# Patient Record
Sex: FEMALE | Race: ASIAN | NOT HISPANIC OR LATINO | Employment: UNEMPLOYED | URBAN - METROPOLITAN AREA
[De-identification: names, ages, dates, MRNs, and addresses within clinical notes are randomized per-mention and may not be internally consistent; named-entity substitution may affect disease eponyms.]

---

## 2019-12-19 ENCOUNTER — HOSPITAL ENCOUNTER (OUTPATIENT)
Facility: CLINIC | Age: 29
Discharge: HOME | End: 2019-12-19

## 2023-08-05 ENCOUNTER — APPOINTMENT (OUTPATIENT)
Dept: CT IMAGING | Facility: CLINIC | Age: 33
End: 2023-08-05
Attending: PSYCHIATRY & NEUROLOGY
Payer: COMMERCIAL

## 2023-08-05 ENCOUNTER — APPOINTMENT (OUTPATIENT)
Dept: GENERAL RADIOLOGY | Facility: CLINIC | Age: 33
End: 2023-08-05
Attending: STUDENT IN AN ORGANIZED HEALTH CARE EDUCATION/TRAINING PROGRAM
Payer: COMMERCIAL

## 2023-08-05 ENCOUNTER — HOSPITAL ENCOUNTER (EMERGENCY)
Facility: CLINIC | Age: 33
Discharge: HOME | End: 2023-08-05
Attending: EMERGENCY MEDICINE | Admitting: INTERNAL MEDICINE
Payer: COMMERCIAL

## 2023-08-05 ENCOUNTER — APPOINTMENT (OUTPATIENT)
Dept: CT IMAGING | Facility: CLINIC | Age: 33
End: 2023-08-05
Attending: STUDENT IN AN ORGANIZED HEALTH CARE EDUCATION/TRAINING PROGRAM
Payer: COMMERCIAL

## 2023-08-05 ENCOUNTER — HOSPITAL ENCOUNTER (INPATIENT)
Facility: CLINIC | Age: 33
LOS: 11 days | Discharge: HOME | End: 2023-08-16
Admitting: INTERNAL MEDICINE
Payer: COMMERCIAL

## 2023-08-05 VITALS
SYSTOLIC BLOOD PRESSURE: 87 MMHG | RESPIRATION RATE: 20 BRPM | DIASTOLIC BLOOD PRESSURE: 72 MMHG | OXYGEN SATURATION: 100 % | HEART RATE: 91 BPM | WEIGHT: 123 LBS

## 2023-08-05 DIAGNOSIS — I46.9 CARDIAC ARREST (H): ICD-10-CM

## 2023-08-05 LAB
ABO/RH(D): NORMAL
ALBUMIN SERPL BCG-MCNC: 3.6 G/DL (ref 3.5–5.2)
ALLEN'S TEST: ABNORMAL
ALP SERPL-CCNC: 49 U/L (ref 35–129)
ALT SERPL W P-5'-P-CCNC: 51 U/L (ref 0–70)
AMPHETAMINES UR QL SCN: ABNORMAL
ANION GAP SERPL CALCULATED.3IONS-SCNC: 14 MMOL/L (ref 7–15)
ANTIBODY SCREEN: NEGATIVE
AST SERPL W P-5'-P-CCNC: 80 U/L (ref 0–45)
ATRIAL RATE - MUSE: 90 BPM
BARBITURATES UR QL SCN: ABNORMAL
BASE EXCESS BLDA CALC-SCNC: -8.3 MMOL/L (ref -9.6–2)
BASE EXCESS BLDA CALC-SCNC: -9.2 MMOL/L (ref -9–1.8)
BENZODIAZ UR QL SCN: ABNORMAL
BILIRUB SERPL-MCNC: 0.6 MG/DL
BUN SERPL-MCNC: 17.4 MG/DL (ref 8–23)
BZE UR QL SCN: ABNORMAL
CA-I BLD-MCNC: 4.3 MG/DL (ref 4.4–5.2)
CA-I BLD-MCNC: 4.4 MG/DL (ref 4.4–5.2)
CA-I BLD-MCNC: 4.9 MG/DL (ref 4.4–5.2)
CALCIUM SERPL-MCNC: 7.5 MG/DL (ref 8.2–9.6)
CANNABINOIDS UR QL SCN: ABNORMAL
CHLORIDE SERPL-SCNC: 111 MMOL/L (ref 98–107)
CHOLEST SERPL-MCNC: 115 MG/DL
CPB POCT: NO
CREAT BLD-MCNC: 1 MG/DL (ref 0.5–1)
CREAT SERPL-MCNC: 0.87 MG/DL (ref 0.51–1.17)
DEPRECATED HCO3 PLAS-SCNC: 17 MMOL/L (ref 22–29)
DIASTOLIC BLOOD PRESSURE - MUSE: NORMAL MMHG
ERYTHROCYTE [DISTWIDTH] IN BLOOD BY AUTOMATED COUNT: 12.7 % (ref 10–15)
ETHANOL UR QL SCN: ABNORMAL
FIBRINOGEN PPP-MCNC: 173 MG/DL (ref 170–490)
GFR SERPL CREATININE-BSD FRML MDRD: 43 ML/MIN/1.73M2
GFR SERPL CREATININE-BSD FRML MDRD: ABNORMAL ML/MIN/{1.73_M2}
GLUCOSE BLD-MCNC: 150 MG/DL (ref 70–99)
GLUCOSE BLD-MCNC: 165 MG/DL (ref 70–99)
GLUCOSE BLDC GLUCOMTR-MCNC: 124 MG/DL (ref 70–99)
GLUCOSE BLDC GLUCOMTR-MCNC: 146 MG/DL (ref 70–99)
GLUCOSE BLDC GLUCOMTR-MCNC: 149 MG/DL (ref 70–99)
GLUCOSE SERPL-MCNC: 170 MG/DL (ref 70–99)
HCO3 BLD-SCNC: 19 MMOL/L (ref 21–28)
HCO3 BLDA-SCNC: 17 MMOL/L (ref 21–28)
HCO3 BLDV-SCNC: 19 MMOL/L (ref 21–28)
HCO3 BLDV-SCNC: 20 MMOL/L (ref 21–28)
HCT VFR BLD AUTO: 38.7 % (ref 35–53)
HCT VFR BLD CALC: 41 % (ref 35–47)
HDLC SERPL-MCNC: 61 MG/DL
HGB BLD-MCNC: 12 G/DL (ref 11.7–17.7)
HGB BLD-MCNC: 12.3 G/DL (ref 11.7–15.7)
HGB BLD-MCNC: 13.9 G/DL (ref 11.7–15.7)
HOLD SPECIMEN: NORMAL
INR PPP: 1.22 (ref 0.85–1.15)
INTERPRETATION ECG - MUSE: NORMAL
LACTATE BLD-SCNC: 4.2 MMOL/L
LACTATE BLD-SCNC: 6.6 MMOL/L
LACTATE SERPL-SCNC: 4.6 MMOL/L (ref 0.7–2)
LDLC SERPL CALC-MCNC: 40 MG/DL
MAGNESIUM SERPL-MCNC: 1.7 MG/DL (ref 1.7–2.3)
MCH RBC QN AUTO: 27.6 PG (ref 26.5–33)
MCHC RBC AUTO-ENTMCNC: 31 G/DL (ref 31.5–36.5)
MCV RBC AUTO: 89 FL (ref 78–100)
NONHDLC SERPL-MCNC: 54 MG/DL
O2/TOTAL GAS SETTING VFR VENT: 100 %
O2/TOTAL GAS SETTING VFR VENT: 50 %
OPIATES UR QL SCN: ABNORMAL
OXYHGB MFR BLD: 99 % (ref 92–100)
OXYHGB MFR BLDA: 100 % (ref 92–100)
P AXIS - MUSE: 92 DEGREES
PCO2 BLD: 46 MM HG (ref 35–45)
PCO2 BLDA: 34 MM HG (ref 35–45)
PCO2 BLDV: 48 MM HG (ref 40–50)
PCO2 BLDV: 51 MM HG (ref 40–50)
PCP QUAL URINE (ROCHE): ABNORMAL
PH BLD: 7.21 [PH] (ref 7.35–7.45)
PH BLDA: 7.31 [PH] (ref 7.35–7.45)
PH BLDV: 7.2 [PH] (ref 7.32–7.43)
PH BLDV: 7.2 [PH] (ref 7.32–7.43)
PHOSPHATE SERPL-MCNC: 4.3 MG/DL (ref 2.5–4.5)
PLATELET # BLD AUTO: 263 10E3/UL (ref 150–450)
PO2 BLD: 190 MM HG (ref 80–105)
PO2 BLDA: 497 MM HG (ref 80–105)
PO2 BLDV: 34 MM HG (ref 25–47)
PO2 BLDV: 36 MM HG (ref 25–47)
POTASSIUM BLD-SCNC: 3.4 MMOL/L (ref 3.5–5)
POTASSIUM BLD-SCNC: 3.6 MMOL/L (ref 3.4–5.3)
POTASSIUM SERPL-SCNC: 3.7 MMOL/L (ref 3.4–5.3)
PR INTERVAL - MUSE: 140 MS
PROT SERPL-MCNC: 5.7 G/DL (ref 6.4–8.3)
QRS DURATION - MUSE: 80 MS
QT - MUSE: 380 MS
QTC - MUSE: 464 MS
R AXIS - MUSE: 79 DEGREES
RADIOLOGIST FLAGS: ABNORMAL
RBC # BLD AUTO: 4.34 10E6/UL (ref 3.8–5.9)
SAO2 % BLDV: 53 % (ref 94–100)
SAO2 % BLDV: 55 % (ref 94–100)
SODIUM BLD-SCNC: 143 MMOL/L (ref 133–144)
SODIUM BLD-SCNC: 143 MMOL/L (ref 133–144)
SODIUM SERPL-SCNC: 142 MMOL/L (ref 136–145)
SPECIMEN EXPIRATION DATE: NORMAL
SYSTOLIC BLOOD PRESSURE - MUSE: NORMAL MMHG
T AXIS - MUSE: 77 DEGREES
TRIGL SERPL-MCNC: 71 MG/DL
TROPONIN T BLD-MCNC: 0.12 UG/L
TROPONIN T SERPL HS-MCNC: 144 NG/L
TSH SERPL DL<=0.005 MIU/L-ACNC: 3.24 UIU/ML (ref 0.3–4.2)
VENTRICULAR RATE- MUSE: 90 BPM
WBC # BLD AUTO: 24.2 10E3/UL (ref 4–11)

## 2023-08-05 PROCEDURE — 80307 DRUG TEST PRSMV CHEM ANLYZR: CPT | Performed by: STUDENT IN AN ORGANIZED HEALTH CARE EDUCATION/TRAINING PROGRAM

## 2023-08-05 PROCEDURE — 250N000011 HC RX IP 250 OP 636

## 2023-08-05 PROCEDURE — 82565 ASSAY OF CREATININE: CPT

## 2023-08-05 PROCEDURE — 87077 CULTURE AEROBIC IDENTIFY: CPT | Performed by: STUDENT IN AN ORGANIZED HEALTH CARE EDUCATION/TRAINING PROGRAM

## 2023-08-05 PROCEDURE — 87147 CULTURE TYPE IMMUNOLOGIC: CPT | Performed by: STUDENT IN AN ORGANIZED HEALTH CARE EDUCATION/TRAINING PROGRAM

## 2023-08-05 PROCEDURE — 84484 ASSAY OF TROPONIN QUANT: CPT

## 2023-08-05 PROCEDURE — 70498 CT ANGIOGRAPHY NECK: CPT

## 2023-08-05 PROCEDURE — 99152 MOD SED SAME PHYS/QHP 5/>YRS: CPT | Mod: GC | Performed by: INTERNAL MEDICINE

## 2023-08-05 PROCEDURE — 87205 SMEAR GRAM STAIN: CPT | Performed by: STUDENT IN AN ORGANIZED HEALTH CARE EDUCATION/TRAINING PROGRAM

## 2023-08-05 PROCEDURE — 84443 ASSAY THYROID STIM HORMONE: CPT | Performed by: STUDENT IN AN ORGANIZED HEALTH CARE EDUCATION/TRAINING PROGRAM

## 2023-08-05 PROCEDURE — 200N000002 HC R&B ICU UMMC

## 2023-08-05 PROCEDURE — 70496 CT ANGIOGRAPHY HEAD: CPT | Mod: 26 | Performed by: RADIOLOGY

## 2023-08-05 PROCEDURE — 86850 RBC ANTIBODY SCREEN: CPT | Performed by: STUDENT IN AN ORGANIZED HEALTH CARE EDUCATION/TRAINING PROGRAM

## 2023-08-05 PROCEDURE — 87635 SARS-COV-2 COVID-19 AMP PRB: CPT | Performed by: STUDENT IN AN ORGANIZED HEALTH CARE EDUCATION/TRAINING PROGRAM

## 2023-08-05 PROCEDURE — 36556 INSERT NON-TUNNEL CV CATH: CPT

## 2023-08-05 PROCEDURE — 74018 RADEX ABDOMEN 1 VIEW: CPT | Mod: 26 | Performed by: RADIOLOGY

## 2023-08-05 PROCEDURE — 85027 COMPLETE CBC AUTOMATED: CPT | Performed by: STUDENT IN AN ORGANIZED HEALTH CARE EDUCATION/TRAINING PROGRAM

## 2023-08-05 PROCEDURE — 83735 ASSAY OF MAGNESIUM: CPT | Performed by: STUDENT IN AN ORGANIZED HEALTH CARE EDUCATION/TRAINING PROGRAM

## 2023-08-05 PROCEDURE — 93308 TTE F-UP OR LMTD: CPT | Mod: 26 | Performed by: EMERGENCY MEDICINE

## 2023-08-05 PROCEDURE — 82330 ASSAY OF CALCIUM: CPT | Performed by: STUDENT IN AN ORGANIZED HEALTH CARE EDUCATION/TRAINING PROGRAM

## 2023-08-05 PROCEDURE — 82947 ASSAY GLUCOSE BLOOD QUANT: CPT

## 2023-08-05 PROCEDURE — 80061 LIPID PANEL: CPT | Performed by: STUDENT IN AN ORGANIZED HEALTH CARE EDUCATION/TRAINING PROGRAM

## 2023-08-05 PROCEDURE — 84484 ASSAY OF TROPONIN QUANT: CPT | Performed by: STUDENT IN AN ORGANIZED HEALTH CARE EDUCATION/TRAINING PROGRAM

## 2023-08-05 PROCEDURE — 31500 INSERT EMERGENCY AIRWAY: CPT | Performed by: EMERGENCY MEDICINE

## 2023-08-05 PROCEDURE — 86316 IMMUNOASSAY TUMOR OTHER: CPT

## 2023-08-05 PROCEDURE — 999N000185 HC STATISTIC TRANSPORT TIME EA 15 MIN

## 2023-08-05 PROCEDURE — 250N000009 HC RX 250: Performed by: EMERGENCY MEDICINE

## 2023-08-05 PROCEDURE — 5A1945Z RESPIRATORY VENTILATION, 24-96 CONSECUTIVE HOURS: ICD-10-PCS | Performed by: INTERNAL MEDICINE

## 2023-08-05 PROCEDURE — 85384 FIBRINOGEN ACTIVITY: CPT | Performed by: STUDENT IN AN ORGANIZED HEALTH CARE EDUCATION/TRAINING PROGRAM

## 2023-08-05 PROCEDURE — 84132 ASSAY OF SERUM POTASSIUM: CPT

## 2023-08-05 PROCEDURE — 82803 BLOOD GASES ANY COMBINATION: CPT | Mod: 91

## 2023-08-05 PROCEDURE — 94002 VENT MGMT INPAT INIT DAY: CPT

## 2023-08-05 PROCEDURE — 93454 CORONARY ARTERY ANGIO S&I: CPT | Performed by: INTERNAL MEDICINE

## 2023-08-05 PROCEDURE — 250N000013 HC RX MED GY IP 250 OP 250 PS 637: Performed by: STUDENT IN AN ORGANIZED HEALTH CARE EDUCATION/TRAINING PROGRAM

## 2023-08-05 PROCEDURE — 272N000001 HC OR GENERAL SUPPLY STERILE: Performed by: INTERNAL MEDICINE

## 2023-08-05 PROCEDURE — 999N000157 HC STATISTIC RCP TIME EA 10 MIN

## 2023-08-05 PROCEDURE — 74176 CT ABD & PELVIS W/O CONTRAST: CPT | Mod: 26 | Performed by: RADIOLOGY

## 2023-08-05 PROCEDURE — 82805 BLOOD GASES W/O2 SATURATION: CPT | Performed by: STUDENT IN AN ORGANIZED HEALTH CARE EDUCATION/TRAINING PROGRAM

## 2023-08-05 PROCEDURE — 83605 ASSAY OF LACTIC ACID: CPT

## 2023-08-05 PROCEDURE — 84450 TRANSFERASE (AST) (SGOT): CPT | Performed by: STUDENT IN AN ORGANIZED HEALTH CARE EDUCATION/TRAINING PROGRAM

## 2023-08-05 PROCEDURE — 70498 CT ANGIOGRAPHY NECK: CPT | Mod: 26 | Performed by: RADIOLOGY

## 2023-08-05 PROCEDURE — 999N000065 XR CHEST PORT 1 VIEW

## 2023-08-05 PROCEDURE — 250N000011 HC RX IP 250 OP 636: Performed by: STUDENT IN AN ORGANIZED HEALTH CARE EDUCATION/TRAINING PROGRAM

## 2023-08-05 PROCEDURE — 82810 BLOOD GASES O2 SAT ONLY: CPT

## 2023-08-05 PROCEDURE — 83605 ASSAY OF LACTIC ACID: CPT | Performed by: STUDENT IN AN ORGANIZED HEALTH CARE EDUCATION/TRAINING PROGRAM

## 2023-08-05 PROCEDURE — 70496 CT ANGIOGRAPHY HEAD: CPT

## 2023-08-05 PROCEDURE — 84155 ASSAY OF PROTEIN SERUM: CPT | Performed by: STUDENT IN AN ORGANIZED HEALTH CARE EDUCATION/TRAINING PROGRAM

## 2023-08-05 PROCEDURE — 71250 CT THORAX DX C-: CPT | Mod: 26 | Performed by: RADIOLOGY

## 2023-08-05 PROCEDURE — 250N000011 HC RX IP 250 OP 636: Performed by: INTERNAL MEDICINE

## 2023-08-05 PROCEDURE — C1894 INTRO/SHEATH, NON-LASER: HCPCS | Performed by: INTERNAL MEDICINE

## 2023-08-05 PROCEDURE — 70450 CT HEAD/BRAIN W/O DYE: CPT | Mod: 77

## 2023-08-05 PROCEDURE — 84100 ASSAY OF PHOSPHORUS: CPT | Performed by: STUDENT IN AN ORGANIZED HEALTH CARE EDUCATION/TRAINING PROGRAM

## 2023-08-05 PROCEDURE — B2111ZZ FLUOROSCOPY OF MULTIPLE CORONARY ARTERIES USING LOW OSMOLAR CONTRAST: ICD-10-PCS | Performed by: INTERNAL MEDICINE

## 2023-08-05 PROCEDURE — 71045 X-RAY EXAM CHEST 1 VIEW: CPT | Mod: 26 | Performed by: RADIOLOGY

## 2023-08-05 PROCEDURE — 93010 ELECTROCARDIOGRAM REPORT: CPT | Mod: 59 | Performed by: EMERGENCY MEDICINE

## 2023-08-05 PROCEDURE — 82803 BLOOD GASES ANY COMBINATION: CPT

## 2023-08-05 PROCEDURE — 36556 INSERT NON-TUNNEL CV CATH: CPT | Mod: 59 | Performed by: INTERNAL MEDICINE

## 2023-08-05 PROCEDURE — C1751 CATH, INF, PER/CENT/MIDLINE: HCPCS | Performed by: INTERNAL MEDICINE

## 2023-08-05 PROCEDURE — 93454 CORONARY ARTERY ANGIO S&I: CPT | Mod: 26 | Performed by: INTERNAL MEDICINE

## 2023-08-05 PROCEDURE — 83036 HEMOGLOBIN GLYCOSYLATED A1C: CPT | Performed by: STUDENT IN AN ORGANIZED HEALTH CARE EDUCATION/TRAINING PROGRAM

## 2023-08-05 PROCEDURE — 258N000003 HC RX IP 258 OP 636

## 2023-08-05 PROCEDURE — 410N000003 HC PER-PERFUSION 1ST 30 MIN: Performed by: INTERNAL MEDICINE

## 2023-08-05 PROCEDURE — C1769 GUIDE WIRE: HCPCS | Performed by: INTERNAL MEDICINE

## 2023-08-05 PROCEDURE — 74018 RADEX ABDOMEN 1 VIEW: CPT | Mod: 26 | Performed by: STUDENT IN AN ORGANIZED HEALTH CARE EDUCATION/TRAINING PROGRAM

## 2023-08-05 PROCEDURE — 250N000011 HC RX IP 250 OP 636: Mod: JZ | Performed by: INTERNAL MEDICINE

## 2023-08-05 PROCEDURE — 70450 CT HEAD/BRAIN W/O DYE: CPT

## 2023-08-05 PROCEDURE — 999N000065 XR ABDOMEN PORT 1 VIEW

## 2023-08-05 PROCEDURE — 71250 CT THORAX DX C-: CPT

## 2023-08-05 PROCEDURE — 258N000003 HC RX IP 258 OP 636: Performed by: INTERNAL MEDICINE

## 2023-08-05 PROCEDURE — 82330 ASSAY OF CALCIUM: CPT

## 2023-08-05 PROCEDURE — 99291 CRITICAL CARE FIRST HOUR: CPT | Mod: 25

## 2023-08-05 PROCEDURE — 36415 COLL VENOUS BLD VENIPUNCTURE: CPT | Performed by: STUDENT IN AN ORGANIZED HEALTH CARE EDUCATION/TRAINING PROGRAM

## 2023-08-05 PROCEDURE — 99291 CRITICAL CARE FIRST HOUR: CPT | Mod: 25 | Performed by: EMERGENCY MEDICINE

## 2023-08-05 PROCEDURE — 93005 ELECTROCARDIOGRAM TRACING: CPT | Mod: 59

## 2023-08-05 PROCEDURE — 99152 MOD SED SAME PHYS/QHP 5/>YRS: CPT | Performed by: INTERNAL MEDICINE

## 2023-08-05 PROCEDURE — 71045 X-RAY EXAM CHEST 1 VIEW: CPT | Mod: 26 | Performed by: STUDENT IN AN ORGANIZED HEALTH CARE EDUCATION/TRAINING PROGRAM

## 2023-08-05 PROCEDURE — 93005 ELECTROCARDIOGRAM TRACING: CPT

## 2023-08-05 PROCEDURE — 93308 TTE F-UP OR LMTD: CPT

## 2023-08-05 PROCEDURE — 3E043XZ INTRODUCTION OF VASOPRESSOR INTO CENTRAL VEIN, PERCUTANEOUS APPROACH: ICD-10-PCS | Performed by: INTERNAL MEDICINE

## 2023-08-05 PROCEDURE — 85610 PROTHROMBIN TIME: CPT | Performed by: STUDENT IN AN ORGANIZED HEALTH CARE EDUCATION/TRAINING PROGRAM

## 2023-08-05 PROCEDURE — 250N000011 HC RX IP 250 OP 636: Performed by: EMERGENCY MEDICINE

## 2023-08-05 PROCEDURE — 86901 BLOOD TYPING SEROLOGIC RH(D): CPT | Performed by: STUDENT IN AN ORGANIZED HEALTH CARE EDUCATION/TRAINING PROGRAM

## 2023-08-05 DEVICE — CATH CENTRAL LINE MULTI LUMEN 7FRX20CM CDC-45703-XP1A: Type: IMPLANTABLE DEVICE | Status: FUNCTIONAL

## 2023-08-05 RX ORDER — FENTANYL CITRATE 50 UG/ML
INJECTION, SOLUTION INTRAMUSCULAR; INTRAVENOUS
Status: DISCONTINUED
Start: 2023-08-05 | End: 2023-08-05 | Stop reason: WASHOUT

## 2023-08-05 RX ORDER — CEFTRIAXONE 1 G/1
1 INJECTION, POWDER, FOR SOLUTION INTRAMUSCULAR; INTRAVENOUS EVERY 24 HOURS
Status: DISCONTINUED | OUTPATIENT
Start: 2023-08-05 | End: 2023-08-06

## 2023-08-05 RX ORDER — FENTANYL CITRATE 50 UG/ML
INJECTION, SOLUTION INTRAMUSCULAR; INTRAVENOUS
Status: DISCONTINUED | OUTPATIENT
Start: 2023-08-05 | End: 2023-08-05 | Stop reason: HOSPADM

## 2023-08-05 RX ORDER — IOPAMIDOL 755 MG/ML
75 INJECTION, SOLUTION INTRAVASCULAR ONCE
Status: COMPLETED | OUTPATIENT
Start: 2023-08-05 | End: 2023-08-05

## 2023-08-05 RX ORDER — NALOXONE HYDROCHLORIDE 0.4 MG/ML
0.4 INJECTION, SOLUTION INTRAMUSCULAR; INTRAVENOUS; SUBCUTANEOUS
Status: DISCONTINUED | OUTPATIENT
Start: 2023-08-05 | End: 2023-08-16 | Stop reason: HOSPADM

## 2023-08-05 RX ORDER — NICOTINE POLACRILEX 4 MG
15-30 LOZENGE BUCCAL
Status: DISCONTINUED | OUTPATIENT
Start: 2023-08-05 | End: 2023-08-05

## 2023-08-05 RX ORDER — MAGNESIUM SULFATE HEPTAHYDRATE 40 MG/ML
2 INJECTION, SOLUTION INTRAVENOUS ONCE
Status: COMPLETED | OUTPATIENT
Start: 2023-08-05 | End: 2023-08-05

## 2023-08-05 RX ORDER — DEXMEDETOMIDINE HYDROCHLORIDE 4 UG/ML
.1-1.2 INJECTION, SOLUTION INTRAVENOUS CONTINUOUS
Status: DISCONTINUED | OUTPATIENT
Start: 2023-08-05 | End: 2023-08-05

## 2023-08-05 RX ORDER — DEXTROSE MONOHYDRATE 100 MG/ML
INJECTION, SOLUTION INTRAVENOUS CONTINUOUS PRN
Status: DISCONTINUED | OUTPATIENT
Start: 2023-08-05 | End: 2023-08-07

## 2023-08-05 RX ORDER — ETOMIDATE 2 MG/ML
20 INJECTION INTRAVENOUS ONCE
Status: COMPLETED | OUTPATIENT
Start: 2023-08-05 | End: 2023-08-05

## 2023-08-05 RX ORDER — NICOTINE POLACRILEX 4 MG
15-30 LOZENGE BUCCAL
Status: DISCONTINUED | OUTPATIENT
Start: 2023-08-05 | End: 2023-08-16 | Stop reason: HOSPADM

## 2023-08-05 RX ORDER — PROPOFOL 10 MG/ML
5-75 INJECTION, EMULSION INTRAVENOUS CONTINUOUS
Status: DISCONTINUED | OUTPATIENT
Start: 2023-08-05 | End: 2023-08-07

## 2023-08-05 RX ORDER — ONDANSETRON 4 MG/1
4 TABLET, ORALLY DISINTEGRATING ORAL EVERY 6 HOURS PRN
Status: DISCONTINUED | OUTPATIENT
Start: 2023-08-05 | End: 2023-08-16 | Stop reason: HOSPADM

## 2023-08-05 RX ORDER — BUSPIRONE HYDROCHLORIDE 5 MG/1
10 TABLET ORAL 3 TIMES DAILY
Status: DISCONTINUED | OUTPATIENT
Start: 2023-08-05 | End: 2023-08-07

## 2023-08-05 RX ORDER — DEXTROSE MONOHYDRATE 25 G/50ML
25-50 INJECTION, SOLUTION INTRAVENOUS
Status: DISCONTINUED | OUTPATIENT
Start: 2023-08-05 | End: 2023-08-05

## 2023-08-05 RX ORDER — PROPOFOL 10 MG/ML
INJECTION, EMULSION INTRAVENOUS
Status: COMPLETED
Start: 2023-08-05 | End: 2023-08-05

## 2023-08-05 RX ORDER — POTASSIUM CHLORIDE 7.45 MG/ML
10 INJECTION INTRAVENOUS ONCE
Status: COMPLETED | OUTPATIENT
Start: 2023-08-05 | End: 2023-08-06

## 2023-08-05 RX ORDER — DEXTROSE MONOHYDRATE 25 G/50ML
25-50 INJECTION, SOLUTION INTRAVENOUS
Status: DISCONTINUED | OUTPATIENT
Start: 2023-08-05 | End: 2023-08-16 | Stop reason: HOSPADM

## 2023-08-05 RX ORDER — NALOXONE HYDROCHLORIDE 0.4 MG/ML
0.2 INJECTION, SOLUTION INTRAMUSCULAR; INTRAVENOUS; SUBCUTANEOUS
Status: DISCONTINUED | OUTPATIENT
Start: 2023-08-05 | End: 2023-08-16 | Stop reason: HOSPADM

## 2023-08-05 RX ORDER — ONDANSETRON 2 MG/ML
4 INJECTION INTRAMUSCULAR; INTRAVENOUS EVERY 6 HOURS PRN
Status: DISCONTINUED | OUTPATIENT
Start: 2023-08-05 | End: 2023-08-16 | Stop reason: HOSPADM

## 2023-08-05 RX ORDER — FENTANYL CITRATE 50 UG/ML
25-50 INJECTION, SOLUTION INTRAMUSCULAR; INTRAVENOUS
Status: DISCONTINUED | OUTPATIENT
Start: 2023-08-05 | End: 2023-08-07

## 2023-08-05 RX ORDER — CALCIUM GLUCONATE 20 MG/ML
1 INJECTION, SOLUTION INTRAVENOUS ONCE
Status: COMPLETED | OUTPATIENT
Start: 2023-08-05 | End: 2023-08-05

## 2023-08-05 RX ORDER — CHLORHEXIDINE GLUCONATE ORAL RINSE 1.2 MG/ML
15 SOLUTION DENTAL EVERY 12 HOURS
Status: DISCONTINUED | OUTPATIENT
Start: 2023-08-05 | End: 2023-08-07

## 2023-08-05 RX ORDER — IOPAMIDOL 755 MG/ML
INJECTION, SOLUTION INTRAVASCULAR
Status: DISCONTINUED | OUTPATIENT
Start: 2023-08-05 | End: 2023-08-05 | Stop reason: HOSPADM

## 2023-08-05 RX ADMIN — CEFTRIAXONE SODIUM 1 G: 1 INJECTION, POWDER, FOR SOLUTION INTRAMUSCULAR; INTRAVENOUS at 23:28

## 2023-08-05 RX ADMIN — Medication 50 MCG/HR: at 21:19

## 2023-08-05 RX ADMIN — PROPOFOL 30 MCG/KG/MIN: 10 INJECTION, EMULSION INTRAVENOUS at 23:14

## 2023-08-05 RX ADMIN — CALCIUM GLUCONATE 1 G: 20 INJECTION, SOLUTION INTRAVENOUS at 22:46

## 2023-08-05 RX ADMIN — ETOMIDATE INJECTION 20 MG: 2 SOLUTION INTRAVENOUS at 19:56

## 2023-08-05 RX ADMIN — SUCCINYLCHOLINE CHLORIDE 100 MG: 20 INJECTION, SOLUTION INTRAMUSCULAR; INTRAVENOUS; PARENTERAL at 19:59

## 2023-08-05 RX ADMIN — MAGNESIUM SULFATE IN WATER 2 G: 40 INJECTION, SOLUTION INTRAVENOUS at 21:41

## 2023-08-05 RX ADMIN — CHLORHEXIDINE GLUCONATE 15 ML: 1.2 SOLUTION ORAL at 22:45

## 2023-08-05 RX ADMIN — EPINEPHRINE 0.02 MCG/KG/MIN: 1 INJECTION INTRAMUSCULAR; INTRAVENOUS; SUBCUTANEOUS at 23:13

## 2023-08-05 RX ADMIN — BUSPIRONE HYDROCHLORIDE 10 MG: 10 TABLET ORAL at 23:13

## 2023-08-05 RX ADMIN — KETAMINE HYDROCHLORIDE 50 MG: 10 INJECTION INTRAMUSCULAR; INTRAVENOUS at 19:57

## 2023-08-05 RX ADMIN — IOPAMIDOL 75 ML: 755 INJECTION, SOLUTION INTRAVENOUS at 22:56

## 2023-08-05 ASSESSMENT — ACTIVITIES OF DAILY LIVING (ADL)
ADLS_ACUITY_SCORE: 35
ADLS_ACUITY_SCORE: 35
ADLS_ACUITY_SCORE: 39
ADLS_ACUITY_SCORE: 35

## 2023-08-05 NOTE — LETTER
[facility] 6D INTERMEDIATE CARE  [address] [facility] [address]  [phone]      2023    Estela Fry  [address] [place] [address]  [phone] (home)     : 1990      To Whom it may concern:    Estela Fry was admitted to the hospital from 23 - 23.     Sincerely,    Delia Beal PA-C

## 2023-08-05 NOTE — Clinical Note
Total contrast given (ml) 20 Detail Level: Detailed Quality 130: Documentation Of Current Medications In The Medical Record: Current Medications Documented Quality 131: Pain Assessment And Follow-Up: Pain assessment using a standardized tool is documented as negative, no follow-up plan required Quality 110: Preventive Care And Screening: Influenza Immunization: Influenza immunization was not ordered or administered, reason not given

## 2023-08-05 NOTE — Clinical Note
Case will be done under monitored anesthesia care; CRNA and/or MDA present throughout case. Please see anesthesia record for full documentation of medications, hemodynamics, respiratory status, and level of consciousness.

## 2023-08-05 NOTE — Clinical Note
dry, intact, no bleeding and no hematoma. All sites are closed and covered with steri strips and sterile semi-porous dressing.

## 2023-08-05 NOTE — LETTER
Transition Communication Hand-off for Care Transitions to Next Level of Care Provider    Name: Estela Fry  : 1990  MRN #: [id number]  Primary Care Provider: [facility]     Primary Clinic: [facility]  [facility] [address]     Reason for Hospitalization:  Cardiac arrest (H) [I46.9]  Admit Date/Time: 2023  8:13 PM  Discharge Date: 23  Payor Source: Payor: [facility] / Plan: [facility] PMAP / Product Type: HMO /            Reason for Communication Hand-off Referral: Other continuity of care    Discharge Plan: See AVS    Discharge Needs Assessment:  Needs      Flowsheet Row Most Recent Value   Equipment Currently Used at Home none          Follow-up plan:    Future Appointments   Date Time Provider Department Center   2023  8:30 AM Ana Bales MD [facility]   10/18/2023 11:30 AM Marcia Wilhelm MD [facility]       Key Recommendations:  See AVS    Lety Sena RN    AVS/Discharge Summary is the source of truth; this is a helpful guide for improved communication of patient story

## 2023-08-05 NOTE — Clinical Note
Monitored Anesthesia Care is planned for this procedure.    Provider immediate assessment completed.

## 2023-08-06 ENCOUNTER — APPOINTMENT (OUTPATIENT)
Dept: CT IMAGING | Facility: CLINIC | Age: 33
End: 2023-08-06
Attending: STUDENT IN AN ORGANIZED HEALTH CARE EDUCATION/TRAINING PROGRAM
Payer: COMMERCIAL

## 2023-08-06 ENCOUNTER — APPOINTMENT (OUTPATIENT)
Dept: ULTRASOUND IMAGING | Facility: CLINIC | Age: 33
End: 2023-08-06
Attending: STUDENT IN AN ORGANIZED HEALTH CARE EDUCATION/TRAINING PROGRAM
Payer: COMMERCIAL

## 2023-08-06 ENCOUNTER — ANCILLARY PROCEDURE (OUTPATIENT)
Dept: ULTRASOUND IMAGING | Facility: CLINIC | Age: 33
End: 2023-08-06
Attending: EMERGENCY MEDICINE
Payer: COMMERCIAL

## 2023-08-06 ENCOUNTER — APPOINTMENT (OUTPATIENT)
Dept: CARDIOLOGY | Facility: CLINIC | Age: 33
End: 2023-08-06
Attending: STUDENT IN AN ORGANIZED HEALTH CARE EDUCATION/TRAINING PROGRAM
Payer: COMMERCIAL

## 2023-08-06 LAB
ALBUMIN SERPL BCG-MCNC: 2.8 G/DL (ref 3.5–5.2)
ALBUMIN SERPL BCG-MCNC: 2.9 G/DL (ref 3.5–5.2)
ALBUMIN SERPL BCG-MCNC: 3.2 G/DL (ref 3.5–5.2)
ALBUMIN SERPL BCG-MCNC: 3.5 G/DL (ref 3.5–5.2)
ALBUMIN SERPL BCG-MCNC: 3.6 G/DL (ref 3.5–5.2)
ALLEN'S TEST: ABNORMAL
ALP SERPL-CCNC: 36 U/L (ref 35–129)
ALP SERPL-CCNC: 36 U/L (ref 35–129)
ALP SERPL-CCNC: 40 U/L (ref 35–129)
ALP SERPL-CCNC: 42 U/L (ref 35–129)
ALP SERPL-CCNC: 43 U/L (ref 35–129)
ALT SERPL W P-5'-P-CCNC: 33 U/L (ref 0–70)
ALT SERPL W P-5'-P-CCNC: 35 U/L (ref 0–70)
ALT SERPL W P-5'-P-CCNC: 36 U/L (ref 0–70)
ALT SERPL W P-5'-P-CCNC: 47 U/L (ref 0–70)
ALT SERPL W P-5'-P-CCNC: 49 U/L (ref 0–70)
ANION GAP SERPL CALCULATED.3IONS-SCNC: 12 MMOL/L (ref 7–15)
ANION GAP SERPL CALCULATED.3IONS-SCNC: 8 MMOL/L (ref 7–15)
ANION GAP SERPL CALCULATED.3IONS-SCNC: 9 MMOL/L (ref 7–15)
AST SERPL W P-5'-P-CCNC: 34 U/L (ref 0–45)
AST SERPL W P-5'-P-CCNC: 40 U/L (ref 0–45)
AST SERPL W P-5'-P-CCNC: 40 U/L (ref 0–45)
AST SERPL W P-5'-P-CCNC: 59 U/L (ref 0–45)
AST SERPL W P-5'-P-CCNC: 72 U/L (ref 0–45)
BASE EXCESS BLDA CALC-SCNC: -1.5 MMOL/L (ref -9–1.8)
BASE EXCESS BLDA CALC-SCNC: -2.3 MMOL/L (ref -9–1.8)
BASE EXCESS BLDA CALC-SCNC: -2.4 MMOL/L (ref -9–1.8)
BASE EXCESS BLDA CALC-SCNC: -5.1 MMOL/L (ref -9–1.8)
BASE EXCESS BLDV CALC-SCNC: -0.9 MMOL/L (ref -7.7–1.9)
BILIRUB SERPL-MCNC: 0.5 MG/DL
BILIRUB SERPL-MCNC: 0.6 MG/DL
BILIRUB SERPL-MCNC: 0.8 MG/DL
BILIRUB SERPL-MCNC: 0.9 MG/DL
BILIRUB SERPL-MCNC: 0.9 MG/DL
BUN SERPL-MCNC: 10 MG/DL (ref 8–23)
BUN SERPL-MCNC: 10.5 MG/DL (ref 8–23)
BUN SERPL-MCNC: 13.6 MG/DL (ref 8–23)
BUN SERPL-MCNC: 16.5 MG/DL (ref 8–23)
BUN SERPL-MCNC: 8.9 MG/DL (ref 8–23)
C PNEUM DNA SPEC QL NAA+PROBE: NOT DETECTED
CA-I BLD-MCNC: 4.4 MG/DL (ref 4.4–5.2)
CA-I BLD-MCNC: 4.4 MG/DL (ref 4.4–5.2)
CA-I BLD-MCNC: 4.5 MG/DL (ref 4.4–5.2)
CA-I BLD-MCNC: 4.6 MG/DL (ref 4.4–5.2)
CA-I BLD-MCNC: 5.5 MG/DL (ref 4.4–5.2)
CALCIUM SERPL-MCNC: 7.5 MG/DL (ref 8.2–9.6)
CALCIUM SERPL-MCNC: 7.6 MG/DL (ref 8.2–9.6)
CALCIUM SERPL-MCNC: 7.8 MG/DL (ref 8.2–9.6)
CALCIUM SERPL-MCNC: 8 MG/DL (ref 8.2–9.6)
CALCIUM SERPL-MCNC: 9.5 MG/DL (ref 8.2–9.6)
CHLORIDE SERPL-SCNC: 108 MMOL/L (ref 98–107)
CHLORIDE SERPL-SCNC: 110 MMOL/L (ref 98–107)
CREAT SERPL-MCNC: 0.59 MG/DL (ref 0.51–1.17)
CREAT SERPL-MCNC: 0.6 MG/DL (ref 0.51–1.17)
CREAT SERPL-MCNC: 0.61 MG/DL (ref 0.51–1.17)
CREAT SERPL-MCNC: 0.67 MG/DL (ref 0.51–1.17)
CREAT SERPL-MCNC: 0.77 MG/DL (ref 0.51–1.17)
DEPRECATED HCO3 PLAS-SCNC: 18 MMOL/L (ref 22–29)
DEPRECATED HCO3 PLAS-SCNC: 20 MMOL/L (ref 22–29)
DEPRECATED HCO3 PLAS-SCNC: 20 MMOL/L (ref 22–29)
DEPRECATED HCO3 PLAS-SCNC: 21 MMOL/L (ref 22–29)
DEPRECATED HCO3 PLAS-SCNC: 22 MMOL/L (ref 22–29)
ERYTHROCYTE [DISTWIDTH] IN BLOOD BY AUTOMATED COUNT: 12.8 % (ref 10–15)
ERYTHROCYTE [DISTWIDTH] IN BLOOD BY AUTOMATED COUNT: 12.9 % (ref 10–15)
ERYTHROCYTE [DISTWIDTH] IN BLOOD BY AUTOMATED COUNT: 13 % (ref 10–15)
FLUAV H1 2009 PAND RNA SPEC QL NAA+PROBE: NOT DETECTED
FLUAV H1 RNA SPEC QL NAA+PROBE: NOT DETECTED
FLUAV H3 RNA SPEC QL NAA+PROBE: NOT DETECTED
FLUAV RNA SPEC QL NAA+PROBE: NOT DETECTED
FLUBV RNA SPEC QL NAA+PROBE: NOT DETECTED
GFR SERPL CREATININE-BSD FRML MDRD: ABNORMAL ML/MIN/{1.73_M2}
GLUCOSE BLDC GLUCOMTR-MCNC: 103 MG/DL (ref 70–99)
GLUCOSE BLDC GLUCOMTR-MCNC: 111 MG/DL (ref 70–99)
GLUCOSE BLDC GLUCOMTR-MCNC: 116 MG/DL (ref 70–99)
GLUCOSE BLDC GLUCOMTR-MCNC: 116 MG/DL (ref 70–99)
GLUCOSE BLDC GLUCOMTR-MCNC: 121 MG/DL (ref 70–99)
GLUCOSE BLDC GLUCOMTR-MCNC: 132 MG/DL (ref 70–99)
GLUCOSE BLDC GLUCOMTR-MCNC: 132 MG/DL (ref 70–99)
GLUCOSE BLDC GLUCOMTR-MCNC: 137 MG/DL (ref 70–99)
GLUCOSE BLDC GLUCOMTR-MCNC: 142 MG/DL (ref 70–99)
GLUCOSE BLDC GLUCOMTR-MCNC: 98 MG/DL (ref 70–99)
GLUCOSE BLDC GLUCOMTR-MCNC: 98 MG/DL (ref 70–99)
GLUCOSE SERPL-MCNC: 107 MG/DL (ref 70–99)
GLUCOSE SERPL-MCNC: 121 MG/DL (ref 70–99)
GLUCOSE SERPL-MCNC: 126 MG/DL (ref 70–99)
GLUCOSE SERPL-MCNC: 140 MG/DL (ref 70–99)
GLUCOSE SERPL-MCNC: 145 MG/DL (ref 70–99)
HADV DNA SPEC QL NAA+PROBE: NOT DETECTED
HBA1C MFR BLD: 5.4 %
HCO3 BLD-SCNC: 20 MMOL/L (ref 21–28)
HCO3 BLD-SCNC: 23 MMOL/L (ref 21–28)
HCO3 BLD-SCNC: 23 MMOL/L (ref 21–28)
HCO3 BLD-SCNC: 24 MMOL/L (ref 21–28)
HCO3 BLDV-SCNC: 25 MMOL/L (ref 21–28)
HCOV PNL SPEC NAA+PROBE: NOT DETECTED
HCT VFR BLD AUTO: 34.6 % (ref 35–53)
HCT VFR BLD AUTO: 36 % (ref 35–53)
HCT VFR BLD AUTO: 40.3 % (ref 35–53)
HGB BLD-MCNC: 11.3 G/DL (ref 11.7–17.7)
HGB BLD-MCNC: 11.5 G/DL (ref 11.7–17.7)
HGB BLD-MCNC: 12.8 G/DL (ref 11.7–17.7)
HMPV RNA SPEC QL NAA+PROBE: NOT DETECTED
HPIV1 RNA SPEC QL NAA+PROBE: NOT DETECTED
HPIV2 RNA SPEC QL NAA+PROBE: NOT DETECTED
HPIV3 RNA SPEC QL NAA+PROBE: NOT DETECTED
HPIV4 RNA SPEC QL NAA+PROBE: NOT DETECTED
LACTATE SERPL-SCNC: 1 MMOL/L (ref 0.7–2)
LACTATE SERPL-SCNC: 1.1 MMOL/L (ref 0.7–2)
LACTATE SERPL-SCNC: 1.1 MMOL/L (ref 0.7–2)
LACTATE SERPL-SCNC: 1.3 MMOL/L (ref 0.7–2)
LACTATE SERPL-SCNC: 1.3 MMOL/L (ref 0.7–2)
LACTATE SERPL-SCNC: 1.5 MMOL/L (ref 0.7–2)
LACTATE SERPL-SCNC: 2.2 MMOL/L (ref 0.7–2)
LACTATE SERPL-SCNC: 2.7 MMOL/L (ref 0.7–2)
M PNEUMO DNA SPEC QL NAA+PROBE: NOT DETECTED
MAGNESIUM SERPL-MCNC: 1.7 MG/DL (ref 1.7–2.3)
MAGNESIUM SERPL-MCNC: 2.5 MG/DL (ref 1.7–2.3)
MCH RBC QN AUTO: 27.9 PG (ref 26.5–33)
MCH RBC QN AUTO: 28 PG (ref 26.5–33)
MCH RBC QN AUTO: 28.6 PG (ref 26.5–33)
MCHC RBC AUTO-ENTMCNC: 31.8 G/DL (ref 31.5–36.5)
MCHC RBC AUTO-ENTMCNC: 31.9 G/DL (ref 31.5–36.5)
MCHC RBC AUTO-ENTMCNC: 32.7 G/DL (ref 31.5–36.5)
MCV RBC AUTO: 88 FL (ref 78–100)
MRSA DNA SPEC QL NAA+PROBE: POSITIVE
O2/TOTAL GAS SETTING VFR VENT: 35 %
O2/TOTAL GAS SETTING VFR VENT: 40 %
OXYHGB MFR BLD: 98 % (ref 92–100)
OXYHGB MFR BLD: 98 % (ref 92–100)
OXYHGB MFR BLD: 99 % (ref 92–100)
OXYHGB MFR BLD: 99 % (ref 92–100)
OXYHGB MFR BLDV: 73 % (ref 70–75)
PCO2 BLD: 36 MM HG (ref 35–45)
PCO2 BLD: 42 MM HG (ref 35–45)
PCO2 BLD: 43 MM HG (ref 35–45)
PCO2 BLD: 43 MM HG (ref 35–45)
PCO2 BLDV: 47 MM HG (ref 40–50)
PH BLD: 7.35 [PH] (ref 7.35–7.45)
PH BLD: 7.36 [PH] (ref 7.35–7.45)
PH BLDV: 7.34 [PH] (ref 7.32–7.43)
PHOSPHATE SERPL-MCNC: 3.4 MG/DL (ref 2.5–4.5)
PLAT MORPH BLD: NORMAL
PLATELET # BLD AUTO: 153 10E3/UL (ref 150–450)
PLATELET # BLD AUTO: 161 10E3/UL (ref 150–450)
PLATELET # BLD AUTO: 199 10E3/UL (ref 150–450)
PO2 BLD: 128 MM HG (ref 80–105)
PO2 BLD: 155 MM HG (ref 80–105)
PO2 BLD: 160 MM HG (ref 80–105)
PO2 BLD: 180 MM HG (ref 80–105)
PO2 BLDV: 43 MM HG (ref 25–47)
POTASSIUM SERPL-SCNC: 3.3 MMOL/L (ref 3.4–5.3)
POTASSIUM SERPL-SCNC: 4 MMOL/L (ref 3.4–5.3)
POTASSIUM SERPL-SCNC: 4 MMOL/L (ref 3.4–5.3)
POTASSIUM SERPL-SCNC: 4.1 MMOL/L (ref 3.4–5.3)
POTASSIUM SERPL-SCNC: 4.2 MMOL/L (ref 3.4–5.3)
POTASSIUM SERPL-SCNC: 5.1 MMOL/L (ref 3.4–5.3)
PROT SERPL-MCNC: 4.7 G/DL (ref 6.4–8.3)
PROT SERPL-MCNC: 4.8 G/DL (ref 6.4–8.3)
PROT SERPL-MCNC: 5.2 G/DL (ref 6.4–8.3)
PROT SERPL-MCNC: 5.5 G/DL (ref 6.4–8.3)
PROT SERPL-MCNC: 5.5 G/DL (ref 6.4–8.3)
RADIOLOGIST FLAGS: ABNORMAL
RBC # BLD AUTO: 3.95 10E6/UL (ref 3.8–5.9)
RBC # BLD AUTO: 4.1 10E6/UL (ref 3.8–5.9)
RBC # BLD AUTO: 4.59 10E6/UL (ref 3.8–5.9)
RBC MORPH BLD: NORMAL
RSV RNA SPEC QL NAA+PROBE: NOT DETECTED
RSV RNA SPEC QL NAA+PROBE: NOT DETECTED
RV+EV RNA SPEC QL NAA+PROBE: NOT DETECTED
SA TARGET DNA: POSITIVE
SARS-COV-2 RNA RESP QL NAA+PROBE: NEGATIVE
SODIUM SERPL-SCNC: 136 MMOL/L (ref 136–145)
SODIUM SERPL-SCNC: 138 MMOL/L (ref 136–145)
TROPONIN T SERPL HS-MCNC: 173 NG/L
TROPONIN T SERPL HS-MCNC: 44 NG/L
TROPONIN T SERPL HS-MCNC: 47 NG/L
TROPONIN T SERPL HS-MCNC: 54 NG/L
UFH PPP CHRO-ACNC: >1.1 IU/ML
WBC # BLD AUTO: 17.7 10E3/UL (ref 4–11)
WBC # BLD AUTO: 18.1 10E3/UL (ref 4–11)
WBC # BLD AUTO: 20.4 10E3/UL (ref 4–11)

## 2023-08-06 PROCEDURE — 85027 COMPLETE CBC AUTOMATED: CPT | Performed by: STUDENT IN AN ORGANIZED HEALTH CARE EDUCATION/TRAINING PROGRAM

## 2023-08-06 PROCEDURE — 250N000011 HC RX IP 250 OP 636

## 2023-08-06 PROCEDURE — 82330 ASSAY OF CALCIUM: CPT | Performed by: STUDENT IN AN ORGANIZED HEALTH CARE EDUCATION/TRAINING PROGRAM

## 2023-08-06 PROCEDURE — 200N000002 HC R&B ICU UMMC

## 2023-08-06 PROCEDURE — 250N000011 HC RX IP 250 OP 636: Mod: JZ | Performed by: STUDENT IN AN ORGANIZED HEALTH CARE EDUCATION/TRAINING PROGRAM

## 2023-08-06 PROCEDURE — 250N000009 HC RX 250

## 2023-08-06 PROCEDURE — 83605 ASSAY OF LACTIC ACID: CPT | Performed by: STUDENT IN AN ORGANIZED HEALTH CARE EDUCATION/TRAINING PROGRAM

## 2023-08-06 PROCEDURE — 83735 ASSAY OF MAGNESIUM: CPT | Performed by: STUDENT IN AN ORGANIZED HEALTH CARE EDUCATION/TRAINING PROGRAM

## 2023-08-06 PROCEDURE — 82805 BLOOD GASES W/O2 SATURATION: CPT | Performed by: STUDENT IN AN ORGANIZED HEALTH CARE EDUCATION/TRAINING PROGRAM

## 2023-08-06 PROCEDURE — 93970 EXTREMITY STUDY: CPT | Mod: 26 | Performed by: RADIOLOGY

## 2023-08-06 PROCEDURE — 84484 ASSAY OF TROPONIN QUANT: CPT | Performed by: STUDENT IN AN ORGANIZED HEALTH CARE EDUCATION/TRAINING PROGRAM

## 2023-08-06 PROCEDURE — 87040 BLOOD CULTURE FOR BACTERIA: CPT | Performed by: STUDENT IN AN ORGANIZED HEALTH CARE EDUCATION/TRAINING PROGRAM

## 2023-08-06 PROCEDURE — 258N000003 HC RX IP 258 OP 636: Performed by: STUDENT IN AN ORGANIZED HEALTH CARE EDUCATION/TRAINING PROGRAM

## 2023-08-06 PROCEDURE — 94002 VENT MGMT INPAT INIT DAY: CPT

## 2023-08-06 PROCEDURE — 87641 MR-STAPH DNA AMP PROBE: CPT | Performed by: STUDENT IN AN ORGANIZED HEALTH CARE EDUCATION/TRAINING PROGRAM

## 2023-08-06 PROCEDURE — 80051 ELECTROLYTE PANEL: CPT | Performed by: STUDENT IN AN ORGANIZED HEALTH CARE EDUCATION/TRAINING PROGRAM

## 2023-08-06 PROCEDURE — 87077 CULTURE AEROBIC IDENTIFY: CPT | Performed by: STUDENT IN AN ORGANIZED HEALTH CARE EDUCATION/TRAINING PROGRAM

## 2023-08-06 PROCEDURE — C9113 INJ PANTOPRAZOLE SODIUM, VIA: HCPCS | Mod: JZ | Performed by: STUDENT IN AN ORGANIZED HEALTH CARE EDUCATION/TRAINING PROGRAM

## 2023-08-06 PROCEDURE — 84132 ASSAY OF SERUM POTASSIUM: CPT | Performed by: STUDENT IN AN ORGANIZED HEALTH CARE EDUCATION/TRAINING PROGRAM

## 2023-08-06 PROCEDURE — 94003 VENT MGMT INPAT SUBQ DAY: CPT

## 2023-08-06 PROCEDURE — 87633 RESP VIRUS 12-25 TARGETS: CPT | Performed by: STUDENT IN AN ORGANIZED HEALTH CARE EDUCATION/TRAINING PROGRAM

## 2023-08-06 PROCEDURE — 84100 ASSAY OF PHOSPHORUS: CPT | Performed by: STUDENT IN AN ORGANIZED HEALTH CARE EDUCATION/TRAINING PROGRAM

## 2023-08-06 PROCEDURE — 250N000013 HC RX MED GY IP 250 OP 250 PS 637: Performed by: STUDENT IN AN ORGANIZED HEALTH CARE EDUCATION/TRAINING PROGRAM

## 2023-08-06 PROCEDURE — 93010 ELECTROCARDIOGRAM REPORT: CPT | Performed by: INTERNAL MEDICINE

## 2023-08-06 PROCEDURE — 99291 CRITICAL CARE FIRST HOUR: CPT | Mod: GC | Performed by: INTERNAL MEDICINE

## 2023-08-06 PROCEDURE — 250N000011 HC RX IP 250 OP 636: Performed by: STUDENT IN AN ORGANIZED HEALTH CARE EDUCATION/TRAINING PROGRAM

## 2023-08-06 PROCEDURE — 36415 COLL VENOUS BLD VENIPUNCTURE: CPT | Performed by: STUDENT IN AN ORGANIZED HEALTH CARE EDUCATION/TRAINING PROGRAM

## 2023-08-06 PROCEDURE — 87486 CHLMYD PNEUM DNA AMP PROBE: CPT | Performed by: STUDENT IN AN ORGANIZED HEALTH CARE EDUCATION/TRAINING PROGRAM

## 2023-08-06 PROCEDURE — 250N000009 HC RX 250: Performed by: STUDENT IN AN ORGANIZED HEALTH CARE EDUCATION/TRAINING PROGRAM

## 2023-08-06 PROCEDURE — 70450 CT HEAD/BRAIN W/O DYE: CPT | Mod: 26 | Performed by: RADIOLOGY

## 2023-08-06 PROCEDURE — 71275 CT ANGIOGRAPHY CHEST: CPT

## 2023-08-06 PROCEDURE — 71275 CT ANGIOGRAPHY CHEST: CPT | Mod: 26 | Performed by: STUDENT IN AN ORGANIZED HEALTH CARE EDUCATION/TRAINING PROGRAM

## 2023-08-06 PROCEDURE — 76775 US EXAM ABDO BACK WALL LIM: CPT | Mod: 26 | Performed by: RADIOLOGY

## 2023-08-06 PROCEDURE — 999N000185 HC STATISTIC TRANSPORT TIME EA 15 MIN

## 2023-08-06 PROCEDURE — 84450 TRANSFERASE (AST) (SGOT): CPT | Performed by: STUDENT IN AN ORGANIZED HEALTH CARE EDUCATION/TRAINING PROGRAM

## 2023-08-06 PROCEDURE — 258N000003 HC RX IP 258 OP 636

## 2023-08-06 PROCEDURE — 93005 ELECTROCARDIOGRAM TRACING: CPT

## 2023-08-06 PROCEDURE — 99254 IP/OBS CNSLTJ NEW/EST MOD 60: CPT | Mod: GC | Performed by: PSYCHIATRY & NEUROLOGY

## 2023-08-06 PROCEDURE — 93306 TTE W/DOPPLER COMPLETE: CPT

## 2023-08-06 PROCEDURE — 76775 US EXAM ABDO BACK WALL LIM: CPT

## 2023-08-06 PROCEDURE — 70450 CT HEAD/BRAIN W/O DYE: CPT

## 2023-08-06 PROCEDURE — 85520 HEPARIN ASSAY: CPT | Performed by: STUDENT IN AN ORGANIZED HEALTH CARE EDUCATION/TRAINING PROGRAM

## 2023-08-06 PROCEDURE — 93970 EXTREMITY STUDY: CPT

## 2023-08-06 PROCEDURE — 999N000157 HC STATISTIC RCP TIME EA 10 MIN

## 2023-08-06 RX ORDER — CALCIUM GLUCONATE 20 MG/ML
1 INJECTION, SOLUTION INTRAVENOUS
Status: DISCONTINUED | OUTPATIENT
Start: 2023-08-06 | End: 2023-08-06

## 2023-08-06 RX ORDER — NOREPINEPHRINE BITARTRATE 0.06 MG/ML
INJECTION, SOLUTION INTRAVENOUS
Status: COMPLETED
Start: 2023-08-06 | End: 2023-08-06

## 2023-08-06 RX ORDER — ADENOSINE 3 MG/ML
INJECTION, SOLUTION INTRAVENOUS
Status: COMPLETED
Start: 2023-08-06 | End: 2023-08-06

## 2023-08-06 RX ORDER — DEXMEDETOMIDINE HYDROCHLORIDE 4 UG/ML
.1-1.2 INJECTION, SOLUTION INTRAVENOUS CONTINUOUS
Status: DISCONTINUED | OUTPATIENT
Start: 2023-08-06 | End: 2023-08-07

## 2023-08-06 RX ORDER — MAGNESIUM SULFATE HEPTAHYDRATE 40 MG/ML
2 INJECTION, SOLUTION INTRAVENOUS ONCE
Status: COMPLETED | OUTPATIENT
Start: 2023-08-07 | End: 2023-08-07

## 2023-08-06 RX ORDER — NOREPINEPHRINE BITARTRATE 0.06 MG/ML
.01-.6 INJECTION, SOLUTION INTRAVENOUS CONTINUOUS
Status: DISCONTINUED | OUTPATIENT
Start: 2023-08-06 | End: 2023-08-07

## 2023-08-06 RX ORDER — HEPARIN SODIUM 10000 [USP'U]/100ML
0-5000 INJECTION, SOLUTION INTRAVENOUS CONTINUOUS
Status: DISPENSED | OUTPATIENT
Start: 2023-08-06 | End: 2023-08-10

## 2023-08-06 RX ORDER — HEPARIN SODIUM 5000 [USP'U]/.5ML
5000 INJECTION, SOLUTION INTRAVENOUS; SUBCUTANEOUS EVERY 12 HOURS
Status: DISCONTINUED | OUTPATIENT
Start: 2023-08-06 | End: 2023-08-06

## 2023-08-06 RX ORDER — CEFTRIAXONE 2 G/1
2 INJECTION, POWDER, FOR SOLUTION INTRAMUSCULAR; INTRAVENOUS EVERY 24 HOURS
Status: COMPLETED | OUTPATIENT
Start: 2023-08-06 | End: 2023-08-09

## 2023-08-06 RX ORDER — NOREPINEPHRINE BITARTRATE 0.06 MG/ML
.01-.6 INJECTION, SOLUTION INTRAVENOUS CONTINUOUS
Status: CANCELLED | OUTPATIENT
Start: 2023-08-06

## 2023-08-06 RX ORDER — IOPAMIDOL 755 MG/ML
52 INJECTION, SOLUTION INTRAVASCULAR ONCE
Status: COMPLETED | OUTPATIENT
Start: 2023-08-06 | End: 2023-08-06

## 2023-08-06 RX ORDER — POTASSIUM CHLORIDE 29.8 MG/ML
20 INJECTION INTRAVENOUS ONCE
Status: COMPLETED | OUTPATIENT
Start: 2023-08-06 | End: 2023-08-06

## 2023-08-06 RX ADMIN — POTASSIUM CHLORIDE 10 MEQ: 7.45 INJECTION INTRAVENOUS at 00:16

## 2023-08-06 RX ADMIN — ONDANSETRON 4 MG: 2 INJECTION INTRAMUSCULAR; INTRAVENOUS at 21:43

## 2023-08-06 RX ADMIN — Medication 0.03 MCG/KG/MIN: at 07:28

## 2023-08-06 RX ADMIN — PANTOPRAZOLE SODIUM 40 MG: 40 INJECTION, POWDER, FOR SOLUTION INTRAVENOUS at 08:12

## 2023-08-06 RX ADMIN — DEXMEDETOMIDINE HYDROCHLORIDE 0.3 MCG/KG/HR: 400 INJECTION INTRAVENOUS at 17:08

## 2023-08-06 RX ADMIN — CALCIUM CHLORIDE 1 G: 100 INJECTION, SOLUTION INTRAVENOUS at 21:06

## 2023-08-06 RX ADMIN — HEPARIN SODIUM 1000 UNITS/HR: 10000 INJECTION, SOLUTION INTRAVENOUS at 14:33

## 2023-08-06 RX ADMIN — Medication 50 MCG/HR: at 08:24

## 2023-08-06 RX ADMIN — DEXMEDETOMIDINE HYDROCHLORIDE 0.2 MCG/KG/HR: 400 INJECTION INTRAVENOUS at 06:40

## 2023-08-06 RX ADMIN — BUSPIRONE HYDROCHLORIDE 10 MG: 10 TABLET ORAL at 19:41

## 2023-08-06 RX ADMIN — SODIUM CHLORIDE, POTASSIUM CHLORIDE, SODIUM LACTATE AND CALCIUM CHLORIDE 1000 ML: 600; 310; 30; 20 INJECTION, SOLUTION INTRAVENOUS at 11:07

## 2023-08-06 RX ADMIN — NOREPINEPHRINE BITARTRATE 0.03 MCG/KG/MIN: 0.06 INJECTION, SOLUTION INTRAVENOUS at 07:28

## 2023-08-06 RX ADMIN — SODIUM CHLORIDE, POTASSIUM CHLORIDE, SODIUM LACTATE AND CALCIUM CHLORIDE 500 ML: 600; 310; 30; 20 INJECTION, SOLUTION INTRAVENOUS at 05:09

## 2023-08-06 RX ADMIN — BUSPIRONE HYDROCHLORIDE 10 MG: 10 TABLET ORAL at 08:37

## 2023-08-06 RX ADMIN — Medication 2 UNITS/HR: at 07:52

## 2023-08-06 RX ADMIN — CHLORHEXIDINE GLUCONATE 15 ML: 1.2 SOLUTION ORAL at 08:52

## 2023-08-06 RX ADMIN — CEFTRIAXONE SODIUM 2 G: 2 INJECTION, POWDER, FOR SOLUTION INTRAMUSCULAR; INTRAVENOUS at 22:10

## 2023-08-06 RX ADMIN — BUSPIRONE HYDROCHLORIDE 10 MG: 10 TABLET ORAL at 14:36

## 2023-08-06 RX ADMIN — IOPAMIDOL 52 ML: 755 INJECTION, SOLUTION INTRAVENOUS at 15:53

## 2023-08-06 RX ADMIN — ADENOSINE 6 MG: 3 INJECTION INTRAVENOUS at 03:15

## 2023-08-06 RX ADMIN — PROPOFOL 40 MCG/KG/MIN: 10 INJECTION, EMULSION INTRAVENOUS at 05:21

## 2023-08-06 RX ADMIN — SODIUM CHLORIDE, POTASSIUM CHLORIDE, SODIUM LACTATE AND CALCIUM CHLORIDE 500 ML: 600; 310; 30; 20 INJECTION, SOLUTION INTRAVENOUS at 06:11

## 2023-08-06 RX ADMIN — SODIUM CHLORIDE, POTASSIUM CHLORIDE, SODIUM LACTATE AND CALCIUM CHLORIDE 1000 ML: 600; 310; 30; 20 INJECTION, SOLUTION INTRAVENOUS at 07:48

## 2023-08-06 RX ADMIN — HEPARIN SODIUM 5000 UNITS: 5000 INJECTION, SOLUTION INTRAVENOUS; SUBCUTANEOUS at 10:38

## 2023-08-06 RX ADMIN — FENTANYL CITRATE 25 MCG: 50 INJECTION, SOLUTION INTRAMUSCULAR; INTRAVENOUS at 10:31

## 2023-08-06 RX ADMIN — POTASSIUM CHLORIDE 20 MEQ: 29.8 INJECTION, SOLUTION INTRAVENOUS at 01:25

## 2023-08-06 RX ADMIN — CHLORHEXIDINE GLUCONATE 15 ML: 1.2 SOLUTION ORAL at 19:40

## 2023-08-06 RX ADMIN — EPINEPHRINE 0.03 MCG/KG/MIN: 1 INJECTION INTRAMUSCULAR; INTRAVENOUS; SUBCUTANEOUS at 08:24

## 2023-08-06 ASSESSMENT — ACTIVITIES OF DAILY LIVING (ADL)
ADLS_ACUITY_SCORE: 46
ADLS_ACUITY_SCORE: 35
ADLS_ACUITY_SCORE: 46
ADLS_ACUITY_SCORE: 35
ADLS_ACUITY_SCORE: 35
ADLS_ACUITY_SCORE: 46
ADLS_ACUITY_SCORE: 35
ADLS_ACUITY_SCORE: 43
ADLS_ACUITY_SCORE: 35
ADLS_ACUITY_SCORE: 35
ADLS_ACUITY_SCORE: 46
ADLS_ACUITY_SCORE: 35
ADLS_ACUITY_SCORE: 39
ADLS_ACUITY_SCORE: 35
ADLS_ACUITY_SCORE: 35
ADLS_ACUITY_SCORE: 39
ADLS_ACUITY_SCORE: 46
ADLS_ACUITY_SCORE: 50
ADLS_ACUITY_SCORE: 46
ADLS_ACUITY_SCORE: 35

## 2023-08-06 NOTE — ED NOTES
Second provider critical care    Critical care was performed.   Critical Care Addendum  My initial assessment, based on my review of prehospital provider report, review of vital signs, focused history, physical exam, review of cardiac rhythm monitor, 12 lead ECG analysis, and discussion with cardiology , established a high suspicion that Latia An has cardiac arrest, which requires immediate intervention, and therefore she is critically ill.     After the initial assessment, the care team initiated multiple lab tests, initiated medication therapy with epi drip, RSI medications, and consulted with cardiology  to provide stabilization care. Due to the critical nature of this patient, I reassessed vital signs, physical exam, mental status, neurologic status, and respiratory status multiple times prior to her disposition.     Time also spent performing reviewing test results, discussion with consultants, and coordination of care.     Critical care time (excluding teaching time and procedures): 35 minutes.     I provided additional critical care in the emergency department for this unidentified patient.  Approximately 31-year-old female that was found to be in cardiac arrest by prehospital providers.  Rhythm interpreted to be V-fib and ROSC after 1 shock.  Patient arrives with i-gel in place.  Initially ultrasounded heart with adequate cardiac activity, no RV dilation.  Brief FAST exam completed with no free fluid in the abdomen.  Uterus appears to be empty.  No significant B-lines.  I-gel was swapped out for ET tube with RSI medications.  Blood pressures were soft in the 80s, epi drip initiated.  Her glucose was normal.  Initial i-STAT labs with mild lactate elevation to 6, otherwise electrolytes largely within normal limits.  Significant other is present and reporting no significant comorbidities.  EKG postarrest is nonischemic.  Chest x-ray confirming ET tube placement.    Concern for possible  intracranial bleeding, electrolyte abnormality, arrhythmia, respiratory arrest as etiologies for cardiac arrest.  The Cath Lab was activated prehospital for this V-fib arrest.  Spoke with Dr. Liz from the cardiology team who insisted on immediate Cath Lab intervention.  Patient was hemodynamically stable with a normal EKG at this time.  Expressed my concern for possible intracranial bleeding and advocated for head CT prior to Cath Lab.  Cardiology did not agree and patient was brought to the Cath Lab by ECMO staff with plan for remainder of their postarrest work-up including head CT deferred until after cath.      MD Lilly Miller Sarah, MD  08/05/23 2030       Abiola Carr MD  08/05/23 0878

## 2023-08-06 NOTE — ED PROVIDER NOTES
ED Provider Note  [facility]      History     Chief Complaint   Patient presents with    Cardiac Arrest     HPI  31 year old female who was BIBA under anonymous protocol with ROSC in field after unwitnessed arrest. EMS reports they have limited information as the patient's family did not speak English. They found her down in her home, unresponsive with vfib on the monitor. She was shocked x1 with ROSC in field. iGel was placed and patient was bagged during transport. She had sinus tachycardia and otherwise nl vitals during transport. No meds were given. Finger stick glucose was around 170. They were not aware of any trauma or suspected overdose. They do not know of any past medical problems. They are not sure how long patient was down but they do believe family members performed bystander CPR. On arrival, patient receiving bag mask ventilations with ambu bag. She opened her eyes shortly after being transferred to hospital stretcher and started gagging/tearing, with extensor posturing. EMS reports they activated ECMO from the field but they stopped in the ED because cath lab was not ready yet.    Patient's  arrived later in the course and reports he was out fishing with his son. The patient called him and mentioned she was taking some herbal remedy/tea to treat recent problem of uterine prolapse. His daughter later called him from home telling him that the patient had collapsed and was not responding. He was on his way driving home and was a few stop lights away. He found patient on the floor of the kitchen, blue and not breathing well when he arrived. He started CPR. He did not see any blood or evidence that their had been any trauma. He did not see any evidence that she had taken a bunch of pills. She does not have a history of self harm or SI. She has not heart or lung issues. No family h/o sudden cardiac death. The only thing he knows that she was taking was an herbal medication  to treat the prolapse. He thinks she may have choked on the medicine and passed out.         Past Medical History  No past medical history on file.  No past surgical history on file.  No current outpatient medications on file.    Not on File  Family History  No family history on file.  Social History          A medically appropriate review of systems was performed with pertinent positives and negatives noted in the HPI, and all other systems negative.    Physical Exam   BP: 103/73  Pulse: 99  Resp: 18  Weight: 55.8 kg (123 lb)  SpO2: 96 %  Physical Exam  Vitals and nursing note reviewed.   Constitutional:       General: She is in acute distress.      Appearance: She is normal weight. She is not diaphoretic.      Interventions: She is intubated. Backboard in place.      Comments: iGel in place   HENT:      Head: Normocephalic and atraumatic.      Nose: Nose normal.      Mouth/Throat:      Mouth: Mucous membranes are moist.   Eyes:      General: Gaze aligned appropriately. No scleral icterus.     Conjunctiva/sclera: Conjunctivae normal.      Right eye: Right conjunctiva is not injected.      Left eye: Left conjunctiva is not injected.      Pupils:      Right eye: Pupil is round.      Left eye: Pupil is not round.      Comments: Pupils 4 mm bilaterally   Cardiovascular:      Rate and Rhythm: Normal rate and regular rhythm.   Pulmonary:      Effort: She is intubated.      Breath sounds: Normal breath sounds.      Comments: BVM ventilations  Abdominal:      General: There is no distension.      Palpations: Abdomen is soft.   Musculoskeletal:         General: No swelling or signs of injury.      Right lower leg: No edema.      Left lower leg: No edema.   Skin:     Coloration: Skin is not cyanotic, jaundiced, mottled or pale.      Findings: No bruising or rash.   Neurological:      GCS: GCS eye subscore is 2. GCS motor subscore is 2.      Cranial Nerves: No facial asymmetry.      Motor: Abnormal muscle tone present.    Psychiatric:      Comments: Unable to assess           ED Course, Procedures, & Data      -Intubation    Date/Time: 8/5/2023 8:38 PM    Performed by: Marla Kang MD  Authorized by: Marla Kang MD    Emergent condition/consent implied    ED EVALUATION:      ASA Class: Class 5- Severe systemic disease with imminent risk of death    Mallampati: N/A- Alternate secured airway    NPO Status: not NPO, emergent situation    UNIVERSAL PROTOCOL   Site Marked: NA  Prior Images Obtained and Reviewed:  NA  Required items: Required blood products, implants, devices and special equipment available    Patient identity confirmed:  Anonymous protocol, patient vented/unresponsive  Patient was reevaluated immediately before administering moderate or deep sedation or anesthesia  Confirmation Checklist:  Correct equipment/implants were available  Universal protocol:     Required blood products, implants, devices, and special equipment available: yes      Patient identity confirmed:  Anonymous protocol, patient vented/unresponsive  Pre-procedure details:     Indications: airway protection, altered consciousness and cardio/pulmonary arrest      Patient status:  Altered mental status    Look externally: no concerns      Induction agents:  Etomidate    Paralytics:  Succinylcholine  Procedure details:     Preoxygenation:  Supraglottic device    CPR in progress: no      Number of attempts:  1  Successful intubation attempt details:     Intubation method:  Oral    Intubation technique: video assisted      Laryngoscope blade:  Mac 3    Bougie used: yes      Grade view: II      Tube size (mm):  7.5    Tube type:  Cuffed    Tube visualized through cords: yes    Placement assessment:     ETT at teeth/gumline (cm):  21    Tube secured with:  ETT navas    Breath sounds:  Equal    Placement verification: chest rise, colorimetric ETCO2, CXR verification, direct visualization and equal breath sounds      CXR findings:  Appropriate  position  Post-procedure details:     Procedure completion:  Patient tolerated the procedure well with no immediate complications    PROCEDURE    Patient Tolerance:  Patient tolerated the procedure well with no immediate complications    Results for orders placed during the hospital encounter of 08/05/23    POC US ECHO LIMITED    Impression  Limited Bedside Cardiac Ultrasound, performed and interpreted by me.  Indication: Pulseless on exam/Cardiac arrest and Hypotension/shock.  subcostal views were acquired.  Image quality was satisfactory.    Findings:  Global left ventricular function appears intact.  Chambers do not appear dilated.  There is no evidence of free fluid within the pericardium.    IMPRESSION: Grossly normal left ventricular function and chamber size.  No pericardial effusion..    ED Course Selections:        EKG Interpretation:      Interpreted by Marla Kang MD  Time reviewed: 1939  Symptoms at time of EKG: ROSC  Rhythm: normal sinus   Rate: normal  Axis: normal  Ectopy: none  Conduction: normal  ST Segments/ T Waves: No ST-T wave changes  Q Waves: none  Comparison to prior: No old EKG available    Clinical Impression: normal EKG          ED Course Selections: The Lactic acid level is elevated due to cardiac arrest, at this time there is no sign of severe sepsis or septic shock.       Results for orders placed or performed during the hospital encounter of 08/05/23   XR Abdomen Port 1 View     Status: None    Narrative    EXAM: XR ABDOMEN PORT 1 VIEW  8/5/2023 7:55 PM     HISTORY:  OG PLACEMENT       COMPARISON:  Same day chest x-ray    FINDINGS:   Portable supine frontal view of the abdomen. Gastric tube tip and  sidehole projected in the stomach.    Nonobstructive bowel gas pattern. No portal venous gas or pneumatosis.    The included osseous structures and soft tissues are within normal  limits.     The lung bases are clear.      Impression    IMPRESSION: Gastric tube tip and sidehole  projected in the stomach.  Nonobstructive bowel gas pattern.     I have personally reviewed the examination and initial interpretation  and I agree with the findings.    BENITEZ RYAN DO         SYSTEM ID:  [id number]   XR Chest Port 1 View     Status: None    Narrative    Exam: XR CHEST PORT 1 VIEW, 8/5/2023 7:56 PM    Indication: ETT    Comparison: None    Findings:   Endotracheal tube tip in the mid thoracic trachea. Gastric tube tip  out of field of view and sidehole projecting in the stomach. Cardiac  and mediastinal silhouettes within normal limits. No focal airspace  consolidation. No pleural effusion or appreciable pneumothorax. No  acute osseous abnormality.      Impression    Impression: Endotracheal tube tip in midthoracic trachea. No acute  airspace disease.    I have personally reviewed the examination and initial interpretation  and I agree with the findings.    BENITEZ RYAN DO         SYSTEM ID:  [id number]   POC US ECHO LIMITED     Status: None    Impression    Limited Bedside Cardiac Ultrasound, performed and interpreted by me.   Indication: Pulseless on exam/Cardiac arrest and Hypotension/shock.  subcostal views were acquired.   Image quality was satisfactory.    Findings:    Global left ventricular function appears intact.  Chambers do not appear dilated.  There is no evidence of free fluid within the pericardium.    IMPRESSION: Grossly normal left ventricular function and chamber size.  No pericardial effusion..       Loda Draw     Status: None (In process)    Narrative    The following orders were created for panel order Loda Draw.  Procedure                               Abnormality         Status                     ---------                               -----------         ------                     Extra Blue Top Tube[[id number]]                              Final result               Extra Red Top Tube[[id number]]                               Final result               Extra Green Top  (Lithium...[[id number]]                                                 Extra Purple Top Tube[[id number]]                            Final result                 Please view results for these tests on the individual orders.   iStat Gases Electrolytes ICA Glucose Venous, POCT     Status: Abnormal   Result Value Ref Range    CPB Applied No     Hematocrit POCT 41 35 - 47 %    Calcium, Ionized Whole Blood POCT 4.9 4.4 - 5.2 mg/dL    Glucose Whole Blood POCT 165 (H) 70 - 99 mg/dL    Bicarbonate Venous POCT 19 (L) 21 - 28 mmol/L    Hemoglobin POCT 13.9 11.7 - 15.7 g/dL    Potassium POCT 3.6 3.4 - 5.3 mmol/L    Sodium POCT 143 133 - 144 mmol/L    pCO2 Venous POCT 48 40 - 50 mm Hg    pO2 Venous POCT 34 25 - 47 mm Hg    pH Venous POCT 7.20 (L) 7.32 - 7.43    O2 Sat, Venous POCT 53 (L) 94 - 100 %   Extra Blue Top Tube     Status: None   Result Value Ref Range    Hold Specimen JIC    Extra Red Top Tube     Status: None   Result Value Ref Range    Hold Specimen JIC    Extra Purple Top Tube     Status: None   Result Value Ref Range    Hold Specimen JIC    Creatinine POCT     Status: Abnormal   Result Value Ref Range    Creatinine POCT 1.0 0.5 - 1.0 mg/dL    GFR, ESTIMATED POCT 43 (L) >60 mL/min/1.73m2   iStat Troponin, POCT     Status: Normal   Result Value Ref Range    TROPPC POCT 0.12 <=0.12 ug/L   Arterial Panel POCT     Status: Abnormal   Result Value Ref Range    pH Arterial POCT 7.31 (L) 7.35 - 7.45    pCO2 Arterial POCT 34 (L) 35 - 45 mm Hg    pO2 Arterial POCT 497 (H) 80 - 105 mm Hg    Bicarbonate Arterial POCT 17 (L) 21 - 28 mmol/L    Sodium POCT 143 133 - 144 mmol/L    Potassium POCT 3.4 (L) 3.5 - 5.0 mmol/L    Hemoglobin POCT 12.3 11.7 - 15.7 g/dL    Glucose Whole Blood POCT 150 (H) 70 - 99 mg/dL    Calcium, Ionized Whole Blood POCT 4.4 4.4 - 5.2 mg/dL    Base Excess/Deficit (+/-) POCT -8.3 -9.6 - 2.0 mmol/L    FIO2 POCT 100.0 %    Lactic Acid POCT 4.2 (HH) <=2.0 mmol/L   Oxyhemoglobin, arterial POCT     Status: Normal    Result Value Ref Range    Oxyhemoglobin POCT 100 92 - 100 %   iStat Gases (lactate) venous, POCT     Status: Abnormal   Result Value Ref Range    Lactic Acid POCT 6.6 (HH) <=2.0 mmol/L    Bicarbonate Venous POCT 20 (L) 21 - 28 mmol/L    O2 Sat, Venous POCT 55 (L) 94 - 100 %    pCO2 Venous POCT 51 (H) 40 - 50 mm Hg    pH Venous POCT 7.20 (L) 7.32 - 7.43    pO2 Venous POCT 36 25 - 47 mm Hg   EKG 12-lead, tracing only     Status: None   Result Value Ref Range    Systolic Blood Pressure  mmHg    Diastolic Blood Pressure  mmHg    Ventricular Rate 90 BPM    Atrial Rate 90 BPM    MN Interval 140 ms    QRS Duration 80 ms     ms    QTc 464 ms    P Axis 92 degrees    R AXIS 79 degrees    T Axis 77 degrees    Interpretation ECG       Sinus rhythm  Low voltage QRS  Borderline ECG  Unconfirmed report - interpretation of this ECG is computer generated - see medical record for final interpretation  Confirmed by - EMERGENCY ROOM, PHYSICIAN ([id number]),  RATNA POWERS ([id number]) on 8/5/2023 8:22:05 PM       Medications   0.9% sodium chloride BOLUS (has no administration in time range)   etomidate (AMIDATE) injection 20 mg (20 mg Intravenous $Given 8/5/23 1956)   ketamine (KETALAR) injection 50 mg (50 mg Intravenous $Given by Other 8/5/23 1957)   succinylcholine (ANECTINE) injection 100 mg (100 mg Intravenous $Given 8/5/23 1959)     Labs Ordered and Resulted from Time of ED Arrival to Time of ED Departure   ISTAT GASES ELECTROLYTES ICA GLUCOSE VENOUS POCT - Abnormal       Result Value    CPB Applied No      Hematocrit POCT 41      Calcium, Ionized Whole Blood POCT 4.9      Glucose Whole Blood POCT 165 (*)     Bicarbonate Venous POCT 19 (*)     Hemoglobin POCT 13.9      Potassium POCT 3.6      Sodium POCT 143      pCO2 Venous POCT 48      pO2 Venous POCT 34      pH Venous POCT 7.20 (*)     O2 Sat, Venous POCT 53 (*)    ISTAT CREATININE POCT - Abnormal    Creatinine POCT 1.0      GFR, ESTIMATED POCT 43 (*)    ARTERIAL PANEL  POCT - Abnormal    pH Arterial POCT 7.31 (*)     pCO2 Arterial POCT 34 (*)     pO2 Arterial POCT 497 (*)     Bicarbonate Arterial POCT 17 (*)     Sodium POCT 143      Potassium POCT 3.4 (*)     Hemoglobin POCT 12.3      Glucose Whole Blood POCT 150 (*)     Calcium, Ionized Whole Blood POCT 4.4      Base Excess/Deficit (+/-) POCT -8.3      FIO2 POCT 100.0      Lactic Acid POCT 4.2 (*)    ISTAT GASES LACTATE VENOUS POCT - Abnormal    Lactic Acid POCT 6.6 (*)     Bicarbonate Venous POCT 20 (*)     O2 Sat, Venous POCT 55 (*)     pCO2 Venous POCT 51 (*)     pH Venous POCT 7.20 (*)     pO2 Venous POCT 36     ISTAT TROPONIN POCT - Normal    TROPPC POCT 0.12     OXYHEMOGLOBIN, ARTERIAL POCT - Normal    Oxyhemoglobin POCT 100     LACTIC ACID WHOLE BLOOD   BLOOD GAS VENOUS   LACTIC ACID WHOLE BLOOD     POC US ECHO LIMITED   Final Result   Limited Bedside Cardiac Ultrasound, performed and interpreted by me.    Indication: Pulseless on exam/Cardiac arrest and Hypotension/shock.   subcostal views were acquired.    Image quality was satisfactory.      Findings:     Global left ventricular function appears intact.   Chambers do not appear dilated.   There is no evidence of free fluid within the pericardium.      IMPRESSION: Grossly normal left ventricular function and chamber size.  No pericardial effusion..            XR Chest Port 1 View   Final Result   Impression: Endotracheal tube tip in midthoracic trachea. No acute   airspace disease.      I have personally reviewed the examination and initial interpretation   and I agree with the findings.      BENITEZ RYAN DO            SYSTEM ID:  [id number]      XR Abdomen Port 1 View   Final Result   IMPRESSION: Gastric tube tip and sidehole projected in the stomach.   Nonobstructive bowel gas pattern.       I have personally reviewed the examination and initial interpretation   and I agree with the findings.      BENITEZ RYAN DO            SYSTEM ID:  [id number]      POC US  RETROPERITONEAL LIMITED (Aorta/renal)    (Results Pending)          Critical care was performed.   Critical Care Addendum  My initial assessment, based on my review of prehospital provider report, review of nursing observations, review of vital signs, focused history, physical exam, review of cardiac rhythm monitor, 12 lead ECG analysis, and discussion with  and EMS , established a high suspicion that Estela Fry has respiratory distress, severe traumatic injuries, ischemic or hemorrhagic stroke, a dangerous drug overdose, cardiac arrest, altered mental status, a critical arrhythmia, a high risk electrolyte abnormality, severe hemorrhage, and severe hypotension, which requires immediate intervention, and therefore she is critically ill.     After the initial assessment, the care team initiated multiple lab tests, initiated IV fluid administration, initiated medication therapy with RSI meds, ketamine, performed advanced airway management, and consulted with cardiology  to provide stabilization care. Due to the critical nature of this patient, I reassessed nursing observations, vital signs, physical exam, review of cardiac rhythm monitor, mental status, neurologic status, and respiratory status multiple times prior to her disposition.     Time also spent performing documentation, discussion with family to obtain medical information for decision making, reviewing test results, discussion with consultants, and coordination of care.     Critical care time (excluding teaching time and procedures): 48 minutes.     Assessment & Plan    31 year old female who was BIBA under anonymous protocol with ROSC in field after unwitnessed arrest.      Ddx: vfib arrest, seizure, PEA arrest, respiratory arrest, intracranial hemorrhage, accidental/intentional overdose, traumatic injury, long QT syndrome, Torsade de points, electrolyte abnormality    Young healthy female BIBA with ROSC in field after defibrillation x1 for reported  vfib with unknown down time. Very limited information about events surrounding presumed arrest. POCUS ECHO on arrival with organized cardiac activity. No pericardial effusion. No RVD. Patient receiving BVM ventilations through iGel placed in field. Shortly after transfer to ED stretcher, patient opened eyes, tearing, extensor posturing, gagging on tube. Initial BP in 80s. Repeat 103/73. HR 99 with NSR on the monitor. Sats 96% on iGel with BVMs. RSI meds given for endotracheal intubation. Sedation and analgesia with ketamine. POC labs notable for venous blood gas with ph 7.20, pCO2 51, lactate 6.6. Relatively normal for typical cardiac arrest with prolonged down time. EKG with NSR and no acute ischemic changes. Normal intervals. No WPW, no Brugada. POC Trop 0.12. Examination with extensor posturing and anisocoria of left pupil concerning for ICH or other neurologic pathology. Patient without risk factors for CAD. No rhythm strip showing presenting rhythm so question seizure activity vs vfib?     Spoke with cath lab staff, Dr. Liz, and discussed concern for intracranial hemorrhage or other etiology for arrest. It is unclear if presenting rhythm was truly vfib and patient is not refractory with ROSC achieved after one defibrillation. Patient remains hemodynamically stable so no traditional indication to initiate ECMO. Patient also very low risk for CAD and coronary lesion is not the most likely cause for arrest. Discussed obtaining head CT and treating potential ICH prior to completing diagnostic coronary angiogram. Discussed concern that delaying management of herniation or other more likely etiology for arrest may result in worse patient outcome. Dr. Liz verbalized understanding of concerns but wanted patient to go directly to cath lab without delay for head CT or further diagnostics. Patient transported to cath lab for further management per cardiology service.       I have reviewed the nursing notes. I have  reviewed the findings, diagnosis, plan and need for follow up with the patient.    New Prescriptions    No medications on file       Final diagnoses:   Cardiac arrest (H)       Marla Kang  [facility] EMERGENCY DEPARTMENT  8/5/2023     Marla Kang MD  08/06/23 [id number]       Marla Kang MD  08/06/23 [id number]

## 2023-08-06 NOTE — CONSULTS
Neurocritical Care Consultation    Reason for critical care admission: Cardiac Arrest  Admitting Team: CSI  Date of Service:  08/06/2023  Date of Admission:  8/5/2023  Hospital Day: 1    Assessment/Plan  Latia Richards is a 89 year old adult with unknown past medical history was admitted on 8/5/2023 for unwitnessed VT/VF cardiac arrest. Obtained 3 shocks before ROSC. Coronary angiography without evidence of coronary artery disease. Neurocritical care consulted for neuro-prognostication and concern for stroke given CT Head with R frontal hypodensity with loss of grey white differentiation. Will perform repeat CT Head to further characterize lesion.    Additional Information:   CTA unremarkable without large vessel occlusion. Repeat CT head no longer with loss of grey white, initial lesion on CT likely artifact, less concern for stroke at this point. Will have day team confirm my prelim read.    24 hour events: Per HPI    Neuro  #Concern for anoxic brain injury post arrest   Post arrest day: 0  Length of downtime: unknown  -Witnessed arrest: No  -ROSC: Yes, rhythm: V fib  -TTM initiated: No  -Current temp: 98.1     #R anterior frontal hypodensity (resolved?)  -CTA head and neck now  -CT head without contrast 8/6/23 at 0400 to further characterize hypodensity and assess for stability  -No need for antiplatelets at this time from neurology perspective   -Consider MRI brain in 3-5 days pending clinical course     Analgesics & sedation  Prior sedation: fentanyl 200 mcg x 1, versed 4 mg x 1   Current sedation: initially none, afterwards started on fentanyl 50 and propofol    Exam findings   -Cough: Yes  -Gag: Yes  -Pupils: PERRL, pupillometry NPI's R 3.6, 2.17 mm; L 3.4, 2.17 mm  -Corneals: Yes     Neuroimaging  -Day 1 CTH shows: Loss of gray-white differentiation in the anterior right  frontal lobe concerning for ischemic infarct.     Neurophysiology  -start/continue vEEG with [facility]  -vEEG shows:  pending    Biomarkers  -Neuron-specific Enolase (NSE) results: pending   -S 100B protein: pending     Recommendations  -Avoid hypotonic solutions as they may worsen cerebral edema   -Avoid nitroprusside or nitroglycerin as they may also worsen cerbral edema  -Advise slow correction of hypernatremia if needed  -When safe to do so, please limit use of CNS acting medications such as benzodiazepines, opiates, anticholinergics, which will permit a more accurate neurological assessment  -We will continue to follow, please call [phone] with any questions    Clinically Significant Risk Factors Present on Admission          # Hypocalcemia: Lowest iCa = 4.3 mg/dL in last 2 days, will monitor and replace as appropriate      # Coagulation Defect: INR = 1.22 (Ref range: 0.85 - 1.15) and/or PTT = N/A, will monitor for bleeding                    The patient was discussed with neurocritical care attending, Dr. Griffith over the phone - to be formally staffed in AM     Argelia Posada MD  PGY-3 Neurology Resident     History of Present Illness:  Latia Arroyout is a 89 year old adult with unknown past medical history was admitted on 8/5/2023 for VT/VF cardiac arrest. Unwitnessed event. 3 shocks before obtaining ROSC.     Patient was intubated, thus history obtained from  at bedside.    Per , she has been feeling unwell since yesterday.  Endorsed feeling very fatigued yesterday at a family reunion.  Has been stressed from work.  Today, patient was cooking in the kitchen while partner had been out fishing.  As he was out getting cookies for his daughter, he received a call from his daughter saying that his wife was passed out on the floor.  He hurried home and found the patient blue.  He initiated CPR and attempted to give her water, then called 911.  He was concerned she had something stuck in her throat.    Per H&P, patient was given 2 shocks by first responders and upon arrival by EMS was given an additional 1  shock with ROSC.  Coronary angiography performed on arrival without evidence of coronary artery disease.    I was paged by nursing with concerns that patient was having flexor posturing movements of her arms.  On my arrival, patient appeared to have random movements in all extremities.  She was intermittently kicking her legs and, although arms were held in a flexed posture initially, she soon started moving them randomly.  Appeared to have excellent strength in all extremities, requiring me and to other nursing staff to hold her down. Appeared to be reaching for her ET tube (had mitts on).  Notably, she was not on any sedation at this time.    Discussed with primary team we were okay with starting the patient on sedation with fentanyl and propofol to keep her comfortable.        Physician Attestation   I saw this patient with the resident and agree with the resident/fellow's findings and plan of care as documented in the note.      Key findings: Patient status post cardiac arrest.  On examination she is following all commands.  She is interactive and moving.  EEG deferred secondary to this fact.  Follow clinically.  We will sign off at this time.  Please call back should there be any neurologic findings following extubation.    Please see A&P for additional details of medical decision making.  77 MINUTES SPENT BY ME on the date of service doing chart review, history, exam, documentation & further activities per the note.        Josh Griffith MD  Date of Service (when I saw the patient): 08/06/23        Not on File    Current Medications:   busPIRone  10 mg Oral TID    calcium gluconate  1 g Intravenous Once    chlorhexidine  15 mL Mouth/Throat Q12H    magnesium sulfate  2 g Intravenous Once    [START ON 8/6/2023] pantoprazole  40 mg Per Feeding Tube QAM AC    Or    [START ON 8/6/2023] pantoprazole  40 mg Intravenous QAM AC       PRN Medications:  glucose **OR** dextrose **OR** glucagon, fentaNYL, naloxone  **OR** naloxone **OR** naloxone **OR** naloxone, ondansetron **OR** ondansetron    Infusions:   EPINEPHrine         Physical Examination:   Initially performed off all sedation. Later given fentanyl and propofol as patient appeared very agitated.    Vitals: Temp 98.1  F (36.7  C) (Esophageal)   Ht 1.524 m (5')   Wt 56 kg (123 lb 7.3 oz)   BMI 24.11 kg/m    General: Adult adult patient, lying in bed, critically-ill, agitated appeared to be trying to pull at tube with both arms, appears uncomfortable  HEENT: Normocephalic, atraumatic, no icterus  Cardiac: tachycardic per vitals  Pulm: unlabored, expansion symmetric, no retractions or use of accessory muscles  Abdomen: non-distended  Extremities: Warm, no edema, appears well perfused  Skin: No rash or lesion  Neuro:  Mental status: Awake with eyes open - not following any commands   Cranial nerves: PERRL, conjugate gaze: midline with spontaneous R horizontal movements but no spontaneous L horizontal movements, face grossly symmetric  Motor: Moving all extremities spontaneously antigravity, kicking legs and moving arms - appeared to be reaching to remove ET tube with both arms  Sensory: No movement to noxious stimuli after sedated on fentanyl and propofol  Coordination: Unable to assess   Gait: Unable to assess     Labs and Imaging:    Results for orders placed or performed during the hospital encounter of 08/05/23 (from the past 24 hour(s))   Ionized Calcium   Result Value Ref Range    Calcium Ionized 4.3 (L) 4.4 - 5.2 mg/dL   Blood gas arterial and oxyhgb   Result Value Ref Range    pH Arterial 7.21 (L) 7.35 - 7.45    pCO2 Arterial 46 (H) 35 - 45 mm Hg    pO2 Arterial 190 (H) 80 - 105 mm Hg    Bicarbonate Arterial 19 (L) 21 - 28 mmol/L    Oxyhemoglobin Arterial 99 92 - 100 %    Base Excess/Deficit (+/-) -9.2 (L) -9.0 - 1.8 mmol/L    FIO2 50     Roger's Test Artline    Lactic acid whole blood   Result Value Ref Range    Lactic Acid 4.6 (HH) 0.7 - 2.0 mmol/L   CT  Chest Abdomen Pelvis w/o Contrast    Impression    RESIDENT PRELIMINARY INTERPRETATION  IMPRESSION:   1. Findings concerning for aspiration/infection with endotracheal tube  tip in the lower thoracic trachea.  2. No definite acute findings in the abdomen or pelvis on this  noncontrast exam.      CT Head w/o Contrast   Result Value Ref Range    Radiologist flags Probable ischemic infarct (AA)     Narrative    EXAM: CT HEAD W/O CONTRAST  8/5/2023 9:07 PM     HISTORY:  post arrest       COMPARISON:  None    TECHNIQUE: Using multidetector thin collimation helical acquisition  technique, axial, coronal and sagittal CT images from the skull base  to the vertex were obtained without intravenous contrast.   (topogram) image(s) also obtained and reviewed.    FINDINGS:  No acute intracranial hemorrhage, mass effect, or midline shift. Loss  of gray-white matter differentiation in the anterior right frontal  lobe. Subtle loss of gray-white matter differentiation in the right  temporal lobe, commonly seen in this location, likely artifactual.  Ventricles are proportionate to the cerebral sulci. Clear basal  cisterns. Contrast was visualized within the sinuses secondary to same  day cardiac catheterization.    The bony calvaria and the bones of the skull base are normal. The  visualized portions of the paranasal sinuses and mastoid air cells are  clear. Grossly normal orbits.       Impression    IMPRESSION: Loss of gray-white differentiation in the anterior right  frontal lobe concerning for ischemic infarct.     [Critical Result: Probable ischemic infarct]    Finding was identified on 8/5/2023 9:08 PM.     Dr. Marie was contacted by Dr. Sloan on 8/5/2023 9:11 PM and  verbalized understanding of the critical result.     I have personally reviewed the examination and initial interpretation  and I agree with the findings.    KANDICE BLANCO MD         SYSTEM ID:  [id number]   Comprehensive metabolic panel   Result Value Ref  "Range    Sodium 142 136 - 145 mmol/L    Potassium 3.7 3.4 - 5.3 mmol/L    Chloride 111 (H) 98 - 107 mmol/L    Carbon Dioxide (CO2) 17 (L) 22 - 29 mmol/L    Anion Gap 14 7 - 15 mmol/L    Urea Nitrogen 17.4 8.0 - 23.0 mg/dL    Creatinine 0.87 0.51 - 1.17 mg/dL    Calcium 7.5 (L) 8.2 - 9.6 mg/dL    Glucose 170 (H) 70 - 99 mg/dL    Alkaline Phosphatase 49 35 - 129 U/L    AST 80 (H) 0 - 45 U/L    ALT 51 0 - 70 U/L    Protein Total 5.7 (L) 6.4 - 8.3 g/dL    Albumin 3.6 3.5 - 5.2 g/dL    Bilirubin Total 0.6 <=1.2 mg/dL    GFR Estimate      Narrative    The generation of reference intervals for this test is currently based on binary male or female sex. If the electronic health record information indicates another gender identity or if Legal Sex is recorded as \"Unknown\", both male and female reference intervals are provided where applicable, and should be considered according to the individual's appropriate clinical context.   Magnesium   Result Value Ref Range    Magnesium 1.7 1.7 - 2.3 mg/dL   Phosphorus   Result Value Ref Range    Phosphorus 4.3 2.5 - 4.5 mg/dL   CBC with platelets   Result Value Ref Range    WBC Count 24.2 (H) 4.0 - 11.0 10e3/uL    RBC Count 4.34 3.80 - 5.90 10e6/uL    Hemoglobin 12.0 11.7 - 17.7 g/dL    Hematocrit 38.7 35.0 - 53.0 %    MCV 89 78 - 100 fL    MCH 27.6 26.5 - 33.0 pg    MCHC 31.0 (L) 31.5 - 36.5 g/dL    RDW 12.7 10.0 - 15.0 %    Platelet Count 263 150 - 450 10e3/uL    Narrative    The generation of reference intervals for this test is currently based on binary male or female sex. If the electronic health record information indicates another gender identity or if Legal Sex is recorded as \"Unknown\", both male and female reference intervals are provided where applicable, and should be considered according to the individual's appropriate clinical context.   INR   Result Value Ref Range    INR 1.22 (H) 0.85 - 1.15   Fibrinogen activity   Result Value Ref Range    Fibrinogen Activity 173 170 - " 490 mg/dL   ABO/Rh type and screen    Narrative    The following orders were created for panel order ABO/Rh type and screen.  Procedure                               Abnormality         Status                     ---------                               -----------         ------                     Adult Type and Screen[[id number]]                            Final result                 Please view results for these tests on the individual orders.   TSH with free T4 reflex   Result Value Ref Range    TSH 3.24 0.30 - 4.20 uIU/mL   Adult Type and Screen   Result Value Ref Range    ABO/RH(D) O POS     Antibody Screen Negative Negative    SPECIMEN EXPIRATION DATE [id number]    Glucose by meter   Result Value Ref Range    GLUCOSE BY METER POCT 149 (H) 70 - 99 mg/dL   Urine Drugs of Abuse Screen with ETOH    Narrative    The following orders were created for panel order Urine Drugs of Abuse Screen with ETOH.  Procedure                               Abnormality         Status                     ---------                               -----------         ------                     Drug abuse screen 8 urin...[[id number]]  Abnormal            Final result                 Please view results for these tests on the individual orders.   Drug abuse screen 8 urine (UR)   Result Value Ref Range    Amphetamines Urine Screen Negative Screen Negative    Barbituates Urine Screen Negative Screen Negative    Benzodiazepine Urine Screen Positive (A) Screen Negative    Cannabinoids Urine Screen Negative Screen Negative    Cocaine Urine Screen Negative Screen Negative    Ethanol Urine Screen Negative Screen Negative    Opiates Urine Screen Negative Screen Negative    PCP Urine Screen Negative Screen Negative   Glucose by meter   Result Value Ref Range    GLUCOSE BY METER POCT 146 (H) 70 - 99 mg/dL   XR Chest Port 1 View    Impression    RESIDENT PRELIMINARY INTERPRETATION  IMPRESSION:  1.  Endotracheal tube terminates over the mid  thoracic trachea.  2.  Right greater than left mixed perihilar opacities suggestive of  infection/aspiration when comparing to the same day CT CAP.   EKG 12-lead, complete   Result Value Ref Range    Systolic Blood Pressure  mmHg    Diastolic Blood Pressure  mmHg    Ventricular Rate 96 BPM    Atrial Rate 96 BPM    SD Interval 134 ms    QRS Duration 88 ms     ms    QTc 459 ms    P Axis 84 degrees    R AXIS 63 degrees    T Axis 32 degrees    Interpretation ECG       Sinus rhythm  Low voltage QRS  Borderline ECG  No previous ECGs available         All relevant imaging and laboratory values personally reviewed.

## 2023-08-06 NOTE — PLAN OF CARE
Major Shift Events: Pt sedated with Precedex @ 0.2, Fent @ 25 for comfort. Pt follows commands and NULL. Levo, Vaso and Epi all started this morning to maintain MAPs>65. Weaned pressors as able throughout shift. Also given total 2L LR. Trending Lactics Q2H. Converted to SR/SB this morning. Vent settings unchanged. LE US, IVC US and chest CT completed.Given Heparin bolus and started gtt @ 1000units/hr. UOP minimal for part of shift, improving this afternoon, watching for now. Pt visited by spouse and son at bedside.  Plan: Continue to monitor pt. Plan for PST and potential extubation tomorrow. Orders for cardiac MRI.  For vital signs and complete assessments, please see documentation flowsheets.

## 2023-08-06 NOTE — ED TRIAGE NOTES
[facility] for an v-fib arrest. Pt was shocked once and received rosc   Sinus tach   Ecmo activation

## 2023-08-06 NOTE — ED PROVIDER NOTES
Second provider critical care    Critical care was performed.   Critical Care Addendum  My initial assessment, based on my review of prehospital provider report, review of vital signs, focused history, physical exam, review of cardiac rhythm monitor, 12 lead ECG analysis, and discussion with cardiology, established a high suspicion that Latia An has cardiac arrest, which requires immediate intervention, and therefore she is critically ill.     After the initial assessment, the care team initiated multiple lab tests, initiated medication therapy with epi drip, RSI medications, and consulted with cardiology to provide stabilization care. Due to the critical nature of this patient, I reassessed vital signs, physical exam, mental status, neurologic status, and respiratory status multiple times prior to her disposition.     Time also spent performing reviewing test results, discussion with consultants, and coordination of care.     Critical care time (excluding teaching time and procedures): 35 minutes.     I provided additional critical care in the emergency department for this unidentified patient.  Approximately 31-year-old female that was found to be in cardiac arrest by prehospital providers.  Rhythm interpreted to be V-fib and ROSC after 1 shock.  Patient arrives with i-gel in place.  Initially ultrasounded heart with adequate cardiac activity, no RV dilation.  Brief FAST exam completed with no free fluid in the abdomen.  Uterus appears to be empty.  No significant B-lines.  I-gel was swapped out for ET tube with RSI medications.  Blood pressures were soft in the 80s, epi drip initiated.  Her glucose was normal.  Initial i-STAT labs with mild lactate elevation to 6, otherwise electrolytes largely within normal limits.  Significant other is present and reporting no significant comorbidities.  EKG postarrest is nonischemic.  Chest x-ray confirming ET tube placement.    Concern for possible  intracranial bleeding, electrolyte abnormality, arrhythmia, respiratory arrest as etiologies for cardiac arrest.  The Cath Lab was activated prehospital for this V-fib arrest.  Spoke with Dr. Liz from the cardiology team who insisted on immediate Cath Lab intervention.  Patient was hemodynamically stable with a normal EKG at this time.  Expressed my concern for possible intracranial bleeding and advocated for head CT prior to Cath Lab.  Cardiology did not agree and patient was brought to the Cath Lab by ECMO staff with plan for remainder of their postarrest work-up including head CT deferred until after cath.      MD Lilly Miller Sarah, MD  08/05/23 2030       Abiola Carr MD  08/05/23 [id number]

## 2023-08-06 NOTE — PROGRESS NOTES
[facility]    Cardiology Progress Note- Cardiology        Date of Admission:  8/5/2023     Assessment & Plan: HVSL   Estela Fry ([facility]) is a 33 year old lady admitted on 8/5/2023 following VT/VF cardiac arrest at home with ROSC after 3 shocks.      NEURO/PSYCH  #Risk for anoxic brain injury  #C/f Frontal CVA  #Hypoxic encephalopathy 2/2 cardiac arrest  Patient with c/f frontal lobe CVA on initial CT head in setting of cardiac arrest with resultant edema. However, subsequent scans without evidence of CVA, suspect imaging artifact. Patient now following commands and moving all extremities.   -neurocrit consult, appreciate assistance  -propfol for sedation     CARDIOVASCULAR  #VT/VF cardiac arrest  #Shock, uncertain etiology   Unclear inciting etiology. Reportedly in VT/VF when first responders arrived on scene and underwent 3x DCCV shocks before achieving ROSC. Coronary angiogram clean and without anatomic variant. No lyte issues. Echocardiogram without significant LV dysfunction however with mild RV dysfunction and IVC thrombus. Consider PE and obstructive shock as etiology of arrest however would have expected a PEA physiology rather than VT/VF.   -wean vasopressors as able  -lower extremity ultrasounds, IVC ultrasound  -telemetry monitoring        PULMONARY  #Acute hypoxic respiratory failure  #Aspiration pneumonitis  In setting of arrest and possible PE as above. Currently with minimal vent settings.   -CTPE  -wean vent settings as able  -CTX x5 days given risk of aspiration  -peridex     GI  No known history     # Nutrition: start trickle TF this PM     RENAL  #Lactic acidosis  In setting of arrest. Improving with hemodynamic stability  -monitor I/O, lytes     HEME/ONC  #Leukocytosis  Favor stress response in setting of arrest. Trend CBC     ENDOCRINE  No known history  -insulin gtt prn given critical illness, currently not requiring       INFECTIOUS DISEASE  #Aspiration pneumonitis/aspiration PNA  -monitor respiratory status  -ABX as above  -Trend cultures     SKIN/MSK  No acute concerns     Prophylaxis:     - DVT: Heparin infusion    - GI: PPI   - Aspiration: peridex    Family: updated at bedside  Disposition: admit to CICU  Code Status: full     This patient's care was discussed with Dr Devin Valadez, attending cardiologist, who agrees with the assessment and plan unless otherwise indicated.    Gato Bacon MD [facility], PGY-5  Fellow, Cardiovascular Disease       Diet: NPO for Medical/Clinical Reasons Except for: Meds    DVT Prophylaxis: Heparin  Decker Catheter: PRESENT, indication: Strict 1-2 Hour I&O  Cardiac Monitoring: ACTIVE order. Indication: ICU  Code Status: Full Code          Clinically Significant Risk Factors Present on Admission        # Hypokalemia: Lowest K = 3.3 mmol/L in last 2 days, will replace as needed   # Hypocalcemia: Lowest Ca = 7.5 mg/dL in last 2 days, will monitor and replace as appropriate      # Coagulation Defect: INR = 1.22 (Ref range: 0.85 - 1.15) and/or PTT = N/A, will monitor for bleeding      # Circulatory Shock: currently requiring pressors for blood pressure support              Disposition Plan   Expected discharge: > 7 days, recommended to  be determined  once  stablized .    Entered: Gato Bacon MD 08/06/2023, 7:34 AM   The patient's care was discussed with the Attending Physician, Dr. Devin Valadez, Bedside Nurse, and Patient's Family.      Gato Bacon MD  [facility]  ______________________________________________________________________    Interval History   No events overnight. Increased vasopressor needs this AM, now stable. Minimal vent settings. Moving, opens eyes to command.     Physical Exam   Vital Signs: Temp: 99.1  F (37.3  C) Temp src: Esophageal BP: 95/73 Pulse: (!) 166   Resp: 16 SpO2: 100 % O2 Device: Mechanical Ventilator    Weight: 123 lbs  7.32 oz  Exam:  Constitutional: Intubated, moves spontaneously, opens eyes to command, no distress   Head: Normocephalic. No masses, lesions, or abnormalities  ENT: PERRL, no scleral icterus or injection, external nose and ears are normal  Cardiovascular: Tachycardic and regular, no murmur appreciated, no rub, radial pulses 2+ and equal bilaterally  Respiratory: Clear to auscultation bilaterally, mechanical breath sounds, no wheeze  Gastrointestinal: Abdomen soft, non-distended. No masses.   : Deferred  Musculoskeletal: extremities normal with no gross deformities noted, normal muscle tone  Skin: no suspicious lesions or rashes  Neurologic: Intubated, moves all extremities spontaneously, opens eyes to command  Psychiatric: Unable to assess       Medical Decision Making           Data   I personally reviewed all laboratory and imaging data from the last 24 hours in addition to all available cardiology data.

## 2023-08-06 NOTE — H&P
[facility]    Cardiology History and Physical -  CICU         Date of Admission:  8/5/2023    Assessment & Plan: HVSL    Latia Richards is a 89 year old adult admitted on 8/5/2023. Latia Richards who developed VT/VF cardiac arrest today with ROSC after 3 shocks.       NEURO/PSYCH  #C/f Frontal CVA  #Hypoxic encephalopathy 2/2 cardiac arrest  Patient with c/f frontal lobe CVA in setting of cardiac arrest with resultant edema. Unclear etiology for this in 31 year old given no reported medical history. Will discuss case with neurocritical care team for further eval  -neurocrit consult, appreciate assistance  -propfol for sedation    CARDIOVASCULAR  #VT/VF cardiac arrest  Unclear inciting etiology. Coronary angiogram clean and without anatomic variant. No lyte issues. Will obtain echo for assessment of underlying CM as well as EKG for underlying pre-excitation syndrome.   -TTE  -trend hemodynamics  -weaning off pressors  -lipids, A1C  -check EKG for pro-arrhythmogenic etiology      PULMONARY  #Acute hypoxic respiratory failure  #Aspiration pneumonitis  In setting of arrest  -wean vent settings as able  -CTX given aspiration  -peridex    GI  No known history    # Nutrition: hold on feeds for now    RENAL  #Lactic acidosis  In setting of arrest. Improving with hemodynamic stability  -monitor I/O, lytes    HEME/ONC  #Leukocytosis  Favor stress response in setting of arrest. Trend CBC    ENDOCRINE  No known history  -insulin gtt prn given critical illness    INFECTIOUS DISEASE  #Aspiration pneumonitis/aspiration PNA  -monitor respiratory status    # Antimicrobials  -ceftriaxone     # Cultures  -blood, sputum cultures, COVID check    SKIN/MSK  No acute concerns    Prophylaxis:     - DVT: SCDs for now      - GI: PPI   - Aspiration: peridex  Family: updated at bedside  Disposition: admit to CICU  Code Status: full    The patient was seen and  discussed with Dr. Valadez, who agrees with the above assessment and plan.      Wendy Marie MD  Cardiology Fellow PGY6  P: [phone]    Cardiac arrest  Shock: Shock, unspecified        Acidosis    Not present on admission    Not present on admission    Not present on admission    Encephalopathy: Toxic encephalopathy  Metabolic encephalopathy    Not present on admission      Not present on admission      Entered: Wendy Marie MD 08/05/2023, 10:26 PM       Wendy Marie MD  [facility]    ______________________________________________________________________    Chief Complaint   Cardiac arrest    History is obtained from the emergency department physician and EMS    History of Present Illness   Anonymous Asher Richards is a 89 year old female who suffered VT/VF cardiac arrest today. Unwitnessed event. By report given 2 shocks by first responders and on arrival by EMS was given 1 additional shock with ROSC. Transported to [facility] for ongoing care.     Coronary angiography performed on arrival and noted for no coronary artery disease. She is now transferred to the CICU for ongoing care. At time of this note, no additional information regarding PMH available aside from by report that patient is 31 years old.       Past Medical History    No past medical history on file.    Past Surgical History   No past surgical history on file.    Prior to Admission Medications   None           Physical Exam   Vital Signs: Temp: 98.1  F (36.7  C) Temp src: Esophageal                Weight: 123 lbs 7.32 oz    General Appearance: Intubated, in NAD  Respiratory: coarse bilaterally, ventilated breath sounds  Cardiovascular: tachy, regular, s1, s2  GI: soft, bs hypoactive  Skin: warm, well perfused, no rash on exposed skin  Neuro: intubated, sedated     Medical Decision Making       60 MINUTES SPENT BY ME on the date of service doing chart review, history, exam, documentation &  further activities per the note.      Data     I have personally reviewed the following data over the past 24 hrs:    24.2 (H)  \   12.0   / 263     142 111 (H) 17.4 /  146 (H)   3.7 17 (L) 0.87 \     ALT: 51 AST: 80 (H) AP: 49 TBILI: 0.6   ALB: 3.6 TOT PROTEIN: 5.7 (L) LIPASE: N/A     TSH: 3.24 T4: N/A A1C: N/A     Procal: N/A CRP: N/A Lactic Acid: 4.6 (HH)       INR:  1.22 (H) PTT:  N/A   D-dimer:  N/A Fibrinogen:  173     Imaging results reviewed over the past 24 hrs:   Recent Results (from the past 24 hour(s))   CT Chest Abdomen Pelvis w/o Contrast    Impression    RESIDENT PRELIMINARY INTERPRETATION  IMPRESSION:   1. Findings concerning for aspiration/infection with endotracheal tube  tip in the lower thoracic trachea.  2. No definite acute findings in the abdomen or pelvis on this  noncontrast exam.      CT Head w/o Contrast   Result Value    Radiologist flags Probable ischemic infarct (AA)    Narrative    EXAM: CT HEAD W/O CONTRAST  8/5/2023 9:07 PM     HISTORY:  post arrest       COMPARISON:  None    TECHNIQUE: Using multidetector thin collimation helical acquisition  technique, axial, coronal and sagittal CT images from the skull base  to the vertex were obtained without intravenous contrast.   (topogram) image(s) also obtained and reviewed.    FINDINGS:  No acute intracranial hemorrhage, mass effect, or midline shift. Loss  of gray-white matter differentiation in the anterior right frontal  lobe. Subtle loss of gray-white matter differentiation in the right  temporal lobe, commonly seen in this location, likely artifactual.  Ventricles are proportionate to the cerebral sulci. Clear basal  cisterns. Contrast was visualized within the sinuses secondary to same  day cardiac catheterization.    The bony calvaria and the bones of the skull base are normal. The  visualized portions of the paranasal sinuses and mastoid air cells are  clear. Grossly normal orbits.       Impression    IMPRESSION: Loss of  gray-white differentiation in the anterior right  frontal lobe concerning for ischemic infarct.     [Critical Result: Probable ischemic infarct]    Finding was identified on 8/5/2023 9:08 PM.     Dr. Marie was contacted by Dr. Sloan on 8/5/2023 9:11 PM and  verbalized understanding of the critical result.     I have personally reviewed the examination and initial interpretation  and I agree with the findings.    KANDICE BLANCO MD         SYSTEM ID:  [id number]

## 2023-08-06 NOTE — PLAN OF CARE
Major Shift Events:      Pt admited @ 2100 for VF/VT arrest with ROSC  Neuro/Pain: on admit pt posturing & no purposeful movments. Now pt moves purposefully. Does not follow commands. Pupils equal and reactive. Prop infusing per order.   Cardiac: on admission pt SR in the 90's. @ 0000 pt tachy cardic in 150s. EKG afib vs SVT. Adenosine given. MD states pts rhythm now looks more like ST. Rates currently 150-160s. Not responsive to fluid or sedation. Bedside echo completed x2 per MD. Pts ulse pressure 15 points. Epi infusing first part of night.   Respiratory: CMV/35%/350/14/5. Minimal blood secretions. Clear lung sounds.   GI: passing gas. No BM.   : UO /hr  Endocrine/Diet: Insulin gtt ordered. Has not met requirements yet.     Total Fluids/Replacements:  - 1L of LR  - 1g calcium gluc  - 30meq of potassium.     Plan: EP consult today. Discuss plan with team.   For vital signs and complete assessments, please see documentation flowsheets.

## 2023-08-07 ENCOUNTER — APPOINTMENT (OUTPATIENT)
Dept: OCCUPATIONAL THERAPY | Facility: CLINIC | Age: 33
End: 2023-08-07
Attending: STUDENT IN AN ORGANIZED HEALTH CARE EDUCATION/TRAINING PROGRAM
Payer: COMMERCIAL

## 2023-08-07 LAB
ALBUMIN SERPL BCG-MCNC: 3.2 G/DL (ref 3.5–5.2)
ALBUMIN SERPL BCG-MCNC: 3.3 G/DL (ref 3.5–5.2)
ALLEN'S TEST: ABNORMAL
ALLEN'S TEST: ABNORMAL
ALP SERPL-CCNC: 45 U/L (ref 35–129)
ALP SERPL-CCNC: 46 U/L (ref 35–129)
ALT SERPL W P-5'-P-CCNC: 35 U/L (ref 0–70)
ALT SERPL W P-5'-P-CCNC: 35 U/L (ref 0–70)
ANION GAP SERPL CALCULATED.3IONS-SCNC: 8 MMOL/L (ref 7–15)
ANION GAP SERPL CALCULATED.3IONS-SCNC: 9 MMOL/L (ref 7–15)
AST SERPL W P-5'-P-CCNC: 34 U/L (ref 0–45)
AST SERPL W P-5'-P-CCNC: 35 U/L (ref 0–45)
ATRIAL RATE - MUSE: 96 BPM
BASE EXCESS BLDA CALC-SCNC: 0.2 MMOL/L (ref -9–1.8)
BASE EXCESS BLDA CALC-SCNC: 1.5 MMOL/L (ref -9–1.8)
BILIRUB SERPL-MCNC: 0.6 MG/DL
BILIRUB SERPL-MCNC: 0.6 MG/DL
BUN SERPL-MCNC: 6.9 MG/DL (ref 6–20)
BUN SERPL-MCNC: 7.6 MG/DL (ref 8–23)
CA-I BLD-MCNC: 4.6 MG/DL (ref 4.4–5.2)
CA-I BLD-MCNC: 5 MG/DL (ref 4.4–5.2)
CALCIUM SERPL-MCNC: 8.3 MG/DL (ref 8.6–10)
CALCIUM SERPL-MCNC: 8.8 MG/DL (ref 8.2–9.6)
CHLORIDE SERPL-SCNC: 104 MMOL/L (ref 98–107)
CHLORIDE SERPL-SCNC: 106 MMOL/L (ref 98–107)
CREAT SERPL-MCNC: 0.63 MG/DL (ref 0.51–1.17)
CREAT SERPL-MCNC: 0.65 MG/DL (ref 0.51–1.17)
CRP SERPL-MCNC: 72.5 MG/L
DEPRECATED HCO3 PLAS-SCNC: 23 MMOL/L (ref 22–29)
DEPRECATED HCO3 PLAS-SCNC: 25 MMOL/L (ref 22–29)
DIASTOLIC BLOOD PRESSURE - MUSE: NORMAL MMHG
ERYTHROCYTE [DISTWIDTH] IN BLOOD BY AUTOMATED COUNT: 12.9 % (ref 10–15)
GFR SERPL CREATININE-BSD FRML MDRD: ABNORMAL ML/MIN/{1.73_M2}
GFR SERPL CREATININE-BSD FRML MDRD: ABNORMAL ML/MIN/{1.73_M2}
GLUCOSE BLDC GLUCOMTR-MCNC: 96 MG/DL (ref 70–99)
GLUCOSE SERPL-MCNC: 106 MG/DL (ref 70–99)
GLUCOSE SERPL-MCNC: 118 MG/DL (ref 70–99)
HCG INTACT+B SERPL-ACNC: <1 MIU/ML
HCO3 BLD-SCNC: 25 MMOL/L (ref 21–28)
HCO3 BLD-SCNC: 26 MMOL/L (ref 21–28)
HCT VFR BLD AUTO: 34.6 % (ref 35–53)
HGB BLD-MCNC: 11.1 G/DL (ref 11.7–17.7)
INTERPRETATION ECG - MUSE: NORMAL
LACTATE SERPL-SCNC: 0.6 MMOL/L (ref 0.7–2)
LACTATE SERPL-SCNC: 0.8 MMOL/L (ref 0.7–2)
LACTATE SERPL-SCNC: 0.9 MMOL/L (ref 0.7–2)
LACTATE SERPL-SCNC: 1 MMOL/L (ref 0.7–2)
LACTATE SERPL-SCNC: 1.1 MMOL/L (ref 0.7–2)
MAGNESIUM SERPL-MCNC: 1.6 MG/DL (ref 1.7–2.3)
MCH RBC QN AUTO: 28.4 PG (ref 26.5–33)
MCHC RBC AUTO-ENTMCNC: 32.1 G/DL (ref 31.5–36.5)
MCV RBC AUTO: 89 FL (ref 78–100)
NSE SERPL IA-MCNC: 14.5 NG/ML
O2/TOTAL GAS SETTING VFR VENT: 30 %
O2/TOTAL GAS SETTING VFR VENT: 35 %
OXYHGB MFR BLD: 97 % (ref 92–100)
OXYHGB MFR BLD: 99 % (ref 92–100)
P AXIS - MUSE: 84 DEGREES
PCO2 BLD: 39 MM HG (ref 35–45)
PCO2 BLD: 42 MM HG (ref 35–45)
PH BLD: 7.39 [PH] (ref 7.35–7.45)
PH BLD: 7.43 [PH] (ref 7.35–7.45)
PLATELET # BLD AUTO: 164 10E3/UL (ref 150–450)
PO2 BLD: 150 MM HG (ref 80–105)
PO2 BLD: 153 MM HG (ref 80–105)
POTASSIUM SERPL-SCNC: 3.6 MMOL/L (ref 3.4–5.3)
POTASSIUM SERPL-SCNC: 4 MMOL/L (ref 3.4–5.3)
PR INTERVAL - MUSE: 134 MS
PROT SERPL-MCNC: 5.3 G/DL (ref 6.4–8.3)
PROT SERPL-MCNC: 5.5 G/DL (ref 6.4–8.3)
QRS DURATION - MUSE: 88 MS
QT - MUSE: 364 MS
QTC - MUSE: 459 MS
R AXIS - MUSE: 63 DEGREES
RBC # BLD AUTO: 3.91 10E6/UL (ref 3.8–5.9)
SODIUM SERPL-SCNC: 137 MMOL/L (ref 136–145)
SODIUM SERPL-SCNC: 138 MMOL/L (ref 136–145)
SYSTOLIC BLOOD PRESSURE - MUSE: NORMAL MMHG
T AXIS - MUSE: 32 DEGREES
TROPONIN T SERPL HS-MCNC: 43 NG/L
TROPONIN T SERPL HS-MCNC: 45 NG/L
UFH PPP CHRO-ACNC: 0.4 IU/ML
UFH PPP CHRO-ACNC: 0.43 IU/ML
VENTRICULAR RATE- MUSE: 96 BPM
WBC # BLD AUTO: 16.6 10E3/UL (ref 4–11)

## 2023-08-07 PROCEDURE — 85520 HEPARIN ASSAY: CPT

## 2023-08-07 PROCEDURE — 84484 ASSAY OF TROPONIN QUANT: CPT | Performed by: STUDENT IN AN ORGANIZED HEALTH CARE EDUCATION/TRAINING PROGRAM

## 2023-08-07 PROCEDURE — 83735 ASSAY OF MAGNESIUM: CPT | Performed by: STUDENT IN AN ORGANIZED HEALTH CARE EDUCATION/TRAINING PROGRAM

## 2023-08-07 PROCEDURE — 93010 ELECTROCARDIOGRAM REPORT: CPT | Performed by: INTERNAL MEDICINE

## 2023-08-07 PROCEDURE — 36415 COLL VENOUS BLD VENIPUNCTURE: CPT | Performed by: INTERNAL MEDICINE

## 2023-08-07 PROCEDURE — 84155 ASSAY OF PROTEIN SERUM: CPT | Performed by: STUDENT IN AN ORGANIZED HEALTH CARE EDUCATION/TRAINING PROGRAM

## 2023-08-07 PROCEDURE — 97165 OT EVAL LOW COMPLEX 30 MIN: CPT | Mod: GO

## 2023-08-07 PROCEDURE — 86147 CARDIOLIPIN ANTIBODY EA IG: CPT | Performed by: INTERNAL MEDICINE

## 2023-08-07 PROCEDURE — 82330 ASSAY OF CALCIUM: CPT | Performed by: STUDENT IN AN ORGANIZED HEALTH CARE EDUCATION/TRAINING PROGRAM

## 2023-08-07 PROCEDURE — 999N000157 HC STATISTIC RCP TIME EA 10 MIN

## 2023-08-07 PROCEDURE — 250N000011 HC RX IP 250 OP 636: Mod: JZ | Performed by: STUDENT IN AN ORGANIZED HEALTH CARE EDUCATION/TRAINING PROGRAM

## 2023-08-07 PROCEDURE — 999N000127 HC STATISTIC PERIPHERAL IV START W US GUIDANCE

## 2023-08-07 PROCEDURE — 94003 VENT MGMT INPAT SUBQ DAY: CPT

## 2023-08-07 PROCEDURE — 82805 BLOOD GASES W/O2 SATURATION: CPT | Performed by: NURSE PRACTITIONER

## 2023-08-07 PROCEDURE — 85520 HEPARIN ASSAY: CPT | Performed by: STUDENT IN AN ORGANIZED HEALTH CARE EDUCATION/TRAINING PROGRAM

## 2023-08-07 PROCEDURE — 83605 ASSAY OF LACTIC ACID: CPT | Performed by: STUDENT IN AN ORGANIZED HEALTH CARE EDUCATION/TRAINING PROGRAM

## 2023-08-07 PROCEDURE — 99223 1ST HOSP IP/OBS HIGH 75: CPT | Mod: GC | Performed by: INTERNAL MEDICINE

## 2023-08-07 PROCEDURE — 99254 IP/OBS CNSLTJ NEW/EST MOD 60: CPT | Mod: GC | Performed by: INTERNAL MEDICINE

## 2023-08-07 PROCEDURE — C9113 INJ PANTOPRAZOLE SODIUM, VIA: HCPCS | Mod: JZ | Performed by: STUDENT IN AN ORGANIZED HEALTH CARE EDUCATION/TRAINING PROGRAM

## 2023-08-07 PROCEDURE — 80051 ELECTROLYTE PANEL: CPT | Performed by: STUDENT IN AN ORGANIZED HEALTH CARE EDUCATION/TRAINING PROGRAM

## 2023-08-07 PROCEDURE — 99291 CRITICAL CARE FIRST HOUR: CPT | Mod: FS | Performed by: NURSE PRACTITIONER

## 2023-08-07 PROCEDURE — 93005 ELECTROCARDIOGRAM TRACING: CPT

## 2023-08-07 PROCEDURE — 82805 BLOOD GASES W/O2 SATURATION: CPT

## 2023-08-07 PROCEDURE — 97530 THERAPEUTIC ACTIVITIES: CPT | Mod: GO

## 2023-08-07 PROCEDURE — 80053 COMPREHEN METABOLIC PANEL: CPT | Performed by: STUDENT IN AN ORGANIZED HEALTH CARE EDUCATION/TRAINING PROGRAM

## 2023-08-07 PROCEDURE — 85027 COMPLETE CBC AUTOMATED: CPT | Performed by: STUDENT IN AN ORGANIZED HEALTH CARE EDUCATION/TRAINING PROGRAM

## 2023-08-07 PROCEDURE — 250N000011 HC RX IP 250 OP 636: Mod: JZ

## 2023-08-07 PROCEDURE — 84702 CHORIONIC GONADOTROPIN TEST: CPT | Performed by: NURSE PRACTITIONER

## 2023-08-07 PROCEDURE — 999N000253 HC STATISTIC WEANING TRIALS

## 2023-08-07 PROCEDURE — 86146 BETA-2 GLYCOPROTEIN ANTIBODY: CPT | Performed by: INTERNAL MEDICINE

## 2023-08-07 PROCEDURE — 200N000002 HC R&B ICU UMMC

## 2023-08-07 PROCEDURE — 86140 C-REACTIVE PROTEIN: CPT | Performed by: NURSE PRACTITIONER

## 2023-08-07 PROCEDURE — 97535 SELF CARE MNGMENT TRAINING: CPT | Mod: GO

## 2023-08-07 PROCEDURE — 250N000013 HC RX MED GY IP 250 OP 250 PS 637: Performed by: STUDENT IN AN ORGANIZED HEALTH CARE EDUCATION/TRAINING PROGRAM

## 2023-08-07 RX ORDER — MAGNESIUM SULFATE HEPTAHYDRATE 40 MG/ML
2 INJECTION, SOLUTION INTRAVENOUS ONCE
Status: COMPLETED | OUTPATIENT
Start: 2023-08-07 | End: 2023-08-07

## 2023-08-07 RX ORDER — ACETAMINOPHEN 325 MG/1
650 TABLET ORAL EVERY 4 HOURS PRN
Status: DISCONTINUED | OUTPATIENT
Start: 2023-08-07 | End: 2023-08-16 | Stop reason: HOSPADM

## 2023-08-07 RX ORDER — POTASSIUM CHLORIDE 29.8 MG/ML
20 INJECTION INTRAVENOUS ONCE
Status: COMPLETED | OUTPATIENT
Start: 2023-08-07 | End: 2023-08-07

## 2023-08-07 RX ADMIN — MAGNESIUM SULFATE HEPTAHYDRATE 2 G: 2 INJECTION, SOLUTION INTRAVENOUS at 00:05

## 2023-08-07 RX ADMIN — BUSPIRONE HYDROCHLORIDE 10 MG: 10 TABLET ORAL at 07:14

## 2023-08-07 RX ADMIN — ACETAMINOPHEN 650 MG: 325 TABLET, FILM COATED ORAL at 02:13

## 2023-08-07 RX ADMIN — CHLORHEXIDINE GLUCONATE 15 ML: 1.2 SOLUTION ORAL at 07:14

## 2023-08-07 RX ADMIN — PANTOPRAZOLE SODIUM 40 MG: 40 INJECTION, POWDER, FOR SOLUTION INTRAVENOUS at 07:14

## 2023-08-07 RX ADMIN — CEFTRIAXONE SODIUM 2 G: 2 INJECTION, POWDER, FOR SOLUTION INTRAMUSCULAR; INTRAVENOUS at 23:02

## 2023-08-07 RX ADMIN — HEPARIN SODIUM 700 UNITS/HR: 10000 INJECTION, SOLUTION INTRAVENOUS at 13:43

## 2023-08-07 RX ADMIN — POTASSIUM CHLORIDE 20 MEQ: 29.8 INJECTION, SOLUTION INTRAVENOUS at 12:00

## 2023-08-07 RX ADMIN — MAGNESIUM SULFATE HEPTAHYDRATE 2 G: 2 INJECTION, SOLUTION INTRAVENOUS at 05:03

## 2023-08-07 RX ADMIN — ACETAMINOPHEN 650 MG: 325 TABLET, FILM COATED ORAL at 21:28

## 2023-08-07 ASSESSMENT — ACTIVITIES OF DAILY LIVING (ADL)
ADLS_ACUITY_SCORE: 46
ADLS_ACUITY_SCORE: 35
ADLS_ACUITY_SCORE: 46
ADLS_ACUITY_SCORE: 35
ADLS_ACUITY_SCORE: 42
ADLS_ACUITY_SCORE: 46
ADLS_ACUITY_SCORE: 35
ADLS_ACUITY_SCORE: 35
ADLS_ACUITY_SCORE: 46
ADLS_ACUITY_SCORE: 35
ADLS_ACUITY_SCORE: 46
ADLS_ACUITY_SCORE: 35
ADLS_ACUITY_SCORE: 35
ADLS_ACUITY_SCORE: 46
ADLS_ACUITY_SCORE: 42

## 2023-08-07 NOTE — CONSULTS
Hematology Consult Note   Date of Service: 08/07/2023    Patient: Estela Fry  MRN: [id number]  Admission Date: 8/5/2023  Hospital Day # 2   Primary Outpatient Hematologist: None    Reason for Consult: hypercoagulable workup      History of Present Illness:    Estela Fry is a 33 year old female with unknown medical history who presents after unwitnessed VT/Vfib arrest at home on 8/5.     The patient was feeling fatigued for one day prior to arrest. Per her daughter, she passed out while cooking. Her , who was not initially there at the time that she passed out, performed CPR and called 911. EMS shocked her three times before achieving ROSC.     On arrival to the hospital, the patient was intubated and requiring pressor support. She underwent a LHC that showed no evidence of CAD. TTE showing mild RV dysfunction with normal LV function. She was found to have an IVC thrombus (40%) in the mid/distal IVC with a nonocclusive DVT in the right external iliac vein surrounding her catheter. CT PE and lower extremity ultrasound were both negative.     The patient denies any personal or family history of clotting disorders. She denies any other medical history. She reports that she uses an estrogen patch for birth control. Does not take any other medications apart from advil when she has headaches. No recent history of travel.     Denies chest pain, shortness of breath, abdominal pain, nausea, vomiting.     Hematologic History:None    Review of Systems: Pertinent positive and negative systems described in HPI; the remainder of the 14 systems are negative    Past Medical History:  No past medical history on file.    Past Surgical History:  Past Surgical History:   Procedure Laterality Date     CV CENTRAL VENOUS CATHETER PLACEMENT N/A 8/5/2023    Procedure: Central Venous Catheter Placement;  Surgeon: Sid Liz MD;  Location:  [facility]     CV CORONARY ANGIOGRAM N/A 8/5/2023    Procedure: Coronary  Angiogram;  Surgeon: Sid Liz MD;  Location:  [facility]       Social History:  Social History     Socioeconomic History     Marital status:         Family History  No family history on file.    Outpatient Medications:  No current facility-administered medications on file prior to encounter.  No current outpatient medications on file prior to encounter.       Physical Exam:    BP 99/54   Pulse 101   Temp 99.7  F (37.6  C) (Oral)   Resp 22   Ht 1.524 m (5')   Wt 56 kg (123 lb 7.3 oz)   SpO2 97%   BMI 24.11 kg/m    Gen: Well appearing, in NAD  HEENT: EOMI, PERRL  CV: Normal rate, regular rhythm. No m/r/g  Pulm: CTAB, no wheezing, normal work of breathing  Abd: Soft, nt/nd, no rebound/guarding  Ext: Warm and well perfused. No lower extremity edema, erythema, or tenderness. Line in place in right groin without surrounding erythema, swelling, or tenderness  Skin: No rash, cyanosis or petechial lesion  Neuro: Alert and answering questions appropriately. CNII-XII grossly intact. Moving all extremities without issue or focal neurologic deficits.     Labs & Studies: I personally reviewed the following studies:  ROUTINE LABS (Last four results):  CMP  Recent Labs   Lab 08/07/23  0953 08/07/23  0749 08/07/23  0327 08/06/23  2233 08/06/23  2229 08/06/23  1614 08/06/23  1610 08/06/23  0319 08/06/23  0221 08/05/23  2112 08/05/23  2108     --  138  --  138  --  136   < >  --    < > 142   POTASSIUM 3.6  --  4.0  --  4.1  --  4.2   < >  --    < > 3.7   CHLORIDE 104  --  106  --  108*  --  108*   < >  --    < > 111*   CO2 25  --  23  --  22  --  20*   < >  --    < > 17*   ANIONGAP 8  --  9  --  8  --  8   < >  --    < > 14   * 96 118* 116* 121*   < > 140*   < >  --    < > 170*   BUN 6.9  --  7.6*  --  8.9  --  10.5   < >  --    < > 17.4   CR 0.65  --  0.63  --  0.59  --  0.60   < >  --    < > 0.87   CUATE 8.3*  --  8.8  --  9.5  --  7.6*   < >  --    < > 7.5*   MAG  --   --  1.6*  --   1.7  --   --   --  2.5*  --  1.7   PHOS  --   --   --   --   --   --   --   --  3.4  --  4.3   PROTTOTAL 5.5*  --  5.3*  --  5.2*  --  4.7*   < >  --    < > 5.7*   ALBUMIN 3.3*  --  3.2*  --  3.2*  --  2.8*   < >  --    < > 3.6   BILITOTAL 0.6  --  0.6  --  0.9  --  0.9   < >  --    < > 0.6   ALKPHOS 45  --  46  --  42  --  36   < >  --    < > 49   AST 34  --  35  --  40  --  34   < >  --    < > 80*   ALT 35  --  35  --  36  --  33   < >  --    < > 51    < > = values in this interval not displayed.     CBC  Recent Labs   Lab 08/07/23  0327 08/06/23  1610 08/06/23  1400 08/06/23  0319   WBC 16.6* 17.7* 20.4* 18.1*   RBC 3.91 4.10 4.59 3.95   HGB 11.1* 11.5* 12.8 11.3*   HCT 34.6* 36.0 40.3 34.6*   MCV 89 88 88 88   MCH 28.4 28.0 27.9 28.6   MCHC 32.1 31.9 31.8 32.7   RDW 12.9 13.0 12.9 12.8    161 199 153     INR  Recent Labs   Lab 08/05/23  2108   INR 1.22*       Imaging:  Examination: Ultrasound inferior vena cava 8/6/2023     Indication: Right upper quadrant IVC evaluation for thrombus     Comparison: None     Findings:  There has partial luminal echogenic debris spanning approximately 2.4  cm in length within the mid/distal IVC, filling roughly 40% of the  luminal diameter. Normal IVC phasic waveforms throughout. Normal IVC  velocities.     Nonocclusive echogenic clot surrounding the right external iliac vein  line/catheter.     On the cine clips in the right upper quadrant there is an edematous  structure that is either edematous duodenum or edematous gallbladder.  Consider right upper quadrant ultrasound                                                                      Impression:  1. There is approximately 40% width luminal focal thrombus within the  mid/distal IVC, with normal phasic waveforms throughout the IVC.  2. Nonocclusive DVT involving the right external iliac vein  surrounding catheter.  3. Structure in the right upper quadrant noted on the cine images  probably edematous duodenum and/or  edematous gallbladder. Right upper  quadrant ultrasound would help delineate.     [Urgent Result: DVTs]     Finding was identified on 8/6/2023 1:26 PM.      MELODY Vega CNP was contacted by Dr. Richards at 8/6/2023  1:30 PM and verbalized understanding of the urgent finding.      I have personally reviewed the examination and initial interpretation  and I agree with the findings.     WILLIAM GRIFFIN MD            Assessment & Plan:   Estela Fry is a 33 year old female with no known medical history who presents after VF arrest at home due to unknown etiology. Heme/onc was consulted for hypercoagulable workup due to IVC and right external iliac vein clots.     Recommendations:   IVC and right external iliac vein clots  Mid/distal IVC thrombus with 40% width initially seen on TTE and then on dedicated IVC US. Nonocclusive DVT in right external iliac vein surrounding patient's catheter also visualized on ultrasound. Patient started on heparin gtt by primary team. Unclear etiology for clots - patient denies personal or family history of clotting disorders, any medical conditions including autoimmune conditions, recent travel, or recurrent pregnancy losses. She did have one miscarriage but has seven healthy children. Would recommend checking antiphospholipid antibody, anticardiolipin antibody, and beta-2 glycoprotein antibodies. Would not recommend further workup (protein C, protein S, antithrombin, prothrombin, Factor V Leiden) at this time as these will not  and there is low suspicion they will be positive.   - continue heparin gtt for now until workup completed  - antiphospholipid antibody (ordered by primary team)  - anticardiolipin antibody (ordered)  - beta-2 glycoprotein (ordered)    Patient was seen and plan of care was discussed with attending physician Dr. Sanders.    We will continue to follow this patient. Please don't hesitate to contact the Fellow On-Call with questions.    Marta  MD Camilla  Internal Medicine PGY2    Attending Note:  I have reviewed the patient chart, and interviewed and examined the patient.  I agree with the assessment and plan.Unusual picture- I am not sure how the IVC and iliac thrombus ties into her cardiac arrest, since there was no pulmonary embolism on chest CT angiogram. Will check for antiphospholipid syndrome, since that would have an impact on anticoagulation management.   Jane Sanders MD  Hematology

## 2023-08-07 NOTE — PROGRESS NOTES
Major Shift Events:  comfortably sedated on current medications. Able to follow command and nod appropriately. Moves all four extremeties. Sinus rhythm through night. Norepi is only pressor. Adeuate UoP. Heparin gtt therapeutic with AM labs second recheck at 10am. 2 gm mag given.     Plan: wean to extubate today.     For vital signs and complete assessments, please see documentation flowsheets.

## 2023-08-07 NOTE — PROGRESS NOTES
[facility]    Cardiology Progress Note- Cardiology        Date of Admission:  8/5/2023     Assessment & Plan: HVSL   Estela Fry (Anonymous [facility]) is a 33 year old female admitted on 8/5/2023 following VT/VF cardiac arrest at home with ROSC after 3 shocks. She was not found to have obstructive CAD.      Today's Plan:  PST  Wean sedation, add precedex if needed  Cardiac MRI  EP consult  Extubate  PT/OT  Swallow eval after extubation  FLoor orders    NEURO/PSYCH  # Risk for anoxic brain injury  # C/f Frontal CVA  # Hypoxic encephalopathy 2/2 cardiac arrest  Patient with c/f frontal lobe CVA on initial CT head in setting of cardiac arrest with resultant edema. However, subsequent scans without evidence of CVA, suspect imaging artifact. Patient now following commands and moving all extremities.   -neurocrit consult, appreciate assistance    Plan:  Wean sedation  PST  PT/OT     CARDIOVASCULAR  #VT/VF cardiac arrest  #Shock, uncertain etiology   # IVC thrombus  # Nonocclusive DVT involving the right external iliac vein  surrounding catheter.  Unclear inciting etiology. Reportedly in VT/VF when first responders arrived on scene and underwent 3x DCCV shocks before achieving ROSC. Coronary angiogram clean and without anatomic variant. No lyte issues. Echocardiogram without significant LV dysfunction however with mild RV dysfunction and IVC thrombus.   Lactic normal    Plan:  -wean vasopressors as able  -lower extremity ultrasounds, IVC ultrasound completed, non occlusive DVT right external iliac, IVC thrombus  -telemetry monitoring      PULMONARY  #Acute hypoxic respiratory failure  #Aspiration pneumonitis  In setting of arrest and possible PE as above. Currently with minimal vent settings.   CTPE negative for PE    Plan:  - wean vent settings as able  - CTX x5 days given risk of aspiration  - peridex     GI  No known history  LFT's normal    Plan:  Swallow eval  after extubation  Diet order thereafter     RENAL  #Lactic acidosis- resolved  # Hypoalbuminemia  In setting of arrest.   Kidney function WNL  Net + 2.3 L yest    Plan:  Diurese to keep net even  -monitor I/O, lytes     HEME/ONC  # IVC thrombus  Hgb stable 11.1 (11.5)    Plan:   Continue Heparin infusion    INFECTIOUS DISEASE  #Aspiration pneumonitis/aspiration PNA  #Leukocytosis  WBC 16.6 (17.7)  Temp (24hrs), Av.9  F (37.2  C), Min:97.9  F (36.6  C), Max:100  F (37.8  C)     Plan:  Continue Ceftriaxone  Culture if fevers     ENDOCRINE  No known history  A1c 5.4  -insulin gtt prn given critical illness, currently not requiring      SKIN/MSK  No acute concerns     Prophylaxis:     - DVT: Heparin infusion    - GI: PPI   - Aspiration: peridex    Family: updated at bedside  Disposition: admit to CICU  Code Status: full     This patient's care was discussed with staff attending cardiologist, who agrees with the assessment and plan unless otherwise indicated.    Shanda Byrne NP       Diet: NPO for Medical/Clinical Reasons Except for: Meds    DVT Prophylaxis: Heparin  Decker Catheter: PRESENT, indication: Strict 1-2 Hour I&O  Cardiac Monitoring: ACTIVE order. Indication: ICU  Code Status: Full Code        ______________________________________________________________________    Interval History   No events overnight.     Physical Exam   Vital Signs: Temp: 100  F (37.8  C) Temp src: Esophageal BP: (!) 84/66 Pulse: 63   Resp: 14 SpO2: 100 % O2 Device: Mechanical Ventilator    Weight: 123 lbs 7.32 oz  Exam:  Constitutional: Intubated, moves spontaneously, opens eyes to command, no distress   Head: Normocephalic. No masses, lesions, or abnormalities  ENT: PERRL, no scleral icterus or injection, external nose and ears are normal  Cardiovascular: Tachycardic and regular, no murmur appreciated, no rub, radial pulses 2+ and equal bilaterally  Respiratory: Clear to auscultation bilaterally, mechanical breath sounds, no  wheeze  Gastrointestinal: Abdomen soft, non-distended. No masses.   : Deferred  Musculoskeletal: extremities normal with no gross deformities noted, normal muscle tone  Skin: no suspicious lesions or rashes  Neurologic: Intubated, moves all extremities spontaneously, opens eyes to command  Psychiatric: Unable to assess

## 2023-08-07 NOTE — CONSULTS
Cardiology Consultation Note   Attending: Dr. Eula Johnson.   Reason for consultation: Vfib arrest.   Provider requesting consultation: Dr. Wendy Marie.  Date of Service: 8/7/2023      CC:   Vfib arrest    HPI:   Ms Estela Fry is a 32 yo f with currently unknown medical history (patient chart merge pending) who was admitted to the hospital yesterday after unwitnessed VT/Vfib CA.    Per chart review, her  had noted that she has been feeling unwell since the day prior to her admission. They had a family reunion and she was feeling very fatigued. She had been stressed from work. She  was cooking in the kitchen while her partner was out fishing and he received a call from his daughter that her mother had passed out on the floor. He went back home and found her cyanotic, thus started CPR, attempted to give her water and called 911. First responders and EMS found her to be in shockable rhythm, thus shocked her 3 times before achieving ROSC. Total down time is unknown    On arrival, patient apparently intubated and initially requiring norepi, epi and vaso (now epi and vaso have been weaned off). Otherwise on minimal vent settings. Labs WBC ot 16.6, K 4.0, mg1.6. Patient underwent LHC that noted no CAD. She was transferred to CCU for ongoing care. In the ICU, Neurolocy consulted for neuroprognostication and with concern for stroke given CT head with R frontal hypodensity and loss of grey-white differentiation. CTA w/o LVO. Repeat CT without loss of grey-white differentiation and with initial lesion on CT likely artifact. EEG pending. No TTM was initiated.     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 Adenosine was administered (6mg IV) at that time with termination of the AVNRT, but arrhythmia recurred short after. Currently she is back in SR.      Past Medical History:   No past medical history on file.  - Will ask  as soon as we have contact  number (currently unknown)  - Patient currently still unable to recall medical history accurately      Past Surgical History:   Past Surgical History:   Procedure Laterality Date    CV CENTRAL VENOUS CATHETER PLACEMENT N/A 8/5/2023    Procedure: Central Venous Catheter Placement;  Surgeon: Sid Liz MD;  Location: [facility] CARDIAC CATH LAB    CV CORONARY ANGIOGRAM N/A 8/5/2023    Procedure: Coronary Angiogram;  Surgeon: Sid Liz MD;  Location: [facility] CARDIAC CATH LAB     Allergies: Per MAR   Not on File  Medications:   Per MAR current outpatient cardiovascular medications include:   No medications prior to admission.     No current outpatient medications on file.     Current Facility-Administered Medications   Medication Dose Route Frequency    busPIRone  10 mg Oral or Feeding Tube TID    cefTRIAXone  2 g Intravenous Q24H    chlorhexidine  15 mL Mouth/Throat Q12H    pantoprazole  40 mg Per Feeding Tube QAM AC    Or    pantoprazole  40 mg Intravenous QAM AC     Family History:   No family history on file.  Social History:   Social History     Tobacco Use    Smoking status: Not on file    Smokeless tobacco: Not on file   Substance Use Topics    Alcohol use: Not on file       ROS:   A comprehensive 10 point ROS was negative other than as mentioned in HPI.    Physical Examination:     General: extubated, mildly confused, does not recall what happened.   Neck: JVP is 10 cm  CV: s1 s2 regular; no m/r/g; no pitting edema  Lungs: CTABL, no rhonchi or wheezing  GI: BS+X4; non tender, non distended  Skin: warm, no rashes  Neuro: mildly confused; not oriented to place; moves all extremities;  Psych: cooperative and pleasant    Labs:  [facility]  Recent Labs   Lab 08/07/23  0749 08/07/23  0327 08/06/23  2233 08/06/23  2229 08/06/23  1614 08/06/23  1610 08/06/23  1024 08/06/23  1021   NA  --  138  --  138  --  136  --  138   POTASSIUM  --  4.0  --  4.1  --  4.2  --  5.1   CHLORIDE  --  106  --  108*  --  108*  --  110*    CUATE  --  8.8  --  9.5  --  7.6*  --  7.8*   CO2  --  23  --  22  --  20*  --  20*   BUN  --  7.6*  --  8.9  --  10.5  --  10.0   CR  --  0.63  --  0.59  --  0.60  --  0.61   GLC 96 118* 116* 121*   < > 140*   < > 145*    < > = values in this interval not displayed.     CBC  Recent Labs   Lab 08/07/23  0327 08/06/23  1610 08/06/23  1400 08/06/23  0319   WBC 16.6* 17.7* 20.4* 18.1*   RBC 3.91 4.10 4.59 3.95   HGB 11.1* 11.5* 12.8 11.3*   HCT 34.6* 36.0 40.3 34.6*   MCV 89 88 88 88   MCH 28.4 28.0 27.9 28.6   MCHC 32.1 31.9 31.8 32.7   RDW 12.9 13.0 12.9 12.8    161 199 153     INR  Recent Labs   Lab 08/05/23  2108   INR 1.22*     No results found for: CKTOTAL, CKMB, TROPN  Cholesterol (mg/dL)   Date Value   08/05/2023 115     Direct Measure HDL (mg/dL)   Date Value   08/05/2023 61     LDL Cholesterol Calculated (mg/dL)   Date Value   08/05/2023 40         Imaging:   EKG 1: SR, normal axis and intervals; no preexitation; no ST/T changes concerning for ischemia  EKG 2: SVT likely consistent with AVNRT.    Tele: at midnight     0000 pt tachy cardic in 150s. EKG afib vs SVT. Adenosine given     ECHO:   Formal read pending;    LHC: no CAD    CTA Chest  1. Exam is negative for acute pulmonary embolism.     2. Trace bilateral pleural effusions, left greater than right with  interlobular septal thickening which may be due to mild pulmonary  edema. No focal consolidative opacities concerning for pulmonary  infection.      3. Reflux of contrast into the IVC and hepatic veins may be due to  limited cardiac function.     In the event of a positive result for acute pulmonary embolism  resulting in right heart strain, please activate the PERT  Multidisciplinary group for consultation by paging [phone]  ([phone]).      PERT -- Pulmonary Embolism Response Team (Multidisciplinary team  including cardiology, interventional radiology, critical care,  hematology)     I have personally reviewed the examination and initial  interpretation  and I agree with the findings.    MRI: - pending      Assessment and Plan:   Ms Estela Fry is a 34 yo f with currently unknown medical history (patient chart merge pending) who was admitted to the hospital yesterday after unwitnessed VT/Vfib CA, now extubated and following commands.    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 EKG after ROSC unrevealing for any type of arrhythmia-related abnormality, no pre-exitation seen, no brugada pattern. She also does not have RV strain evidence. Does not meed HCM criteria.  Family history is currently unknown (will plan to ask  about history of SCD in the family)  The SVT episode is unlikely to explain her CA and is likely unrelated.  Could consider myocarditis, given her recent illness but she does not have significant troponin leak or chest pain currently- inflammatory markers not checked, but CMR will show evidence if that is the case.    EP Recommendations:  - Will wait for the CMR study   - She would meet criteria for ICD for secondary prevention. Will wait for the CMR and for the patient to be afebrile. Will discuss with the patient and  when he visits again.    Thank you for allowing us to participate in the care of this patient.     The patient was discussed w/ Dr. Eula Johnson.  Please see addendum for any changes.    Rafael Ballesteros MD PhD  Cardiology fellow  [id number]

## 2023-08-07 NOTE — PLAN OF CARE
Major Shift Events:  Alert/lethargic post extubation. Neuro's intact. Moves all extremities freely. Confused about time/date/place and what happened.  came to bedside, supportive. Sinus Tach . Off norepi with MAPS >65. On room air, denies any SOB. No new skin deficits. Heparin gtt therapeutic and continued. Decker removed has already voided. No BM, bowel sounds hypoactive. Regular diet, poor appetite, needs encouragement. Potassium replaced. Up with SBA. Art line/femoral CVC removed.  Plan: Cardiac MRI when available.   For vital signs and complete assessments, please see documentation flowsheets.      Goal Outcome Evaluation: Progressing    Problem: Plan of Care - These are the overarching goals to be used throughout the patient stay.    Goal: Optimal Comfort and Wellbeing  Intervention: Monitor Pain and Promote Comfort  Recent Flowsheet Documentation  Taken 8/7/2023 1149 by Lynn Stone, RN  Pain Management Interventions: declines  Intervention: Provide Person-Centered Care  Recent Flowsheet Documentation  Taken 8/7/2023 1200 by Lynn Stone, RN  Trust Relationship/Rapport:   care explained   emotional support provided   empathic listening provided   questions answered   questions encouraged   reassurance provided   thoughts/feelings acknowledged   choices provided

## 2023-08-07 NOTE — PROGRESS NOTES
"   08/07/23 1135   Appointment Info   Signing Clinician's Name / Credentials (OT) Mariia Rodriguez, OTR/L   Living Environment   People in Home spouse;child(rex), dependent  (7 children ages range (2-12 yo))   Current Living Arrangements house   Home Accessibility stairs within home   Number of Stairs, Within Home, Primary greater than 10 stairs  (12)   Stair Railings, Within Home, Primary railing on left side (ascending);railings safe and in good condition   Transportation Anticipated car, drives self;family or friend will provide   Living Environment Comments Pt reports living with spouse and 7 children ages ranging from 2-12 yo. Lives in a 2 story home, no GLADYS. 12 stairs to upper level with L handrail. Has both tub/shower and walk in shower, mainly uses walk in. No chair or grab bars.   Self-Care   Usual Activity Tolerance good   Current Activity Tolerance fair   Regular Exercise Yes   Activity/Exercise Type other (see comments)  (ab workouts and plays volleyball for fun)   Exercise Amount/Frequency 3-5 times/wk   Equipment Currently Used at Home none   Fall history within last six months no   Activity/Exercise/Self-Care Comment Pt reports IND in ADLs prior to admission. No AD for mobility.   Instrumental Activities of Daily Living (IADL)   IADL Comments IND in IADLs prior to admission. Works full time at \"[employer]\" M-F.   General Information   Onset of Illness/Injury or Date of Surgery 08/06/23   Referring Physician Gaby Byrne, APRN CNP   Patient/Family Therapy Goal Statement (OT) \"Get back to doing things for myself\"   Additional Occupational Profile Info/Pertinent History of Current Problem Per EMR, \"33 year old lady admitted on 8/5/2023 following VT/VF cardiac arrest at home with ROSC after 3 shocks.\"   Existing Precautions/Restrictions fall;cardiac   Limitations/Impairments safety/cognitive   Left Upper Extremity (Weight-bearing Status) full weight-bearing (FWB)   Right Upper Extremity (Weight-bearing " Status) full weight-bearing (FWB)   Left Lower Extremity (Weight-bearing Status) full weight-bearing (FWB)   Right Lower Extremity (Weight-bearing Status) full weight-bearing (FWB)   General Observations and Info Activity: Adult ICU Early Mobility   Cognitive Status Examination   Orientation Status person;place   Affect/Mental Status (Cognitive) low arousal/lethargic   Follows Commands follows one-step commands   Cognitive Status Comments Pt A&O to self and place. Not oriented to situation, month (reporting June) or day   Visual Perception   Visual Impairment/Limitations corrective lenses full-time;WFL   Impact of Vision Impairment on Function (Vision) Did not have glasses present this date   Sensory   Sensory Quick Adds sensation intact   Pain Assessment   Patient Currently in Pain Yes, see Vital Sign flowsheet  (1/10)   Posture   Posture forward head position;protracted shoulders   Posture Comments Neck flexed forward 2/2 weakness   Range of Motion Comprehensive   General Range of Motion bilateral upper extremity ROM WFL   Strength Comprehensive (MMT)   Comment, General Manual Muscle Testing (MMT) Assessment general deconditioning   Coordination   Upper Extremity Coordination No deficits were identified   Coordination Comments Slow, purposeful movements completing g/h tasks   Bed Mobility   Bed Mobility supine-sit   Supine-Sit Coryell (Bed Mobility) contact guard   Comment (Bed Mobility) cues for positioning   Transfers   Transfers sit-stand transfer   Sit-Stand Transfer   Sit-Stand [place] (Transfers) minimum assist (75% patient effort)   Sit/Stand Transfer Comments hand held assist w gait belt   Balance   Balance Assessment standing balance: dynamic   Balance Comments Juan Antonio   Activities of Daily Living   BADL Assessment/Intervention bathing;lower body dressing;grooming;toileting   Bathing Assessment/Intervention   Coryell Level (Bathing) moderate assist (50% patient effort)   Comment, (Bathing) per  clinical judgement   Lower Body Dressing Assessment/Training   Comment, (Lower Body Dressing) donning socks from bed level   Pierce Level (Lower Body Dressing) supervision   Grooming Assessment/Training   Pierce Level (Grooming) supervision   Comment, (Grooming) set up assist seated in chair for g/h tasks   Toileting   Comment, (Toileting) Completing wiping darryl area while standing   Pierce Level (Toileting) moderate assist (50% patient effort)   Clinical Impression   Criteria for Skilled Therapeutic Interventions Met (OT) Yes, treatment indicated   OT Diagnosis Dec IND in I/ADLs   OT Problem List-Impairments impacting ADL problems related to;activity tolerance impaired;balance;cognition;mobility;strength;pain   Assessment of Occupational Performance 3-5 Performance Deficits   Identified Performance Deficits functional transfers and mobility, toileting, bathing   Planned Therapy Interventions (OT) ADL retraining;IADL retraining;bed mobility training;cognition;strengthening;transfer training;home program guidelines;progressive activity/exercise;risk factor education   Clinical Decision Making Complexity (OT) low complexity   Anticipated Equipment Needs Upon Discharge (OT)   (TBD)   Risk & Benefits of therapy have been explained evaluation/treatment results reviewed;care plan/treatment goals reviewed;risks/benefits reviewed;current/potential barriers reviewed;participants voiced agreement with care plan;participants included;patient   Clinical Impression Comments Pt appears below functional baseline. Would benefit from continued OT services to promote safety and IND in I/ADLs   OT Total Evaluation Time   OT Eval, Low Complexity Minutes (62445) 8   Total Session Time   Timed Code Treatment Minutes 40   Total Session Time (sum of timed and untimed services) 48

## 2023-08-08 ENCOUNTER — APPOINTMENT (OUTPATIENT)
Dept: OCCUPATIONAL THERAPY | Facility: CLINIC | Age: 33
End: 2023-08-08
Payer: COMMERCIAL

## 2023-08-08 LAB
ALBUMIN SERPL BCG-MCNC: 3.2 G/DL (ref 3.5–5.2)
ALP SERPL-CCNC: 37 U/L (ref 35–129)
ALT SERPL W P-5'-P-CCNC: 26 U/L (ref 0–70)
ANION GAP SERPL CALCULATED.3IONS-SCNC: 7 MMOL/L (ref 7–15)
AST SERPL W P-5'-P-CCNC: 26 U/L (ref 0–45)
ATRIAL RATE - MUSE: 126 BPM
ATRIAL RATE - MUSE: 83 BPM
ATRIAL RATE - MUSE: NORMAL BPM
ATRIAL RATE - MUSE: NORMAL BPM
BILIRUB SERPL-MCNC: 0.6 MG/DL
BUN SERPL-MCNC: 8.5 MG/DL (ref 6–20)
CALCIUM SERPL-MCNC: 8.2 MG/DL (ref 8.6–10)
CHLORIDE SERPL-SCNC: 107 MMOL/L (ref 98–107)
CREAT SERPL-MCNC: 0.66 MG/DL (ref 0.51–1.17)
DEPRECATED HCO3 PLAS-SCNC: 27 MMOL/L (ref 22–29)
DIASTOLIC BLOOD PRESSURE - MUSE: NORMAL MMHG
ERYTHROCYTE [DISTWIDTH] IN BLOOD BY AUTOMATED COUNT: 12.7 % (ref 10–15)
GFR SERPL CREATININE-BSD FRML MDRD: ABNORMAL ML/MIN/{1.73_M2}
GLUCOSE SERPL-MCNC: 102 MG/DL (ref 70–99)
HCT VFR BLD AUTO: 30.7 % (ref 35–53)
HGB BLD-MCNC: 9.9 G/DL (ref 11.7–17.7)
HOLD SPECIMEN: NORMAL
INTERPRETATION ECG - MUSE: NORMAL
MAGNESIUM SERPL-MCNC: 1.7 MG/DL (ref 1.7–2.3)
MCH RBC QN AUTO: 28.8 PG (ref 26.5–33)
MCHC RBC AUTO-ENTMCNC: 32.2 G/DL (ref 31.5–36.5)
MCV RBC AUTO: 89 FL (ref 78–100)
P AXIS - MUSE: 61 DEGREES
P AXIS - MUSE: 76 DEGREES
P AXIS - MUSE: NORMAL DEGREES
P AXIS - MUSE: NORMAL DEGREES
PLATELET # BLD AUTO: 117 10E3/UL (ref 150–450)
POTASSIUM SERPL-SCNC: 3.4 MMOL/L (ref 3.4–5.3)
POTASSIUM SERPL-SCNC: 3.8 MMOL/L (ref 3.4–5.3)
PR INTERVAL - MUSE: 148 MS
PR INTERVAL - MUSE: 160 MS
PR INTERVAL - MUSE: NORMAL MS
PR INTERVAL - MUSE: NORMAL MS
PROT SERPL-MCNC: 5.6 G/DL (ref 6.4–8.3)
QRS DURATION - MUSE: 100 MS
QRS DURATION - MUSE: 78 MS
QRS DURATION - MUSE: 80 MS
QRS DURATION - MUSE: 84 MS
QT - MUSE: 274 MS
QT - MUSE: 286 MS
QT - MUSE: 314 MS
QT - MUSE: 384 MS
QTC - MUSE: 414 MS
QTC - MUSE: 445 MS
QTC - MUSE: 451 MS
QTC - MUSE: 486 MS
R AXIS - MUSE: 32 DEGREES
R AXIS - MUSE: 53 DEGREES
R AXIS - MUSE: 67 DEGREES
R AXIS - MUSE: 75 DEGREES
RBC # BLD AUTO: 3.44 10E6/UL (ref 3.8–5.9)
S100 CA BINDING PROTEIN B SER-MCNC: 86 NG/L
SODIUM SERPL-SCNC: 141 MMOL/L (ref 136–145)
SYSTOLIC BLOOD PRESSURE - MUSE: NORMAL MMHG
T AXIS - MUSE: -28 DEGREES
T AXIS - MUSE: -58 DEGREES
T AXIS - MUSE: 10 DEGREES
T AXIS - MUSE: 41 DEGREES
UFH PPP CHRO-ACNC: 0.15 IU/ML
UFH PPP CHRO-ACNC: 0.37 IU/ML
UFH PPP CHRO-ACNC: 0.45 IU/ML
VENTRICULAR RATE- MUSE: 126 BPM
VENTRICULAR RATE- MUSE: 144 BPM
VENTRICULAR RATE- MUSE: 159 BPM
VENTRICULAR RATE- MUSE: 83 BPM
WBC # BLD AUTO: 7 10E3/UL (ref 4–11)

## 2023-08-08 PROCEDURE — 85730 THROMBOPLASTIN TIME PARTIAL: CPT | Performed by: INTERNAL MEDICINE

## 2023-08-08 PROCEDURE — 250N000011 HC RX IP 250 OP 636: Mod: JZ | Performed by: STUDENT IN AN ORGANIZED HEALTH CARE EDUCATION/TRAINING PROGRAM

## 2023-08-08 PROCEDURE — 85520 HEPARIN ASSAY: CPT | Performed by: STUDENT IN AN ORGANIZED HEALTH CARE EDUCATION/TRAINING PROGRAM

## 2023-08-08 PROCEDURE — 85027 COMPLETE CBC AUTOMATED: CPT | Performed by: NURSE PRACTITIONER

## 2023-08-08 PROCEDURE — 36415 COLL VENOUS BLD VENIPUNCTURE: CPT | Performed by: INTERNAL MEDICINE

## 2023-08-08 PROCEDURE — 36415 COLL VENOUS BLD VENIPUNCTURE: CPT | Performed by: STUDENT IN AN ORGANIZED HEALTH CARE EDUCATION/TRAINING PROGRAM

## 2023-08-08 PROCEDURE — 97530 THERAPEUTIC ACTIVITIES: CPT | Mod: GO

## 2023-08-08 PROCEDURE — 80053 COMPREHEN METABOLIC PANEL: CPT | Performed by: NURSE PRACTITIONER

## 2023-08-08 PROCEDURE — 200N000002 HC R&B ICU UMMC

## 2023-08-08 PROCEDURE — 84132 ASSAY OF SERUM POTASSIUM: CPT | Performed by: STUDENT IN AN ORGANIZED HEALTH CARE EDUCATION/TRAINING PROGRAM

## 2023-08-08 PROCEDURE — 999N000147 HC STATISTIC PT IP EVAL DEFER

## 2023-08-08 PROCEDURE — 99233 SBSQ HOSP IP/OBS HIGH 50: CPT | Mod: FS | Performed by: NURSE PRACTITIONER

## 2023-08-08 PROCEDURE — 85390 FIBRINOLYSINS SCREEN I&R: CPT | Mod: 26 | Performed by: PATHOLOGY

## 2023-08-08 PROCEDURE — 36415 COLL VENOUS BLD VENIPUNCTURE: CPT | Performed by: NURSE PRACTITIONER

## 2023-08-08 PROCEDURE — 250N000013 HC RX MED GY IP 250 OP 250 PS 637: Performed by: STUDENT IN AN ORGANIZED HEALTH CARE EDUCATION/TRAINING PROGRAM

## 2023-08-08 PROCEDURE — 83735 ASSAY OF MAGNESIUM: CPT

## 2023-08-08 PROCEDURE — 97535 SELF CARE MNGMENT TRAINING: CPT | Mod: GO

## 2023-08-08 PROCEDURE — 85520 HEPARIN ASSAY: CPT | Performed by: INTERNAL MEDICINE

## 2023-08-08 RX ORDER — POTASSIUM CHLORIDE 750 MG/1
20 TABLET, EXTENDED RELEASE ORAL ONCE
Status: COMPLETED | OUTPATIENT
Start: 2023-08-08 | End: 2023-08-08

## 2023-08-08 RX ORDER — NORELGESTROMIN AND ETHINYL ESTRADIOL 150; 35 UG/D; UG/D
1 PATCH TRANSDERMAL WEEKLY
COMMUNITY
End: 2023-11-21

## 2023-08-08 RX ORDER — MAGNESIUM SULFATE HEPTAHYDRATE 40 MG/ML
2 INJECTION, SOLUTION INTRAVENOUS ONCE
Status: COMPLETED | OUTPATIENT
Start: 2023-08-08 | End: 2023-08-08

## 2023-08-08 RX ORDER — POTASSIUM CHLORIDE 750 MG/1
40 TABLET, EXTENDED RELEASE ORAL ONCE
Status: COMPLETED | OUTPATIENT
Start: 2023-08-08 | End: 2023-08-08

## 2023-08-08 RX ADMIN — ACETAMINOPHEN 650 MG: 325 TABLET, FILM COATED ORAL at 20:13

## 2023-08-08 RX ADMIN — ACETAMINOPHEN 650 MG: 325 TABLET, FILM COATED ORAL at 16:12

## 2023-08-08 RX ADMIN — POTASSIUM CHLORIDE 40 MEQ: 750 TABLET, EXTENDED RELEASE ORAL at 07:55

## 2023-08-08 RX ADMIN — HEPARIN SODIUM 1000 UNITS/HR: 10000 INJECTION, SOLUTION INTRAVENOUS at 14:32

## 2023-08-08 RX ADMIN — MAGNESIUM SULFATE HEPTAHYDRATE 2 G: 2 INJECTION, SOLUTION INTRAVENOUS at 07:55

## 2023-08-08 RX ADMIN — CEFTRIAXONE SODIUM 2 G: 2 INJECTION, POWDER, FOR SOLUTION INTRAMUSCULAR; INTRAVENOUS at 22:30

## 2023-08-08 RX ADMIN — POTASSIUM CHLORIDE 20 MEQ: 750 TABLET, EXTENDED RELEASE ORAL at 11:58

## 2023-08-08 RX ADMIN — ACETAMINOPHEN 650 MG: 325 TABLET, FILM COATED ORAL at 05:27

## 2023-08-08 ASSESSMENT — ACTIVITIES OF DAILY LIVING (ADL)
ADLS_ACUITY_SCORE: 41
ADLS_ACUITY_SCORE: 35
ADLS_ACUITY_SCORE: 41
ADLS_ACUITY_SCORE: 39
ADLS_ACUITY_SCORE: 39
ADLS_ACUITY_SCORE: 42
ADLS_ACUITY_SCORE: 35
ADLS_ACUITY_SCORE: 41
ADLS_ACUITY_SCORE: 39
ADLS_ACUITY_SCORE: 35
ADLS_ACUITY_SCORE: 39
ADLS_ACUITY_SCORE: 35
ADLS_ACUITY_SCORE: 35
ADLS_ACUITY_SCORE: 39
ADLS_ACUITY_SCORE: 35
ADLS_ACUITY_SCORE: 39
ADLS_ACUITY_SCORE: 39
ADLS_ACUITY_SCORE: 35
ADLS_ACUITY_SCORE: 39

## 2023-08-08 NOTE — PLAN OF CARE
Goal Outcome Evaluation:      Plan of Care Reviewed With: patient, spouse    Overall Patient Progress: improvingOverall Patient Progress: improving    Outcome Evaluation: Remains confused. No significant cardiac arrythmias.    Major Shift Events:  Intermittent confusion to place, time, and situation. Forgetful, not call light appropriate. Bed alarm on. Reports dizziness/lightheadedness at rest. O2 sats >92% on RA, nonproductive cough. SR/ST 90s-140s with activity. Sternal chest pain, PRN tylenol given. BP WDL. Eating well, reports no BM today but passing flatus. Pt dumping hat/flushing toilet prior to I/O documentation but reports adequate urine output.     Plan: Continue to reorient and remind to use call light. 6C orders, awaiting bed availability. Cardiac MRI.    For vital signs and complete assessments, please see documentation flowsheets.

## 2023-08-08 NOTE — PHARMACY-ADMISSION MEDICATION HISTORY
Pharmacist Admission Medication History    Admission medication history is complete. The information provided in this note is only as accurate as the sources available at the time of the update.    Medication reconciliation/reorder completed by provider prior to medication history? Yes    Information Source(s): Patient and Family member via in-person    Pertinent Information: patient could not recall offhand exact day of last patch replacement but stated changes on Mondays (presumed last 7/31/23 PTA). No other prescription medications, OTC medications, supplements, or vitamins reported.    Changes made to PTA medication list:  Added: Xulane birth control patch  Deleted: None  Changed: None    Medication Affordability: not assessed    Allergies reviewed with patient and updates made in EHR: yes    Medication History Completed By: Rubina Sahni RPH 8/8/2023 11:39 AM    Prior to Admission medications    Medication Sig Last Dose Taking? Auth Provider Long Term End Date   norelgestromin-ethinyl estradiol (XULANE) 150-35 MCG/24HR patch Place 1 patch onto the skin once a week Remove old patch and apply new patch onto the skin once a week for 3 weeks (21 days). Do not wear patch week 4 (days 22-28), then repeat. 7/31/2023 Yes Unknown, Entered By History Yes

## 2023-08-08 NOTE — PLAN OF CARE
4A Physical Therapy - Per chart review and discussion with RN, Pt ambulating with SBA, no balance deficits, assisted to bathroom SBA, self performed cares and turning/low reaching without difficulty. Reporting intermittent light headedness, no dizziness, no balance deficits or noted vestibular concerns. Plan for OT to continue to follow to address cardiac rehab, functional mobility and cognition as needed. Pt with no skilled inpatient PT needs, Will complete consult and defer evaluation, please reconsult as appropriate if patient has decline in functional mobility requiring further skilled inpatient PT needs. Defer Discharge recommendations to occupational therapy.

## 2023-08-08 NOTE — PROGRESS NOTES
Interventional Cardiology Progress Note      Assessment & Plan:  Estela Fry ([facility]) is a 33 year old female admitted on 8/5/2023 following an unwitnessed VT/VF cardiac arrest at home with ROSC after 3 shocks. She was not found to have obstructive CAD on angiogram. Extubated 8/7.     #VT/VF cardiac arrest  # Elevated Troponin 2/2 Cardiac Arrest  Patient was found down by her daughter at home,  started CPR and called EMS. Patient was shocked 3 times with ROSC and transported to [facility]. Patient requiring pressors on admission which have since been weaned off. Coronary angiography performed on arrival and noted for no coronary artery disease.   - Echo 8/6 showing mild RV dysfunction normal LV fx and an IVC thrombus   - Cardiac MRI today  - Plan for ICD once INR therapeutic (goal 2.0-3.0)   - K/Mag replacement protocols    # IVC thrombus  # Nonocclusive DVT involving the right external iliac vein  Mid/distal IVC thrombus with 40% width initially seen on TTE and then on dedicated IVC US. Nonocclusive DVT in right external iliac vein surrounding patient's catheter also visualized on ultrasound.   - Continue heparin gtt; switch to Lovenox 1mg/kg BID per heme after ICD scheduled  - Hypercoagulable workup negative thus far, antiphospholipid antibody and Lupus panel results pending  - Continue Warfarin with heparin drip; ICD once INR therapeutic  - Per heme, do not start DOAC until all hypercoagulability labs resulted  - Outpatient follow up with hematology     # Hypoxic encephalopathy 2/2 cardiac arrest   Concern for frontal lobe CVA on admission CT. Subsequent scan without evidence of CVA. She is following commands and moving all extremities. Patient A&O x4 on exam today  - PT/OP  - Neurology following; recommend MRI Brain if clinical course not improving    # Normocytic Anemia of Critical Illness  Hgb on Admission 12.0; today 9.9  - Daily CBC    Resolved Problems:   # Lactic Acidosis  #  Leukocytosis  #Acute hypoxic respiratory failure  #Aspiration pneumonitis    Prophylaxis:  DVT: Heparin gtt  Diet: NPO for possible ICD placement today  Activity: As Tolerated  Code Status: Full Code  Disposition: Pending MRI/ICD placement    Patient discussed w/ Dr. Liz.    Delia Beal, MS, PA-C  [facility] Structural/Interventional Cardiology   Pager [phone]    Interval History:  Patient reporting continued rib pain; otherwise asymptomatic and feeling well.     Most recent vital signs:  /68 (BP Location: Right arm)   Pulse 100   Temp 98.9  F (37.2  C) (Axillary)   Resp 19   Ht 1.524 m (5')   Wt 51.1 kg (112 lb 10.5 oz)   SpO2 98%   BMI 22.00 kg/m    Temp:  [98.3  F (36.8  C)-99.7  F (37.6  C)] 98.9  F (37.2  C)  Pulse:  [] 100  Resp:  [15-22] 19  BP: ()/(60-90) 109/68  MAP:  [66 mmHg-89 mmHg] 69 mmHg  Arterial Line BP: ()/(51-73) 90/56  SpO2:  [92 %-99 %] 98 %  Wt Readings from Last 2 Encounters:   08/08/23 51.1 kg (112 lb 10.5 oz)       Intake/Output Summary (Last 24 hours) at 8/8/2023 1226  Last data filed at 8/8/2023 1200  Gross per 24 hour   Intake 2609.3 ml   Output 3450 ml   Net -840.7 ml       Physical exam:  General: In bed, in NAD  HEENT: EOMI, PERRLA, no scleral icterus or injection  CARDIAC: RRR  RESP: CTAB, no wheezes, rhonchi or crackles appreciated.  GI: NABS, NT/ND, no guarding or rebound  EXTREMITIES: NO LE edema;  No bruits appreciated.   SKIN: No acute lesions appreciated  NEURO: Alert and oriented X3, no focal neurological deficits noted    Labs (Past three days):  CBC  Recent Labs   Lab 08/08/23  0647 08/07/23  0327 08/06/23  1610 08/06/23  1400   WBC 7.0 16.6* 17.7* 20.4*   RBC 3.44* 3.91 4.10 4.59   HGB 9.9* 11.1* 11.5* 12.8   HCT 30.7* 34.6* 36.0 40.3   MCV 89 89 88 88   MCH 28.8 28.4 28.0 27.9   MCHC 32.2 32.1 31.9 31.8   RDW 12.7 12.9 13.0 12.9   * 164 161 199     BMP  Recent Labs   Lab 08/08/23  1015 08/08/23  0647 08/07/23  0953 08/07/23  0749  08/07/23 0327 08/06/23 2233 08/06/23 2229 08/06/23 0319 08/06/23 0221 08/05/23 2112 08/05/23 2108   NA  --  141 137  --  138  --  138   < >  --    < > 142   POTASSIUM 3.8 3.4 3.6  --  4.0  --  4.1   < >  --    < > 3.7   CHLORIDE  --  107 104  --  106  --  108*   < >  --    < > 111*   CO2  --  27 25  --  23  --  22   < >  --    < > 17*   ANIONGAP  --  7 8  --  9  --  8   < >  --    < > 14   GLC  --  102* 106* 96 118*   < > 121*   < >  --    < > 170*   BUN  --  8.5 6.9  --  7.6*  --  8.9   < >  --    < > 17.4   CR  --  0.66 0.65  --  0.63  --  0.59   < >  --    < > 0.87   CUATE  --  8.2* 8.3*  --  8.8  --  9.5   < >  --    < > 7.5*   MAG  --  1.7  --   --  1.6*  --  1.7  --  2.5*  --  1.7   PHOS  --   --   --   --   --   --   --   --  3.4  --  4.3    < > = values in this interval not displayed.     Troponins:   Lab Results   Component Value Date    CTROPT 43 (H) 08/07/2023    CTROPT 45 (H) 08/07/2023    CTROPT 47 (H) 08/06/2023    CTROPT 44 (H) 08/06/2023    CTROPT 54 (H) 08/06/2023        INR  Recent Labs   Lab 08/05/23 2108   INR 1.22*     Liver panel  Recent Labs   Lab 08/08/23 0647 08/07/23 0953 08/07/23 0327 08/06/23 2229   PROTTOTAL 5.6* 5.5* 5.3* 5.2*   ALBUMIN 3.2* 3.3* 3.2* 3.2*   BILITOTAL 0.6 0.6 0.6 0.9   ALKPHOS 37 45 46 42   AST 26 34 35 40   ALT 26 35 35 36       Imaging/procedure results:  EKG 12 Lead 8/9/23:       Coronary Angiogram 8/5/23: No significant coronary artery disease.     Medical Decision Making       40 MINUTES SPENT BY ME on the date of service doing chart review, history, exam, documentation & further activities per the note.

## 2023-08-08 NOTE — PROGRESS NOTES
[facility]    Cardiology Progress Note- Cardiology        Date of Admission:  8/5/2023     Assessment & Plan: HVSL   Estela Fry (Anonymous [facility]) is a 33 year old female admitted on 8/5/2023 following VT/VF cardiac arrest at home with ROSC after 3 shocks. She was not found to have obstructive CAD. Extubated 8/7.      Subjective and Interval: PST    Today's Plan:  Floor orders  PT/OT  Cardiac MRI  Follow up heme labs  ICD timing TBD  Add on bowel reg  Stop PPI    NEURO/PSYCH  # Risk for anoxic brain injury  # C/f Frontal CVA  # Hypoxic encephalopathy 2/2 cardiac arrest  Patient with c/f frontal lobe CVA on initial CT head in setting of cardiac arrest with resultant edema. However, subsequent scans without evidence of CVA, suspect imaging artifact. Patient now following commands and moving all extremities. Extubated 8/7    Plan:  PT/OT     CARDIOVASCULAR  #VT/VF cardiac arrest  #Shock, uncertain etiology   # IVC thrombus  # Nonocclusive DVT involving the right external iliac vein  surrounding catheter.  Unclear inciting etiology. Reportedly in VT/VF when first responders arrived on scene and underwent 3x DCCV shocks before achieving ROSC. Coronary angiogram clean and without anatomic variant. No lyte issues. Echocardiogram without significant LV dysfunction however with mild RV dysfunction and IVC thrombus.   Lactic normal    Plan:  -wean vasopressors as able  -lower extremity ultrasounds, IVC ultrasound completed, non occlusive DVT right external iliac, IVC thrombus  -telemetry monitoring      PULMONARY  #Acute hypoxic respiratory failure  #Aspiration pneumonitis  In setting of arrest and possible PE as above. Room air  CTPE negative for PE    Plan:  - CTX x5 days given risk of aspiration     GI  No known history  LFT's normal    Plan:  Diet ordered, eating well  Add bowel reg     RENAL  #Lactic acidosis- resolved  # Hypoalbuminemia  In setting of  arrest.   Kidney function WNL  Net -800 ml yest    Plan:  -monitor I/O, lytes     HEME/ONC  # IVC thrombus  Hgb stable     Plan:   Continue Heparin infusion, eventual apixa after MRI    INFECTIOUS DISEASE  #Aspiration pneumonitis/aspiration PNA  #Leukocytosis- resolved  WBC  normalized  Temp (24hrs), Av  F (37.2  C), Min:98.3  F (36.8  C), Max:99.7  F (37.6  C)     Plan:  Continue Ceftriaxone x 3-5 days  Culture if fevers     ENDOCRINE  No known history  A1c 5.4    Plan: sliding scale insulin if needed     SKIN/MSK  No acute concerns     Prophylaxis:     - DVT: Heparin infusion     Family: updated at bedside  Disposition: admit to CICU  Code Status: full     This patient's care was discussed with staff attending cardiologist, who agrees with the assessment and plan unless otherwise indicated.    Shanda Byrne NP       Diet: Regular Diet Adult    DVT Prophylaxis: Heparin  Decker Catheter: Not present  Cardiac Monitoring: ACTIVE order. Indication: ICU  Code Status: Full Code        ______________________________________________________________________    Interval History   No events overnight.     Physical Exam   Vital Signs: Temp: 98.6  F (37  C) Temp src: Axillary BP: 94/66 Pulse: 90   Resp: 20 SpO2: 94 % O2 Device: None (Room air)    Weight: 112 lbs 10.48 oz  Exam:  Constitutional: NAD   Head: Normocephalic. No masses, lesions, or abnormalities  ENT: PERRL, no scleral icterus or injection, external nose and ears are normal  Cardiovascular: reg no murmur appreciated, no rub, radial pulses 2+ and equal bilaterally  Respiratory: Clear to auscultation   Gastrointestinal: Abdomen soft, non-distended. No masses.   : Deferred  Musculoskeletal: extremities normal with no gross deformities noted, normal muscle tone  Skin: no suspicious lesions or rashes  Neurologic: alert and oriented  Psychiatric: approp

## 2023-08-08 NOTE — PLAN OF CARE
ICU End of Shift Summary. See flowsheets for vital signs and detailed assessment.    Changes this shift: No acute changes this shift. Alert to self, moving all extremities, following commands, up SBA to the bathroom. SR/ST with reported chest pain overnight. EKG obtained. PRN tylenol given. On RA with VSS. Voiding spontaneously. Currently has menses. Reg diet,  brought in culturally appropriate food which she tolerated fairly well, no BM this shift.Bed alarm on for impulsivity.     Plan: Cardiac MRI when available. Encourage activity as tolerated. Transfer to floor when available.     Goal Outcome Evaluation:      Plan of Care Reviewed With: patient    Overall Patient Progress: improvingOverall Patient Progress: improving    Outcome Evaluation: Patient is alert, oriented to only self. NULL, following commands. SR on RA

## 2023-08-09 ENCOUNTER — APPOINTMENT (OUTPATIENT)
Dept: OCCUPATIONAL THERAPY | Facility: CLINIC | Age: 33
End: 2023-08-09
Payer: COMMERCIAL

## 2023-08-09 LAB
ALBUMIN SERPL BCG-MCNC: 3.5 G/DL (ref 3.5–5.2)
ALP SERPL-CCNC: 40 U/L (ref 35–129)
ALT SERPL W P-5'-P-CCNC: 29 U/L (ref 0–70)
ANION GAP SERPL CALCULATED.3IONS-SCNC: 8 MMOL/L (ref 7–15)
AST SERPL W P-5'-P-CCNC: 26 U/L (ref 0–45)
B2 GLYCOPROT1 IGG SERPL IA-ACNC: 1.7 U/ML
B2 GLYCOPROT1 IGM SERPL IA-ACNC: <2.4 U/ML
BACTERIA SPEC CULT: ABNORMAL
BILIRUB SERPL-MCNC: 0.5 MG/DL
BUN SERPL-MCNC: 6.4 MG/DL (ref 6–20)
CALCIUM SERPL-MCNC: 8.5 MG/DL (ref 8.6–10)
CARDIOLIPIN IGG SER IA-ACNC: <2 GPL-U/ML
CARDIOLIPIN IGG SER IA-ACNC: NEGATIVE
CARDIOLIPIN IGM SER IA-ACNC: 2 MPL-U/ML
CARDIOLIPIN IGM SER IA-ACNC: NEGATIVE
CHLORIDE SERPL-SCNC: 108 MMOL/L (ref 98–107)
CREAT SERPL-MCNC: 0.62 MG/DL (ref 0.51–1.17)
DEPRECATED HCO3 PLAS-SCNC: 25 MMOL/L (ref 22–29)
ERYTHROCYTE [DISTWIDTH] IN BLOOD BY AUTOMATED COUNT: 12.6 % (ref 10–15)
GFR SERPL CREATININE-BSD FRML MDRD: ABNORMAL ML/MIN/{1.73_M2}
GLUCOSE SERPL-MCNC: 103 MG/DL (ref 70–99)
HCT VFR BLD AUTO: 31.3 % (ref 35–53)
HGB BLD-MCNC: 9.9 G/DL (ref 11.7–17.7)
INR PPP: 1.06 (ref 0.85–1.15)
MAGNESIUM SERPL-MCNC: 1.8 MG/DL (ref 1.7–2.3)
MCH RBC QN AUTO: 28 PG (ref 26.5–33)
MCHC RBC AUTO-ENTMCNC: 31.6 G/DL (ref 31.5–36.5)
MCV RBC AUTO: 88 FL (ref 78–100)
PLATELET # BLD AUTO: 147 10E3/UL (ref 150–450)
POTASSIUM SERPL-SCNC: 3.6 MMOL/L (ref 3.4–5.3)
PROT SERPL-MCNC: 6 G/DL (ref 6.4–8.3)
RBC # BLD AUTO: 3.54 10E6/UL (ref 3.8–5.9)
SODIUM SERPL-SCNC: 141 MMOL/L (ref 136–145)
UFH PPP CHRO-ACNC: 0.37 IU/ML
WBC # BLD AUTO: 5.5 10E3/UL (ref 4–11)

## 2023-08-09 PROCEDURE — 250N000013 HC RX MED GY IP 250 OP 250 PS 637: Performed by: STUDENT IN AN ORGANIZED HEALTH CARE EDUCATION/TRAINING PROGRAM

## 2023-08-09 PROCEDURE — 97530 THERAPEUTIC ACTIVITIES: CPT | Mod: GO

## 2023-08-09 PROCEDURE — 250N000011 HC RX IP 250 OP 636: Mod: JZ | Performed by: STUDENT IN AN ORGANIZED HEALTH CARE EDUCATION/TRAINING PROGRAM

## 2023-08-09 PROCEDURE — 93010 ELECTROCARDIOGRAM REPORT: CPT | Performed by: INTERNAL MEDICINE

## 2023-08-09 PROCEDURE — 83735 ASSAY OF MAGNESIUM: CPT | Performed by: STUDENT IN AN ORGANIZED HEALTH CARE EDUCATION/TRAINING PROGRAM

## 2023-08-09 PROCEDURE — 36415 COLL VENOUS BLD VENIPUNCTURE: CPT | Performed by: NURSE PRACTITIONER

## 2023-08-09 PROCEDURE — 80053 COMPREHEN METABOLIC PANEL: CPT | Performed by: NURSE PRACTITIONER

## 2023-08-09 PROCEDURE — 93005 ELECTROCARDIOGRAM TRACING: CPT

## 2023-08-09 PROCEDURE — 99232 SBSQ HOSP IP/OBS MODERATE 35: CPT | Mod: GC | Performed by: INTERNAL MEDICINE

## 2023-08-09 PROCEDURE — 99233 SBSQ HOSP IP/OBS HIGH 50: CPT | Mod: FS | Performed by: NURSE PRACTITIONER

## 2023-08-09 PROCEDURE — 85520 HEPARIN ASSAY: CPT | Performed by: STUDENT IN AN ORGANIZED HEALTH CARE EDUCATION/TRAINING PROGRAM

## 2023-08-09 PROCEDURE — 97110 THERAPEUTIC EXERCISES: CPT | Mod: GO

## 2023-08-09 PROCEDURE — 85014 HEMATOCRIT: CPT | Performed by: NURSE PRACTITIONER

## 2023-08-09 PROCEDURE — 85610 PROTHROMBIN TIME: CPT | Performed by: NURSE PRACTITIONER

## 2023-08-09 PROCEDURE — 120N000003 HC R&B IMCU UMMC

## 2023-08-09 RX ORDER — WARFARIN SODIUM 5 MG/1
5 TABLET ORAL
Status: COMPLETED | OUTPATIENT
Start: 2023-08-09 | End: 2023-08-09

## 2023-08-09 RX ORDER — MAGNESIUM SULFATE HEPTAHYDRATE 40 MG/ML
2 INJECTION, SOLUTION INTRAVENOUS ONCE
Status: COMPLETED | OUTPATIENT
Start: 2023-08-09 | End: 2023-08-09

## 2023-08-09 RX ORDER — POTASSIUM CHLORIDE 750 MG/1
20 TABLET, EXTENDED RELEASE ORAL ONCE
Status: COMPLETED | OUTPATIENT
Start: 2023-08-09 | End: 2023-08-09

## 2023-08-09 RX ADMIN — ACETAMINOPHEN 650 MG: 325 TABLET, FILM COATED ORAL at 01:01

## 2023-08-09 RX ADMIN — ACETAMINOPHEN 650 MG: 325 TABLET, FILM COATED ORAL at 07:36

## 2023-08-09 RX ADMIN — MAGNESIUM SULFATE IN WATER 2 G: 40 INJECTION, SOLUTION INTRAVENOUS at 06:04

## 2023-08-09 RX ADMIN — CEFTRIAXONE SODIUM 2 G: 2 INJECTION, POWDER, FOR SOLUTION INTRAMUSCULAR; INTRAVENOUS at 22:35

## 2023-08-09 RX ADMIN — ACETAMINOPHEN 650 MG: 325 TABLET, FILM COATED ORAL at 11:45

## 2023-08-09 RX ADMIN — POTASSIUM CHLORIDE 20 MEQ: 750 TABLET, EXTENDED RELEASE ORAL at 06:04

## 2023-08-09 RX ADMIN — HEPARIN SODIUM 1000 UNITS/HR: 10000 INJECTION, SOLUTION INTRAVENOUS at 14:38

## 2023-08-09 RX ADMIN — WARFARIN SODIUM 5 MG: 5 TABLET ORAL at 18:06

## 2023-08-09 ASSESSMENT — ACTIVITIES OF DAILY LIVING (ADL)
ADLS_ACUITY_SCORE: 39

## 2023-08-09 NOTE — PROGRESS NOTES
Hematology  Daily Progress Note   Date of Service: 08/09/2023    Patient: Estela Fry  MRN: [id number]  Admission Date: 8/5/2023  Hospital Day # Hospital Day: 5      Initial Reason for Consult: Hypercoagulable workup    Assessment & Plan:   Estela Fry is a 33 year old female with no known medical history who presents after VF arrest at home due to unknown etiology. Heme/onc was consulted for hypercoagulable workup due to IVC and right external iliac vein clots.      Mid/distal IVC thrombus with 40% width initially seen on TTE and then on dedicated IVC US. Nonocclusive DVT in right external iliac vein surrounding patient's catheter also visualized on ultrasound. Patient started on heparin gtt by primary team and remains stable on this. Unclear etiology for clots - patient denies personal or family history of clotting disorders, any medical conditions including autoimmune conditions, recent travel, or recurrent pregnancy losses. She did have one miscarriage but has seven healthy children. Antiphospholipid antibody in process. Anticardiolipin antibody and beta-2 glycoprotein antibodies both negative. Would not recommend further workup (protein C, protein S, antithrombin, prothrombin, Factor V Leiden) at this time as these will not  and there is low suspicion they will be positive. Recommend initiating warfarin (with bridge of heparin or lovenox) while awaiting result of antiphospholipid antibody. Patient will follow up hematology as an outpatient for further management of anticoagulation.    Recommendations:   - recommend initiating warfarin with bridge of heparin or lovenox depending on length of hospitalization  - hematology to arrange follow up in clinic for further management of anticoagulation  - JAK2 and PNH labs added on    Patient was seen and plan of care was discussed with attending physician Dr. Wilhelm.    Hematology will arrange for outpatient follow up and will also follow up outstanding  labs. On discharge paperwork please include contact information for the [facility] (phone [phone]). If further questions arise please don't hesitate to contact the Fellow On-Call with questions.    Marta Sampson MD  Internal Medicine PGY2        Physician Attestation   I saw this patient with the resident and agree with the resident/fellow's findings and plan of care as documented in the note.      Key findings: two out of three antiphospholipid antibodies are negative.    Complex case without etiology for cardiac arrest. Unclear if thrombosis were due to resuscitation. Will send additional workup (PNH, MPN mutation) that would . As  lupus anticoagulant is pending would error on side of caution and use low molecular weight heparin at 1 mg/kg BID to bridge to warfarin. If lupus anticoagulant is negative could consider use of direct oral anticoagulant.     I have personally reviewed the following data over the past 24 hrs:    5.5  \   9.9 (L)   / 147 (L)     141 108 (H) 6.4 /  103 (H)   3.6 25 0.62 \     ALT: 29 AST: 26 AP: 40 TBILI: 0.5   ALB: 3.5 TOT PROTEIN: 6.0 (L) LIPASE: N/A       We will follow up outstanding note(s) and arrange for outpatient follow up with Hematology at the [facility].     Marcia Wilhelm MD/PhD  Date of Service (when I saw the patient): 08/09/23      ___________________________________________________________________    Subjective & Interval History:    No acute events noted overnight. Patient feels well this AM. Is occasionally forgetful. Has a dry cough. Respirations stable on room air. Awaiting cardiac MRI and ICD placement. Denies pain or discomfort today. Is eager to get home and get back to work.     Physical Exam:    /80 (BP Location: Right arm)   Pulse 95   Temp 98.6  F (37  C) (Oral)   Resp 20   Ht 1.524 m (5')   Wt 52.6 kg (115 lb 15.4 oz)   SpO2 98%   BMI 22.65 kg/m    Gen: Well  appearing, in NAD  HEENT: EOMI, PERRL  Pulm: nonlabored respirations on room air  Ext: Warm and well perfused. No lower extremity edema  Skin: No rash, cyanosis or petechial lesion  Neuro: Alert and answering questions appropriately. CNII-XII grossly intact. Moving all extremities without issue or focal neurologic deficits    Labs & Studies: I personally reviewed the following studies:  ROUTINE LABS (Last four results):  CMP  Recent Labs   Lab 08/09/23  0436 08/08/23  1015 08/08/23  0647 08/07/23  0953 08/07/23  0749 08/07/23  0327 08/06/23  2233 08/06/23  2229 08/06/23  0319 08/06/23  0221 08/05/23  2112 08/05/23  2108     --  141 137  --  138  --  138   < >  --    < > 142   POTASSIUM 3.6 3.8 3.4 3.6  --  4.0  --  4.1   < >  --    < > 3.7   CHLORIDE 108*  --  107 104  --  106  --  108*   < >  --    < > 111*   CO2 25  --  27 25  --  23  --  22   < >  --    < > 17*   ANIONGAP 8  --  7 8  --  9  --  8   < >  --    < > 14   *  --  102* 106* 96 118*   < > 121*   < >  --    < > 170*   BUN 6.4  --  8.5 6.9  --  7.6*  --  8.9   < >  --    < > 17.4   CR 0.62  --  0.66 0.65  --  0.63  --  0.59   < >  --    < > 0.87   CUATE 8.5*  --  8.2* 8.3*  --  8.8  --  9.5   < >  --    < > 7.5*   MAG 1.8  --  1.7  --   --  1.6*  --  1.7  --  2.5*  --  1.7   PHOS  --   --   --   --   --   --   --   --   --  3.4  --  4.3   PROTTOTAL 6.0*  --  5.6* 5.5*  --  5.3*  --  5.2*   < >  --    < > 5.7*   ALBUMIN 3.5  --  3.2* 3.3*  --  3.2*  --  3.2*   < >  --    < > 3.6   BILITOTAL 0.5  --  0.6 0.6  --  0.6  --  0.9   < >  --    < > 0.6   ALKPHOS 40  --  37 45  --  46  --  42   < >  --    < > 49   AST 26  --  26 34  --  35  --  40   < >  --    < > 80*   ALT 29  --  26 35  --  35  --  36   < >  --    < > 51    < > = values in this interval not displayed.     CBC  Recent Labs   Lab 08/09/23  0436 08/08/23  0647 08/07/23  0327 08/06/23  1610   WBC 5.5 7.0 16.6* 17.7*   RBC 3.54* 3.44* 3.91 4.10   HGB 9.9* 9.9* 11.1* 11.5*   HCT 31.3* 30.7*  34.6* 36.0   MCV 88 89 89 88   MCH 28.0 28.8 28.4 28.0   MCHC 31.6 32.2 32.1 31.9   RDW 12.6 12.7 12.9 13.0   * 117* 164 161     INR  Recent Labs   Lab 08/05/23  2108   INR 1.22*       Medications list for reference:  Current Facility-Administered Medications   Medication    acetaminophen (TYLENOL) tablet 650 mg    cefTRIAXone (ROCEPHIN) 2 g vial to attach to  ml bag for ADULTS or NS 50 ml bag for PEDS    glucose gel 15-30 g    Or    dextrose 50 % injection 25-50 mL    Or    glucagon injection 1 mg    heparin infusion 25,000 units in D5W 250 mL ANTICOAGULANT    naloxone (NARCAN) injection 0.2 mg    Or    naloxone (NARCAN) injection 0.4 mg    Or    naloxone (NARCAN) injection 0.2 mg    Or    naloxone (NARCAN) injection 0.4 mg    ondansetron (ZOFRAN ODT) ODT tab 4 mg    Or    ondansetron (ZOFRAN) injection 4 mg

## 2023-08-09 NOTE — PROGRESS NOTES
Transferred to: Unit 6D  at 1750. Report given to Lila TURCIOS  Belongings: Phone, , and clothing with pt.  Decker removed? Yes  Central line removed? Yes  Chart and medications sent with patient Yes  Family notified: Yes, pt informed  via phone.

## 2023-08-09 NOTE — PHARMACY-ANTICOAGULATION SERVICE
Clinical Pharmacy - Warfarin Dosing Consult     Pharmacy has been consulted to manage this patient s warfarin therapy.  Indication: DVT/ PE Treatment (in setting of suspected antiphospholipid syndrome)  Therapy Goal: INR 2-3  Warfarin Prior to Admission: No  Significant drug interactions: High intensity heparin (bridge)  Recent documented change in oral intake/nutrition: No  Dose Comments: New start    INR   Date Value Ref Range Status   08/09/2023 1.06 0.85 - 1.15 Final   08/05/2023 1.22 (H) 0.85 - 1.15 Final       Recommend warfarin 5 mg today.  Pharmacy will monitor Estela Fry daily and order warfarin doses to achieve specified goal.      Please contact pharmacy as soon as possible if the warfarin needs to be held for a procedure or if the warfarin goals change.

## 2023-08-09 NOTE — PROGRESS NOTES
[facility]    Cardiology Progress Note- Cardiology        Date of Admission:  8/5/2023     Assessment & Plan: HVSL   Estela Fry (Anonymous [facility]) is a 33 year old female admitted on 8/5/2023 following VT/VF cardiac arrest at home with ROSC after 3 shocks. She was not found to have obstructive CAD. Extubated 8/7.      Subjective and Interval: NAEO. Continues to have floor orders, working with PT/OT, cMRI ordered but not yet completed, added on bowel reg, stopped PPI, eating well, denies pain.     Today's Plan:  Cardiac MRI  Follow up heme labs  ICD timing TBD pending MRI  Transition from Heparin to Apixaban after cMRI  Ramp up bowel reg if no BM    # Addendum: Per heme, will initiate Coumadin given ALP will not result for some time and unsure if clotting disorder present. Will place pharm to dose consult now.     NEURO/PSYCH  # Risk for anoxic brain injury  # C/f Frontal CVA  # Hypoxic encephalopathy 2/2 cardiac arrest  Patient with c/f frontal lobe CVA on initial CT head in setting of cardiac arrest with resultant edema. However, subsequent scans without evidence of CVA, suspect imaging artifact. Patient now following commands and moving all extremities. Extubated 8/7    Plan:  PT/OT     CARDIOVASCULAR  #VT/VF cardiac arrest  #Shock, uncertain etiology   # IVC thrombus  # Nonocclusive DVT involving the right external iliac vein  surrounding catheter.  Unclear inciting etiology. Reportedly in VT/VF when first responders arrived on scene and underwent 3x DCCV shocks before achieving ROSC. Coronary angiogram clean and without anatomic variant. No lyte issues. Echocardiogram without significant LV dysfunction however with mild RV dysfunction and IVC thrombus.   Lactic normal    Plan:  -lower extremity ultrasounds, IVC ultrasound completed, non occlusive DVT right external iliac, IVC thrombus  -telemetry monitoring      PULMONARY  #Acute hypoxic respiratory  failure  #Aspiration pneumonitis  In setting of arrest and possible PE as above. Room air  CTPE negative for PE    Plan:  - CTX x5 days given risk of aspiration     GI  No known history  LFT's normal    Plan:  Diet ordered, eating well  Add bowel reg     RENAL  #Lactic acidosis- resolved  # Hypoalbuminemia  In setting of arrest.   Kidney function WNL    Plan:  -monitor I/O, lytes     HEME/ONC  # IVC thrombus  Hgb stable     Plan:   Continue Heparin infusion, eventual apixa after MRI    INFECTIOUS DISEASE  #Aspiration pneumonitis/aspiration PNA  #Leukocytosis- resolved  WBC  normalized    Plan:  Continue Ceftriaxone x 3-5 days  Culture if fevers     ENDOCRINE  No known history  A1c 5.4    Plan: sliding scale insulin if needed     SKIN/MSK  No acute concerns     Prophylaxis:     - DVT: Heparin infusion     Family: updated at bedside  Disposition: admit to CICU  Code Status: full     This patient's care was discussed with staff attending cardiologist, who agrees with the assessment and plan unless otherwise indicated.    Shanda Byrne NP       Diet: Regular Diet Adult    DVT Prophylaxis: Heparin  Decker Catheter: Not present  Cardiac Monitoring: ACTIVE order. Indication: ICU  Code Status: Full Code        ______________________________________________________________________    Interval History   No events overnight.     Physical Exam   Vital Signs: Temp: 98.5  F (36.9  C) Temp src: Oral BP: 111/86 Pulse: 90   Resp: 17 SpO2: 98 % O2 Device: None (Room air)    Weight: 115 lbs 15.39 oz  Exam:  Constitutional: NAD   Head: Normocephalic. No masses, lesions, or abnormalities  ENT: PERRL, no scleral icterus or injection, external nose and ears are normal  Cardiovascular: reg no murmur appreciated, no rub, radial pulses 2+ and equal bilaterally  Respiratory: Clear to auscultation   Gastrointestinal: Abdomen soft, non-distended. No masses.   : Deferred  Musculoskeletal: extremities normal with no gross deformities noted,  normal muscle tone  Skin: no suspicious lesions or rashes  Neurologic: alert and oriented  Psychiatric: approp

## 2023-08-10 ENCOUNTER — APPOINTMENT (OUTPATIENT)
Dept: OCCUPATIONAL THERAPY | Facility: CLINIC | Age: 33
End: 2023-08-10
Payer: COMMERCIAL

## 2023-08-10 ENCOUNTER — APPOINTMENT (OUTPATIENT)
Dept: MRI IMAGING | Facility: CLINIC | Age: 33
End: 2023-08-10
Attending: STUDENT IN AN ORGANIZED HEALTH CARE EDUCATION/TRAINING PROGRAM
Payer: COMMERCIAL

## 2023-08-10 LAB
ALBUMIN SERPL BCG-MCNC: 3.6 G/DL (ref 3.5–5.2)
ALP SERPL-CCNC: 38 U/L (ref 35–129)
ALT SERPL W P-5'-P-CCNC: 19 U/L (ref 0–70)
ANION GAP SERPL CALCULATED.3IONS-SCNC: 10 MMOL/L (ref 7–15)
AST SERPL W P-5'-P-CCNC: 20 U/L (ref 0–45)
BACTERIA SPT CULT: ABNORMAL
BILIRUB SERPL-MCNC: 0.6 MG/DL
BUN SERPL-MCNC: 5.8 MG/DL (ref 6–20)
CALCIUM SERPL-MCNC: 8.7 MG/DL (ref 8.6–10)
CHLORIDE SERPL-SCNC: 106 MMOL/L (ref 98–107)
CREAT SERPL-MCNC: 0.67 MG/DL (ref 0.51–1.17)
DEPRECATED HCO3 PLAS-SCNC: 26 MMOL/L (ref 22–29)
DRVVT SCREEN RATIO: 0.9
ERYTHROCYTE [DISTWIDTH] IN BLOOD BY AUTOMATED COUNT: 12.5 % (ref 10–15)
GFR SERPL CREATININE-BSD FRML MDRD: ABNORMAL ML/MIN/{1.73_M2}
GLUCOSE SERPL-MCNC: 96 MG/DL (ref 70–99)
GRAM STAIN RESULT: ABNORMAL
GRAM STAIN RESULT: ABNORMAL
HCT VFR BLD AUTO: 30.1 % (ref 35–53)
HEPZYMED PTT 1:2 MIX: 44 SECONDS (ref 31–45)
HEPZYMED PTT RATIO: 1.35
HEPZYMED PTT-LA: 50 SECONDS (ref 31–45)
HEPZYMED THROMBIN TIME: 17.5 SECONDS (ref 15.7–21.7)
HGB BLD-MCNC: 9.9 G/DL (ref 11.7–17.7)
INR PPP: 1.13 (ref 0.85–1.15)
INR PPP: 1.19 (ref 0.85–1.15)
INTERPRETATION: NORMAL
LA PPP-IMP: NEGATIVE
LUPUS INTERPRETATION: ABNORMAL
MAGNESIUM SERPL-MCNC: 1.9 MG/DL (ref 1.7–2.3)
MCH RBC QN AUTO: 28.4 PG (ref 26.5–33)
MCHC RBC AUTO-ENTMCNC: 32.9 G/DL (ref 31.5–36.5)
MCV RBC AUTO: 86 FL (ref 78–100)
PATIENT PTT-LA: 169 SECONDS (ref 31–45)
PLATELET # BLD AUTO: 181 10E3/UL (ref 150–450)
PLATELET NEUTRALIZATION: -4 SECONDS
POTASSIUM SERPL-SCNC: 3.5 MMOL/L (ref 3.4–5.3)
PROT SERPL-MCNC: 6.2 G/DL (ref 6.4–8.3)
RBC # BLD AUTO: 3.49 10E6/UL (ref 3.8–5.9)
SIGNIFICANT RESULTS: NORMAL
SODIUM SERPL-SCNC: 142 MMOL/L (ref 136–145)
SPECIMEN DESCRIPTION: NORMAL
TEST DETAILS, MDL: NORMAL
THROMBIN TIME: >60 SECONDS (ref 13–19)
UFH PPP CHRO-ACNC: 0.49 IU/ML
WBC # BLD AUTO: 4.4 10E3/UL (ref 4–11)

## 2023-08-10 PROCEDURE — 75561 CARDIAC MRI FOR MORPH W/DYE: CPT

## 2023-08-10 PROCEDURE — 250N000013 HC RX MED GY IP 250 OP 250 PS 637: Performed by: STUDENT IN AN ORGANIZED HEALTH CARE EDUCATION/TRAINING PROGRAM

## 2023-08-10 PROCEDURE — 120N000003 HC R&B IMCU UMMC

## 2023-08-10 PROCEDURE — 85610 PROTHROMBIN TIME: CPT | Performed by: NURSE PRACTITIONER

## 2023-08-10 PROCEDURE — 99207 PR SERVICE NOT STAFFED W/SUPERV PROV: CPT | Performed by: INTERNAL MEDICINE

## 2023-08-10 PROCEDURE — A9585 GADOBUTROL INJECTION: HCPCS | Mod: JZ | Performed by: STUDENT IN AN ORGANIZED HEALTH CARE EDUCATION/TRAINING PROGRAM

## 2023-08-10 PROCEDURE — 250N000011 HC RX IP 250 OP 636: Mod: JZ

## 2023-08-10 PROCEDURE — 75561 CARDIAC MRI FOR MORPH W/DYE: CPT | Mod: 26 | Performed by: STUDENT IN AN ORGANIZED HEALTH CARE EDUCATION/TRAINING PROGRAM

## 2023-08-10 PROCEDURE — 88184 FLOWCYTOMETRY/ TC 1 MARKER: CPT | Performed by: PATHOLOGY

## 2023-08-10 PROCEDURE — G0452 MOLECULAR PATHOLOGY INTERPR: HCPCS | Mod: 26 | Performed by: STUDENT IN AN ORGANIZED HEALTH CARE EDUCATION/TRAINING PROGRAM

## 2023-08-10 PROCEDURE — 255N000002 HC RX 255 OP 636: Mod: JZ | Performed by: STUDENT IN AN ORGANIZED HEALTH CARE EDUCATION/TRAINING PROGRAM

## 2023-08-10 PROCEDURE — 97530 THERAPEUTIC ACTIVITIES: CPT | Mod: GO

## 2023-08-10 PROCEDURE — 250N000013 HC RX MED GY IP 250 OP 250 PS 637

## 2023-08-10 PROCEDURE — 85027 COMPLETE CBC AUTOMATED: CPT | Performed by: NURSE PRACTITIONER

## 2023-08-10 PROCEDURE — 88185 FLOWCYTOMETRY/TC ADD-ON: CPT | Performed by: HOSPITALIST

## 2023-08-10 PROCEDURE — 81339 MPL GENE SEQ ALYS EXON 10: CPT | Performed by: HOSPITALIST

## 2023-08-10 PROCEDURE — 97535 SELF CARE MNGMENT TRAINING: CPT | Mod: GO

## 2023-08-10 PROCEDURE — 81270 JAK2 GENE: CPT | Performed by: HOSPITALIST

## 2023-08-10 PROCEDURE — 88187 FLOWCYTOMETRY/READ 2-8: CPT | Performed by: PATHOLOGY

## 2023-08-10 PROCEDURE — 97129 THER IVNTJ 1ST 15 MIN: CPT | Mod: GO

## 2023-08-10 PROCEDURE — 36415 COLL VENOUS BLD VENIPUNCTURE: CPT | Performed by: NURSE PRACTITIONER

## 2023-08-10 PROCEDURE — 99232 SBSQ HOSP IP/OBS MODERATE 35: CPT | Performed by: NURSE PRACTITIONER

## 2023-08-10 PROCEDURE — 80053 COMPREHEN METABOLIC PANEL: CPT | Performed by: NURSE PRACTITIONER

## 2023-08-10 PROCEDURE — 85520 HEPARIN ASSAY: CPT | Performed by: STUDENT IN AN ORGANIZED HEALTH CARE EDUCATION/TRAINING PROGRAM

## 2023-08-10 PROCEDURE — 83735 ASSAY OF MAGNESIUM: CPT | Performed by: STUDENT IN AN ORGANIZED HEALTH CARE EDUCATION/TRAINING PROGRAM

## 2023-08-10 PROCEDURE — 88184 FLOWCYTOMETRY/ TC 1 MARKER: CPT | Performed by: HOSPITALIST

## 2023-08-10 RX ORDER — LIDOCAINE 4 G/G
1 PATCH TOPICAL
Status: DISCONTINUED | OUTPATIENT
Start: 2023-08-10 | End: 2023-08-16 | Stop reason: HOSPADM

## 2023-08-10 RX ORDER — WARFARIN SODIUM 5 MG/1
5 TABLET ORAL
Status: DISCONTINUED | OUTPATIENT
Start: 2023-08-10 | End: 2023-08-10

## 2023-08-10 RX ORDER — GADOBUTROL 604.72 MG/ML
7.5 INJECTION INTRAVENOUS ONCE
Status: COMPLETED | OUTPATIENT
Start: 2023-08-10 | End: 2023-08-10

## 2023-08-10 RX ADMIN — LIDOCAINE PATCH 4% 1 PATCH: 40 PATCH TOPICAL at 20:07

## 2023-08-10 RX ADMIN — APIXABAN 5 MG: 5 TABLET, FILM COATED ORAL at 20:07

## 2023-08-10 RX ADMIN — HEPARIN SODIUM 1000 UNITS/HR: 10000 INJECTION, SOLUTION INTRAVENOUS at 15:08

## 2023-08-10 RX ADMIN — ACETAMINOPHEN 650 MG: 325 TABLET, FILM COATED ORAL at 15:11

## 2023-08-10 RX ADMIN — GADOBUTROL 7.5 ML: 604.72 INJECTION INTRAVENOUS at 09:59

## 2023-08-10 ASSESSMENT — ACTIVITIES OF DAILY LIVING (ADL)
ADLS_ACUITY_SCORE: 39

## 2023-08-10 NOTE — DISCHARGE SUMMARY
[address] [address]  p: [phone]  Discharge Summary: Cardiology Service    Estela Fry MRN# [id number]   YOB: 1990 Age: 33 year old     Admission Date: 8/5/2023  Discharge Date: 08/16/23    Discharge Diagnoses:  #VT/VF cardiac arrest, resolved  # Elevated Troponin 2/2 Cardiac Arrest, resolved  # IVC thrombus  # Nonocclusive DVT involving the right external iliac vein  # Hypoxic encephalopathy 2/2 cardiac arrest    # Normocytic Anemia of Critical Illness, improving  # Lactic Acidosis, resolved  # Leukocytosis, resolved  #Acute hypoxic respiratory failure, resolved  # Aspiration pneumonitis, resolved    Brief HPI:  Estela Fry (Anonymous [facility]) is a 33 year old female admitted on 8/5/2023 following an unwitnessed VT/VF cardiac arrest at home with ROSC after 3 shocks. She was not found to have obstructive CAD on angiogram. Extubated 8/7.    Course complicated by acute respiratory distress 8/11, due to tracheal stenosis, she is now being followed with ENT status post direct laryngoscopy and CT scan. She has since symptomatically improved.     Hospital Course by Diagnosis:  #VT/VF cardiac arrest  # Elevated Troponin 2/2 Cardiac Arrest s/p ICD placement  Patient was found down by her daughter at home,  started CPR and called EMS. Patient was shocked 3 times with ROSC and transported to [facility]. Patient requiring pressors on admission which have since been weaned off. Coronary angiography performed on arrival and noted for no coronary artery disease.   - Cardiac MRI: Normal cardiac function, LVEF of 64% and RVEF of 59%. There is no evidence of myocardial fibrosis, given no fibrosis consider genetic evaluation for the etiology of her cardiac arrest  - ICD placed 8/15/23   - Continue Xarelto 15mg BID through 9/1 then switch to Xarelto 20mg PO daily; Eliquis discontinued due to being potential cause of respiratory distress as  only new medication started at that time.  - Percocet 5-325 mg q6h PRN for pain x5 days     # IVC thrombus  # Nonocclusive DVT involving the right external iliac vein  Mid/distal IVC thrombus with 40% width initially seen on TTE and then on dedicated IVC US. Nonocclusive DVT in right external iliac vein surrounding patient's catheter also visualized on ultrasound.   - Continue Xarelto 15mg BID through 9/1 then switch to Xarelto 20mg PO daily  - Hypercoagulable workup negative thus far, no contraindication to starting DOAC per hematology; heme OP follow up 10/18/23     # Normocytic Anemia of Critical Illness  Hgb on Admission 12.0; Hgb at discharge 11.6  - CBC in 1 week    # Subglottic Stenosis  Patient developed barking cough and shortness of breath on the morning of 8/11 with acute onset from previously stable state concerning for anaphylaxis. Symptoms improved after receiving racemic epinephrine, albuterol, decadron, and Benadryl.  - CT soft tissue Neck 8/11: Status post removal of endotracheal tube with mild subglottic  tracheal narrowing at the level of C7-T1   - Follow up with ENT as OP     Resolved Problems:   # Lactic Acidosis  # Leukocytosis  #Acute hypoxic respiratory failure  #Aspiration pneumonitis  # Hypoxic encephalopathy 2/2 cardiac arrest     Pertinent Procedures:  1. ICD Placement 8/15/23  2. Coronary Angiogram 8/5/23    Consults:  Hematology, Neurology, Pharmacy, EP, ENT    Medication Changes:  See Medication List Below    Discharge medications:   Current Discharge Medication List        START taking these medications    Details   cephALEXin (KEFLEX) 500 MG capsule Take 1 capsule (500 mg) by mouth 3 times daily  Qty: 15 capsule, Refills: 0    Associated Diagnoses: Cardiac arrest (H)      oxyCODONE-acetaminophen (PERCOCET) 5-325 MG tablet Take 1 tablet by mouth every 6 hours as needed for severe pain  Qty: 20 tablet, Refills: 0    Associated Diagnoses: Cardiac arrest (H)      !! rivaroxaban  ANTICOAGULANT (XARELTO) 15 MG TABS tablet Take 1 tablet (15 mg) by mouth 2 times daily (with meals)  Qty: 32 tablet, Refills: 0    Associated Diagnoses: Cardiac arrest (H)      !! rivaroxaban ANTICOAGULANT (XARELTO) 20 MG TABS tablet Take 1 tablet (20 mg) by mouth daily (with dinner)  Qty: 30 tablet, Refills: 3    Associated Diagnoses: Cardiac arrest (H)       !! - Potential duplicate medications found. Please discuss with provider.        CONTINUE these medications which have NOT CHANGED    Details   norelgestromin-ethinyl estradiol (XULANE) 150-35 MCG/24HR patch Place 1 patch onto the skin once a week Remove old patch and apply new patch onto the skin once a week for 3 weeks (21 days). Do not wear patch week 4 (days 22-28), then repeat.             Follow-up:  PCP in 1 week  Hematology 10/18/23  Critical Care Cardiology Clinic in 1-2 weeks  Device clinic in 1 week  ENT ikn 1-3 months    Labs or imaging requiring follow-up after discharge:  CBC    Code status:  Full Code    Condition on discharge  Temp:  [96.9  F (36.1  C)-98.8  F (37.1  C)] 98.4  F (36.9  C)  Pulse:  [77-93] 78  Resp:  [12-21] 19  BP: ()/(62-82) 90/62  SpO2:  [96 %-99 %] 98 %    General: Alert, interactive, NAD  Eyes: sclera anicteric, EOMI  Cardiovascular: regular rate and rhythm, normal S1 and S2, no murmurs, gallops, or rubs  Resp: clear to auscultation bilaterally, no rales, wheezes, or rhonchi  GI: Soft, nontender, nondistended. +BS.  No HSM or masses, no rebound or guarding.  Extremities: no edema, no cyanosis or clubbing  Skin: Warm and dry, no jaundice or rash, ICD site c/d/I with minimal tenderness  Neuro: moves all extremities equally  Psych: Alert & oriented x 3    Imaging with results:  ECG 8/11/23:    CXR 8/15/23:   Left chest wall inferior approach implantable cardiac defibrillator.  No ectopic air collection within the chest or the suspicious thoracic  opacity.                                               IMPRESSION: Left  lateral subcutaneous inferior approach implantable  cardiac defibrillator without obvious radiographic complication.    Other imaging studies:  cMRI 8/10/23:   1. The LV is normal in cavity size and wall thickness. The global systolic function is normal. The LVEF is  64%. There are no regional wall motion abnormalities.     2. The RV is normal in cavity size. The global systolic function is normal. The RVEF is 59%.      3. Both atria are normal in size.     4. There is no significant valvular disease.      5. Late gadolinium enhancement imaging shows no MI, fibrosis or infiltrative disease.      6. There is trivial pericardial effusion.     7. There is no intracardiac thrombus.     CONCLUSIONS: Normal cardiac function, LVEF of 64% and RVEF of 59%. There is no evidence of myocardial  fibrosis, given no fibrosis consider genetic evaluation for the etiology of her cardiac arrest.     Patient Care Team:  Annita Rodriguez as PCP - General  Delia Beal PA-C  [facility] Cardiology  Patient discussed with staff cardiologist, Dr. Melgoza, who agrees with the above documentation and plan. Documentation represents joint decision making.   Time Spent on this Encounter   I, Delia Beal PA-C, personally saw the patient today and spent greater than 30 minutes discharging this patient.

## 2023-08-10 NOTE — PROGRESS NOTES
Major Shift Events:      Neuro: A/O x 4. Appropriate. Follows commands and responds appropriately to inquiries.    Pulm: On RA.    CV: SR/ST. Afebrile. Cardiac MRI completed today.    GI/: Voiding. No BM reported. Tolerating regular diet with minimal appetite.    Pain: Medial chest. PRN Tylenol 650mg administered with some relief reported.    Access: PIV x 2. SL. Patent.    Activity: Independent in room.    Skin: Intact. No breaks or wounds noted or reported.    Plan:   Continue to monitor patient's clinical status and report changes to [facility] S[facility].    For vital signs and complete assessments, please see documentation flowsheets.

## 2023-08-10 NOTE — CONSULTS
Discharge Pharmacy Test Claim    Eliquis and xarelto are covered with monthly copays of $3 through patient's [facility] prepaid medical assistance plan.    Test Claim Copay   eliquis 3.00   xarelto 3.00     Abiola Max  [facility] Liaison  Ph: [phone] Pager: [phone]   Securely message with the Vocera Web Console (learn more here)

## 2023-08-10 NOTE — PROGRESS NOTES
Brief hematology f/up note.     The lupus anticoagulant test came back neg -> the pt has triple NEG APS (antiphospholipid syndrome) testing.     Plan)  -given that her APS testing are all neg, there is no contraindication for DOAC.  -f/u pending labs: JAK2, PNH  -the pt has a f/up appt with Dr. Wilhelm in our hematology clinic on 10/18/23.    We will sign off for now. Please do not hesitate to contact again for any questions.     Go MD Lynette  Hematology oncology fellow  Pager [phone]

## 2023-08-10 NOTE — CARE PLAN
Alert x4, calm and cooperative, SR/ST, Ind in room, mild pain in sternum, LS clear, Tolerating diet, resting well overnight, mild temp 99.3 oral overnight, RA with sat >95%, Remains safe in room,  in room overnight, /70 (BP Location: Left arm, Cuff Size: Adult Regular)   Pulse 89   Temp 99.3  F (37.4  C) (Oral)   Resp 16   Ht 1.524 m (5')   Wt 50.5 kg (111 lb 5.3 oz)   SpO2 94%   BMI 21.74 kg/m

## 2023-08-11 ENCOUNTER — ANCILLARY PROCEDURE (OUTPATIENT)
Dept: CARDIOLOGY | Facility: CLINIC | Age: 33
End: 2023-08-11
Attending: INTERNAL MEDICINE
Payer: COMMERCIAL

## 2023-08-11 ENCOUNTER — APPOINTMENT (OUTPATIENT)
Dept: CT IMAGING | Facility: CLINIC | Age: 33
End: 2023-08-11
Attending: NURSE PRACTITIONER
Payer: COMMERCIAL

## 2023-08-11 ENCOUNTER — APPOINTMENT (OUTPATIENT)
Dept: GENERAL RADIOLOGY | Facility: CLINIC | Age: 33
End: 2023-08-11
Attending: NURSE PRACTITIONER
Payer: COMMERCIAL

## 2023-08-11 DIAGNOSIS — I46.9 CARDIAC ARREST (H): ICD-10-CM

## 2023-08-11 DIAGNOSIS — I46.9 CARDIAC ARREST (H): Primary | ICD-10-CM

## 2023-08-11 LAB
ALBUMIN SERPL BCG-MCNC: 3.9 G/DL (ref 3.5–5.2)
ALLEN'S TEST: YES
ALP SERPL-CCNC: 39 U/L (ref 35–129)
ALT SERPL W P-5'-P-CCNC: 28 U/L (ref 0–70)
ANION GAP SERPL CALCULATED.3IONS-SCNC: 13 MMOL/L (ref 7–15)
ANION GAP SERPL CALCULATED.3IONS-SCNC: 9 MMOL/L (ref 7–15)
AST SERPL W P-5'-P-CCNC: 27 U/L (ref 0–45)
ATRIAL RATE - MUSE: 84 BPM
BACTERIA BLD CULT: NO GROWTH
BACTERIA BLD CULT: NO GROWTH
BASE EXCESS BLDA CALC-SCNC: 2.8 MMOL/L (ref -9–1.8)
BASE EXCESS BLDV CALC-SCNC: 4 MMOL/L (ref -7.7–1.9)
BILIRUB SERPL-MCNC: 0.6 MG/DL
BUN SERPL-MCNC: 7 MG/DL (ref 6–20)
BUN SERPL-MCNC: 7.3 MG/DL (ref 6–20)
C PNEUM DNA SPEC QL NAA+PROBE: NOT DETECTED
CALCIUM SERPL-MCNC: 9 MG/DL (ref 8.6–10)
CALCIUM SERPL-MCNC: 9.3 MG/DL (ref 8.6–10)
CHLORIDE SERPL-SCNC: 105 MMOL/L (ref 98–107)
CHLORIDE SERPL-SCNC: 106 MMOL/L (ref 98–107)
CREAT SERPL-MCNC: 0.66 MG/DL (ref 0.51–1.17)
CREAT SERPL-MCNC: 0.67 MG/DL (ref 0.51–1.17)
CRP SERPL-MCNC: 12.3 MG/L
D DIMER PPP FEU-MCNC: 1.16 UG/ML FEU (ref 0–0.5)
DEPRECATED HCO3 PLAS-SCNC: 25 MMOL/L (ref 22–29)
DEPRECATED HCO3 PLAS-SCNC: 26 MMOL/L (ref 22–29)
DIASTOLIC BLOOD PRESSURE - MUSE: NORMAL MMHG
ERYTHROCYTE [DISTWIDTH] IN BLOOD BY AUTOMATED COUNT: 12.3 % (ref 10–15)
ERYTHROCYTE [DISTWIDTH] IN BLOOD BY AUTOMATED COUNT: 12.4 % (ref 10–15)
FLUAV H1 2009 PAND RNA SPEC QL NAA+PROBE: NOT DETECTED
FLUAV H1 RNA SPEC QL NAA+PROBE: NOT DETECTED
FLUAV H3 RNA SPEC QL NAA+PROBE: NOT DETECTED
FLUAV RNA SPEC QL NAA+PROBE: NEGATIVE
FLUAV RNA SPEC QL NAA+PROBE: NOT DETECTED
FLUBV RNA RESP QL NAA+PROBE: NEGATIVE
FLUBV RNA SPEC QL NAA+PROBE: NOT DETECTED
GFR SERPL CREATININE-BSD FRML MDRD: ABNORMAL ML/MIN/{1.73_M2}
GFR SERPL CREATININE-BSD FRML MDRD: NORMAL ML/MIN/{1.73_M2}
GLUCOSE SERPL-MCNC: 106 MG/DL (ref 70–99)
GLUCOSE SERPL-MCNC: 97 MG/DL (ref 70–99)
HADV DNA SPEC QL NAA+PROBE: NOT DETECTED
HCO3 BLD-SCNC: 27 MMOL/L (ref 21–28)
HCO3 BLDV-SCNC: 29 MMOL/L (ref 21–28)
HCOV PNL SPEC NAA+PROBE: NOT DETECTED
HCT VFR BLD AUTO: 31.3 % (ref 35–53)
HCT VFR BLD AUTO: 36.4 % (ref 35–53)
HGB BLD-MCNC: 10.2 G/DL (ref 11.7–17.7)
HGB BLD-MCNC: 12.1 G/DL (ref 11.7–17.7)
HMPV RNA SPEC QL NAA+PROBE: NOT DETECTED
HPIV1 RNA SPEC QL NAA+PROBE: NOT DETECTED
HPIV2 RNA SPEC QL NAA+PROBE: NOT DETECTED
HPIV3 RNA SPEC QL NAA+PROBE: NOT DETECTED
HPIV4 RNA SPEC QL NAA+PROBE: NOT DETECTED
INR PPP: 1.37 (ref 0.85–1.15)
INTERPRETATION ECG - MUSE: NORMAL
LACTATE SERPL-SCNC: 1.5 MMOL/L (ref 0.7–2)
M PNEUMO DNA SPEC QL NAA+PROBE: NOT DETECTED
MAGNESIUM SERPL-MCNC: 2 MG/DL (ref 1.7–2.3)
MCH RBC QN AUTO: 28.2 PG (ref 26.5–33)
MCH RBC QN AUTO: 28.4 PG (ref 26.5–33)
MCHC RBC AUTO-ENTMCNC: 32.6 G/DL (ref 31.5–36.5)
MCHC RBC AUTO-ENTMCNC: 33.2 G/DL (ref 31.5–36.5)
MCV RBC AUTO: 85 FL (ref 78–100)
MCV RBC AUTO: 87 FL (ref 78–100)
O2/TOTAL GAS SETTING VFR VENT: 0 %
O2/TOTAL GAS SETTING VFR VENT: 97 %
P AXIS - MUSE: 54 DEGREES
PATH REPORT.COMMENTS IMP SPEC: NORMAL
PATH REPORT.FINAL DX SPEC: NORMAL
PATH REPORT.MICROSCOPIC SPEC OTHER STN: NORMAL
PATH REPORT.RELEVANT HX SPEC: NORMAL
PCO2 BLD: 38 MM HG (ref 35–45)
PCO2 BLDV: 46 MM HG (ref 40–50)
PH BLD: 7.46 [PH] (ref 7.35–7.45)
PH BLDV: 7.41 [PH] (ref 7.32–7.43)
PLATELET # BLD AUTO: 199 10E3/UL (ref 150–450)
PLATELET # BLD AUTO: 224 10E3/UL (ref 150–450)
PO2 BLD: 102 MM HG (ref 80–105)
PO2 BLDV: 43 MM HG (ref 25–47)
POTASSIUM SERPL-SCNC: 3.8 MMOL/L (ref 3.4–5.3)
POTASSIUM SERPL-SCNC: 3.9 MMOL/L (ref 3.4–5.3)
PR INTERVAL - MUSE: 126 MS
PROT SERPL-MCNC: 6.4 G/DL (ref 6.4–8.3)
QRS DURATION - MUSE: 82 MS
QT - MUSE: 382 MS
QTC - MUSE: 451 MS
R AXIS - MUSE: 41 DEGREES
RBC # BLD AUTO: 3.62 10E6/UL (ref 3.8–5.9)
RBC # BLD AUTO: 4.26 10E6/UL (ref 3.8–5.9)
RSV RNA SPEC NAA+PROBE: NEGATIVE
RSV RNA SPEC QL NAA+PROBE: NOT DETECTED
RSV RNA SPEC QL NAA+PROBE: NOT DETECTED
RV+EV RNA SPEC QL NAA+PROBE: NOT DETECTED
SARS-COV-2 RNA RESP QL NAA+PROBE: NEGATIVE
SODIUM SERPL-SCNC: 141 MMOL/L (ref 136–145)
SODIUM SERPL-SCNC: 143 MMOL/L (ref 136–145)
SYSTOLIC BLOOD PRESSURE - MUSE: NORMAL MMHG
T AXIS - MUSE: 31 DEGREES
UFH PPP CHRO-ACNC: >1.1 IU/ML
VENTRICULAR RATE- MUSE: 84 BPM
WBC # BLD AUTO: 4 10E3/UL (ref 4–11)
WBC # BLD AUTO: 4.5 10E3/UL (ref 4–11)

## 2023-08-11 PROCEDURE — 82803 BLOOD GASES ANY COMBINATION: CPT | Performed by: NURSE PRACTITIONER

## 2023-08-11 PROCEDURE — 85379 FIBRIN DEGRADATION QUANT: CPT | Performed by: NURSE PRACTITIONER

## 2023-08-11 PROCEDURE — 85027 COMPLETE CBC AUTOMATED: CPT | Performed by: NURSE PRACTITIONER

## 2023-08-11 PROCEDURE — 87633 RESP VIRUS 12-25 TARGETS: CPT | Performed by: NURSE PRACTITIONER

## 2023-08-11 PROCEDURE — 70491 CT SOFT TISSUE NECK W/DYE: CPT | Mod: 26 | Performed by: RADIOLOGY

## 2023-08-11 PROCEDURE — 93010 ELECTROCARDIOGRAM REPORT: CPT | Performed by: INTERNAL MEDICINE

## 2023-08-11 PROCEDURE — 250N000013 HC RX MED GY IP 250 OP 250 PS 637: Performed by: STUDENT IN AN ORGANIZED HEALTH CARE EDUCATION/TRAINING PROGRAM

## 2023-08-11 PROCEDURE — 250N000009 HC RX 250: Performed by: NURSE PRACTITIONER

## 2023-08-11 PROCEDURE — 87486 CHLMYD PNEUM DNA AMP PROBE: CPT | Performed by: NURSE PRACTITIONER

## 2023-08-11 PROCEDURE — 71045 X-RAY EXAM CHEST 1 VIEW: CPT | Mod: 26 | Performed by: RADIOLOGY

## 2023-08-11 PROCEDURE — 83520 IMMUNOASSAY QUANT NOS NONAB: CPT

## 2023-08-11 PROCEDURE — 250N000011 HC RX IP 250 OP 636: Mod: JZ

## 2023-08-11 PROCEDURE — 36415 COLL VENOUS BLD VENIPUNCTURE: CPT | Performed by: NURSE PRACTITIONER

## 2023-08-11 PROCEDURE — 83605 ASSAY OF LACTIC ACID: CPT | Performed by: NURSE PRACTITIONER

## 2023-08-11 PROCEDURE — 99232 SBSQ HOSP IP/OBS MODERATE 35: CPT | Performed by: NURSE PRACTITIONER

## 2023-08-11 PROCEDURE — 87205 SMEAR GRAM STAIN: CPT | Performed by: NURSE PRACTITIONER

## 2023-08-11 PROCEDURE — 0CJY8ZZ INSPECTION OF MOUTH AND THROAT, VIA NATURAL OR ARTIFICIAL OPENING ENDOSCOPIC: ICD-10-PCS | Performed by: OTOLARYNGOLOGY

## 2023-08-11 PROCEDURE — 93282 PRGRMG EVAL IMPLANTABLE DFB: CPT

## 2023-08-11 PROCEDURE — 99232 SBSQ HOSP IP/OBS MODERATE 35: CPT | Performed by: PHYSICIAN ASSISTANT

## 2023-08-11 PROCEDURE — 87637 SARSCOV2&INF A&B&RSV AMP PRB: CPT | Performed by: NURSE PRACTITIONER

## 2023-08-11 PROCEDURE — 82310 ASSAY OF CALCIUM: CPT | Performed by: NURSE PRACTITIONER

## 2023-08-11 PROCEDURE — 250N000013 HC RX MED GY IP 250 OP 250 PS 637

## 2023-08-11 PROCEDURE — 83735 ASSAY OF MAGNESIUM: CPT | Performed by: STUDENT IN AN ORGANIZED HEALTH CARE EDUCATION/TRAINING PROGRAM

## 2023-08-11 PROCEDURE — 86140 C-REACTIVE PROTEIN: CPT | Performed by: NURSE PRACTITIONER

## 2023-08-11 PROCEDURE — 85610 PROTHROMBIN TIME: CPT | Performed by: NURSE PRACTITIONER

## 2023-08-11 PROCEDURE — 250N000011 HC RX IP 250 OP 636: Mod: JZ | Performed by: NURSE PRACTITIONER

## 2023-08-11 PROCEDURE — 70491 CT SOFT TISSUE NECK W/DYE: CPT

## 2023-08-11 PROCEDURE — 250N000013 HC RX MED GY IP 250 OP 250 PS 637: Performed by: NURSE PRACTITIONER

## 2023-08-11 PROCEDURE — 999N000157 HC STATISTIC RCP TIME EA 10 MIN

## 2023-08-11 PROCEDURE — 36600 WITHDRAWAL OF ARTERIAL BLOOD: CPT

## 2023-08-11 PROCEDURE — 71045 X-RAY EXAM CHEST 1 VIEW: CPT

## 2023-08-11 PROCEDURE — 80053 COMPREHEN METABOLIC PANEL: CPT | Performed by: NURSE PRACTITIONER

## 2023-08-11 PROCEDURE — 85520 HEPARIN ASSAY: CPT | Performed by: STUDENT IN AN ORGANIZED HEALTH CARE EDUCATION/TRAINING PROGRAM

## 2023-08-11 PROCEDURE — 250N000011 HC RX IP 250 OP 636

## 2023-08-11 PROCEDURE — 120N000003 HC R&B IMCU UMMC

## 2023-08-11 PROCEDURE — 93005 ELECTROCARDIOGRAM TRACING: CPT

## 2023-08-11 RX ORDER — ALBUTEROL SULFATE 0.83 MG/ML
2.5 SOLUTION RESPIRATORY (INHALATION) ONCE
Status: COMPLETED | OUTPATIENT
Start: 2023-08-11 | End: 2023-08-11

## 2023-08-11 RX ORDER — DIPHENHYDRAMINE HCL 25 MG
25 CAPSULE ORAL EVERY 6 HOURS PRN
Status: DISCONTINUED | OUTPATIENT
Start: 2023-08-11 | End: 2023-08-16 | Stop reason: HOSPADM

## 2023-08-11 RX ORDER — DIPHENHYDRAMINE HYDROCHLORIDE 50 MG/ML
25 INJECTION INTRAMUSCULAR; INTRAVENOUS EVERY 6 HOURS PRN
Status: DISCONTINUED | OUTPATIENT
Start: 2023-08-11 | End: 2023-08-16 | Stop reason: HOSPADM

## 2023-08-11 RX ORDER — METHYLPREDNISOLONE SODIUM SUCCINATE 125 MG/2ML
125 INJECTION, POWDER, LYOPHILIZED, FOR SOLUTION INTRAMUSCULAR; INTRAVENOUS EVERY 6 HOURS
Status: DISCONTINUED | OUTPATIENT
Start: 2023-08-11 | End: 2023-08-12

## 2023-08-11 RX ORDER — EPINEPHRINE 0.1 MG/ML
INJECTION INTRAVENOUS
Status: COMPLETED
Start: 2023-08-11 | End: 2023-08-11

## 2023-08-11 RX ORDER — BENZONATATE 100 MG/1
100 CAPSULE ORAL 3 TIMES DAILY PRN
Status: DISCONTINUED | OUTPATIENT
Start: 2023-08-11 | End: 2023-08-16 | Stop reason: HOSPADM

## 2023-08-11 RX ORDER — POTASSIUM CHLORIDE 750 MG/1
20 TABLET, EXTENDED RELEASE ORAL ONCE
Status: COMPLETED | OUTPATIENT
Start: 2023-08-11 | End: 2023-08-11

## 2023-08-11 RX ORDER — ALBUTEROL SULFATE 0.83 MG/ML
2.5 SOLUTION RESPIRATORY (INHALATION)
Status: DISCONTINUED | OUTPATIENT
Start: 2023-08-11 | End: 2023-08-16 | Stop reason: HOSPADM

## 2023-08-11 RX ORDER — MAGNESIUM OXIDE 400 MG/1
400 TABLET ORAL EVERY 4 HOURS
Status: COMPLETED | OUTPATIENT
Start: 2023-08-11 | End: 2023-08-11

## 2023-08-11 RX ORDER — IOPAMIDOL 755 MG/ML
100 INJECTION, SOLUTION INTRAVASCULAR ONCE
Status: COMPLETED | OUTPATIENT
Start: 2023-08-11 | End: 2023-08-11

## 2023-08-11 RX ORDER — METHYLPREDNISOLONE SODIUM SUCCINATE 125 MG/2ML
125 INJECTION, POWDER, LYOPHILIZED, FOR SOLUTION INTRAMUSCULAR; INTRAVENOUS ONCE
Status: COMPLETED | OUTPATIENT
Start: 2023-08-11 | End: 2023-08-11

## 2023-08-11 RX ADMIN — APIXABAN 5 MG: 5 TABLET, FILM COATED ORAL at 07:49

## 2023-08-11 RX ADMIN — ALBUTEROL SULFATE 2.5 MG: 2.5 SOLUTION RESPIRATORY (INHALATION) at 11:06

## 2023-08-11 RX ADMIN — RACEPINEPHRINE HYDROCHLORIDE 0.5 ML: 11.25 SOLUTION RESPIRATORY (INHALATION) at 12:15

## 2023-08-11 RX ADMIN — RIVAROXABAN 20 MG: 20 TABLET, FILM COATED ORAL at 17:52

## 2023-08-11 RX ADMIN — MAGNESIUM OXIDE TAB 400 MG (241.3 MG ELEMENTAL MG) 400 MG: 400 (241.3 MG) TAB at 09:51

## 2023-08-11 RX ADMIN — LIDOCAINE PATCH 4% 1 PATCH: 40 PATCH TOPICAL at 19:36

## 2023-08-11 RX ADMIN — DIPHENHYDRAMINE HYDROCHLORIDE 25 MG: 50 INJECTION, SOLUTION INTRAMUSCULAR; INTRAVENOUS at 11:13

## 2023-08-11 RX ADMIN — METHYLPREDNISOLONE SODIUM SUCCINATE 125 MG: 125 INJECTION INTRAMUSCULAR; INTRAVENOUS at 11:58

## 2023-08-11 RX ADMIN — MAGNESIUM OXIDE TAB 400 MG (241.3 MG ELEMENTAL MG) 400 MG: 400 (241.3 MG) TAB at 15:14

## 2023-08-11 RX ADMIN — IOPAMIDOL 100 ML: 755 INJECTION, SOLUTION INTRAVENOUS at 16:16

## 2023-08-11 RX ADMIN — METHYLPREDNISOLONE SODIUM SUCCINATE 125 MG: 125 INJECTION, POWDER, FOR SOLUTION INTRAMUSCULAR; INTRAVENOUS at 17:52

## 2023-08-11 RX ADMIN — POTASSIUM CHLORIDE 20 MEQ: 750 TABLET, EXTENDED RELEASE ORAL at 08:51

## 2023-08-11 RX ADMIN — ACETAMINOPHEN 650 MG: 325 TABLET, FILM COATED ORAL at 08:55

## 2023-08-11 ASSESSMENT — ACTIVITIES OF DAILY LIVING (ADL)
ADLS_ACUITY_SCORE: 37
ADLS_ACUITY_SCORE: 37
ADLS_ACUITY_SCORE: 39
ADLS_ACUITY_SCORE: 39
ADLS_ACUITY_SCORE: 37
ADLS_ACUITY_SCORE: 37
ADLS_ACUITY_SCORE: 39
ADLS_ACUITY_SCORE: 37
ADLS_ACUITY_SCORE: 39
ADLS_ACUITY_SCORE: 39
ADLS_ACUITY_SCORE: 37
ADLS_ACUITY_SCORE: 37

## 2023-08-11 NOTE — CONSULTS
"Otolaryngology Consult Note  August 11, 2023  CC: Airway assessment, acute cough    HPI: Estela Fry is a 33 year old adult who was otherwise healthy and experienced unwitnessed VT/VF cardiac arrest at home on 8/5, she was intubated and ROSC was obtained after three shocks, she was taken to cath lab where no obstructive CAD was observed. Cardiac work up revealed inferior vena cava US with 40% luminal thrombus so she was started on eliquis. CTA negative for pulmonary embolism. Patient was extubated on 8/7 without complication and without respiratory complaint until earlier this AM when she developed strong barking cough, shortness of breath and \"throat tightness\" that prompted rapid response and concern for anaphylactic reaction. She was given albuterol nebs, racemic epinephrine, solumedrol and benadryl. She subsequently improved however remained fatigued and with quite voice. Ent was consulted for airway evaluation.    On our evaluation she was stable on room air. Her voice was very soft which she says started this morning. She notes mild shortness of breath and severe cough. She was intermittently having significant barking cough during evaluation. She denies any prior medical concerns or allergies. She has never had an allergic reaction. She denies any history of shortness of breath or weak voice prior to this hospitalization. She denies any family history of head/neck cancer. She is a never smoker and notes occasional social alcohol use. She reports being vaccinated for COVID and TB. COVID test was negative.    No past medical history on file.    Past Surgical History:   Procedure Laterality Date    CV CENTRAL VENOUS CATHETER PLACEMENT N/A 8/5/2023    Procedure: Central Venous Catheter Placement;  Surgeon: Sid Liz MD;  Location: [facility] CARDIAC CATH LAB    CV CORONARY ANGIOGRAM N/A 8/5/2023    Procedure: Coronary Angiogram;  Surgeon: Sid Liz MD;  Location: [facility] CARDIAC CATH LAB       No " current outpatient medications on file.        No Known Allergies    Social History     Socioeconomic History    Marital status:      Spouse name: Not on file    Number of children: Not on file    Years of education: Not on file    Highest education level: Not on file   Occupational History    Not on file   Tobacco Use    Smoking status: Not on file    Smokeless tobacco: Not on file   Substance and Sexual Activity    Alcohol use: Not on file    Drug use: Not on file    Sexual activity: Not on file   Other Topics Concern    Not on file   Social History Narrative    Not on file     Social Determinants of Health     Financial Resource Strain: Not on file   Food Insecurity: Not on file   Transportation Needs: Not on file   Physical Activity: Not on file   Stress: Not on file   Social Connections: Not on file   Intimate Partner Violence: Not on file   Housing Stability: Not on file       No family history on file.    ROS: 12 point review of systems is negative unless noted in HPI.    PHYSICAL EXAM:  /70 (BP Location: Right arm)   Pulse (!) 125   Temp 100.1  F (37.8  C) (Axillary)   Resp 25   Ht 1.524 m (5')   Wt 50 kg (110 lb 3.7 oz)   SpO2 94%   BMI 21.53 kg/m    General: laying in bed, no acute distress, voice is very soft, intermittent barking cough  HEAD: normocephalic, atraumatic  Face: symmetrical HB 1/6, no swelling, edema, or erythema. Sensation V1-V3 intact and equal bilaterally.   Eyes: EOMI without spontaneous or gaze evoked nystagmus, PERRL, clear sclera  Nose: no anterior drainage  Mouth: FOM and tongue are soft without edema. Mucosal surfaces are moist, no ulcers, tongue is midline and symmetric  Oropharynx: tonsils within normal limits, uvula midline, no oropharyngeal erythema or edema  Neck: no LAD, trachea midline  Neuro: cranial nerves 2-12 grossly intact  Respiratory: breathing non-labored on RA, Biphasic stridor with effortful breathing  Skin: no rashes or skin lesions of the  face/neck  Cardio: extremities warm and well perfused     FIBEROPTIC ENDOSCOPY:  Due to airway concern, fiberoptic laryngoscopy was indicated. After obtaining verbal consent, the nose was topically decongested and anesthetized. The fiberoptic laryngoscope was passed under endoscopic vision through the right nasal passage. The turbinates were normal. The inferior and middle meati were clear bilaterally without purulence, masses, or polyps. The nasopharynx was clear. The eustachian tubes were clear. The soft palate appeared normal with good mobility. The epiglottis was sharp without edema, and the visualized portion of the vallecula was clear. The larynx was clear with mobile cords bilaterally. The arytenoids were clear without edema, and there was no pooling in the hypopharynx. 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    ROUTINE IP LABS (Last four results)  BMP  Recent Labs   Lab 08/11/23  1148 08/11/23  0610 08/10/23  0655 08/09/23  0436    141 142 141   POTASSIUM 3.9 3.8 3.5 3.6   CHLORIDE 105 106 106 108*   CUATE 9.3 9.0 8.7 8.5*   CO2 25 26 26 25   BUN 7.0 7.3 5.8* 6.4   CR 0.67 0.66 0.67 0.62   * 97 96 103*     CBC  Recent Labs   Lab 08/11/23  1148 08/11/23  0610 08/10/23  0655 08/09/23  0436   WBC 4.5 4.0 4.4 5.5   RBC 4.26 3.62* 3.49* 3.54*   HGB 12.1 10.2* 9.9* 9.9*   HCT 36.4 31.3* 30.1* 31.3*   MCV 85 87 86 88   MCH 28.4 28.2 28.4 28.0   MCHC 33.2 32.6 32.9 31.6   RDW 12.4 12.3 12.5 12.6    199 181 147*     INR  Recent Labs   Lab 08/11/23  0610 08/10/23  0655 08/09/23  1342 08/08/23  1015   INR 1.37* 1.13 1.06 1.19*       Imaging:  Results for orders placed or performed during the hospital encounter of 08/05/23   CT Head w/o Contrast     Value    Radiologist flags Probable ischemic infarct (AA)    Narrative    EXAM: CT HEAD W/O CONTRAST  8/5/2023 9:07 PM     HISTORY:  post arrest       COMPARISON:  None    TECHNIQUE: Using multidetector thin  collimation helical acquisition  technique, axial, coronal and sagittal CT images from the skull base  to the vertex were obtained without intravenous contrast.   (topogram) image(s) also obtained and reviewed.    FINDINGS:  No acute intracranial hemorrhage, mass effect, or midline shift. Loss  of gray-white matter differentiation in the anterior right frontal  lobe. Subtle loss of gray-white matter differentiation in the right  temporal lobe, commonly seen in this location, likely artifactual.  Ventricles are proportionate to the cerebral sulci. Clear basal  cisterns. Contrast was visualized within the sinuses secondary to same  day cardiac catheterization.    The bony calvaria and the bones of the skull base are normal. The  visualized portions of the paranasal sinuses and mastoid air cells are  clear. Grossly normal orbits.       Impression    IMPRESSION: Loss of gray-white differentiation in the anterior right  frontal lobe concerning for ischemic infarct.     [Critical Result: Probable ischemic infarct]    Finding was identified on 8/5/2023 9:08 PM.     Dr. Marie was contacted by Dr. Sloan on 8/5/2023 9:11 PM and  verbalized understanding of the critical result.     I have personally reviewed the examination and initial interpretation  and I agree with the findings.    KANDICE BLANCO MD         SYSTEM ID:  [id number]   CT Chest Abdomen Pelvis w/o Contrast    Narrative    EXAMINATION: CT CHEST ABDOMEN PELVIS W/O CONTRAST, 8/5/2023 9:06 PM    TECHNIQUE: Helical CT images from the thoracic inlet through the  symphysis pubis were obtained without intravenous contrast.     COMPARISON: None available    HISTORY: post arrest    FINDINGS:    Support devices: Endotracheal tube tip in the lower thoracic trachea.  Gastric tube tip in the stomach. Right femoral approach arterial and  venous catheters terminating at the distal aorta and IVC.    Chest:     Lungs: The trachea and central airways are patent. No  pneumothorax or  pleural effusion. Bilateral dependent left greater than right  consolidation of opacities and adjacent tree-in-bud nodularity.    Mediastinum: The visualized thyroid gland is unremarkable. The heart  size is within normal limits. No pericardial effusion. The ascending  aorta and main pulmonary artery diameters are within normal limits.  Subtle hypodensities within the pulmonary arteries give the appearance  of artifact, although pulmonary embolism cannot be excluded without  contrast. Normal appearance and configuration of the great vessels off  of the aortic arch. No suspicious mediastinal, hilar, or axillary  lymph nodes. Fluid within the proximal esophagus surrounding the  gastric tube.    Abdomen and pelvis: Evaluation is somewhat limited due to lack of  contrast.    Liver: Unremarkable noncontrast appearance of the liver    Biliary System: Normal gallbladder. No extrahepatic biliary ductal  dilation.    Pancreas: No mass or pancreatic ductal dilation.    Adrenal glands: No mass or nodules    Spleen: Normal.    Kidneys: No suspicious mass, obstructing calculus or hydronephrosis.   Contrast visualized within the renal collecting system.  The urinary  bladder is well-distended with intraluminal contrast, unremarkable.  Focus of hyperdensity in the anterior aspect of the bladder felt to be  mixing artifact.    Gastrointestinal tract :Normal appendix. Normal caliber small bowel.    Mesentery/peritoneum/retroperitoneum: No mass. No free air. Small  volume free fluid in the pelvis.    Lymph nodes: No significant lymphadenopathy.    Vasculature: Patency of the major intra-abdominal vasculature cannot  be assessed on this noncontrast exam. Small volume gas noted within  the suprarenal IVC, right internal jugular vein, right subclavian vein  and right shoulder subcutaneous tissues.    Pelvis: Urinary bladder is normal.    Osseous structures: No aggressive or acute osseous lesion.      Soft tissues: Small  5 mm pocket of air in the anterior chest wall  tissues (series 9. image 110) of unknown significance, although likely  iatrogenic.      Impression    IMPRESSION:   1. Findings concerning for aspiration/infection with endotracheal tube  tip in the lower thoracic trachea.  2. No definite acute findings in the abdomen or pelvis on this  noncontrast exam.   3. Unusual appearance of focus of hyperdensity in the bladder felt to  be mixing artifact.  4. Hypodensities within the pulmonary arteries felt to be artifact.  Pulmonary embolism cannot be excluded without contrast injection.  5. Small pockets of air in the anterior chest wall as well as multiple  vascular structures, likely iatrogenic.    I have personally reviewed the examination and initial interpretation  and I agree with the findings.    WILLIAM GRIFFIN MD         SYSTEM ID:  [id number]   XR Chest Port 1 View    Narrative    EXAM:  XR CHEST PORT 1 VIEW    INDICATION: Endotracheal tube positioning    COMPARISON:  CT CAP 5/20/2023    FINDINGS:  Single AP view of the chest. Endotracheal tube terminates over the mid  thoracic trachea. Esophageal probe over the mid/distal esophagus.  Partially visualized enteric tube.    Cardiomediastinal silhouette within normal limits.  Right greater than  left mixed perihilar opacities. No pneumothorax.  No pleural effusion.        Impression    IMPRESSION:  1.  Endotracheal tube terminates over the mid thoracic trachea.  2.  Right greater than left mixed perihilar opacities suggestive of  infection/aspiration.    I have personally reviewed the examination and initial interpretation  and I agree with the findings.    WILLIAM GRIFFIN MD         SYSTEM ID:  [id number]   XR Abdomen Port 1 View    Narrative    EXAM: XR ABDOMEN PORT 1 VIEW  8/5/2023 10:17 PM      HISTORY: OG placement    COMPARISON: CT CAP 8/5/2023    FINDINGS:   Single AP view of the abdomen. Esophageal probe over the distal  esophagus. Enteric tube sidehole and tip over  the stomach.    Air distended stomach. No pneumatosis. No portal venous gas. Please  see same day chest x-ray for thoracic findings.      Impression    IMPRESSION:  1. Enteric tube tip and sidehole projects over the stomach.  2. Gaseous distention of the stomach.    I have personally reviewed the examination and initial interpretation  and I agree with the findings.    WILLIAM GRIFFIN MD         SYSTEM ID:  [id number]   CTA Head Neck with Contrast    Narrative    EXAM: CTA HEAD NECK W CONTRAST  8/5/2023 11:51 PM     HISTORY: evaluation for stenosis/thrombosis       COMPARISON: Multiple same-day head CTs    TECHNIQUE:  HEAD and NECK CTA: During rapid bolus intravenous injection of  nonionic contrast material, axial images were obtained using thin  collimation multidetector helical technique from the base of the upper  aortic arch through the [place]. This CT angiogram data was  reconstructed at thin intervals with mild overlap. Images were sent to  the 3D workstation, and 3D reconstructions were obtained. The axial  source images, multiplanar reformations, 3D reconstructions in both  maximum intensity projection display and volume rendered models were  reviewed, with reconstructions performed by the technologist.    CONTRAST: 75 mL Isovue 370    FINDINGS:  Head CTA demonstrates no large vessel arterial occlusion or stenosis  of the major intracranial arteries. No aneurysm. Anterior and left  posterior communicating arteries are patent. Right posterior  communicating artery is not well visualized.     Neck CTA: Patent great vessel origins. Classic great vessel anatomy.  The normal distal right internal carotid artery is patent measuring 3  mm. The normal distal left internal carotid artery is patent measuring  3 mm. No vertebral artery stenosis.    Please see same day CT CAP for superior mediastinal findings.  Intubation tube terminates just above the tory.      Impression    IMPRESSION:  1. Head CTA  demonstrates patent major intracranial arteries. No  aneurysm or hemodynamically significant stenosis.  2. Neck CTA demonstrates patent major cervical arteries. No  hemodynamically significant stenosis.  3. The endotracheal tube terminates just above the tory. Consider  slight retraction.    I have personally reviewed the examination and initial interpretation  and I agree with the findings.    KANDICE BLANCO MD         SYSTEM ID:  [id number]   CT Head w/o Contrast    Narrative    EXAM: CT HEAD W/O CONTRAST  8/5/2023 11:50 PM     HISTORY: Evaluation for stenosis/thrombosis       COMPARISON: 8/5/2023    TECHNIQUE: Using multidetector thin collimation helical acquisition  technique, axial, coronal and sagittal CT images from the skull base  to the vertex were obtained without intravenous contrast.   (topogram) image(s) also obtained and reviewed.    FINDINGS:  Previous loss of gray-white differentiation in the right anterior  frontal lobe is no longer identified.. No acute loss of gray-white  matter differentiation in the cerebral hemispheres. Ventricles are  proportionate to the cerebral sulci. Clear basal cisterns.    The bony calvaria and the bones of the skull base are normal. A  plastic left maxillary sinus. Mild maxillary mucosal thickening.  Grossly normal orbits.       Impression    IMPRESSION: No acute intracranial pathology. Previous loss of  gray-white differentiation in right anterior frontal lobe is no longer  identified.     I have personally reviewed the examination and initial interpretation  and I agree with the findings.    KANDICE BLANCO MD         SYSTEM ID:  [id number]   CT Head w/o Contrast    Narrative    EXAM: CT HEAD W/O CONTRAST, 8/6/2023 4:17 AM    INDICATION: Cardiac arrest. Evaluation of evolution of infarction from  2100 scan.    COMPARISON:  Multiple head CTs from 8/5/2023    TECHNIQUE: Using multidetector thin collimation helical acquisition  technique, axial, coronal and sagittal  CT images from the skull base  to the vertex were obtained without intravenous contrast.   (topogram) image(s) also obtained and reviewed.    FINDINGS:  The loss of gray-white differentiation in the right anterior frontal  lobe seen on 8/5/2023 at 20:44 which is not seen on 8/5/2023 at 2303,  is still not seen on today's exam. No acute loss of gray-white matter  differentiation in the cerebral hemispheres. Ventricles are  proportionate to the cerebral sulci. Clear basal cisterns.    The bony calvaria and the bones of the skull base are normal. The  endotracheal tube. Hypoplastic left maxillary sinus with mucosal  thickening. Clear mastoid air cells. Grossly normal orbits.       Impression    IMPRESSION: No acute intracranial pathology. The previous loss of  acute gray-white matter in the right frontal lobe is not visualized on  this study.     I have personally reviewed the examination and initial interpretation  and I agree with the findings.    KANDICE BLANCO MD         SYSTEM ID:  [id number]    Lower Extremity Venous Duplex Bilateral    Narrative    EXAMINATION: DOPPLER VENOUS ULTRASOUND OF BILATERAL LOWER EXTREMITIES,  8/6/2023 12:14 PM     COMPARISON: None    HISTORY: Swelling    TECHNIQUE:  Gray-scale evaluation with compression, spectral flow and  color Doppler assessment of the deep venous system of both legs from  groin to knee, and then at the ankles.    FINDINGS:  In both lower extremities, the femoral, popliteal and posterior tibial  veins demonstrate normal compressibility and blood flow. Normal  compressibility and blood flow within the left common femoral vein.  The right common femoral vein is obscured secondary to overlying  line/bandaging.      Impression    IMPRESSION:  1.  No evidence of deep venous thrombosis in either lower extremity.    I have personally reviewed the examination and initial interpretation  and I agree with the findings.    WILLIAM GRIFFIN MD         SYSTEM ID:  [id number]    US Inferior Vena Cava     Value    Radiologist flags DVTs (Urgent)    Narrative    Examination: Ultrasound inferior vena cava 8/6/2023    Indication: Right upper quadrant IVC evaluation for thrombus    Comparison: None    Findings:  There has partial luminal echogenic debris spanning approximately 2.4  cm in length within the mid/distal IVC, filling roughly 40% of the  luminal diameter. Normal IVC phasic waveforms throughout. Normal IVC  velocities.    Nonocclusive echogenic clot surrounding the right external iliac vein  line/catheter.    On the cine clips in the right upper quadrant there is an edematous  structure that is either edematous duodenum or edematous gallbladder.  Consider right upper quadrant ultrasound      Impression    Impression:  1. There is approximately 40% width luminal focal thrombus within the  mid/distal IVC, with normal phasic waveforms throughout the IVC.  2. Nonocclusive DVT involving the right external iliac vein  surrounding catheter.  3. Structure in the right upper quadrant noted on the cine images  probably edematous duodenum and/or edematous gallbladder. Right upper  quadrant ultrasound would help delineate.    [Urgent Result: DVTs]    Finding was identified on 8/6/2023 1:26 PM.     MELODY Vega CNP was contacted by Dr. Richards at 8/6/2023  1:30 PM and verbalized understanding of the urgent finding.     I have personally reviewed the examination and initial interpretation  and I agree with the findings.    WILLIAM GRIFFIN MD         SYSTEM ID:  [id number]   CT Chest Pulmonary Embolism w Contrast    Narrative    EXAMINATION: CTA pulmonary angiogram, 8/6/2023 4:05 PM     COMPARISON: CT chest abdomen and pelvis 8/5/2023    HISTORY: Cardiac arrest, indeterminate etiology, IVC thrombus noted on  ultrasound.    TECHNIQUE: Volumetric helical acquisition of CT images of the chest  from the lung apices to the kidneys were acquired after the  administration of 80 mL of Isovue-370  IV contrast. Flash technique  with free breathing acquisition.  Post-processed multiplanar and/or  MIP reformations were obtained, archived to PACS and used in  interpretation of this study.     FINDINGS:  Contrast bolus is: adequate.  Exam is negative for acute  pulmonary embolism.      Endotracheal tube at mid thoracic aorta. Enteric tube with tip in the  partially visualized stomach.    Normal cardiac size and shape without pericardial effusion.    Reflux of contrast into the IVC and hepatic veins. Round hyperdense  material within the gallbladder, likely contrast secondary to reflux.     Bilateral small pleural effusions, left greater than right, with  associated atelectasis. Asymmetric predominant groundglass opacities.  No focal consolidative opacities.    No acute or suspicious osseous abnormalities.      Impression    IMPRESSION:   1. Exam is negative for acute pulmonary embolism.    2. Trace bilateral pleural effusions, left greater than right with  interlobular septal thickening which may be due to mild pulmonary  edema. No focal consolidative opacities concerning for pulmonary  infection.     3. Reflux of contrast into the IVC and hepatic veins may be due to  limited cardiac function.    In the event of a positive result for acute pulmonary embolism  resulting in right heart strain, please activate the [facility]  Multidisciplinary group for consultation by paging [phone]).     [facility] -- Pulmonary Embolism Response Team (Multidisciplinary team  including cardiology, interventional radiology, critical care,  hematology)    I have personally reviewed the examination and initial interpretation  and I agree with the findings.    BENITEZ RYAN DO         SYSTEM ID:  [id number]   Cardiac Catheterization    Narrative    No significant coronary artery disease.   Successful placement of a CVC in the RFV     MR Cardiac w Contrast    Narrative                                                     [facility]                                                      CMR Report      MRN:          [id number]                                  Name:        WENCESLAO GAYTAN                                   :                                           Scan Date:   2023-Aug-10                                  Electronically signed by Eldon Wright Elsa 2023-Aug-10 10:50:59    SUMMARY   ==========================================================================================================    Clinical history: 33 year old female with prior cardiac arrest.     1. The LV is normal in cavity size and wall thickness. The global systolic function is normal. The LVEF is  64%. There are no regional wall motion abnormalities.    2. The RV is normal in cavity size. The global systolic function is normal. The RVEF is 59%.     3. Both atria are normal in size.    4. There is no significant valvular disease.     5. Late gadolinium enhancement imaging shows no MI, fibrosis or infiltrative disease.     6. There is trivial pericardial effusion.    7. There is no intracardiac thrombus.    CONCLUSIONS: Normal cardiac function, LVEF of 64% and RVEF of 59%. There is no evidence of myocardial  fibrosis, given no fibrosis consider genetic evaluation for the etiology of her cardiac arrest.     CORE EXAM   ==========================================================================================================    MEASUREMENTS   ----------------------------------------------------------------------------------------      VOLUMETRIC ANALYSIS       ----------------------------------------------  .---------------------------------------------------------.                   LV     Reference  RV     Reference   +-----+-----------+-------+-----------+-------+-----------+   EDV  ml         121.9             104.5                    ml/m^2      83.8              71.9               ESV  ml          44.3              43.2                     ml/m^2      30.5              29.8               CO                                                         L/min/m^2                                             g/m^2                                            SV   ml          77.5              61.3                    ml/m^2      53.3              42.1               EF   %           63.6              58.6              '-----+-----------+-------+-----------+-------+-----------'        LV DIMENSIONS       ----------------------------------------------          WALL THICKNESS - ANTEROSEPTAL:  0.7 cm          WALL THICKNESS - INFEROLATERAL:  0.6 cm          LV DEBORA:  4.5 cm          LV ESD:  3.4 cm        LA DIMENSIONS (LV SYSTOLE)       ----------------------------------------------          DIAMETER:  3.6 cm        AORTIC ROOT DIMENSIONS       ----------------------------------------------          AORTIC ROOT SIZE:  Normal      ANATOMY   ----------------------------------------------------------------------------------------      LEFT VENTRICLE       ----------------------------------------------          WALL THICKNESS:  Normal          CAVITY SIZE:  Normal        RIGHT VENTRICLE       ----------------------------------------------          WALL THICKNESS:  Normal          CONTRACTILITY:  Normal          CAVITY SIZE:  Normal        LEFT ATRIUM       ----------------------------------------------          CAVITY SIZE:  Normal        RIGHT ATRIUM       ----------------------------------------------          CAVITY SIZE:  Normal        PERICARDIUM       ----------------------------------------------          Normal          EFFUSION:  Trivial        PLEURAL EFFUSION       ----------------------------------------------   None       VALVES   ----------------------------------------------------------------------------------------      AORTIC VALVE       ----------------------------------------------           AORTIC VALVE LEAFLETS:  Trileaflet        MITRAL VALVE       ----------------------------------------------          MITRAL VALVE LEAFLETS:  Normal Leaflets        TRICUSPID VALVE       ----------------------------------------------          TRICUSPID VALVE LEAFLETS:  Normal Leaflets        PULMONIC VALVE       ----------------------------------------------          PULMONIC VALVE LEAFLETS:  Normal Leaflets      17 SEGMENT   ----------------------------------------------------------------------------------------  .------------------------------------------------------------------------------------------.   Segments            Wall Motion   Hyperenhancement  Stress Perfusion  Interpretation   +--------------------+--------------+------------------+------------------+----------------+   Base Anterior       Normal/Hyper  None                                Normal            Base Anteroseptal   Normal/Hyper  None                                Normal            Base Inferoseptal   Normal/Hyper  None                                Normal            Base Inferior       Normal/Hyper  None                                Normal            Base Inferolateral  Normal/Hyper  None                                Normal            Base Anterolateral  Normal/Hyper  None                                Normal            Mid Anterior        Normal/Hyper  None                                Normal            Mid Anteroseptal    Normal/Hyper  None                                Normal            Mid Inferoseptal    Normal/Hyper  None                                Normal            Mid Inferior        Normal/Hyper  None                                Normal            Mid Inferolateral   Normal/Hyper  None                                Normal            Mid Anterolateral   Normal/Hyper  None                                Normal            Apical Anterior      Normal/Hyper  None                                Normal            Apical Septal       Normal/Hyper  None                                Normal            Apical Inferior     Normal/Hyper  None                                Normal            Apical Lateral      Normal/Hyper  None                                Normal            Landrum                Normal/Hyper  None                                Normal           +--------------------+--------------+------------------+------------------+----------------+   RV Segments         Wall Motion   Hyperenhancement                    Interpretation   +--------------------+--------------+------------------+------------------+----------------+   RV Basal Anterior   Normal/Hyper  None                                Normal            RV Basal Inferior   Normal/Hyper  None                                Normal            RV Mid              Normal/Hyper  None                                Normal            RV Apical           Normal/Hyper  None                                Normal           '--------------------+--------------+------------------+------------------+----------------'        FINDINGS       ----------------------------------------------          LV SCAR SIZE (17 SEGMENT):  0 %      SCAN INFO   ==========================================================================================================    GENERAL   ----------------------------------------------------------------------------------------      CONTRAST AGENT       ----------------------------------------------          TYPE:  Gadavist          GD CONCENTRATION:  0.5 M          VOLUME ADMINISTERED:  7.5 ml          DOSAGE:  0.07 mmol/kg        VITALS       ----------------------------------------------          HEIGHT:  60.0 in          HEIGHT:  152.4 cm          WEIGHT:  111.0 lbs          WEIGHT:  50.4 kgs          BSA:  1.45 m^2         SETUP       ----------------------------------------------          SCAN TYPE:  Clinical          PATIENT TYPE:  Outpatient          INCOMPLETE SCAN:  No          REASON(S) FOR SCAN:  nonischemic CM etiology           ATTENDING PHYSICIAN:  CARDIOLOGIST INVASIVE      BILLING   ----------------------------------------------------------------------------------------      CPT Codes      ICD10 Codes      Report generated by [employer], a product of [employer]         Assessment and Plan  Estela Fry is a 33 year old otherwise healthy who experienced cardiac arrest with unknown cause requiring intubation from 8/5 to 8/7. She developed barking cough and shortness of breath on the morning of 8/11 with acute onset from previously stable state concerning for anaphylaxis. She improved after receiving racemic epinephrine, albuterol, decadron, and Benadryl and is currently stable on RA without shortness of breath but with continued soft voice and biphasic stridor with effort. 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 This is most likely related to intubation considering location and timing. CT scan would help us better evaluate extent or subglottic stenosis and rule out other obstructive airway causes. Given current status, recommend continuing medical management with steroids and nebulizers. ENT will continue to monitor.    - CT Neck  - Decadron 10mg Q8H for 3 doses  - Racemic epinephrine as needed  - ENT will continue to monitor and if condition worsens will expedite OR for tracheal dilation  - Please contact Otolaryngology on call with any questions or concerns    Patient and plan was discussed with staff Dr. Chandana Lr MD   Department of Otolaryngology - Head and Neck Surgery, PGY 1  Please page ENT with questions

## 2023-08-11 NOTE — PROGRESS NOTES
Interventional Cardiology Progress Note      Assessment & Plan:  Estela Fry is a 33 year old female admitted on 8/5/2023 following an unwitnessed VT/VF cardiac arrest at home with ROSC after 3 shocks. She was not found to have obstructive CAD on angiogram. Extubated 8/7.     Today's Update:  - DOAC today, ok per Heme  - ICD per EP schedule, tentatively for Monday with Dr. Johnson  -    Addendum: Notified by RN @ 11 AM of patient wheezing and having trouble breathing. Entered room to find patient in tripod position, drooling with stridor/tight cough. Stat neb and benadryl given. Tachycardic 110-140's with frequent coughing, O2 sats > 95% RA, supplemental O2 for comfort, RR 20's. Patient feeling like symptoms slightly improved. Awaiting lab results. Plan to treat with IV Solumedrol. Per Dr. Liz continue IV Solumedrol 125 mg q6h for 24 hours then q8h for 24 hours the prednisone taper. ENT to assess for airway swelling. Unclear if anaphylactic reaction to Eliquis (only new medication) but will change to Xarelto in case this was trigger.     # VT/VF cardiac arrest  # Elevated Troponin 2/2 Cardiac Arrest  Patient was found down by her daughter at home,  started CPR and called EMS. Patient was shocked 3 times with ROSC and transported to [facility]. Patient requiring pressors on admission which have since been weaned off. Coronary angiography performed on arrival and noted for no coronary artery disease.   - Echo 8/6 showing mild RV dysfunction normal LV fx and an IVC thrombus   - Cardiac MRI today   - K/Mag replacement protocols     # IVC thrombus  # Nonocclusive DVT involving the right external iliac vein  Mid/distal IVC thrombus with 40% width initially seen on TTE and then on dedicated IVC US. Nonocclusive DVT in right external iliac vein surrounding patient's catheter also visualized on ultrasound.   - Hematology consulted: hypercoagulable workup negative thus far, antiphospholipid antibody pending  - Lupus panel  results negative 8/10: per Heme ok to start DOAC  - Start Eliquis 5 mg BID  - Outpatient follow up with hematology scheduled 10/18/23     # Hypoxic encephalopathy 2/2 cardiac arrest   Concern for frontal lobe CVA on admission CT. Subsequent scan without evidence of CVA. She is following commands and moving all extremities. Patient A&O x4 on exam today  - PT/OP  - Neurology following; recommend MRI Brain if clinical course not improving     # Normocytic Anemia of Critical Illness  Hgb on Admission 12.0; today 10.2  - Daily CBC     Resolved Problems:   # Lactic Acidosis  # Leukocytosis  # Acute hypoxic respiratory failure  # Aspiration pneumonitis     Prophylaxis:  DVT: DOAC  Diet: Regular  Activity: As Tolerated  Code Status: Full Code  Disposition: Pending ICD placement     Patient discussed w/ Dr. Liz.    Soledad Bruce APRN, CNP  [facility]  Interventional Cardiology  [phone]    Medical Decision Making       50 MINUTES SPENT BY ME on the date of service doing chart review, history, exam, documentation & further activities per the note.      Interval History:  NAEO, telemetry, labs, vital signs and nursing notes reviewed.  Rib pain mildly improved with addition of Lidocaine patches yesterday  Patient discussing ICD option with  and family. Will update EP on final plan hopefully later today    Most recent vital signs:  /79 (BP Location: Left arm, Cuff Size: Adult Regular)   Pulse 110   Temp 98.6  F (37  C) (Oral)   Resp 19   Ht 1.524 m (5')   Wt 50 kg (110 lb 3.7 oz)   SpO2 97%   BMI 21.53 kg/m    Temp:  [98.2  F (36.8  C)-99.5  F (37.5  C)] 98.6  F (37  C)  Pulse:  [] 110  Resp:  [14-22] 19  BP: (104-118)/(76-88) 110/79  SpO2:  [97 %-99 %] 97 %  Wt Readings from Last 2 Encounters:   08/11/23 50 kg (110 lb 3.7 oz)     Intake/Output Summary (Last 24 hours) at 8/11/2023 0713  Last data filed at 8/10/2023 1800  Gross per 24 hour   Intake 430 ml   Output  --   Net 430 ml     Physical exam:  General: Pleasant female Appears comfortable and in no acute distress. Alert and interactive  HEENT: Normocephalic, atraumatic. No scleral icterus or injection  CARDIAC: Regular rate and rhythm, no m/r/g appreciated. Peripheral pulses 2+  RESP: Normal work of breathing on room air without use of accessory breathing muscles. Clear to auscultation in all fields. No wheezes, rhonchi or crackles appreciated.  GI: No abdominal distention. Soft and nontender.   EXTREMITIES: Without lower extremity edema. No cyanosis or clubbing. Warm and well perfused. No venous stasis changes.   SKIN: No acute lesions appreciated. Warm and dry to touch  NEURO: Alert and oriented X3, CN II-XII grossly intact, no focal neurological deficits noted, normal speech  PSYCH: Mood and affect are appropriate    Labs (Past three days):  CBC  Recent Labs   Lab 08/11/23  0610 08/10/23  0655 08/09/23  0436 08/08/23  0647   WBC 4.0 4.4 5.5 7.0   RBC 3.62* 3.49* 3.54* 3.44*   HGB 10.2* 9.9* 9.9* 9.9*   HCT 31.3* 30.1* 31.3* 30.7*   MCV 87 86 88 89   MCH 28.2 28.4 28.0 28.8   MCHC 32.6 32.9 31.6 32.2   RDW 12.3 12.5 12.6 12.7    181 147* 117*     BMP  Recent Labs   Lab 08/10/23  0655 08/09/23  0436 08/08/23  1015 08/08/23  0647 08/07/23  0953 08/07/23  0749 08/07/23  0327 08/06/23  0319 08/06/23  0221 08/05/23  2112 08/05/23  2108    141  --  141 137  --  138   < >  --    < > 142   POTASSIUM 3.5 3.6 3.8 3.4 3.6  --  4.0   < >  --    < > 3.7   CHLORIDE 106 108*  --  107 104  --  106   < >  --    < > 111*   CO2 26 25  --  27 25  --  23   < >  --    < > 17*   ANIONGAP 10 8  --  7 8  --  9   < >  --    < > 14   GLC 96 103*  --  102* 106*   < > 118*   < >  --    < > 170*   BUN 5.8* 6.4  --  8.5 6.9  --  7.6*   < >  --    < > 17.4   CR 0.67 0.62  --  0.66 0.65  --  0.63   < >  --    < > 0.87   CUATE 8.7 8.5*  --  8.2* 8.3*  --  8.8   < >  --    < > 7.5*   MAG 1.9 1.8  --  1.7  --   --  1.6*   < > 2.5*  --  1.7    PHOS  --   --   --   --   --   --   --   --  3.4  --  4.3    < > = values in this interval not displayed.     Troponins: No results found for: TROPI, TROPONIN, TROPR, TROPN     INR  Recent Labs   Lab 08/11/23  0610 08/10/23  0655 08/09/23  1342 08/08/23  1015   INR 1.37* 1.13 1.06 1.19*     Liver panel  Recent Labs   Lab 08/10/23  0655 08/09/23  0436 08/08/23  0647 08/07/23  0953   PROTTOTAL 6.2* 6.0* 5.6* 5.5*   ALBUMIN 3.6 3.5 3.2* 3.3*   BILITOTAL 0.6 0.5 0.6 0.6   ALKPHOS 38 40 37 45   AST 20 26 26 34   ALT 19 29 26 35       Imaging/procedure results:  EKG 12 Lead 8/9/23:        Coronary Angiogram 8/5/23: No significant coronary artery disease.     cMRI 8/10/23:  Clinical history: 33 year old female with prior cardiac arrest.      1. The LV is normal in cavity size and wall thickness. The global systolic function is normal. The LVEF is  64%. There are no regional wall motion abnormalities.     2. The RV is normal in cavity size. The global systolic function is normal. The RVEF is 59%.      3. Both atria are normal in size.     4. There is no significant valvular disease.      5. Late gadolinium enhancement imaging shows no MI, fibrosis or infiltrative disease.      6. There is trivial pericardial effusion.     7. There is no intracardiac thrombus.     CONCLUSIONS: Normal cardiac function, LVEF of 64% and RVEF of 59%. There is no evidence of myocardial  fibrosis, given no fibrosis consider genetic evaluation for the etiology of her cardiac arrest.

## 2023-08-11 NOTE — CARE PLAN
Alert x4, calm and cooperative, SR/ST, Ind in room, mild pain in sternum, LS clear, Tolerating diet, resting well overnight, RA with sat >95%, Remains safe in room,  in room overnight, /79 (BP Location: Left arm, Cuff Size: Adult Regular)   Pulse 110   Temp 98.6  F (37  C) (Oral)   Resp 19   Ht 1.524 m (5')   Wt 50 kg (110 lb 3.7 oz)   SpO2 97%   BMI 21.53 kg/m

## 2023-08-11 NOTE — PLAN OF CARE
Around 1030 patient had pressed call light and NST went into patient's room. Patient was reporting difficulty breathing and was unable to stop coughing heavily. NST notified writer and writer went into patient's room to find patient leaning over the side of the bed with a loud, barky cough. Patient reported difficulties breathing in and writer assisted patient into tripod position with bedside table and provided breathing guidance. Breathing and cough improved slightly with these techniques. During this time, O2 sats were in the mid to upper 90s on room air. Provider, Soledad Bruce, was paged to ask for orders for a neb and prn Tessalon capsules. Writer applied 4 L oxymask to help with feelings of being unable to breathe. When provider got back to writer, writer requested provider at the bedside to assess. Provider also ordered CXR and benadryl. RRT was called with provider in the room due to minimal change in status of cough and difficulty breathing after albuterol neb was administered. During RRT patient received another neb and IV steroid, labs were collected and sent, sputum was collected and sent, and nasal swabs were collected and sent. ENT consulted to assess airway. EKG ordered. Patient is now resting comfortably, arousing to voice. During this event, RR was up to mid 30s and HR was elevated up to low 150s. Patient's  received an update at the bedside.

## 2023-08-11 NOTE — CODE/RAPID RESPONSE
Rapid Response Team Note    Assessment   In assessment a rapid response was called on Estela Fry due to respiratory distress. Was found with acute onset dyspnea, wheezing, drooling, stridor/barking cough. This occurred ~2 hours after 2nd dose of Eliquis so initial concern for was for anaphylaxis. Sounds upper airway but unclear etiology at this time. Was extubated 8/7. Patient received IV Benadryl and albuterol neb. RRT was called. ABG and CXR obtained and unremarkable. Of note, patient's reason for admission was VT/VF cardiac arrest of unknown etiology in an otherwise presumed healthy 33 year old woman.     Plan   -  IV methylprednisolone and racemic epi neb added to Benadryl and albuterol. Some improvement with the nebs. O2 sats remaining stable on room air.   - Primary team discussed with anesthesiology. Formal consult also placed for ENT to see patient.   - Covid, RVP, sputum culture sent.   -  The  CSI  primary team was at bedside.   -  Disposition: The patient will remain on the current unit. We will continue to monitor this patient closely.  -  Reassessment and plan follow-up will be performed by the primary team    25 minutes spent in bedside care, documentation, and discussion with nursing and primary team.     CLARA Cowan  [facility] RRT [facility] Job Code Contact #[phone]  [facility] Paging/Directory    Hospital Course   Brief Summary of events leading to rapid response:   RRT called for patient sounding wheezy, stridorous and having tachycardia and apparent dyspnea, drooling and tight coarse cough. Patient had been feeling well this AM prior, ambulatory in room with no respiratory symptoms. Onset was ~2 hours after 2nd dose Eliquis.     Admission Diagnosis:   Cardiac arrest (H) [I46.9]    Physical Exam   Temp: 99  F (37.2  C) Temp  Min: 98.2  F (36.8  C)  Max: 99  F (37.2  C)  Resp: 22 Resp  Min: 14  Max: 27  SpO2: 99 % SpO2  Min: 97 %  Max: 99 %  Pulse: (!) 133 Pulse  Min: 80  Max: 133    No data  recorded  BP: 109/85 Systolic (24hrs), Av , Min:104 , Max:133   Diastolic (24hrs), Av, Min:76, Max:88     I/Os: I/O last 3 completed shifts:  In: 560 [P.O.:320; I.V.:240]  Out: -      Exam:   Mental status: somnolent after receiving IV Benadryl, prior to that was fully alert and oriented  Resp: O2 sats stable on RA, equal air entry bilaterally, coarse barking cough   ENT: No appreciable edema in posterior pharynx

## 2023-08-11 NOTE — PLAN OF CARE
Major Shift Events: See previous note by this writer regarding RRT this shift. Since RRT, patient has been resting comfortably and is A&O x4. T max 100.7, not w/ in notifying parameters. ST, 100-120. Cough has decreased in frequency and she has remained stable on RA. Up SBA to bathroom. ENT saw patient earlier this afternoon and recommended NPO status, patient only taking small sips here and there and with meds. CT of the neck and EKG done. K+ and mag replaced, rechecks sched for tomorrow AM.   Plan: ICD placement scheduled on 8/14. Cont to monitor cough and respiratory status. Cont w/ POC.  For vital signs and complete assessments, please see documentation flowsheets.

## 2023-08-11 NOTE — PROGRESS NOTES
Electrophysiology Consult Service  Follow up Note   EP Attending:    Date of Service: 8/11/2023     S: We continue to follow this patient for ICD planning. She is continuing to make a good recovery after arrest. VSS. Renal function and electrolytes stable.   HPI:  Ms. Fry is a 33 year old female who had no significant medical history until admitted on 8/5/23 after suffering an out of hospital VT/VF cardiac arrest. She was reported to have been found down by her daughter at home. Her  started CPR and called EMS. She was reported to be in VT/VF on EMS arrival and had 3 external defibrillations with ROSC. She was brought to [facility] and had coronary angiogram that showed no CAD. She did not require ECMO. Initial echo showing mild RV dysfunction and normal LVEF with IVC thrombus. Work up showed nonocclusive DVT involving right external iliac vein. Hematology consulted and hypercoagulable work up negative with negative lupus panel as well. She has been started on DOAC. Initial concern for frontal lobe CVA on admission CT. Subsequent scan without evidence of CVA. CMR showed normal biventricular function with no LGE. ECG shows no evidence of preexcitation, early repolarization, or LQT.   O:   Vitals: /76 (BP Location: Left arm)   Pulse 100   Temp 98.9  F (37.2  C) (Oral)   Resp 14   Ht 1.524 m (5')   Wt 50 kg (110 lb 3.7 oz)   SpO2 99%   BMI 21.53 kg/m    Gen:  AxO, NAD   Lungs: CTAB   CV:  S1S2,Reg, no M/R/G noted. No JVD.   Abd: NT, ND, Soft   Ext:  No pedal edema    Data:  Labs:  BMPRecent Labs   Lab 08/11/23  0610 08/10/23  0655 08/09/23  0436 08/08/23  1015 08/08/23  0647    142 141  --  141   POTASSIUM 3.8 3.5 3.6 3.8 3.4   CHLORIDE 106 106 108*  --  107   CUAET 9.0 8.7 8.5*  --  8.2*   CO2 26 26 25  --  27   BUN 7.3 5.8* 6.4  --  8.5   CR 0.66 0.67 0.62  --  0.66   GLC 97 96 103*  --  102*     CBC  Recent Labs   Lab 08/11/23  0610 08/10/23  0655 08/09/23  0436 08/08/23  0647   WBC  4.0 4.4 5.5 7.0   RBC 3.62* 3.49* 3.54* 3.44*   HGB 10.2* 9.9* 9.9* 9.9*   HCT 31.3* 30.1* 31.3* 30.7*   MCV 87 86 88 89   MCH 28.2 28.4 28.0 28.8   MCHC 32.6 32.9 31.6 32.2   RDW 12.3 12.5 12.6 12.7    181 147* 117*     INR  Recent Labs   Lab 08/11/23  0610 08/10/23  0655 08/09/23  1342 08/08/23  1015   INR 1.37* 1.13 1.06 1.19*       ECG:      Meds per EPIC EMR:  Current Facility-Administered Medications   Medication     acetaminophen (TYLENOL) tablet 650 mg     albuterol (PROVENTIL) neb solution 2.5 mg     apixaban ANTICOAGULANT (ELIQUIS) tablet 5 mg     benzonatate (TESSALON) capsule 100 mg     glucose gel 15-30 g    Or     dextrose 50 % injection 25-50 mL    Or     glucagon injection 1 mg     Lidocaine (LIDOCARE) 4 % Patch 1 patch     magnesium oxide (MAG-OX) tablet 400 mg     naloxone (NARCAN) injection 0.2 mg    Or     naloxone (NARCAN) injection 0.4 mg    Or     naloxone (NARCAN) injection 0.2 mg    Or     naloxone (NARCAN) injection 0.4 mg     ondansetron (ZOFRAN ODT) ODT tab 4 mg    Or     ondansetron (ZOFRAN) injection 4 mg       8/11/23 CMR:  1. The LV is normal in cavity size and wall thickness. The global systolic function is normal. The LVEF is  64%. There are no regional wall motion abnormalities.     2. The RV is normal in cavity size. The global systolic function is normal. The RVEF is 59%.      3. Both atria are normal in size.     4. There is no significant valvular disease.      5. Late gadolinium enhancement imaging shows no MI, fibrosis or infiltrative disease.      6. There is trivial pericardial effusion.     7. There is no intracardiac thrombus.     CONCLUSIONS: Normal cardiac function, LVEF of 64% and RVEF of 59%. There is no evidence of myocardial  fibrosis, given no fibrosis consider genetic evaluation for the etiology of her cardiac arrest.      A:   Ms. Fry is a 33 year old female who had no significant medical history until admitted on 8/5/23 after suffering an out of hospital  VT/VF cardiac arrest. She was reported to have been found down by her daughter at home. Her  started CPR and called EMS. She was reported to be in VT/VF on EMS arrival and had 3 external defibrillations with ROSC. She was brought to [facility] and had coronary angiogram that showed no CAD. She did not require ECMO. Initial echo showing mild RV dysfunction and normal LVEF with IVC thrombus. Work up showed nonocclusive DVT involving right external iliac vein. Hematology consulted and hypercoagulable work up negative with negative lupus panel as well. She has been started on DOAC. Initial concern for frontal lobe CVA on admission CT. Subsequent scan without evidence of CVA. CMR showed normal biventricular function with no LGE. ECG shows no evidence of preexcitation, early repolarization, or LQT. We continue to follow this patient for ICD planning. She is continuing to make a good recovery after arrest. VSS. Renal function and electrolytes stable.   EP recommendations:   Out of Hospital Cardiac Arrest:   - unclear etiology, no CAD on angiogram, ECG without evidence of early repolarization, LQT, or Brugada, and CMR showed no nidus for arrhyhtmia normal structure/function and no LGE.   - discussed recommendation for secondary prevention ICD in detail with patient. Discussed options of EVICD, SICD, or TV ICD at length. Discussed given normal LVEF, no pacing indication, and younger age favored to avoid TV ICD. Discussed that EVICD would mean enrolling in a trial and provided detailed discussion about this. Discussed all three device options in detail including pros/cons, risks, benefits, and implant considerations for each. We discussed with the patient the rationale for ICD placement, alternative therapies,  technical aspects of the surgical procedure, risks/benefits of therapy and need for long-term follow-up in the Device Clinic.  The risk of defibrillator placement include: over sedation, reaction to local anesthetic,  reaction to narcotics or benzodiazipines used for moderate secation, localized bleeding, internal bleeding, collapsed lung, and acute or late infections. There is the possibilty of unforseen complications as well such as device or lead failure, lead dislodgement, and inappropriate shocks from the defibrillator .   All question/concerns were addressed. After our discussion, the patient verbalized understanding. She would like time to discuss with her  and brothers. We will follow up with her again tomorrow to hear her decision.     The patient states understanding and is agreeable with plan.   Thank you much for allowing us to participate in the care of this pleasant patient.    MELODY Munoz CNP  Electrophysiology Consult Service  Pager: [phone]

## 2023-08-12 LAB
ALBUMIN SERPL BCG-MCNC: 4.4 G/DL (ref 3.5–5.2)
ALP SERPL-CCNC: 43 U/L (ref 35–129)
ALT SERPL W P-5'-P-CCNC: 41 U/L (ref 0–70)
ANION GAP SERPL CALCULATED.3IONS-SCNC: 11 MMOL/L (ref 7–15)
AST SERPL W P-5'-P-CCNC: 35 U/L (ref 0–45)
BILIRUB SERPL-MCNC: 0.7 MG/DL
BUN SERPL-MCNC: 11.7 MG/DL (ref 6–20)
CALCIUM SERPL-MCNC: 9.6 MG/DL (ref 8.6–10)
CHLORIDE SERPL-SCNC: 104 MMOL/L (ref 98–107)
CREAT SERPL-MCNC: 0.58 MG/DL (ref 0.51–1.17)
DEPRECATED HCO3 PLAS-SCNC: 25 MMOL/L (ref 22–29)
ERYTHROCYTE [DISTWIDTH] IN BLOOD BY AUTOMATED COUNT: 12.4 % (ref 10–15)
GFR SERPL CREATININE-BSD FRML MDRD: ABNORMAL ML/MIN/{1.73_M2}
GLUCOSE BLDC GLUCOMTR-MCNC: 108 MG/DL (ref 70–99)
GLUCOSE SERPL-MCNC: 110 MG/DL (ref 70–99)
HCT VFR BLD AUTO: 34.7 % (ref 35–53)
HGB BLD-MCNC: 11.3 G/DL (ref 11.7–17.7)
MAGNESIUM SERPL-MCNC: 2.4 MG/DL (ref 1.7–2.3)
MCH RBC QN AUTO: 28.1 PG (ref 26.5–33)
MCHC RBC AUTO-ENTMCNC: 32.6 G/DL (ref 31.5–36.5)
MCV RBC AUTO: 86 FL (ref 78–100)
PLATELET # BLD AUTO: 272 10E3/UL (ref 150–450)
POTASSIUM SERPL-SCNC: 4.3 MMOL/L (ref 3.4–5.3)
PROT SERPL-MCNC: 7.4 G/DL (ref 6.4–8.3)
RBC # BLD AUTO: 4.02 10E6/UL (ref 3.8–5.9)
SODIUM SERPL-SCNC: 140 MMOL/L (ref 136–145)
WBC # BLD AUTO: 7.9 10E3/UL (ref 4–11)

## 2023-08-12 PROCEDURE — 250N000011 HC RX IP 250 OP 636: Performed by: STUDENT IN AN ORGANIZED HEALTH CARE EDUCATION/TRAINING PROGRAM

## 2023-08-12 PROCEDURE — 250N000011 HC RX IP 250 OP 636: Mod: JZ | Performed by: INTERNAL MEDICINE

## 2023-08-12 PROCEDURE — 85027 COMPLETE CBC AUTOMATED: CPT | Performed by: NURSE PRACTITIONER

## 2023-08-12 PROCEDURE — 83735 ASSAY OF MAGNESIUM: CPT | Performed by: STUDENT IN AN ORGANIZED HEALTH CARE EDUCATION/TRAINING PROGRAM

## 2023-08-12 PROCEDURE — 99232 SBSQ HOSP IP/OBS MODERATE 35: CPT | Mod: GC | Performed by: STUDENT IN AN ORGANIZED HEALTH CARE EDUCATION/TRAINING PROGRAM

## 2023-08-12 PROCEDURE — 120N000003 HC R&B IMCU UMMC

## 2023-08-12 PROCEDURE — 250N000013 HC RX MED GY IP 250 OP 250 PS 637: Performed by: STUDENT IN AN ORGANIZED HEALTH CARE EDUCATION/TRAINING PROGRAM

## 2023-08-12 PROCEDURE — 999N000007 HC SITE CHECK

## 2023-08-12 PROCEDURE — 250N000013 HC RX MED GY IP 250 OP 250 PS 637: Performed by: INTERNAL MEDICINE

## 2023-08-12 PROCEDURE — 999N000128 HC STATISTIC PERIPHERAL IV START W/O US GUIDANCE

## 2023-08-12 PROCEDURE — 36415 COLL VENOUS BLD VENIPUNCTURE: CPT | Performed by: NURSE PRACTITIONER

## 2023-08-12 PROCEDURE — 80053 COMPREHEN METABOLIC PANEL: CPT | Performed by: NURSE PRACTITIONER

## 2023-08-12 PROCEDURE — 250N000013 HC RX MED GY IP 250 OP 250 PS 637

## 2023-08-12 RX ORDER — METHYLPREDNISOLONE SODIUM SUCCINATE 125 MG/2ML
125 INJECTION, POWDER, LYOPHILIZED, FOR SOLUTION INTRAMUSCULAR; INTRAVENOUS EVERY 8 HOURS
Status: DISCONTINUED | OUTPATIENT
Start: 2023-08-12 | End: 2023-08-12

## 2023-08-12 RX ORDER — DEXAMETHASONE SODIUM PHOSPHATE 10 MG/ML
10 INJECTION, SOLUTION INTRAMUSCULAR; INTRAVENOUS EVERY 8 HOURS
Status: DISCONTINUED | OUTPATIENT
Start: 2023-08-12 | End: 2023-08-13

## 2023-08-12 RX ORDER — METHYLPREDNISOLONE SODIUM SUCCINATE 125 MG/2ML
125 INJECTION, POWDER, LYOPHILIZED, FOR SOLUTION INTRAMUSCULAR; INTRAVENOUS EVERY 6 HOURS
Status: DISCONTINUED | OUTPATIENT
Start: 2023-08-12 | End: 2023-08-12

## 2023-08-12 RX ADMIN — DEXAMETHASONE SODIUM PHOSPHATE 10 MG: 10 INJECTION, SOLUTION INTRAMUSCULAR; INTRAVENOUS at 17:28

## 2023-08-12 RX ADMIN — RIVAROXABAN 15 MG: 15 TABLET, FILM COATED ORAL at 17:28

## 2023-08-12 RX ADMIN — ACETAMINOPHEN 650 MG: 325 TABLET, FILM COATED ORAL at 12:04

## 2023-08-12 RX ADMIN — METHYLPREDNISOLONE SODIUM SUCCINATE 125 MG: 125 INJECTION, POWDER, FOR SOLUTION INTRAMUSCULAR; INTRAVENOUS at 08:35

## 2023-08-12 RX ADMIN — DEXAMETHASONE SODIUM PHOSPHATE 10 MG: 10 INJECTION, SOLUTION INTRAMUSCULAR; INTRAVENOUS at 23:30

## 2023-08-12 RX ADMIN — LIDOCAINE PATCH 4% 1 PATCH: 40 PATCH TOPICAL at 19:35

## 2023-08-12 ASSESSMENT — ACTIVITIES OF DAILY LIVING (ADL)
ADLS_ACUITY_SCORE: 37
ADLS_ACUITY_SCORE: 22
ADLS_ACUITY_SCORE: 37
ADLS_ACUITY_SCORE: 22

## 2023-08-12 NOTE — PROGRESS NOTES
Interventional Cardiology Progress Note      Assessment & Plan:  Estela Fry is a 33 year old female admitted on 8/5/2023 following an unwitnessed VT/VF cardiac arrest at home with ROSC after 3 shocks. She was not found to have obstructive CAD on angiogram. Extubated 8/7.     Course complicated by acute respiratory distress, due to tracheal stenosis, she is now being followed with ENT status post direct laryngoscopy and CT scan.  She is on steroids appropriately.  And symptomatically improved from yesterday.    Her and her significant other have multiple questions regarding ICD placement, medical necessity and neck steps in her care plan.    Today's Update:  -Transition metyl prednisolone to Decadron per ENT recommendations  -Xarelto 15 mg twice daily  -Will need care conference for Monday 8/14 to discuss ICD    # VT/VF cardiac arrest  # Elevated Troponin 2/2 Cardiac Arrest  Patient was found down by her daughter at home,  started CPR and called EMS. Patient was shocked 3 times with ROSC and transported to [facility]. Patient requiring pressors on admission which have since been weaned off. Coronary angiography performed on arrival and noted for no coronary artery disease.  No cardiac MRI findings to suggest a nidus of arrhythmia, no cardiomyopathy identified, EKG is unrevealing for Brugada, long QT syndrome at this time.  Etiology is unclear as to her VT VF arrest at this point, work-up ongoing.  - Echo 8/6 showing mild RV dysfunction normal LV fx and an IVC thrombus   - Cardiac MRI without LGE   - K/Mag replacement protocols     # IVC thrombus  # Nonocclusive DVT involving the right external iliac vein  Mid/distal IVC thrombus with 40% width initially seen on TTE and then on dedicated IVC US. Nonocclusive DVT in right external iliac vein surrounding patient's catheter also visualized on ultrasound.   - Hematology consulted: hypercoagulable workup negative thus far, antiphospholipid antibody pending  - Lupus panel  results negative 8/10: per Heme ok to start DOAC  - Xarelto 15 mg twice daily for 21 days followed by 20 mg daily following this    - Outpatient follow up with hematology scheduled 10/18/23     # Hypoxic encephalopathy 2/2 cardiac arrest   Concern for frontal lobe CVA on admission CT. Subsequent scan without evidence of CVA. She is following commands and moving all extremities. Patient A&O x4 on exam today  - PT/OP  - Neurology following; recommend MRI Brain if clinical course not improving     # Normocytic Anemia of Critical Illness  Hgb on Admission 12.0  - Daily CBC     Resolved Problems:   # Lactic Acidosis  # Leukocytosis  # Acute hypoxic respiratory failure  # Aspiration pneumonitis     Prophylaxis:  DVT: DOAC  Diet: Regular  Activity: As Tolerated  Code Status: Full Code  Disposition: Pending ICD placement     Patient discussed w/ Dr. Abad Skelton MD  Cardiology       Interval History:  No acute events overnight   Patient discussing ICD option with .  Patient and family have multiple cultural concerns regarding the device implantation, they are wanting a care conference to discuss ICD implantation utility on Monday.  As of now they are leaning towards no implantation, I did describe the significance of the VT VF arrest, the strong recommendation for secondary prevention of recurrent events.  We will update further family members later today.  They are hoping for care conference on Monday to discuss.  Tentatively it appears ICD is planned for August 15    Most recent vital signs:  /66 (BP Location: Right arm, Cuff Size: Adult Regular)   Pulse 98   Temp 98.5  F (36.9  C) (Oral)   Resp 23   Ht 1.524 m (5')   Wt 48.3 kg (106 lb 8 oz)   SpO2 98%   BMI 20.80 kg/m    Temp:  [97.8  F (36.6  C)-100.7  F (38.2  C)] 98.5  F (36.9  C)  Pulse:  [] 98  Resp:  [16-23] 23  BP: ()/(66-92) 110/66  SpO2:  [98 %-99 %] 98 %  Wt Readings from Last 2 Encounters:   08/12/23 48.3 kg (106  lb 8 oz)     Intake/Output Summary (Last 24 hours) at 8/11/2023 0739  Last data filed at 8/10/2023 1800  Gross per 24 hour   Intake 430 ml   Output --   Net 430 ml     Physical exam:  General: Pleasant female Appears comfortable and in no acute distress. Alert and interactive  HEENT: Normocephalic, atraumatic. No scleral icterus or injection  CARDIAC: Regular rate and rhythm, no m/r/g appreciated. Peripheral pulses 2+  RESP: Mild wheeze, Clear otherwies  GI: No abdominal distention. Soft and nontender.   EXTREMITIES: Without lower extremity edema. No cyanosis or clubbing. Warm and well perfused. No venous stasis changes.   SKIN: No acute lesions appreciated. Warm and dry to touch  NEURO: Alert and oriented X3, CN II-XII grossly intact, no focal neurological deficits noted, normal speech  PSYCH: Mood and affect are appropriate    Labs (Past three days):  CBC  Recent Labs   Lab 08/12/23  0730 08/11/23  1148 08/11/23  0610 08/10/23  0655   WBC 7.9 4.5 4.0 4.4   RBC 4.02 4.26 3.62* 3.49*   HGB 11.3* 12.1 10.2* 9.9*   HCT 34.7* 36.4 31.3* 30.1*   MCV 86 85 87 86   MCH 28.1 28.4 28.2 28.4   MCHC 32.6 33.2 32.6 32.9   RDW 12.4 12.4 12.3 12.5    224 199 181       BMP  Recent Labs   Lab 08/12/23  0741 08/12/23  0730 08/11/23  1148 08/11/23  0610 08/10/23  0655 08/09/23  0436 08/06/23  0319 08/06/23  0221 08/05/23  2112 08/05/23  2108   NA  --  140 143 141 142 141   < >  --    < > 142   POTASSIUM  --  4.3 3.9 3.8 3.5 3.6   < >  --    < > 3.7   CHLORIDE  --  104 105 106 106 108*   < >  --    < > 111*   CO2  --  25 25 26 26 25   < >  --    < > 17*   ANIONGAP  --  11 13 9 10 8   < >  --    < > 14   * 110* 106* 97 96 103*   < >  --    < > 170*   BUN  --  11.7 7.0 7.3 5.8* 6.4   < >  --    < > 17.4   CR  --  0.58 0.67 0.66 0.67 0.62   < >  --    < > 0.87   CUATE  --  9.6 9.3 9.0 8.7 8.5*   < >  --    < > 7.5*   MAG  --  2.4*  --  2.0 1.9 1.8   < > 2.5*  --  1.7   PHOS  --   --   --   --   --   --   --  3.4  --  4.3     < > = values in this interval not displayed.       Troponins: No results found for: TROPI, TROPONIN, TROPR, TROPN     INR  Recent Labs   Lab 08/11/23  0610 08/10/23  0655 08/09/23  1342 08/08/23  1015   INR 1.37* 1.13 1.06 1.19*       Liver panel  Recent Labs   Lab 08/12/23  0730 08/11/23  0610 08/10/23  0655 08/09/23  0436   PROTTOTAL 7.4 6.4 6.2* 6.0*   ALBUMIN 4.4 3.9 3.6 3.5   BILITOTAL 0.7 0.6 0.6 0.5   ALKPHOS 43 39 38 40   AST 35 27 20 26   ALT 41 28 19 29         Imaging/procedure results:  EKG 12 Lead 8/9/23:        Coronary Angiogram 8/5/23: No significant coronary artery disease.     cMRI 8/10/23:  Clinical history: 33 year old female with prior cardiac arrest.      1. The LV is normal in cavity size and wall thickness. The global systolic function is normal. The LVEF is  64%. There are no regional wall motion abnormalities.     2. The RV is normal in cavity size. The global systolic function is normal. The RVEF is 59%.      3. Both atria are normal in size.     4. There is no significant valvular disease.      5. Late gadolinium enhancement imaging shows no MI, fibrosis or infiltrative disease.      6. There is trivial pericardial effusion.     7. There is no intracardiac thrombus.     CONCLUSIONS: Normal cardiac function, LVEF of 64% and RVEF of 59%. There is no evidence of myocardial  fibrosis, given no fibrosis consider genetic evaluation for the etiology of her cardiac arrest.

## 2023-08-12 NOTE — PLAN OF CARE
Neuro: A&Ox4. Pleasant, able to verbalize needs.   Cardiac: Sinus tach. HR 90s-110s. Denies chest pain.   Respiratory: Sating 96-99% on RA. Has a soft voice, coughs infrequently.  GI/: voiding spontaneously.   Diet/appetite: NPO at the beginning of the shift, changed to regular diet. Eating fine.   Activity: Independent, walking to and from the bathroom.  Pain: Denies pain.   Skin: No new deficits noted.  LDA's: Left PIV saline locked.     Plan: Continue steroids, ICD placement on Monday. Continue the POC.

## 2023-08-12 NOTE — PLAN OF CARE
"Goal Outcome Evaluation:      Plan of Care Reviewed With: patient, spouse    Overall Patient Progress: improvingOverall Patient Progress: improving    Outcome Evaluation: Waiting for ICD placement    .    Changes: Respiratory status remained stable throughout shift.       History: Estela Fry is a 33 year old woman who was otherwise healthy and experienced an unwitnessed VT/VF cardiac arrest at home on 8/5, she was intubated and ROSC was obtained after three shocks, she was taken to cath lab where no obstructive CAD was observed. Cardiac work up revealed inferior vena cava US with 40% luminal thrombus so she was started on eliquis. CTA negative for pulmonary embolism. Patient was extubated on 8/7 without complication and without respiratory complaint until earlier this AM, 8/11, when she developed strong barking cough, shortness of breath and \"throat tightness\" that prompted rapid response and concern for anaphylactic reaction. She was given albuterol nebs, racemic epinephrine, solumedrol and benadryl. She subsequently improved however remained fatigued and with quite voice. ENT was consulted for airway evaluation.          Neuro: A/Ox4. Calls appropriately. Pleasant and cooperative.   Cardiac/Tele:  STach and VSS. Afebrile. Denies chest pain   Respiratory: Room air. Tolerating well. Infrequent cough that causes chest pain and some SOB which improves once the coughing ends. Voice is soft, but clear and logical.   GI/: Continent. Ambulates to bathroom. LBM 8/9  Diet/Appetite: Regular diet normally. Was placed on NPO diet after RRT was called in case of the need for a procedure, which was paused until MN at about 2045 so patient could eat dinner. NPO status restarted at MN.  Skin: No new deficits noted.   LDAs: L PIV- SL  Activity: Independent  Pain: Pain in chest when coughing, but otherwise denies pain.     Plan: Continue to monitor respiratory status and notify team with changes. ICD placement scheduled for 10/14. "

## 2023-08-12 NOTE — PROGRESS NOTES
Otolaryngology Progress Note  8/12/2023    S: No acute events overnight. Stable respiratory status overnight, sating 98% on RA with no O2 requirements and no subjective shortness of breath. Voice improved today. Reduced frequency of coughing. CT Neck demonstrated mild subglottic tracheal narrowing with tracheal secretions.    O: /70 (BP Location: Right arm, Patient Position: Sitting, Cuff Size: Adult Regular)   Pulse 97   Temp 97.8  F (36.6  C) (Oral)   Resp 16   Ht 1.524 m (5')   Wt 50 kg (110 lb 3.7 oz)   SpO2 98%   BMI 21.53 kg/m     General: Alert and oriented x 3, No acute distress, voice is stronger today   HEENT: Neck is soft and flat without LAD, FOM and tongue are soft and not edematous.   Pulmonary: Breathing non-labored, no stridor, no accessory muscle use.    Intake/Output Summary (Last 24 hours) at 8/12/2023 1005  Last data filed at 8/12/2023 0800  Gross per 24 hour   Intake 650 ml   Output --   Net 650 ml     Labs:  BMP  Recent Labs   Lab 08/12/23  0741 08/12/23  0730 08/11/23  1148 08/11/23  0610 08/10/23  0655   NA  --  140 143 141 142   POTASSIUM  --  4.3 3.9 3.8 3.5   CHLORIDE  --  104 105 106 106   CUATE  --  9.6 9.3 9.0 8.7   CO2  --  25 25 26 26   BUN  --  11.7 7.0 7.3 5.8*   CR  --  0.58 0.67 0.66 0.67   * 110* 106* 97 96     CBC  Recent Labs   Lab 08/12/23  0730 08/11/23  1148 08/11/23  0610 08/10/23  0655   WBC 7.9 4.5 4.0 4.4   RBC 4.02 4.26 3.62* 3.49*   HGB 11.3* 12.1 10.2* 9.9*   HCT 34.7* 36.4 31.3* 30.1*   MCV 86 85 87 86   MCH 28.1 28.4 28.2 28.4   MCHC 32.6 33.2 32.6 32.9   RDW 12.4 12.4 12.3 12.5    224 199 181     INR  Recent Labs   Lab 08/11/23  0610 08/10/23  0655 08/09/23  1342 08/08/23  1015   INR 1.37* 1.13 1.06 1.19*       A/P: Estela Fry is a 33 year old otherwise healthy who experienced cardiac arrest with unknown cause requiring intubation from 8/5 to 8/7. She developed barking cough and shortness of breath on the morning of 8/11 with acute onset  from previously stable state concerning for anaphylaxis. She improved after receiving racemic epinephrine, albuterol, decadron, and Benadryl and is currently stable on RA without shortness of breath but with continued soft voice and biphasic stridor with effort. ENT laryngoscopy evaluation revealed significant subglottic stenosis but otherwise normal upper airway without signs of edema. CT neck demonstrated mild subglottic stenosis. This is most likely related to intubation considering location and timing. Given current status, recommend continuing medical management with steroids and nebulizer as needed with ENT follow up.    - Continue airway dose steroids (recommend decadron 10mg Q8H)  - Racemic epinephrine as needed  - ENT follow up outpatient  - Please contact Otolaryngology on call with any questions or concerns     Patient and plan was discussed with staff Dr. Chandana Lr MD   Department of Otolaryngology - Head and Neck Surgery, PGY 1  Please page ENT with questions

## 2023-08-13 ENCOUNTER — APPOINTMENT (OUTPATIENT)
Dept: OCCUPATIONAL THERAPY | Facility: CLINIC | Age: 33
End: 2023-08-13
Payer: COMMERCIAL

## 2023-08-13 ENCOUNTER — APPOINTMENT (OUTPATIENT)
Dept: GENERAL RADIOLOGY | Facility: CLINIC | Age: 33
End: 2023-08-13
Attending: INTERNAL MEDICINE
Payer: COMMERCIAL

## 2023-08-13 LAB
ALBUMIN SERPL BCG-MCNC: 4.5 G/DL (ref 3.5–5.2)
ALP SERPL-CCNC: 49 U/L (ref 35–129)
ALT SERPL W P-5'-P-CCNC: 82 U/L (ref 0–70)
ANION GAP SERPL CALCULATED.3IONS-SCNC: 12 MMOL/L (ref 7–15)
AST SERPL W P-5'-P-CCNC: 64 U/L (ref 0–45)
BACTERIA SPT CULT: NORMAL
BASOPHILS # BLD AUTO: 0 10E3/UL (ref 0–0.2)
BASOPHILS NFR BLD AUTO: 0 %
BILIRUB SERPL-MCNC: 0.6 MG/DL
BUN SERPL-MCNC: 22.7 MG/DL (ref 6–20)
CALCIUM SERPL-MCNC: 9.5 MG/DL (ref 8.6–10)
CHLORIDE SERPL-SCNC: 102 MMOL/L (ref 98–107)
CREAT SERPL-MCNC: 0.6 MG/DL (ref 0.51–1.17)
DEPRECATED HCO3 PLAS-SCNC: 24 MMOL/L (ref 22–29)
EOSINOPHIL # BLD AUTO: 0 10E3/UL (ref 0–0.7)
EOSINOPHIL NFR BLD AUTO: 0 %
ERYTHROCYTE [DISTWIDTH] IN BLOOD BY AUTOMATED COUNT: 12.8 % (ref 10–15)
GFR SERPL CREATININE-BSD FRML MDRD: ABNORMAL ML/MIN/{1.73_M2}
GLUCOSE SERPL-MCNC: 138 MG/DL (ref 70–99)
GRAM STAIN RESULT: NORMAL
HCT VFR BLD AUTO: 35.6 % (ref 35–53)
HGB BLD-MCNC: 11.4 G/DL (ref 11.7–17.7)
IMM GRANULOCYTES # BLD: 0.1 10E3/UL
IMM GRANULOCYTES NFR BLD: 0 %
LYMPHOCYTES # BLD AUTO: 0.8 10E3/UL (ref 0.8–5.3)
LYMPHOCYTES NFR BLD AUTO: 5 %
MAGNESIUM SERPL-MCNC: 2.4 MG/DL (ref 1.7–2.3)
MCH RBC QN AUTO: 27.5 PG (ref 26.5–33)
MCHC RBC AUTO-ENTMCNC: 32 G/DL (ref 31.5–36.5)
MCV RBC AUTO: 86 FL (ref 78–100)
MONOCYTES # BLD AUTO: 0.2 10E3/UL (ref 0–1.3)
MONOCYTES NFR BLD AUTO: 2 %
NEUTROPHILS # BLD AUTO: 13.2 10E3/UL (ref 1.6–8.3)
NEUTROPHILS NFR BLD AUTO: 93 %
NRBC # BLD AUTO: 0 10E3/UL
NRBC BLD AUTO-RTO: 0 /100
PLATELET # BLD AUTO: 301 10E3/UL (ref 150–450)
POTASSIUM SERPL-SCNC: 4.1 MMOL/L (ref 3.4–5.3)
PROCALCITONIN SERPL IA-MCNC: 0.05 NG/ML
PROT SERPL-MCNC: 7.5 G/DL (ref 6.4–8.3)
RBC # BLD AUTO: 4.14 10E6/UL (ref 3.8–5.9)
SODIUM SERPL-SCNC: 138 MMOL/L (ref 136–145)
WBC # BLD AUTO: 13.9 10E3/UL (ref 4–11)
WBC # BLD AUTO: 13.9 10E3/UL (ref 4–11)

## 2023-08-13 PROCEDURE — 999N000007 HC SITE CHECK

## 2023-08-13 PROCEDURE — 85027 COMPLETE CBC AUTOMATED: CPT | Performed by: NURSE PRACTITIONER

## 2023-08-13 PROCEDURE — 999N000128 HC STATISTIC PERIPHERAL IV START W/O US GUIDANCE

## 2023-08-13 PROCEDURE — 250N000013 HC RX MED GY IP 250 OP 250 PS 637: Performed by: NURSE PRACTITIONER

## 2023-08-13 PROCEDURE — 87040 BLOOD CULTURE FOR BACTERIA: CPT | Performed by: INTERNAL MEDICINE

## 2023-08-13 PROCEDURE — 250N000013 HC RX MED GY IP 250 OP 250 PS 637: Performed by: INTERNAL MEDICINE

## 2023-08-13 PROCEDURE — 120N000003 HC R&B IMCU UMMC

## 2023-08-13 PROCEDURE — 36415 COLL VENOUS BLD VENIPUNCTURE: CPT | Performed by: NURSE PRACTITIONER

## 2023-08-13 PROCEDURE — 36415 COLL VENOUS BLD VENIPUNCTURE: CPT | Performed by: INTERNAL MEDICINE

## 2023-08-13 PROCEDURE — 97535 SELF CARE MNGMENT TRAINING: CPT | Mod: GO | Performed by: OCCUPATIONAL THERAPIST

## 2023-08-13 PROCEDURE — 80053 COMPREHEN METABOLIC PANEL: CPT | Performed by: NURSE PRACTITIONER

## 2023-08-13 PROCEDURE — 97110 THERAPEUTIC EXERCISES: CPT | Mod: GO | Performed by: OCCUPATIONAL THERAPIST

## 2023-08-13 PROCEDURE — 71045 X-RAY EXAM CHEST 1 VIEW: CPT | Mod: 26 | Performed by: RADIOLOGY

## 2023-08-13 PROCEDURE — 250N000013 HC RX MED GY IP 250 OP 250 PS 637

## 2023-08-13 PROCEDURE — 84145 PROCALCITONIN (PCT): CPT | Performed by: INTERNAL MEDICINE

## 2023-08-13 PROCEDURE — 83735 ASSAY OF MAGNESIUM: CPT | Performed by: STUDENT IN AN ORGANIZED HEALTH CARE EDUCATION/TRAINING PROGRAM

## 2023-08-13 PROCEDURE — 99232 SBSQ HOSP IP/OBS MODERATE 35: CPT | Mod: GC | Performed by: STUDENT IN AN ORGANIZED HEALTH CARE EDUCATION/TRAINING PROGRAM

## 2023-08-13 PROCEDURE — 250N000011 HC RX IP 250 OP 636: Mod: JZ | Performed by: INTERNAL MEDICINE

## 2023-08-13 PROCEDURE — 71045 X-RAY EXAM CHEST 1 VIEW: CPT

## 2023-08-13 RX ORDER — DEXAMETHASONE SODIUM PHOSPHATE 10 MG/ML
10 INJECTION, SOLUTION INTRAMUSCULAR; INTRAVENOUS EVERY 8 HOURS
Status: COMPLETED | OUTPATIENT
Start: 2023-08-13 | End: 2023-08-13

## 2023-08-13 RX ADMIN — DEXAMETHASONE SODIUM PHOSPHATE 10 MG: 10 INJECTION, SOLUTION INTRAMUSCULAR; INTRAVENOUS at 08:25

## 2023-08-13 RX ADMIN — DEXAMETHASONE SODIUM PHOSPHATE 10 MG: 10 INJECTION, SOLUTION INTRAMUSCULAR; INTRAVENOUS at 15:40

## 2023-08-13 RX ADMIN — BENZONATATE 100 MG: 100 CAPSULE ORAL at 08:25

## 2023-08-13 RX ADMIN — RIVAROXABAN 15 MG: 15 TABLET, FILM COATED ORAL at 17:45

## 2023-08-13 RX ADMIN — LIDOCAINE PATCH 4% 1 PATCH: 40 PATCH TOPICAL at 20:01

## 2023-08-13 RX ADMIN — RIVAROXABAN 15 MG: 15 TABLET, FILM COATED ORAL at 08:25

## 2023-08-13 ASSESSMENT — ACTIVITIES OF DAILY LIVING (ADL)
ADLS_ACUITY_SCORE: 22

## 2023-08-13 NOTE — PROGRESS NOTES
Care Management Follow Up    Length of Stay (days): 8    Expected Discharge Date: 08/16/2023     Concerns to be Addressed: Care Conference  Patient plan of care discussed at interdisciplinary rounds: Yes    Anticipated Discharge Disposition: Home     Anticipated Discharge Services: OP CR    Education Provided on the Discharge Plan: Yes  Patient/Family in Agreement with the Plan: Yes    Additional Information:  Per MD request this writer spoke with pt to arrange care conference for tomorrow, 8/14 to further discuss ICD placement with pt/family. Pt confirmed that her  is able to come in person for care conference tomorrow afternoon. Pt would also like her mom and her uncle to join via telephone as they both live in [place]. Pt thinks that 2pm our time (noon in [place]) would likely work best. She is going to call her mom/uncle today to confirm. This writer discussed conference line for call in. She thinks this would be too confusing for her mom so she would like to just call and put her mom/uncle on speaker phone on her cell phone.    Care Conference:  Location: Pt's room  Date/time: Monday 8/14/2023 at 2pm  Those to attend: Cardiology, Electrophysiology, pt, pt's spouse- Think, pt's mother (via phone) and pt's uncle (via phone)    1545: Confirmed care conference timing with pt/spouse.     CC will continue to monitor patient's medical condition and progress towards discharge.  Amber Marx RN BSN  6C Unit Care Coordinator  Phone number: [phone]  Pager: [phone]

## 2023-08-13 NOTE — PLAN OF CARE
"Goal Outcome Evaluation:      Plan of Care Reviewed With: patient    Overall Patient Progress: improvingOverall Patient Progress: improving    Outcome Evaluation: Waiting for ICD placement    .    Changes: No changes during the shift      History: Estela Fry is a 33 year old woman who was otherwise healthy and experienced an unwitnessed VT/VF cardiac arrest at home on 8/5, she was intubated and ROSC was obtained after three shocks, she was taken to cath lab where no obstructive CAD was observed. Cardiac work up revealed inferior vena cava US with 40% luminal thrombus so she was started on eliquis. CTA negative for pulmonary embolism. Patient was extubated on 8/7 without complication and without respiratory complaint until earlier this AM, 8/11, when she developed strong barking cough, shortness of breath and \"throat tightness\" that prompted rapid response and concern for anaphylactic reaction. She was given albuterol nebs, racemic epinephrine, solumedrol and benadryl. She subsequently improved however remained fatigued and with quite voice. ENT was consulted for airway evaluation.        Neuro: A/Ox4. Calls appropriately. Pleasant and cooperative.   Cardiac/Tele: STach and VSS. Afebrile. Denies chest pain   Respiratory: Room air. Tolerating well. Infrequent cough that causes chest pain and some SOB which improves once the coughing ends. Voice is soft, but clear and logical.   GI/: Continent. Ambulates to bathroom. LBM 8/12  Diet/Appetite: Regular diet   Skin: Groin site from previous cardiac catheterization. DARIANA and CDI.   LDAs: R PIV- SL  Activity: Independent  Pain: Pain in chest when coughing, but otherwise denies pain. Lidocaine patch in place on center of chest.      Plan: Continue to monitor respiratory status and notify team with changes.   "

## 2023-08-13 NOTE — PROGRESS NOTES
Interventional Cardiology Progress Note      Assessment & Plan:  Estela Fry is a 33 year old female admitted on 8/5/2023 following an unwitnessed VT/VF cardiac arrest at home with ROSC after 3 shocks. She was not found to have obstructive CAD on angiogram. Extubated 8/7.     Course complicated by acute respiratory distress, due to tracheal stenosis, she is now being followed with ENT status post direct laryngoscopy and CT scan.  She is on steroids appropriately.  And symptomatically improved from yesterday.    Her and her significant other have multiple questions regarding ICD placement, medical necessity and neck steps in her care plan.    Today's Update:  -Continue decardon today and stop after these dosages have finished per ENT.   -Continue to monitor for signs of infection in setting of elevated wbc count. Procal, BC, Chest xray sent.  -Will need care conference for Monday 8/14 to discuss ICD (planning for 2 pm, appreciate social work assistance). Currently patient is very hesitant to undergo procedure and right now appears not to want the ICD/hoping not to have it.     # VT/VF cardiac arrest  # Elevated Troponin 2/2 Cardiac Arrest  Patient was found down by her daughter at home,  started CPR and called EMS. Patient was shocked 3 times with ROSC and transported to [facility]. Patient requiring pressors on admission which have since been weaned off. Coronary angiography performed on arrival and noted for no coronary artery disease.  No cardiac MRI findings to suggest a nidus of arrhythmia, no cardiomyopathy identified, EKG is unrevealing for Brugada, long QT syndrome at this time.  Etiology is unclear as to her VT VF arrest at this point, work-up ongoing.  - Echo 8/6 showing mild RV dysfunction normal LV fx and an IVC thrombus   - Cardiac MRI without LGE   - K/Mag replacement protocols     # IVC thrombus  # Nonocclusive DVT involving the right external iliac vein  Mid/distal IVC thrombus with 40% width  initially seen on TTE and then on dedicated IVC US. Nonocclusive DVT in right external iliac vein surrounding patient's catheter also visualized on ultrasound.   - Hematology consulted: hypercoagulable workup negative thus far, antiphospholipid antibody pending  - Lupus panel results negative 8/10: per Heme ok to start DOAC  - Xarelto 15 mg twice daily for 21 days followed by 20 mg daily following this    - Outpatient follow up with hematology scheduled 10/18/23     # Hypoxic encephalopathy 2/2 cardiac arrest   Concern for frontal lobe CVA on admission CT. Subsequent scan without evidence of CVA. She is following commands and moving all extremities. Patient A&O x4 on exam today  - PT/OP  - Neurology following; recommend MRI Brain if clinical course not improving     # Normocytic Anemia of Critical Illness  Hgb on Admission 12.0  - Daily CBC     # Leukocytosis  -Most likely from steroids, check infectious work up. Hold antibiotics for now    Resolved Problems:   # Lactic Acidosis  # Acute hypoxic respiratory failure  # Aspiration pneumonitis     Prophylaxis:  DVT: DOAC  Diet: Regular  Activity: As Tolerated  Code Status: Full Code  Disposition: Pending ICD placement     Patient discussed w/ Dr. Abad Skelton MD  Cardiology       Interval History:  No acute events overnight . Breathing improved     Most recent vital signs:  BP 97/67 (BP Location: Left arm, Cuff Size: Adult Regular)   Pulse 75   Temp 97.6  F (36.4  C) (Oral)   Resp 18   Ht 1.524 m (5')   Wt 47.7 kg (105 lb 3.2 oz)   SpO2 97%   BMI 20.55 kg/m    Temp:  [97.6  F (36.4  C)-99.1  F (37.3  C)] 97.6  F (36.4  C)  Pulse:  [] 75  Resp:  [16-24] 18  BP: ()/(66-87) 97/67  SpO2:  [97 %-98 %] 97 %  Wt Readings from Last 2 Encounters:   08/13/23 47.7 kg (105 lb 3.2 oz)     Intake/Output Summary (Last 24 hours) at 8/11/2023 0739  Last data filed at 8/10/2023 1800  Gross per 24 hour   Intake 430 ml   Output --   Net 430 ml      Physical exam:  General: Pleasant female Appears comfortable and in no acute distress. Alert and interactive  HEENT: Normocephalic, atraumatic. No scleral icterus or injection  CARDIAC: Regular rate and rhythm, no m/r/g appreciated. Peripheral pulses 2+  RESP: Clear bilaterally   GI: No abdominal distention. Soft and nontender.   EXTREMITIES: Without lower extremity edema. No cyanosis or clubbing. Warm and well perfused. No venous stasis changes.   SKIN: No acute lesions appreciated. Warm and dry to touch  NEURO: Alert and oriented X3, CN II-XII grossly intact, no focal neurological deficits noted, normal speech  PSYCH: Mood and affect are appropriate    Labs (Past three days):  CBC  Recent Labs   Lab 08/13/23  0647 08/12/23  0730 08/11/23  1148 08/11/23  0610   WBC 13.9* 7.9 4.5 4.0   RBC 4.14 4.02 4.26 3.62*   HGB 11.4* 11.3* 12.1 10.2*   HCT 35.6 34.7* 36.4 31.3*   MCV 86 86 85 87   MCH 27.5 28.1 28.4 28.2   MCHC 32.0 32.6 33.2 32.6   RDW 12.8 12.4 12.4 12.3    272 224 199       BMP  Recent Labs   Lab 08/13/23  0647 08/12/23  0741 08/12/23  0730 08/11/23  1148 08/11/23  0610 08/10/23  0655     --  140 143 141 142   POTASSIUM 4.1  --  4.3 3.9 3.8 3.5   CHLORIDE 102  --  104 105 106 106   CO2 24  --  25 25 26 26   ANIONGAP 12  --  11 13 9 10   * 108* 110* 106* 97 96   BUN 22.7*  --  11.7 7.0 7.3 5.8*   CR 0.60  --  0.58 0.67 0.66 0.67   CUATE 9.5  --  9.6 9.3 9.0 8.7   MAG 2.4*  --  2.4*  --  2.0 1.9       Troponins: No results found for: TROPI, TROPONIN, TROPR, TROPN     INR  Recent Labs   Lab 08/11/23  0610 08/10/23  0655 08/09/23  1342 08/08/23  1015   INR 1.37* 1.13 1.06 1.19*       Liver panel  Recent Labs   Lab 08/13/23  0647 08/12/23  0730 08/11/23  0610 08/10/23  0655   PROTTOTAL 7.5 7.4 6.4 6.2*   ALBUMIN 4.5 4.4 3.9 3.6   BILITOTAL 0.6 0.7 0.6 0.6   ALKPHOS 49 43 39 38   AST 64* 35 27 20   ALT 82* 41 28 19         Imaging/procedure results:  EKG 12 Lead 8/9/23:        Coronary  Angiogram 8/5/23: No significant coronary artery disease.     cMRI 8/10/23:  Clinical history: 33 year old female with prior cardiac arrest.      1. The LV is normal in cavity size and wall thickness. The global systolic function is normal. The LVEF is  64%. There are no regional wall motion abnormalities.     2. The RV is normal in cavity size. The global systolic function is normal. The RVEF is 59%.      3. Both atria are normal in size.     4. There is no significant valvular disease.      5. Late gadolinium enhancement imaging shows no MI, fibrosis or infiltrative disease.      6. There is trivial pericardial effusion.     7. There is no intracardiac thrombus.     CONCLUSIONS: Normal cardiac function, LVEF of 64% and RVEF of 59%. There is no evidence of myocardial  fibrosis, given no fibrosis consider genetic evaluation for the etiology of her cardiac arrest.

## 2023-08-14 ENCOUNTER — ANESTHESIA EVENT (OUTPATIENT)
Dept: CARDIOLOGY | Facility: CLINIC | Age: 33
End: 2023-08-14
Payer: COMMERCIAL

## 2023-08-14 LAB
ALBUMIN SERPL BCG-MCNC: 4.1 G/DL (ref 3.5–5.2)
ALP SERPL-CCNC: 47 U/L (ref 35–129)
ALT SERPL W P-5'-P-CCNC: 89 U/L (ref 0–70)
ANION GAP SERPL CALCULATED.3IONS-SCNC: 10 MMOL/L (ref 7–15)
AST SERPL W P-5'-P-CCNC: 45 U/L (ref 0–45)
ATRIAL RATE - MUSE: 105 BPM
BILIRUB SERPL-MCNC: 0.4 MG/DL
BUN SERPL-MCNC: 28.4 MG/DL (ref 6–20)
CALCIUM SERPL-MCNC: 9.3 MG/DL (ref 8.6–10)
CHLORIDE SERPL-SCNC: 105 MMOL/L (ref 98–107)
CREAT SERPL-MCNC: 0.67 MG/DL (ref 0.51–1.17)
DEPRECATED HCO3 PLAS-SCNC: 25 MMOL/L (ref 22–29)
DIASTOLIC BLOOD PRESSURE - MUSE: NORMAL MMHG
ERYTHROCYTE [DISTWIDTH] IN BLOOD BY AUTOMATED COUNT: 12.8 % (ref 10–15)
GFR SERPL CREATININE-BSD FRML MDRD: ABNORMAL ML/MIN/{1.73_M2}
GLUCOSE SERPL-MCNC: 100 MG/DL (ref 70–99)
HCT VFR BLD AUTO: 34.7 % (ref 35–53)
HGB BLD-MCNC: 11.4 G/DL (ref 11.7–17.7)
INTERPRETATION ECG - MUSE: NORMAL
MCH RBC QN AUTO: 28.2 PG (ref 26.5–33)
MCHC RBC AUTO-ENTMCNC: 32.9 G/DL (ref 31.5–36.5)
MCV RBC AUTO: 86 FL (ref 78–100)
P AXIS - MUSE: 75 DEGREES
PLATELET # BLD AUTO: 333 10E3/UL (ref 150–450)
POTASSIUM SERPL-SCNC: 3.9 MMOL/L (ref 3.4–5.3)
PR INTERVAL - MUSE: 134 MS
PROT SERPL-MCNC: 6.9 G/DL (ref 6.4–8.3)
QRS DURATION - MUSE: 74 MS
QT - MUSE: 324 MS
QTC - MUSE: 428 MS
R AXIS - MUSE: 24 DEGREES
RBC # BLD AUTO: 4.04 10E6/UL (ref 3.8–5.9)
SODIUM SERPL-SCNC: 140 MMOL/L (ref 136–145)
SYSTOLIC BLOOD PRESSURE - MUSE: NORMAL MMHG
T AXIS - MUSE: 39 DEGREES
VENTRICULAR RATE- MUSE: 105 BPM
WBC # BLD AUTO: 14.2 10E3/UL (ref 4–11)

## 2023-08-14 PROCEDURE — 120N000003 HC R&B IMCU UMMC

## 2023-08-14 PROCEDURE — 85027 COMPLETE CBC AUTOMATED: CPT | Performed by: NURSE PRACTITIONER

## 2023-08-14 PROCEDURE — 99232 SBSQ HOSP IP/OBS MODERATE 35: CPT | Mod: 24

## 2023-08-14 PROCEDURE — 36415 COLL VENOUS BLD VENIPUNCTURE: CPT | Performed by: NURSE PRACTITIONER

## 2023-08-14 PROCEDURE — 80053 COMPREHEN METABOLIC PANEL: CPT | Performed by: NURSE PRACTITIONER

## 2023-08-14 PROCEDURE — 250N000013 HC RX MED GY IP 250 OP 250 PS 637: Performed by: INTERNAL MEDICINE

## 2023-08-14 RX ADMIN — RIVAROXABAN 15 MG: 15 TABLET, FILM COATED ORAL at 09:00

## 2023-08-14 RX ADMIN — RIVAROXABAN 15 MG: 15 TABLET, FILM COATED ORAL at 18:17

## 2023-08-14 ASSESSMENT — ACTIVITIES OF DAILY LIVING (ADL)
ADLS_ACUITY_SCORE: 22

## 2023-08-14 NOTE — PLAN OF CARE
Goal Outcome Evaluation: Patient is alert and oriented, makes his needs known. Vital signs were stable. NPO at midnight.       Plan of Care Reviewed With: patient    Overall Patient Progress: improvingOverall Patient Progress: improving

## 2023-08-14 NOTE — PROGRESS NOTES
Interventional Cardiology Progress Note      Assessment & Plan:  Estela Fry is a 33 year old female admitted on 8/5/2023 following an unwitnessed VT/VF cardiac arrest at home with ROSC after 3 shocks. She was not found to have obstructive CAD on angiogram. Extubated 8/7.     Course complicated by acute respiratory distress 8/11, due to tracheal stenosis, she is now being followed with ENT status post direct laryngoscopy and CT scan. She has since symptomatically improved.     Today's Update:  - Care Conference to discuss ICD placement; currently scheduled for 8/15  - Continue to monitor for signs of infection in setting of elevated wbc count   - Okay to stop steroids today per ENT      # VT/VF cardiac arrest  # Elevated Troponin 2/2 Cardiac Arrest  Patient was found down by her daughter at home,  started CPR and called EMS. Patient was shocked 3 times with ROSC and transported to [facility]. Patient requiring pressors on admission which have since been weaned off. Coronary angiography performed on arrival and noted for no coronary artery disease.   - Echo 8/6 showing mild RV dysfunction normal LV fx and an IVC thrombus   - Cardiac MRI 8/10: EF 64%; no regional wall motion abnormalities   - K/Mag replacement protocols     # IVC thrombus  # Nonocclusive DVT involving the right external iliac vein  Mid/distal IVC thrombus with 40% width initially seen on TTE and then on dedicated IVC US. Nonocclusive DVT in right external iliac vein surrounding patient's catheter also visualized on ultrasound.   - Hematology consulted: hypercoagulable workup negative thus far, antiphospholipid antibody pending  - Lupus panel results negative 8/10: per Heme ok to start DOAC  - Continue Xarelto 15mg BID  - Outpatient follow up with hematology scheduled 10/18/23     # Hypoxic encephalopathy 2/2 cardiac arrest   Concern for frontal lobe CVA on admission CT. Subsequent scan without evidence of CVA. She is following commands and moving all  extremities. Patient A&O x4 on exam today  - PT/OP  - Neurology following; recommend MRI Brain if clinical course not improving     # Normocytic Anemia of Critical Illness  Hgb on Admission 12.0; today 11.4  - Daily CBC    # Acute Respiratory Distress  # Subglottic Stenosis  Patient developed barking cough and shortness of breath on the morning of 8/11 with acute onset from previously stable state concerning for anaphylaxis. Symptoms improved after receiving racemic epinephrine, albuterol, decadron, and Benadryl.  - CT soft tissue Neck 8/11: Status post removal of endotracheal tube with mild subglottic  tracheal narrowing at the level of C7-T1   - Okay to stop steroids today per ENT  - Follow up with ENT as OP     # Leukocytosis  No signs of active infection at this time  - Daily CBC     Resolved Problems:   # Lactic Acidosis    # Aspiration pneumonitis     Prophylaxis:  DVT: DOAC  Diet: Regular  Activity: As Tolerated  Code Status: Full Code  Disposition: Pending ICD placement    Patient discussed w/ Dr. Melgoza.    Delia Beal MS, PA-C  [facility] Structural/Interventional Cardiology   Pager [phone]      Interval History:  Patient feeling well overnight with no new symptoms; VSS. Continues to be hesitant regarding ICD placement.     Most recent vital signs:  BP 92/63 (BP Location: Left arm, Patient Position: Right side, Cuff Size: Adult Regular)   Pulse 75   Temp 97.6  F (36.4  C) (Oral)   Resp 18   Ht 1.524 m (5')   Wt 47.7 kg (105 lb 3.2 oz)   SpO2 97%   BMI 20.55 kg/m    Temp:  [97  F (36.1  C)-99.3  F (37.4  C)] 97.6  F (36.4  C)  Pulse:  [] 75  Resp:  [16-24] 18  BP: ()/(63-79) 92/63  SpO2:  [97 %-100 %] 97 %  Wt Readings from Last 2 Encounters:   08/13/23 47.7 kg (105 lb 3.2 oz)     Intake/Output Summary (Last 24 hours) at 8/14/2023 0657  Last data filed at 8/13/2023 1956  Gross per 24 hour   Intake 640 ml   Output --   Net 640 ml     Physical exam:  General: In bed, in NAD  HEENT: EOMI,  PERRLA, no scleral icterus or injection  CARDIAC: RRR, no m/r/g appreciated. Peripheral pulses 2+  RESP: CTAB, no wheezes, rhonchi or crackles appreciated.  GI: NABS, NT/ND, no guarding or rebound  EXTREMITIES: NO LE edema, pulses 2+.    SKIN: No acute lesions appreciated  NEURO: Alert and oriented X3, no focal neurological deficits noted    Labs (Past three days):  CBC  Recent Labs   Lab 08/13/23  0647 08/12/23  0730 08/11/23  1148 08/11/23  0610   WBC 13.9*  13.9* 7.9 4.5 4.0   RBC 4.14 4.02 4.26 3.62*   HGB 11.4* 11.3* 12.1 10.2*   HCT 35.6 34.7* 36.4 31.3*   MCV 86 86 85 87   MCH 27.5 28.1 28.4 28.2   MCHC 32.0 32.6 33.2 32.6   RDW 12.8 12.4 12.4 12.3    272 224 199     BMP  Recent Labs   Lab 08/13/23  0647 08/12/23  0741 08/12/23  0730 08/11/23  1148 08/11/23  0610 08/10/23  0655     --  140 143 141 142   POTASSIUM 4.1  --  4.3 3.9 3.8 3.5   CHLORIDE 102  --  104 105 106 106   CO2 24  --  25 25 26 26   ANIONGAP 12  --  11 13 9 10   * 108* 110* 106* 97 96   BUN 22.7*  --  11.7 7.0 7.3 5.8*   CR 0.60  --  0.58 0.67 0.66 0.67   CUATE 9.5  --  9.6 9.3 9.0 8.7   MAG 2.4*  --  2.4*  --  2.0 1.9     Troponins:   Lab Results   Component Value Date    CTROPT 43 (H) 08/07/2023    CTROPT 45 (H) 08/07/2023    CTROPT 47 (H) 08/06/2023    CTROPT 44 (H) 08/06/2023    CTROPT 54 (H) 08/06/2023       INR  Recent Labs   Lab 08/11/23  0610 08/10/23  0655 08/09/23  1342 08/08/23  1015   INR 1.37* 1.13 1.06 1.19*     Liver panel  Recent Labs   Lab 08/13/23  0647 08/12/23  0730 08/11/23  0610 08/10/23  0655   PROTTOTAL 7.5 7.4 6.4 6.2*   ALBUMIN 4.5 4.4 3.9 3.6   BILITOTAL 0.6 0.7 0.6 0.6   ALKPHOS 49 43 39 38   AST 64* 35 27 20   ALT 82* 41 28 19       Imaging/procedure results:  EKG 12 Lead 8/9/23:        Coronary Angiogram 8/5/23: No significant coronary artery disease.      cMRI 8/10/23:  Clinical history: 33 year old female with prior cardiac arrest.      1. The LV is normal in cavity size and wall thickness.  The global systolic function is normal. The LVEF is  64%. There are no regional wall motion abnormalities.     2. The RV is normal in cavity size. The global systolic function is normal. The RVEF is 59%.      3. Both atria are normal in size.     4. There is no significant valvular disease.      5. Late gadolinium enhancement imaging shows no MI, fibrosis or infiltrative disease.      6. There is trivial pericardial effusion.     7. There is no intracardiac thrombus.     CONCLUSIONS: Normal cardiac function, LVEF of 64% and RVEF of 59%. There is no evidence of myocardial  fibrosis, given no fibrosis consider genetic evaluation for the etiology of her cardiac arrest.     Medical Decision Making       40 MINUTES SPENT BY ME on the date of service doing chart review, history, exam, documentation & further activities per the note.

## 2023-08-14 NOTE — PROGRESS NOTES
/71 (BP Location: Left arm)   Pulse 92   Temp 99.3  F (37.4  C) (Oral)   Resp 21   Ht 1.524 m (5')   Wt 47.7 kg (105 lb 3.2 oz)   SpO2 97%   BMI 20.55 kg/m    Neuro: A&Ox4.   Cardiac: Afebrile, VSS.   Respiratory: RA   GI/: Voiding spontaneously. No BM this shift.   Diet/appetite: Tolerating diet. Denies nausea   Activity:Independent    Pain: Denies   Skin: No new deficits noted.  Lines:PIV x1  Drains:None      Pending ICD placement. No new complaints.Pt has been resting comfortably, will continue to monitor and follow plan of care.

## 2023-08-14 NOTE — PLAN OF CARE
Shift Note 2330 - 0700    Neuro: A&Ox4.   Cardiac: SR. VSS.   Respiratory: Sating >95% on RA.  GI/: Independent OOB to the bathroom.  Diet/appetite: Tolerating reg diet. Eating well.  Activity:  Independent, up to chair.  Pain: Denies  Skin: No new deficits noted.  LDA's: PIVx1    Plan: Continue with POC. Notify primary team with changes.          Goal Outcome Evaluation:    Problem: Plan of Care - These are the overarching goals to be used throughout the patient stay.    Goal: Optimal Comfort and Wellbeing  Outcome: Progressing  Intervention: Provide Person-Centered Care  Recent Flowsheet Documentation  Taken 8/13/2023 2340 by Rhonda Bolaños RN  Trust Relationship/Rapport:   care explained   choices provided   questions answered   questions encouraged   reassurance provided   thoughts/feelings acknowledged     Problem: Risk for Delirium  Goal: Optimal Coping  Outcome: Progressing  Goal: Improved Behavioral Control  Outcome: Progressing  Intervention: Minimize Safety Risk  Recent Flowsheet Documentation  Taken 8/13/2023 2340 by Rhonda Bolaños RN  Enhanced Safety Measures: room near unit station  Trust Relationship/Rapport:   care explained   choices provided   questions answered   questions encouraged   reassurance provided   thoughts/feelings acknowledged  Goal: Improved Attention and Thought Clarity  Outcome: Progressing  Goal: Improved Sleep  Outcome: Progressing

## 2023-08-14 NOTE — PROGRESS NOTES
"CLINICAL NUTRITION SERVICES - ASSESSMENT NOTE     Nutrition Prescription    RECOMMENDATIONS FOR MDs/PROVIDERS TO ORDER:  None currently    Malnutrition Status:    Patient does not meet two of the established criteria necessary for diagnosing malnutrition    Recommendations already ordered by Registered Dietitian (RD):  None currently; pt doing well with meals from hospital + OSH food from visitors.     Future/Additional Recommendations:  PO, weight     REASON FOR ASSESSMENT  Estela Fry is a/an 33 year old adult assessed by the dietitian for LOS    Patient admitted for unwitnessed VT/VF cardiac arrest at home with ROSC after 3 shocks. She was not found to have obstructive CAD on angiogram. Extubated 8/7.      MEDICAL HISTORY  No significant PMH    NUTRITION HISTORY  Pt reports no issues with PO or weight PTA.     CURRENT NUTRITION ORDERS  Diet: Regular    Intake/Tolerance: Patient consuming  % of 0-3 meal(s) daily.    LABS   08/12/23 07:30 08/13/23 06:47 08/14/23 07:36   Sodium 140 138 140   Potassium 4.3 4.1 3.9   Chloride 104 102 105   Carbon Dioxide (CO2) 25 24 25   Urea Nitrogen 11.7 22.7 (H) 28.4 (H)   Creatinine 0.58 0.60 0.67   GFR Estimate See Comment See Comment See Comment   Calcium 9.6 9.5 9.3   Anion Gap 11 12 10   Magnesium 2.4 (H) 2.4 (H)    Albumin 4.4 4.5 4.1   Protein Total 7.4 7.5 6.9   Alkaline Phosphatase 43 49 47   ALT 41 82 (H) 89 (H)   AST 35 64 (H) 45   Bilirubin Total 0.7 0.6 0.4   Glucose 110 (H) 138 (H) 100 (H)   Procalcitonin  0.05 (H)        MEDICATIONS  Xarelto    ANTHROPOMETRICS  Height: 152.4 cm (5' 0\")  Most Recent Weight: 47.7 kg (105 lb 3.2 oz)    IBW: 45.5 kg   Body mass index is 20.55 kg/m . BMI Category: Normal BMI  Weight History: No additional recent weight history available in EMR/Care Everywhere. Noted chart marked for merge, will monitor for additional history. Pt reports current weight is her usual weight. She reports no weight loss.  Wt Readings from Last 15 " Encounters:   08/13/23 47.7 kg (105 lb 3.2 oz)       ASSESSED NUTRITION NEEDS  Dosing Weight: 47.7 kg (Actual BW)   Estimated Energy Needs: 1614-0908 kcals/day (25 - 30 kcals/kg)  Justification: Maintenance  Estimated Protein Needs: 48-57 grams protein/day (1 - 1.2 grams of pro/kg)  Justification: Maintenance  Estimated Fluid Needs: 3711-4007 mL/day (1 mL/kcal)   Justification: Maintenance    PHYSICAL FINDINGS  See malnutrition section below.    Ronaldo Score: 19  Per EMR: Skin  Skin WDL: WDL  Device Skin Interventions Taken: No adjustments needed    MALNUTRITION  % Intake: No decreased intake noted  % Weight Loss: None noted  Subcutaneous Fat Loss: None observed   Muscle Loss: None observed  Fluid Accumulation/Edema: None noted  Malnutrition Diagnosis: Patient does not meet two of the established criteria necessary for diagnosing malnutrition    NUTRITION DIAGNOSIS  No nutrition diagnosis at this time       INTERVENTIONS  Implementation  Nutrition Education: will be provided if nutrition education needs arise.         Goals  Patient to consume % of nutritionally adequate meal trays TID, or the equivalent with supplements/snacks.        Monitoring/Evaluation  Progress toward goals will be monitored and evaluated per protocol.    Mae Wagoner RDN, LD  6D/ED RD pager: [phone]  Weekend/Holiday RD pager: [phone]

## 2023-08-15 ENCOUNTER — APPOINTMENT (OUTPATIENT)
Dept: GENERAL RADIOLOGY | Facility: CLINIC | Age: 33
End: 2023-08-15
Attending: NURSE PRACTITIONER
Payer: COMMERCIAL

## 2023-08-15 ENCOUNTER — ANESTHESIA (OUTPATIENT)
Dept: CARDIOLOGY | Facility: CLINIC | Age: 33
End: 2023-08-15
Payer: COMMERCIAL

## 2023-08-15 ENCOUNTER — APPOINTMENT (OUTPATIENT)
Dept: GENERAL RADIOLOGY | Facility: CLINIC | Age: 33
End: 2023-08-15
Payer: COMMERCIAL

## 2023-08-15 LAB
ABO/RH(D): NORMAL
ALBUMIN SERPL BCG-MCNC: 3.8 G/DL (ref 3.5–5.2)
ALP SERPL-CCNC: 47 U/L (ref 35–129)
ALT SERPL W P-5'-P-CCNC: 102 U/L (ref 0–70)
ANION GAP SERPL CALCULATED.3IONS-SCNC: 9 MMOL/L (ref 7–15)
ANTIBODY SCREEN: NEGATIVE
AST SERPL W P-5'-P-CCNC: 40 U/L (ref 0–45)
BILIRUB SERPL-MCNC: 0.3 MG/DL
BUN SERPL-MCNC: 28.6 MG/DL (ref 6–20)
CALCIUM SERPL-MCNC: 8.7 MG/DL (ref 8.6–10)
CHLORIDE SERPL-SCNC: 106 MMOL/L (ref 98–107)
CREAT SERPL-MCNC: 0.74 MG/DL (ref 0.51–1.17)
DEPRECATED HCO3 PLAS-SCNC: 25 MMOL/L (ref 22–29)
ERYTHROCYTE [DISTWIDTH] IN BLOOD BY AUTOMATED COUNT: 12.7 % (ref 10–15)
GFR SERPL CREATININE-BSD FRML MDRD: ABNORMAL ML/MIN/{1.73_M2}
GLUCOSE BLDC GLUCOMTR-MCNC: 91 MG/DL (ref 70–99)
GLUCOSE SERPL-MCNC: 99 MG/DL (ref 70–99)
HCT VFR BLD AUTO: 35.2 % (ref 35–53)
HGB BLD-MCNC: 11.6 G/DL (ref 11.7–17.7)
MCH RBC QN AUTO: 28.5 PG (ref 26.5–33)
MCHC RBC AUTO-ENTMCNC: 33 G/DL (ref 31.5–36.5)
MCV RBC AUTO: 87 FL (ref 78–100)
PLATELET # BLD AUTO: 307 10E3/UL (ref 150–450)
POTASSIUM SERPL-SCNC: 3.9 MMOL/L (ref 3.4–5.3)
PROT SERPL-MCNC: 6.3 G/DL (ref 6.4–8.3)
RBC # BLD AUTO: 4.07 10E6/UL (ref 3.8–5.9)
SODIUM SERPL-SCNC: 140 MMOL/L (ref 136–145)
SPECIMEN EXPIRATION DATE: NORMAL
TRYPTASE SERPL-MCNC: 2.5 UG/L
WBC # BLD AUTO: 7 10E3/UL (ref 4–11)

## 2023-08-15 PROCEDURE — 258N000003 HC RX IP 258 OP 636

## 2023-08-15 PROCEDURE — 86850 RBC ANTIBODY SCREEN: CPT

## 2023-08-15 PROCEDURE — 93005 ELECTROCARDIOGRAM TRACING: CPT

## 2023-08-15 PROCEDURE — 250N000011 HC RX IP 250 OP 636: Performed by: INTERNAL MEDICINE

## 2023-08-15 PROCEDURE — 71045 X-RAY EXAM CHEST 1 VIEW: CPT | Mod: 26 | Performed by: RADIOLOGY

## 2023-08-15 PROCEDURE — 0JH63FZ INSERTION OF SUBCUTANEOUS DEFIBRILLATOR LEAD INTO CHEST SUBCUTANEOUS TISSUE AND FASCIA, PERCUTANEOUS APPROACH: ICD-10-PCS | Performed by: INTERNAL MEDICINE

## 2023-08-15 PROCEDURE — 250N000013 HC RX MED GY IP 250 OP 250 PS 637: Performed by: NURSE PRACTITIONER

## 2023-08-15 PROCEDURE — 99232 SBSQ HOSP IP/OBS MODERATE 35: CPT | Mod: 24

## 2023-08-15 PROCEDURE — 99207 PR NO CHARGE LOS: CPT | Performed by: NURSE PRACTITIONER

## 2023-08-15 PROCEDURE — 272N000002 HC OR SUPPLY OTHER OPNP: Performed by: INTERNAL MEDICINE

## 2023-08-15 PROCEDURE — 80053 COMPREHEN METABOLIC PANEL: CPT | Performed by: NURSE PRACTITIONER

## 2023-08-15 PROCEDURE — 250N000013 HC RX MED GY IP 250 OP 250 PS 637

## 2023-08-15 PROCEDURE — 250N000009 HC RX 250: Performed by: INTERNAL MEDICINE

## 2023-08-15 PROCEDURE — 999N000065 XR CHEST PORT 1 VIEW

## 2023-08-15 PROCEDURE — 120N000003 HC R&B IMCU UMMC

## 2023-08-15 PROCEDURE — 250N000013 HC RX MED GY IP 250 OP 250 PS 637: Performed by: INTERNAL MEDICINE

## 2023-08-15 PROCEDURE — 250N000011 HC RX IP 250 OP 636: Performed by: ANESTHESIOLOGY

## 2023-08-15 PROCEDURE — 370N000017 HC ANESTHESIA TECHNICAL FEE, PER MIN: Performed by: INTERNAL MEDICINE

## 2023-08-15 PROCEDURE — 33270 INS/REP SUBQ DEFIBRILLATOR: CPT | Mod: GC | Performed by: INTERNAL MEDICINE

## 2023-08-15 PROCEDURE — 85027 COMPLETE CBC AUTOMATED: CPT | Performed by: NURSE PRACTITIONER

## 2023-08-15 PROCEDURE — 36415 COLL VENOUS BLD VENIPUNCTURE: CPT | Performed by: NURSE PRACTITIONER

## 2023-08-15 PROCEDURE — 33270 INS/REP SUBQ DEFIBRILLATOR: CPT | Performed by: INTERNAL MEDICINE

## 2023-08-15 PROCEDURE — 250N000011 HC RX IP 250 OP 636: Mod: JZ

## 2023-08-15 PROCEDURE — 0JH608Z INSERTION OF DEFIBRILLATOR GENERATOR INTO CHEST SUBCUTANEOUS TISSUE AND FASCIA, OPEN APPROACH: ICD-10-PCS | Performed by: INTERNAL MEDICINE

## 2023-08-15 PROCEDURE — 250N000011 HC RX IP 250 OP 636: Performed by: NURSE PRACTITIONER

## 2023-08-15 PROCEDURE — 250N000009 HC RX 250

## 2023-08-15 PROCEDURE — C1779 LEAD, PMKR, TRANSVENOUS VDD: HCPCS | Performed by: INTERNAL MEDICINE

## 2023-08-15 PROCEDURE — 272N000001 HC OR GENERAL SUPPLY STERILE: Performed by: INTERNAL MEDICINE

## 2023-08-15 PROCEDURE — C1722 AICD, SINGLE CHAMBER: HCPCS | Performed by: INTERNAL MEDICINE

## 2023-08-15 DEVICE — GENERATOR EMBLEM S-ICD MRI: Type: IMPLANTABLE DEVICE | Status: FUNCTIONAL

## 2023-08-15 DEVICE — LEAD EMBLEM S-1CD 3501: Type: IMPLANTABLE DEVICE | Status: FUNCTIONAL

## 2023-08-15 DEVICE — IMPLANTABLE DEVICE: Type: IMPLANTABLE DEVICE | Status: FUNCTIONAL

## 2023-08-15 RX ORDER — HYDROMORPHONE HCL IN WATER/PF 6 MG/30 ML
0.4 PATIENT CONTROLLED ANALGESIA SYRINGE INTRAVENOUS EVERY 5 MIN PRN
Status: DISCONTINUED | OUTPATIENT
Start: 2023-08-15 | End: 2023-08-15 | Stop reason: HOSPADM

## 2023-08-15 RX ORDER — ONDANSETRON 2 MG/ML
4 INJECTION INTRAMUSCULAR; INTRAVENOUS EVERY 30 MIN PRN
Status: DISCONTINUED | OUTPATIENT
Start: 2023-08-15 | End: 2023-08-15 | Stop reason: HOSPADM

## 2023-08-15 RX ORDER — SODIUM CHLORIDE, SODIUM LACTATE, POTASSIUM CHLORIDE, CALCIUM CHLORIDE 600; 310; 30; 20 MG/100ML; MG/100ML; MG/100ML; MG/100ML
INJECTION, SOLUTION INTRAVENOUS CONTINUOUS
Status: DISCONTINUED | OUTPATIENT
Start: 2023-08-15 | End: 2023-08-15 | Stop reason: HOSPADM

## 2023-08-15 RX ORDER — FENTANYL CITRATE 50 UG/ML
50 INJECTION, SOLUTION INTRAMUSCULAR; INTRAVENOUS EVERY 5 MIN PRN
Status: DISCONTINUED | OUTPATIENT
Start: 2023-08-15 | End: 2023-08-15 | Stop reason: HOSPADM

## 2023-08-15 RX ORDER — ONDANSETRON 2 MG/ML
INJECTION INTRAMUSCULAR; INTRAVENOUS PRN
Status: DISCONTINUED | OUTPATIENT
Start: 2023-08-15 | End: 2023-08-15

## 2023-08-15 RX ORDER — OXYCODONE AND ACETAMINOPHEN 5; 325 MG/1; MG/1
1 TABLET ORAL EVERY 4 HOURS PRN
Status: DISCONTINUED | OUTPATIENT
Start: 2023-08-15 | End: 2023-08-16 | Stop reason: HOSPADM

## 2023-08-15 RX ORDER — SODIUM CHLORIDE, SODIUM LACTATE, POTASSIUM CHLORIDE, CALCIUM CHLORIDE 600; 310; 30; 20 MG/100ML; MG/100ML; MG/100ML; MG/100ML
INJECTION, SOLUTION INTRAVENOUS CONTINUOUS PRN
Status: DISCONTINUED | OUTPATIENT
Start: 2023-08-15 | End: 2023-08-15

## 2023-08-15 RX ORDER — FENTANYL CITRATE 50 UG/ML
INJECTION, SOLUTION INTRAMUSCULAR; INTRAVENOUS PRN
Status: DISCONTINUED | OUTPATIENT
Start: 2023-08-15 | End: 2023-08-15

## 2023-08-15 RX ORDER — FENTANYL CITRATE 50 UG/ML
25 INJECTION, SOLUTION INTRAMUSCULAR; INTRAVENOUS EVERY 5 MIN PRN
Status: DISCONTINUED | OUTPATIENT
Start: 2023-08-15 | End: 2023-08-15 | Stop reason: HOSPADM

## 2023-08-15 RX ORDER — DEXAMETHASONE SODIUM PHOSPHATE 4 MG/ML
INJECTION, SOLUTION INTRA-ARTICULAR; INTRALESIONAL; INTRAMUSCULAR; INTRAVENOUS; SOFT TISSUE PRN
Status: DISCONTINUED | OUTPATIENT
Start: 2023-08-15 | End: 2023-08-15

## 2023-08-15 RX ORDER — HYDROMORPHONE HCL IN WATER/PF 6 MG/30 ML
0.2 PATIENT CONTROLLED ANALGESIA SYRINGE INTRAVENOUS EVERY 5 MIN PRN
Status: DISCONTINUED | OUTPATIENT
Start: 2023-08-15 | End: 2023-08-15 | Stop reason: HOSPADM

## 2023-08-15 RX ORDER — ONDANSETRON 4 MG/1
4 TABLET, ORALLY DISINTEGRATING ORAL EVERY 30 MIN PRN
Status: DISCONTINUED | OUTPATIENT
Start: 2023-08-15 | End: 2023-08-15 | Stop reason: HOSPADM

## 2023-08-15 RX ORDER — OXYCODONE HYDROCHLORIDE 5 MG/1
5 TABLET ORAL
Status: DISCONTINUED | OUTPATIENT
Start: 2023-08-15 | End: 2023-08-15 | Stop reason: HOSPADM

## 2023-08-15 RX ORDER — CEFAZOLIN SODIUM 2 G/100ML
2 INJECTION, SOLUTION INTRAVENOUS
Status: COMPLETED | OUTPATIENT
Start: 2023-08-15 | End: 2023-08-15

## 2023-08-15 RX ORDER — PROPOFOL 10 MG/ML
INJECTION, EMULSION INTRAVENOUS PRN
Status: DISCONTINUED | OUTPATIENT
Start: 2023-08-15 | End: 2023-08-15

## 2023-08-15 RX ORDER — CEFAZOLIN SODIUM 1 G/3ML
1 INJECTION, POWDER, FOR SOLUTION INTRAMUSCULAR; INTRAVENOUS EVERY 8 HOURS
Status: COMPLETED | OUTPATIENT
Start: 2023-08-15 | End: 2023-08-16

## 2023-08-15 RX ORDER — EPHEDRINE SULFATE 50 MG/ML
INJECTION, SOLUTION INTRAMUSCULAR; INTRAVENOUS; SUBCUTANEOUS PRN
Status: DISCONTINUED | OUTPATIENT
Start: 2023-08-15 | End: 2023-08-15

## 2023-08-15 RX ORDER — CEPHALEXIN 500 MG/1
500 CAPSULE ORAL 3 TIMES DAILY
Status: DISCONTINUED | OUTPATIENT
Start: 2023-08-16 | End: 2023-08-16 | Stop reason: HOSPADM

## 2023-08-15 RX ORDER — OXYCODONE HYDROCHLORIDE 10 MG/1
10 TABLET ORAL
Status: DISCONTINUED | OUTPATIENT
Start: 2023-08-15 | End: 2023-08-15 | Stop reason: HOSPADM

## 2023-08-15 RX ORDER — PROPOFOL 10 MG/ML
INJECTION, EMULSION INTRAVENOUS CONTINUOUS PRN
Status: DISCONTINUED | OUTPATIENT
Start: 2023-08-15 | End: 2023-08-15

## 2023-08-15 RX ADMIN — FENTANYL CITRATE 25 MCG: 50 INJECTION, SOLUTION INTRAMUSCULAR; INTRAVENOUS at 11:57

## 2023-08-15 RX ADMIN — FENTANYL CITRATE 25 MCG: 50 INJECTION, SOLUTION INTRAMUSCULAR; INTRAVENOUS at 10:27

## 2023-08-15 RX ADMIN — FENTANYL CITRATE 25 MCG: 50 INJECTION, SOLUTION INTRAMUSCULAR; INTRAVENOUS at 10:01

## 2023-08-15 RX ADMIN — EPHEDRINE SULFATE 5 MG: 5 INJECTION INTRAVENOUS at 09:39

## 2023-08-15 RX ADMIN — LIDOCAINE PATCH 4% 1 PATCH: 40 PATCH TOPICAL at 20:51

## 2023-08-15 RX ADMIN — OXYCODONE HYDROCHLORIDE AND ACETAMINOPHEN 1 TABLET: 5; 325 TABLET ORAL at 16:16

## 2023-08-15 RX ADMIN — ONDANSETRON 4 MG: 2 INJECTION INTRAMUSCULAR; INTRAVENOUS at 11:17

## 2023-08-15 RX ADMIN — FENTANYL CITRATE 25 MCG: 50 INJECTION, SOLUTION INTRAMUSCULAR; INTRAVENOUS at 09:05

## 2023-08-15 RX ADMIN — Medication 2 G: at 08:55

## 2023-08-15 RX ADMIN — FENTANYL CITRATE 25 MCG: 50 INJECTION, SOLUTION INTRAMUSCULAR; INTRAVENOUS at 11:07

## 2023-08-15 RX ADMIN — PHENYLEPHRINE HYDROCHLORIDE 50 MCG: 10 INJECTION INTRAVENOUS at 09:35

## 2023-08-15 RX ADMIN — OXYCODONE HYDROCHLORIDE AND ACETAMINOPHEN 1 TABLET: 5; 325 TABLET ORAL at 20:51

## 2023-08-15 RX ADMIN — CEFAZOLIN 1 G: 1 INJECTION, POWDER, FOR SOLUTION INTRAMUSCULAR; INTRAVENOUS at 18:15

## 2023-08-15 RX ADMIN — RIVAROXABAN 15 MG: 15 TABLET, FILM COATED ORAL at 18:15

## 2023-08-15 RX ADMIN — PROPOFOL 150 MCG/KG/MIN: 10 INJECTION, EMULSION INTRAVENOUS at 08:54

## 2023-08-15 RX ADMIN — MIDAZOLAM 2 MG: 1 INJECTION INTRAMUSCULAR; INTRAVENOUS at 08:43

## 2023-08-15 RX ADMIN — FENTANYL CITRATE 25 MCG: 50 INJECTION, SOLUTION INTRAMUSCULAR; INTRAVENOUS at 09:21

## 2023-08-15 RX ADMIN — PROPOFOL 50 MG: 10 INJECTION, EMULSION INTRAVENOUS at 08:58

## 2023-08-15 RX ADMIN — SODIUM CHLORIDE, POTASSIUM CHLORIDE, SODIUM LACTATE AND CALCIUM CHLORIDE: 600; 310; 30; 20 INJECTION, SOLUTION INTRAVENOUS at 08:49

## 2023-08-15 RX ADMIN — PROPOFOL 30 MG: 10 INJECTION, EMULSION INTRAVENOUS at 11:07

## 2023-08-15 RX ADMIN — DEXAMETHASONE SODIUM PHOSPHATE 4 MG: 4 INJECTION, SOLUTION INTRA-ARTICULAR; INTRALESIONAL; INTRAMUSCULAR; INTRAVENOUS; SOFT TISSUE at 10:15

## 2023-08-15 RX ADMIN — PHENYLEPHRINE HYDROCHLORIDE 0.3 MCG/KG/MIN: 10 INJECTION INTRAVENOUS at 09:29

## 2023-08-15 RX ADMIN — PHENYLEPHRINE HYDROCHLORIDE 100 MCG: 10 INJECTION INTRAVENOUS at 09:21

## 2023-08-15 ASSESSMENT — ACTIVITIES OF DAILY LIVING (ADL)
ADLS_ACUITY_SCORE: 22
ADLS_ACUITY_SCORE: 22
ADLS_ACUITY_SCORE: 28
ADLS_ACUITY_SCORE: 22
ADLS_ACUITY_SCORE: 28
ADLS_ACUITY_SCORE: 28
ADLS_ACUITY_SCORE: 22

## 2023-08-15 NOTE — PLAN OF CARE
ICU End of Shift Summary. See flowsheets for vital signs and detailed assessment.    Changes this shift: Patient NPO after midnight in prep for an ICD placement.  Patient alert and oriented.  Independent in the room.  No complains over the night. 0640 patient transported to Pre op.

## 2023-08-15 NOTE — OR NURSING
Patient brought to PACU for inpatient MAC eval; patient meets criteria to return to inpatient unit and bypass PACU admission. Anesthesia team provided report to inpatient RN; patient placed on pulse ox, capno monitor, and transported to inpatient bed assignment.

## 2023-08-15 NOTE — PROGRESS NOTES
Interventional Cardiology Progress Note      Assessment & Plan:  Estela Fry is a 33 year old female admitted on 8/5/2023 following an unwitnessed VT/VF cardiac arrest at home with ROSC after 3 shocks. She was not found to have obstructive CAD on angiogram. Extubated 8/7.      Course complicated by acute respiratory distress 8/11, due to tracheal stenosis, she is now being followed with ENT status post direct laryngoscopy and CT scan. She has since symptomatically improved.     Today's Update:  - ICD this AM     # VT/VF cardiac arrest  # Elevated Troponin 2/2 Cardiac Arrest s/p ICD Placement  Patient was found down by her daughter at home,  started CPR and called EMS. Patient was shocked 3 times with ROSC and transported to [facility]. Patient requiring pressors on admission which have since been weaned off. Coronary angiography performed on arrival and noted for no coronary artery disease.   - Echo 8/6 showing mild RV dysfunction normal LV fx and an IVC thrombus   - Cardiac MRI 8/10: EF 64%; no regional wall motion abnormalities   - K/Mag replacement protocols  - CXR today with repeat tomorrow AM  - Device check in AM      # IVC thrombus  # Nonocclusive DVT involving the right external iliac vein  Mid/distal IVC thrombus with 40% width initially seen on TTE and then on dedicated IVC US. Nonocclusive DVT in right external iliac vein surrounding patient's catheter also visualized on ultrasound.   - Hematology consulted: hypercoagulable workup negative thus far, antiphospholipid antibody pending  - Lupus panel results negative 8/10: per Heme ok to start DOAC  - Resume Xarelto 15mg BID  - Outpatient follow up with hematology scheduled 10/18/23     # Hypoxic encephalopathy 2/2 cardiac arrest   Concern for frontal lobe CVA on admission CT. Subsequent scan without evidence of CVA. She is following commands and moving all extremities. Patient A&O x4 on exam today  - PT/OP  - Neurology following; recommend MRI Brain if  clinical course not improving     # Normocytic Anemia of Critical Illness  Hgb on Admission 12.0; today 11.6  - Daily CBC     # Acute Respiratory Distress  # Subglottic Stenosis  Patient developed barking cough and shortness of breath on the morning of 8/11 with acute onset from previously stable state concerning for anaphylaxis. Symptoms improved after receiving racemic epinephrine, albuterol, decadron, and Benadryl.  - CT soft tissue Neck 8/11: Status post removal of endotracheal tube with mild subglottic  tracheal narrowing at the level of C7-T1   - Okay to stop steroids 8/14 per ENT  - Follow up with ENT as OP      Resolved Problems:   # Lactic Acidosis  # Aspiration pneumonitis  # Leukocytosis    Prophylaxis:  DVT: DOAC  Diet: NPO for ICD; normal adult diet  Activity: As tolerated  Code Status: Full  Disposition: Plan for discharge tomorrow after ICD today    Patient seen and discussed w/ Dr. Melgoza.    Delia Beal, MS, PA-C  [facility] Structural/Interventional Cardiology   Pager [phone]/[phone]    Interval History:  Patient resting post-procedure; reports mild pain at implantation site but is otherwise asymptomatic. VSS.     Most recent vital signs:  BP 92/65 (BP Location: Left arm, Cuff Size: Adult Regular)   Pulse 68   Temp 98.1  F (36.7  C) (Oral)   Resp 17   Ht 1.524 m (5')   Wt 47.7 kg (105 lb 3.2 oz)   SpO2 98%   BMI 20.55 kg/m    Temp:  [98  F (36.7  C)-98.6  F (37  C)] 98.1  F (36.7  C)  Pulse:  [68-94] 68  Resp:  [14-24] 17  BP: (91-96)/(55-66) 92/65  SpO2:  [97 %-98 %] 98 %  Wt Readings from Last 2 Encounters:   08/13/23 47.7 kg (105 lb 3.2 oz)     Intake/Output Summary (Last 24 hours) at 8/15/2023 0705  Last data filed at 8/14/2023 1806  Gross per 24 hour   Intake 640 ml   Output --   Net 640 ml       Physical exam:  General: In bed, in NAD  HEENT: EOMI, PERRLA, no scleral icterus or injection  CARDIAC: RRR, no m/r/g appreciated. Peripheral pulses 2+  RESP: CTAB, no wheezes, rhonchi or crackles  appreciated.  GI: NABS, NT/ND, no guarding or rebound  EXTREMITIES: NO LE edema, pulses 2+, dressing c/d/i.    SKIN: No acute lesions appreciated  NEURO: Alert and oriented X3, no focal neurological deficits noted    Labs (Past three days):  CBC  Recent Labs   Lab 08/15/23  0613 08/14/23  0736 08/13/23  0647 08/12/23  0730   WBC 7.0 14.2* 13.9*  13.9* 7.9   RBC 4.07 4.04 4.14 4.02   HGB 11.6* 11.4* 11.4* 11.3*   HCT 35.2 34.7* 35.6 34.7*   MCV 87 86 86 86   MCH 28.5 28.2 27.5 28.1   MCHC 33.0 32.9 32.0 32.6   RDW 12.7 12.8 12.8 12.4    333 301 272     BMP  Recent Labs   Lab 08/15/23  0613 08/14/23  0736 08/13/23  0647 08/12/23  0741 08/12/23  0730 08/11/23  1148 08/11/23  0610 08/10/23  0655    140 138  --  140   < > 141 142   POTASSIUM 3.9 3.9 4.1  --  4.3   < > 3.8 3.5   CHLORIDE 106 105 102  --  104   < > 106 106   CO2 25 25 24  --  25   < > 26 26   ANIONGAP 9 10 12  --  11   < > 9 10   GLC 99 100* 138* 108* 110*   < > 97 96   BUN 28.6* 28.4* 22.7*  --  11.7   < > 7.3 5.8*   CR 0.74 0.67 0.60  --  0.58   < > 0.66 0.67   CUATE 8.7 9.3 9.5  --  9.6   < > 9.0 8.7   MAG  --   --  2.4*  --  2.4*  --  2.0 1.9    < > = values in this interval not displayed.     Troponins:   Lab Results   Component Value Date    CTROPT 43 (H) 08/07/2023    CTROPT 45 (H) 08/07/2023    CTROPT 47 (H) 08/06/2023    CTROPT 44 (H) 08/06/2023    CTROPT 54 (H) 08/06/2023        INR  Recent Labs   Lab 08/11/23  0610 08/10/23  0655 08/09/23  1342 08/08/23  1015   INR 1.37* 1.13 1.06 1.19*     Liver panel  Recent Labs   Lab 08/15/23  0613 08/14/23  0736 08/13/23  0647 08/12/23  0730   PROTTOTAL 6.3* 6.9 7.5 7.4   ALBUMIN 3.8 4.1 4.5 4.4   BILITOTAL 0.3 0.4 0.6 0.7   ALKPHOS 47 47 49 43   AST 40 45 64* 35   * 89* 82* 41       Imaging/procedure results:  EKG 12 Lead 8/9/23:        Coronary Angiogram 8/5/23: No significant coronary artery disease.      cMRI 8/10/23:  Clinical history: 33 year old female with prior cardiac arrest.       1. The LV is normal in cavity size and wall thickness. The global systolic function is normal. The LVEF is  64%. There are no regional wall motion abnormalities.     2. The RV is normal in cavity size. The global systolic function is normal. The RVEF is 59%.      3. Both atria are normal in size.     4. There is no significant valvular disease.      5. Late gadolinium enhancement imaging shows no MI, fibrosis or infiltrative disease.      6. There is trivial pericardial effusion.     7. There is no intracardiac thrombus.     CONCLUSIONS: Normal cardiac function, LVEF of 64% and RVEF of 59%. There is no evidence of myocardial fibrosis, given no fibrosis consider genetic evaluation for the etiology of her cardiac arrest.     Medical Decision Making       45 MINUTES SPENT BY ME on the date of service doing chart review, history, exam, documentation & further activities per the note.

## 2023-08-15 NOTE — PROGRESS NOTES
"    Electrophysiology Consult Service  Follow up Note   EP Attending: Dr. Eula Johnson  Date of Service: 8/15/2023     S: We continue to follow this patient for ICD planning. She had a family conference yesterday and they would like to move forward with S-ICD. VSS. Renal function and electrolytes stable.   HPI:  Ms. Fry is a 33 year old female who had no significant medical history until admitted on 8/5/23 after suffering an out of hospital VT/VF cardiac arrest. She was reported to have been found down by her daughter at home. Her  started CPR and called EMS. She was reported to be in VT/VF on EMS arrival and had 3 external defibrillations with ROSC. She was brought to [facility] and had coronary angiogram that showed no CAD. She did not require ECMO. Initial echo showing mild RV dysfunction and normal LVEF with IVC thrombus. Work up showed nonocclusive DVT involving right external iliac vein. Hematology consulted and hypercoagulable work up negative with negative lupus panel as well. She has been started on DOAC. Initial concern for frontal lobe CVA on admission CT. Subsequent scan without evidence of CVA. CMR showed normal biventricular function with no LGE. ECG shows no evidence of preexcitation, early repolarization, or LQT.   O:   Vitals: /64 (Cuff Size: Adult Regular)   Pulse 68   Temp 97.6  F (36.4  C) (Oral)   Resp 18   Ht 1.575 m (5' 2\")   Wt 47.7 kg (105 lb 3.2 oz)   LMP  (LMP Unknown)   SpO2 97%   BMI 19.24 kg/m    Gen:  AxO, NAD   Lungs: CTAB   CV:  S1S2,Reg, no M/R/G noted. No JVD.   Abd: NT, ND, Soft   Ext:  No pedal edema    Data:  Labs:  BMP  Recent Labs   Lab 08/15/23  0613 08/14/23  0736 08/13/23  0647 08/12/23  0741 08/12/23  0730    140 138  --  140   POTASSIUM 3.9 3.9 4.1  --  4.3   CHLORIDE 106 105 102  --  104   CUATE 8.7 9.3 9.5  --  9.6   CO2 25 25 24  --  25   BUN 28.6* 28.4* 22.7*  --  11.7   CR 0.74 0.67 0.60  --  0.58   GLC 99 100* 138* 108* 110*       CBC  Recent Labs "   Lab 08/15/23  0613 08/14/23  0736 08/13/23  0647 08/12/23  0730   WBC 7.0 14.2* 13.9*  13.9* 7.9   RBC 4.07 4.04 4.14 4.02   HGB 11.6* 11.4* 11.4* 11.3*   HCT 35.2 34.7* 35.6 34.7*   MCV 87 86 86 86   MCH 28.5 28.2 27.5 28.1   MCHC 33.0 32.9 32.0 32.6   RDW 12.7 12.8 12.8 12.4    333 301 272       INR  Recent Labs   Lab 08/11/23  0610 08/10/23  0655 08/09/23  1342 08/08/23  1015   INR 1.37* 1.13 1.06 1.19*         ECG:      Meds per EPIC EMR:  Current Facility-Administered Medications   Medication    acetaminophen (TYLENOL) tablet 650 mg    albuterol (PROVENTIL) neb solution 2.5 mg    benzonatate (TESSALON) capsule 100 mg    glucose gel 15-30 g    Or    dextrose 50 % injection 25-50 mL    Or    glucagon injection 1 mg    diphenhydrAMINE (BENADRYL) capsule 25 mg    Or    diphenhydrAMINE (BENADRYL) injection 25 mg    Lidocaine (LIDOCARE) 4 % Patch 1 patch    naloxone (NARCAN) injection 0.2 mg    Or    naloxone (NARCAN) injection 0.4 mg    Or    naloxone (NARCAN) injection 0.2 mg    Or    naloxone (NARCAN) injection 0.4 mg    No antibiotics needed for procedure.    ondansetron (ZOFRAN ODT) ODT tab 4 mg    Or    ondansetron (ZOFRAN) injection 4 mg    racEPINEPHrine neb solution 0.5 mL    rivaroxaban ANTICOAGULANT (XARELTO) tablet 15 mg       8/11/23 CMR:  1. The LV is normal in cavity size and wall thickness. The global systolic function is normal. The LVEF is  64%. There are no regional wall motion abnormalities.     2. The RV is normal in cavity size. The global systolic function is normal. The RVEF is 59%.      3. Both atria are normal in size.     4. There is no significant valvular disease.      5. Late gadolinium enhancement imaging shows no MI, fibrosis or infiltrative disease.      6. There is trivial pericardial effusion.     7. There is no intracardiac thrombus.     CONCLUSIONS: Normal cardiac function, LVEF of 64% and RVEF of 59%. There is no evidence of myocardial  fibrosis, given no fibrosis  consider genetic evaluation for the etiology of her cardiac arrest.      A:   Ms. Fry is a 33 year old female who had no significant medical history until admitted on 8/5/23 after suffering an out of hospital VT/VF cardiac arrest. She was reported to have been found down by her daughter at home. Her  started CPR and called EMS. She was reported to be in VT/VF on EMS arrival and had 3 external defibrillations with ROSC. She was brought to [facility] and had coronary angiogram that showed no CAD. She did not require ECMO. Initial echo showing mild RV dysfunction and normal LVEF with IVC thrombus. Work up showed nonocclusive DVT involving right external iliac vein. Hematology consulted and hypercoagulable work up negative with negative lupus panel as well. She has been started on DOAC. Initial concern for frontal lobe CVA on admission CT. Subsequent scan without evidence of CVA. CMR showed normal biventricular function with no LGE. ECG shows no evidence of preexcitation, early repolarization, or LQT. We continue to follow this patient for ICD planning. She had a family conference yesterday and they would like to move forward with S-ICD. VSS. Renal function and electrolytes stable.   EP recommendations:   Out of Hospital Cardiac Arrest:   - unclear etiology, no CAD on angiogram, ECG without evidence of early repolarization, LQT, or Brugada, and CMR showed no nidus for arrhyhtmia normal structure/function and no LGE.   - discussed recommendation for secondary prevention ICD in detail with patient. Discussed options of EVICD, SICD, or TV ICD at length previously. Discussed all three device options in detail including pros/cons, risks, benefits, and implant considerations for each. She elected to pursue S-ICD. We discussed with the patient the rationale for ICD placement, alternative therapies,  technical aspects of the surgical procedure, risks/benefits of therapy and need for long-term follow-up in the Device Clinic.   The risk of defibrillator placement include: over sedation, reaction to local anesthetic, reaction to narcotics or benzodiazipines used for moderate secation, localized bleeding, internal bleeding, collapsed lung, and acute or late infections. There is the possibilty of unforseen complications as well such as device or lead failure, lead dislodgement, and inappropriate shocks from the defibrillator . All question/concerns were addressed. After our discussion, the patient verbalized understanding. Written consent obtained.   The patient states understanding and is agreeable with plan.   Thank you much for allowing us to participate in the care of this pleasant patient.    MELODY Munoz CNP  Electrophysiology Consult Service  Pager: [phone]

## 2023-08-15 NOTE — PLAN OF CARE
Goal Outcome Evaluation: Patient is alert and oriented, makes her needs known. Vital signs were stable. Patient got ICD device placement today. Given one time prn pain medication today.       Plan of Care Reviewed With: patient    Overall Patient Progress: improvingOverall Patient Progress: improving

## 2023-08-15 NOTE — ANESTHESIA PREPROCEDURE EVALUATION
Anesthesia Pre-Procedure Evaluation    Patient: Estela Fry   MRN: [id number] : 1990        Procedure : Procedure(s):  Subcutaneous Implantable Cardioverter Defibrillator Implant          No past medical history on file.   Past Surgical History:   Procedure Laterality Date    CV CENTRAL VENOUS CATHETER PLACEMENT N/A 2023    Procedure: Central Venous Catheter Placement;  Surgeon: Sid Liz MD;  Location:  [facility]    CV CORONARY ANGIOGRAM N/A 2023    Procedure: Coronary Angiogram;  Surgeon: iSd Liz MD;  Location:  [facility]ARD[facility] CATH LAB      No Known Allergies   Social History     Tobacco Use    Smoking status: Not on file    Smokeless tobacco: Not on file   Substance Use Topics    Alcohol use: Not on file      Wt Readings from Last 1 Encounters:   08/15/23 48.3 kg (106 lb 7.7 oz)        Anesthesia Evaluation   Pt has had prior anesthetic. Type: General.        ROS/MED HX  ENT/Pulmonary:       Neurologic:  - neg neurologic ROS     Cardiovascular: Comment: The patient is s/p cardiac arrest.      METS/Exercise Tolerance:     Hematologic:  - neg hematologic  ROS     Musculoskeletal:       GI/Hepatic:  - neg GI/hepatic ROS     Renal/Genitourinary:  - neg Renal ROS     Endo:  - neg endo ROS     Psychiatric/Substance Use:  - neg psychiatric ROS     Infectious Disease:  - neg infectious disease ROS     Malignancy:       Other:            Physical Exam    Airway        Mallampati: I   TM distance: > 3 FB   Neck ROM: full   Mouth opening: > 3 cm    Respiratory Devices and Support         Dental       (+) Minor Abnormalities - some fillings, tiny chips      Cardiovascular   cardiovascular exam normal       Rhythm and rate: regular and normal     Pulmonary           breath sounds clear to auscultation           OUTSIDE LABS:  CBC:   Lab Results   Component Value Date    WBC 7.0 08/15/2023    WBC 14.2 (H) 2023    HGB 11.6 (L) 08/15/2023    HGB 11.4 (L) 2023    HCT 35.2  08/15/2023    HCT 34.7 (L) 08/14/2023     08/15/2023     08/14/2023     BMP:   Lab Results   Component Value Date     08/15/2023     08/14/2023    POTASSIUM 3.9 08/15/2023    POTASSIUM 3.9 08/14/2023    CHLORIDE 106 08/15/2023    CHLORIDE 105 08/14/2023    CO2 25 08/15/2023    CO2 25 08/14/2023    BUN 28.6 (H) 08/15/2023    BUN 28.4 (H) 08/14/2023    CR 0.74 08/15/2023    CR 0.67 08/14/2023    GLC 99 08/15/2023     (H) 08/14/2023     COAGS:   Lab Results   Component Value Date    INR 1.37 (H) 08/11/2023    FIBR 173 08/05/2023     POC: No results found for: BGM, HCG, HCGS  HEPATIC:   Lab Results   Component Value Date    ALBUMIN 3.8 08/15/2023    PROTTOTAL 6.3 (L) 08/15/2023     (H) 08/15/2023    AST 40 08/15/2023    ALKPHOS 47 08/15/2023    BILITOTAL 0.3 08/15/2023     OTHER:   Lab Results   Component Value Date    PH 7.46 (H) 08/11/2023    LACT 1.5 08/11/2023    A1C 5.4 08/05/2023    CUATE 8.7 08/15/2023    PHOS 3.4 08/06/2023    MAG 2.4 (H) 08/13/2023    TSH 3.24 08/05/2023       Anesthesia Plan    ASA Status:  3    NPO Status:  NPO Appropriate    Anesthesia Type: General.     - Airway: ETT              Consents    Anesthesia Plan(s) and associated risks, benefits, and realistic alternatives discussed. Questions answered and patient/representative(s) expressed understanding.     - Discussed: Risks, Benefits and Alternatives for BOTH SEDATION and the PROCEDURE were discussed     - Discussed with:  Patient      - Extended Intubation/Ventilatory Support Discussed: Yes.      - Patient is DNR/DNI Status: No     Use of blood products discussed: Yes.     - Discussed with: Patient.     Postoperative Care    Pain management: IV analgesics.   PONV prophylaxis: Ondansetron (or other 5HT-3), Dexamethasone or Solumedrol     Comments:    Other Comments: The material risks, benefits, and alternatives were discussed in detail.  The patient agree to proceed.  The patient has no other  complaints at this time.            Bharat Mixon MD

## 2023-08-15 NOTE — ANESTHESIA CARE TRANSFER NOTE
Patient: Estela Fry    Procedure: Procedure(s):  Subcutaneous Implantable Cardioverter Defibrillator Implant       Diagnosis: secondary prevention of sudden cardiac death  Diagnosis Additional Information: No value filed.    Anesthesia Type:   General     Note:    Oropharynx: oropharynx clear of all foreign objects and spontaneously breathing  Level of Consciousness: awake  Oxygen Supplementation: face mask  Level of Supplemental Oxygen (L/min / FiO2): 6  Independent Airway: airway patency satisfactory and stable  Dentition: dentition unchanged  Vital Signs Stable: post-procedure vital signs reviewed and stable  Report to RN Given: handoff report given  Patient transferred to: PACU    Handoff Report: Identifed the Patient, Identified the Reponsible Provider, Reviewed the pertinent medical history, Discussed the surgical course, Reviewed Intra-OP anesthesia mangement and issues during anesthesia, Set expectations for post-procedure period and Allowed opportunity for questions and acknowledgement of understanding  Vitals:  Vitals Value Taken Time   /73 08/15/23 1135   Temp 36.1  C (96.9  F) 08/15/23 1135   Pulse 91 08/15/23 1140   Resp 13 08/15/23 1140   SpO2 99 % 08/15/23 1140   Vitals shown include unvalidated device data.    Electronically Signed By: MELODY Patricia CRNA  August 15, 2023  11:42 AM

## 2023-08-15 NOTE — ANESTHESIA POSTPROCEDURE EVALUATION
Patient: Estela Fry    Procedure: Procedure(s):  Subcutaneous Implantable Cardioverter Defibrillator Implant       Anesthesia Type:  MAC    Note:  Disposition: Outpatient   Postop Pain Control: Uneventful            Sign Out: Well controlled pain   PONV: No   Neuro/Psych: Uneventful            Sign Out: Acceptable/Baseline neuro status   Airway/Respiratory: Uneventful            Sign Out: Acceptable/Baseline resp. status   CV/Hemodynamics: Uneventful            Sign Out: Acceptable CV status; No obvious hypovolemia; No obvious fluid overload   Other NRE: NONE   DID A NON-ROUTINE EVENT OCCUR? No    Event details/Postop Comments:  No complications.           Last vitals:  Vitals Value Taken Time   /73 08/15/23 1135   Temp 36.1  C (96.9  F) 08/15/23 1135   Pulse 81 08/15/23 1144   Resp 8 08/15/23 1144   SpO2 98 % 08/15/23 1144   Vitals shown include unvalidated device data.    Electronically Signed By: Bharat Mixon MD  August 15, 2023  11:45 AM

## 2023-08-15 NOTE — DISCHARGE INSTRUCTIONS
Home Care after an ICD implant    Wound care:  Keep your incision (surgery wound) dry for 3 days.  After 3 days, you may remove the outer bandage.  Keep the strips of tape on.  They will be removed at your clinic visit.  Check for signs of infection each day.  These include increased redness, swelling, drainage or a fever over 101 F (38.3 C).  Call us immediately if you see any of these signs.  If there are no signs of infection, you may shower in 3 days.  Do not submerge the incision (in a bath tub, hot tub, or swimming pool) until fully healed.  Pain:   You may have mild to moderate pain for 3 to 5 days.  Take acetaminophen (Tylenol) or ibuprofen (Advil) for the pain.  Call us if the pain is severe or lasts more than 5 days.    Activity:  You should slowly go back to your normal activities after 24 hours.  Healing will take 4 to 6 weeks.  No driving for 3 days  Avoid climbing a ladder alone.  It is best to stay within 4 feet of the ground.  Avoid anything that may cause rough contact or a hard hit to your chest.  This includes football, hockey, and other contact sports.  Do not go swimming or boating alone.    For at least 2 weeks:  Do not raise your affected arm above your shoulder.  Do not use your affected arm to push, pull, or lift anything over 10 pounds.  For the next 6 weeks:  Avoid repetitive upper body activities (ie: golf, swimming, and weight lifting)    Home monitor:     Upon arriving home, plug your [employer] Home Monitor into the wall.   Your weekly home monitor check will happen every Tuesday. The heart button on the monitor will flash indicating your check is due. Please press the button when flashing.    Follow Up Visits:  Return to the clinic in 7 to 10 days to have your device and wound checked.    Telling others about your device:  Before you have any medical tests or treatments, tell the doctors, dentists, and other care providers about your device.  There are a few tests  and treatments that may interfere with your device.  (These include MRI, radiation therapy, electrocautery, and others.)  Your care team may need to take special steps to keep you safe.  Before you leave the hospital, you will receive a temporary ID card.  A permanent card will be mailed to you about 6 to 8 weeks later.  Always carry the ID card with you.  It has important details about your device.  You should also get a MedicAlert ID.  Please ask us for a MedicAlert brochure, or go to [email or web address].    Safety near electrical equipment:  All of these are safe to use when in good repair:  Microwaves  Radios  Cordless phone  Remote controls  Small electrical tools    Security alicia: It is okay to walk through security alicia at the airports and department stores.  Tell airport security that you have a defibrillator.  They should keep the screening wand at least 6 inches from your device.  Full-body scanners are safe.    Avoid the following:   MRI tests in the hospital unless you have a MRI safe defibrillator.   Arc welding, chain saws and high-powered industrial or commercial tools.   Power lines, power plants and large power generators.   Electric body fat scales.   Magnetic mattress pads or pillow.    What to do after a shock from your ICD:  Stop what you re doing and rest.  If you feel fine before and after the shock, call the device clinic.  If you feel unwell or receive more than one shock, call 911 or go to the emergency room.  A shock could mean that your condition has changed and you may need to see a doctor.    Questions?  Please call [facility]   Device Nurse:          Business Hours:  [phone]    After Hours:  [phone]   Choose option 4, then ask for the on-call device nurse at job code [id number].    Your next device clinic appointment is scheduled on:     _____8/22/2023  at  10:00 AM___________.                                                 [facility]  [facility]  Cl[facility] - [address]  [address] [place]  [address]    You have the following appointment scheduled at [facility].   [facility]  [address] [place] [address]  Fax: [phone]  Phone: [phone]  Appointment: August 24 at 9:40 am   Provider: MELODY Owusu CNP  Please bring your insurance card with to your new appointment.

## 2023-08-16 ENCOUNTER — APPOINTMENT (OUTPATIENT)
Dept: GENERAL RADIOLOGY | Facility: CLINIC | Age: 33
End: 2023-08-16
Attending: STUDENT IN AN ORGANIZED HEALTH CARE EDUCATION/TRAINING PROGRAM
Payer: COMMERCIAL

## 2023-08-16 ENCOUNTER — APPOINTMENT (OUTPATIENT)
Dept: OCCUPATIONAL THERAPY | Facility: CLINIC | Age: 33
End: 2023-08-16
Payer: COMMERCIAL

## 2023-08-16 ENCOUNTER — ANCILLARY PROCEDURE (OUTPATIENT)
Dept: CARDIOLOGY | Facility: CLINIC | Age: 33
End: 2023-08-16
Attending: NURSE PRACTITIONER
Payer: COMMERCIAL

## 2023-08-16 VITALS
HEIGHT: 62 IN | WEIGHT: 106.48 LBS | OXYGEN SATURATION: 98 % | DIASTOLIC BLOOD PRESSURE: 77 MMHG | HEART RATE: 99 BPM | TEMPERATURE: 98.2 F | BODY MASS INDEX: 19.6 KG/M2 | RESPIRATION RATE: 22 BRPM | SYSTOLIC BLOOD PRESSURE: 104 MMHG

## 2023-08-16 LAB
ALBUMIN SERPL BCG-MCNC: 3.9 G/DL (ref 3.5–5.2)
ALP SERPL-CCNC: 47 U/L (ref 35–129)
ALT SERPL W P-5'-P-CCNC: 63 U/L (ref 0–70)
ANION GAP SERPL CALCULATED.3IONS-SCNC: 11 MMOL/L (ref 7–15)
AST SERPL W P-5'-P-CCNC: 25 U/L (ref 0–45)
ATRIAL RATE - MUSE: 80 BPM
BILIRUB SERPL-MCNC: 0.6 MG/DL
BUN SERPL-MCNC: 15.6 MG/DL (ref 6–20)
CALCIUM SERPL-MCNC: 8.9 MG/DL (ref 8.6–10)
CHLORIDE SERPL-SCNC: 101 MMOL/L (ref 98–107)
CREAT SERPL-MCNC: 0.6 MG/DL (ref 0.51–1.17)
DEPRECATED HCO3 PLAS-SCNC: 25 MMOL/L (ref 22–29)
DIASTOLIC BLOOD PRESSURE - MUSE: NORMAL MMHG
ERYTHROCYTE [DISTWIDTH] IN BLOOD BY AUTOMATED COUNT: 12.4 % (ref 10–15)
GFR SERPL CREATININE-BSD FRML MDRD: ABNORMAL ML/MIN/{1.73_M2}
GLUCOSE SERPL-MCNC: 103 MG/DL (ref 70–99)
HCT VFR BLD AUTO: 34.2 % (ref 35–53)
HGB BLD-MCNC: 11.6 G/DL (ref 11.7–17.7)
INTERPRETATION ECG - MUSE: NORMAL
MCH RBC QN AUTO: 28.7 PG (ref 26.5–33)
MCHC RBC AUTO-ENTMCNC: 33.9 G/DL (ref 31.5–36.5)
MCV RBC AUTO: 85 FL (ref 78–100)
P AXIS - MUSE: 76 DEGREES
PLATELET # BLD AUTO: 336 10E3/UL (ref 150–450)
POTASSIUM SERPL-SCNC: 3.8 MMOL/L (ref 3.4–5.3)
PR INTERVAL - MUSE: 134 MS
PROT SERPL-MCNC: 6.4 G/DL (ref 6.4–8.3)
QRS DURATION - MUSE: 82 MS
QT - MUSE: 360 MS
QTC - MUSE: 415 MS
R AXIS - MUSE: 53 DEGREES
RBC # BLD AUTO: 4.04 10E6/UL (ref 3.8–5.9)
SODIUM SERPL-SCNC: 137 MMOL/L (ref 136–145)
SYSTOLIC BLOOD PRESSURE - MUSE: NORMAL MMHG
T AXIS - MUSE: 48 DEGREES
VENTRICULAR RATE- MUSE: 80 BPM
WBC # BLD AUTO: 8.2 10E3/UL (ref 4–11)

## 2023-08-16 PROCEDURE — 250N000011 HC RX IP 250 OP 636: Performed by: NURSE PRACTITIONER

## 2023-08-16 PROCEDURE — 97530 THERAPEUTIC ACTIVITIES: CPT | Mod: GO

## 2023-08-16 PROCEDURE — 36415 COLL VENOUS BLD VENIPUNCTURE: CPT | Performed by: NURSE PRACTITIONER

## 2023-08-16 PROCEDURE — 250N000013 HC RX MED GY IP 250 OP 250 PS 637: Performed by: NURSE PRACTITIONER

## 2023-08-16 PROCEDURE — 71046 X-RAY EXAM CHEST 2 VIEWS: CPT | Mod: 26 | Performed by: RADIOLOGY

## 2023-08-16 PROCEDURE — 99239 HOSP IP/OBS DSCHRG MGMT >30: CPT | Mod: 24

## 2023-08-16 PROCEDURE — 97535 SELF CARE MNGMENT TRAINING: CPT | Mod: GO

## 2023-08-16 PROCEDURE — 250N000013 HC RX MED GY IP 250 OP 250 PS 637: Performed by: STUDENT IN AN ORGANIZED HEALTH CARE EDUCATION/TRAINING PROGRAM

## 2023-08-16 PROCEDURE — 250N000013 HC RX MED GY IP 250 OP 250 PS 637: Performed by: INTERNAL MEDICINE

## 2023-08-16 PROCEDURE — 93260 PRGRMG DEV EVAL IMPLTBL SYS: CPT

## 2023-08-16 PROCEDURE — 71046 X-RAY EXAM CHEST 2 VIEWS: CPT

## 2023-08-16 PROCEDURE — 85027 COMPLETE CBC AUTOMATED: CPT | Performed by: NURSE PRACTITIONER

## 2023-08-16 PROCEDURE — 93260 PRGRMG DEV EVAL IMPLTBL SYS: CPT | Mod: 26 | Performed by: INTERNAL MEDICINE

## 2023-08-16 PROCEDURE — 80053 COMPREHEN METABOLIC PANEL: CPT | Performed by: NURSE PRACTITIONER

## 2023-08-16 RX ORDER — OXYCODONE AND ACETAMINOPHEN 5; 325 MG/1; MG/1
1 TABLET ORAL EVERY 6 HOURS PRN
Qty: 20 TABLET | Refills: 0 | Status: SHIPPED | OUTPATIENT
Start: 2023-08-16 | End: 2023-11-21

## 2023-08-16 RX ORDER — CEPHALEXIN 500 MG/1
500 CAPSULE ORAL 3 TIMES DAILY
Qty: 15 CAPSULE | Refills: 0 | Status: SHIPPED | OUTPATIENT
Start: 2023-08-16 | End: 2023-10-18

## 2023-08-16 RX ORDER — POTASSIUM CHLORIDE 750 MG/1
10 TABLET, EXTENDED RELEASE ORAL ONCE
Status: COMPLETED | OUTPATIENT
Start: 2023-08-16 | End: 2023-08-16

## 2023-08-16 RX ORDER — OXYCODONE AND ACETAMINOPHEN 5; 325 MG/1; MG/1
1 TABLET ORAL EVERY 6 HOURS PRN
Qty: 20 TABLET | Refills: 0 | Status: SHIPPED | OUTPATIENT
Start: 2023-08-16 | End: 2023-08-16

## 2023-08-16 RX ADMIN — POTASSIUM CHLORIDE 10 MEQ: 750 TABLET, EXTENDED RELEASE ORAL at 08:13

## 2023-08-16 RX ADMIN — OXYCODONE HYDROCHLORIDE AND ACETAMINOPHEN 1 TABLET: 5; 325 TABLET ORAL at 05:43

## 2023-08-16 RX ADMIN — OXYCODONE HYDROCHLORIDE AND ACETAMINOPHEN 1 TABLET: 5; 325 TABLET ORAL at 12:53

## 2023-08-16 RX ADMIN — RIVAROXABAN 15 MG: 15 TABLET, FILM COATED ORAL at 08:13

## 2023-08-16 RX ADMIN — CEFAZOLIN 1 G: 1 INJECTION, POWDER, FOR SOLUTION INTRAMUSCULAR; INTRAVENOUS at 00:49

## 2023-08-16 RX ADMIN — CEFAZOLIN 1 G: 1 INJECTION, POWDER, FOR SOLUTION INTRAMUSCULAR; INTRAVENOUS at 10:16

## 2023-08-16 ASSESSMENT — ACTIVITIES OF DAILY LIVING (ADL)
ADLS_ACUITY_SCORE: 28
ADLS_ACUITY_SCORE: 26
ADLS_ACUITY_SCORE: 28
ADLS_ACUITY_SCORE: 26

## 2023-08-16 NOTE — PROGRESS NOTES
Brief Progress Note:    CHW was tasked by LIAT DILLARD to schedule an appt at [facility] to establish primary care and post-hospitalization follow-up.     CHW met w/pt and confirmed address and phone number. Pt responded when asked that she preferred a female provider and did not have a preference for date or time of appointment.     CHW scheduled the following appointment:     [id number] [place] [address]  Fax: [phone]  Phone: [phone]  Appointment: August 24 at 9:40 am   Provider: MELODY Owusu CNP  Please bring your insurance card with to your new appointment.

## 2023-08-16 NOTE — PROGRESS NOTES
Care Management Discharge Note    Discharge Date: 08/16/2023       Discharge Disposition:      Discharge Services:      Discharge DME:      Discharge Transportation: car, drives self, family or friend will provide    Private pay costs discussed: Not applicable    Does the patient's insurance plan have a 3 day qualifying hospital stay waiver?  No    PAS Confirmation Code:    Patient/family educated on Medicare website which has current facility and service quality ratings:      Education Provided on the Discharge Plan:    Persons Notified of Discharge Plans: Pt and spouse  Patient/Family in Agreement with the Plan:  yes    Handoff Referral Completed: Yes    Additional Information:  Pt discharged to home with . Pt and  had financial concerns as they have 7 children at home and are both currently unemployed due to pt's hospitalization. Writer provided pt and spouse with resources for emergency assistance through [facility], recommendation to reach out to their county worker for increase in food stamps and other resources, as well as referral to the 30 day [employer] and the [employer], food shelves and supports, recommendation to call utilities to ask for assistance, information on how to apply for disability and $75 in [employer] gift cards. Pt and spouse were very appreciative. Pt will be following up with new PCP at [facility] on 8/24. Pt and spouse requested wheelchair, but per OT this is not medically necessary. Writer informed pt and spouse that it would not be covered by insurance and finding a used one would be better option. Pt and spouse agreed to this. Handoff sent to new PCP.       [address] [place] [address]  Fax: [phone]  Phone: [phone]  Appointment: August 24 at 9:40 am   Provider: MELODY Owusu, Claudio Tapia RNCC  Covering for 6D/OBS  Phone [phone]  Pager ([phone])  [phone]      SEARCHABLE in [facility] - search CARE COORDINATOR      [place] [facility] [place] (2086-7196) Saturday & Sunday; (2509-1830) [facility] Recognized Holidays    Weekend Pager--  Units: 5A, 5B & 5C  Pager: [phone]  Units: 6B, 6C & 6D    Pager: [phone]  Units: 7A, 7B, 7C & 7D    Pager: [phone]  Units: 6A & ICU   Pager: [phone]  Units: 5 Ortho, 5MS & WB ED Pager: [phone]  Units: 6MS, 8A & 10 ICU  Pager [phone]

## 2023-08-16 NOTE — DISCHARGE SUMMARY
Went over the discharge summary papers with patient and the patient agreed and signed. Patient took all there belongings. Removed the IV line. Patient left the unit safely with a wheelchair with the transport team and her spouse.

## 2023-08-17 NOTE — PLAN OF CARE
Occupational Therapy and Cardiac Rehab Discharge Summary    Reason for therapy discharge:    Discharged to home with outpatient therapy.    Progress towards therapy goal(s). See goals on Care Plan in [facility] electronic health record for goal details.  Goals partially met.  Barriers to achieving goals:   discharge from facility.    Therapy recommendation(s):    Continued therapy is recommended.  Rationale/Recommendations:  Home with A and OP CR.

## 2023-08-18 LAB
BACTERIA BLD CULT: NO GROWTH
BACTERIA BLD CULT: NO GROWTH
INTERPRETATION: NORMAL
SIGNIFICANT RESULTS: NORMAL
SPECIMEN DESCRIPTION: NORMAL
TEST DETAILS, MDL: NORMAL

## 2023-08-22 ENCOUNTER — ANCILLARY PROCEDURE (OUTPATIENT)
Dept: CARDIOLOGY | Facility: CLINIC | Age: 33
End: 2023-08-22
Attending: INTERNAL MEDICINE
Payer: COMMERCIAL

## 2023-08-22 DIAGNOSIS — I46.9 CARDIAC ARREST (H): ICD-10-CM

## 2023-08-22 PROCEDURE — 93260 PRGRMG DEV EVAL IMPLTBL SYS: CPT | Performed by: INTERNAL MEDICINE

## 2023-08-28 LAB
MDC_IDC_LEAD_IMPLANT_DT: NORMAL
MDC_IDC_LEAD_LOCATION: NORMAL
MDC_IDC_LEAD_LOCATION_DETAIL_1: NORMAL
MDC_IDC_LEAD_MFG: NORMAL
MDC_IDC_LEAD_MODEL: NORMAL
MDC_IDC_LEAD_POLARITY_TYPE: NORMAL
MDC_IDC_LEAD_SERIAL: NORMAL
MDC_IDC_MSMT_BATTERY_DTM: NORMAL
MDC_IDC_MSMT_BATTERY_REMAINING_PERCENTAGE: 100 %
MDC_IDC_MSMT_BATTERY_STATUS: NORMAL
MDC_IDC_PG_IMPLANT_DTM: NORMAL
MDC_IDC_PG_MFG: NORMAL
MDC_IDC_PG_MODEL: NORMAL
MDC_IDC_PG_SERIAL: NORMAL
MDC_IDC_PG_TYPE: NORMAL
MDC_IDC_SESS_CLINIC_NAME: NORMAL
MDC_IDC_SESS_DTM: NORMAL
MDC_IDC_SESS_TYPE: NORMAL
MDC_IDC_SET_ZONE_DETECTION_INTERVAL: 250 MS
MDC_IDC_SET_ZONE_DETECTION_INTERVAL: 300 MS
MDC_IDC_SET_ZONE_TYPE: NORMAL
MDC_IDC_SET_ZONE_TYPE: NORMAL
MDC_IDC_SET_ZONE_VENDOR_TYPE: NORMAL
MDC_IDC_SET_ZONE_VENDOR_TYPE: NORMAL
MDC_IDC_STAT_EPISODE_RECENT_COUNT: 0
MDC_IDC_STAT_EPISODE_RECENT_COUNT_DTM_END: NORMAL
MDC_IDC_STAT_EPISODE_RECENT_COUNT_DTM_START: NORMAL
MDC_IDC_STAT_EPISODE_TOTAL_COUNT: 0
MDC_IDC_STAT_EPISODE_TOTAL_COUNT: 0
MDC_IDC_STAT_EPISODE_TOTAL_COUNT_DTM_END: NORMAL
MDC_IDC_STAT_EPISODE_TOTAL_COUNT_DTM_END: NORMAL
MDC_IDC_STAT_EPISODE_TOTAL_COUNT_DTM_START: NORMAL
MDC_IDC_STAT_EPISODE_TOTAL_COUNT_DTM_START: NORMAL
MDC_IDC_STAT_EPISODE_TYPE: NORMAL
MDC_IDC_STAT_EPISODE_VENDOR_TYPE: NORMAL
MDC_IDC_STAT_TACHYTHERAPY_RECENT_DTM_END: NORMAL
MDC_IDC_STAT_TACHYTHERAPY_RECENT_DTM_START: NORMAL
MDC_IDC_STAT_TACHYTHERAPY_SHOCKS_DELIVERED_RECENT: 0
MDC_IDC_STAT_TACHYTHERAPY_SHOCKS_DELIVERED_TOTAL: 0
MDC_IDC_STAT_TACHYTHERAPY_TOTAL_DTM_END: NORMAL
MDC_IDC_STAT_TACHYTHERAPY_TOTAL_DTM_START: NORMAL

## 2023-09-08 DIAGNOSIS — I82.220 IVC THROMBOSIS (H): Primary | ICD-10-CM

## 2023-09-13 ENCOUNTER — TELEPHONE (OUTPATIENT)
Dept: HEMATOLOGY | Facility: CLINIC | Age: 33
End: 2023-09-13
Payer: COMMERCIAL

## 2023-09-13 NOTE — TELEPHONE ENCOUNTER
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   CBCD Provider: Dr. Wilhelm    Estela is scheduled to see Dr. Wilhelm on 10/18/23. She needs a CTV Abdomen Pelvis w/ Contrast prior to her appointment with Dr. Wilhelm.    RN called patient's home phone, it kept ringing and RN was unable to leave VM. RN then called patient's other phone number which was out of service.     RN then called patient's listed contact, Think (Spouse), and their number was out of service as well.     RN will try outreach again on 9/15/23.    Nica Ugalde RN, BSN, PCCN  Nurse Clinician    [facility]  [address] [place] [address], [address], [place] [address]   Office, direct: [phone]  Main office number: [phone]  Pronouns: She, her, hers    Addendum 9/15/2023 10:16 AM:  RN called patient again and both listed phone numbers-unable to leave VM. Another call placed to listed contact, Think, his phone is out of service. Will route to provider to see if she would want any additional outreach completed.    SANTO RN    Addendum 9/19/2023 12:57 PM:  RN called patient again and was unable to leave a voicemail on either of her listed lines nor on Think's phone. RN contacted cardiology team to see if they have updated contact info. Cardiology had an email address for patient. RN sent a generalized message to patient and asked her to call writer. Patient called writer back at [phone]. RN assisted her in scheduling CT scan. Reminded her of upcoming appointment with Dr. Wilhelm. New patient packet will be mailed out to patient.    SANTO TURCIOS

## 2023-09-14 ENCOUNTER — OFFICE VISIT (OUTPATIENT)
Dept: OTOLARYNGOLOGY | Facility: CLINIC | Age: 33
End: 2023-09-14
Payer: COMMERCIAL

## 2023-09-14 VITALS — SYSTOLIC BLOOD PRESSURE: 95 MMHG | DIASTOLIC BLOOD PRESSURE: 57 MMHG | HEART RATE: 74 BPM

## 2023-09-14 DIAGNOSIS — R49.0 DYSPHONIA: Primary | ICD-10-CM

## 2023-09-14 DIAGNOSIS — J38.6 SUBGLOTTIC STENOSIS: ICD-10-CM

## 2023-09-14 PROCEDURE — 31575 DIAGNOSTIC LARYNGOSCOPY: CPT | Performed by: OTOLARYNGOLOGY

## 2023-09-14 PROCEDURE — 99204 OFFICE O/P NEW MOD 45 MIN: CPT | Mod: 25 | Performed by: OTOLARYNGOLOGY

## 2023-09-14 ASSESSMENT — PAIN SCALES - GENERAL: PAINLEVEL: NO PAIN (0)

## 2023-09-14 NOTE — PROGRESS NOTES
[facility]   at the [facility]   Otolaryngology Clinic     Patient: Estela Fry    MRN: [id number]    : 1990    Age/Gender: 33 year old female  Date of Service: 2023  Rendering Provider:   Ana Bales MD     Chief Complaint   Dyspnea  Interval History   HISTORY OF PRESENT ILLNESS: Ms. Fry is a 33 year old female is being followed for dyspnea.     Today, she presents for follow up. she reports:  -she was seen in the hospital after cardiac surgery     PAST MEDICAL HISTORY: No past medical history on file.    PAST SURGICAL HISTORY:   Past Surgical History:   Procedure Laterality Date    CV CENTRAL VENOUS CATHETER PLACEMENT N/A 2023    Procedure: Central Venous Catheter Placement;  Surgeon: Sid Liz MD;  Location: [facility] CARDIAC CATH LAB    CV CORONARY ANGIOGRAM N/A 2023    Procedure: Coronary Angiogram;  Surgeon: Sid Liz MD;  Location: [facility] CARDIAC CATH LAB    EP SICD INSERT N/A 8/15/2023    Procedure: Subcutaneous Implantable Cardioverter Defibrillator Implant;  Surgeon: Eula Johnson MD;  Location: [facility] CARDIAC CATH LAB       CURRENT MEDICATIONS:   Current Outpatient Medications:     norelgestromin-ethinyl estradiol (XULANE) 150-35 MCG/24HR patch, Place 1 patch onto the skin once a week Remove old patch and apply new patch onto the skin once a week for 3 weeks (21 days). Do not wear patch week 4 (days 22-28), then repeat., Disp: , Rfl:     rivaroxaban ANTICOAGULANT (XARELTO) 20 MG TABS tablet, Take 1 tablet (20 mg) by mouth daily (with dinner), Disp: 30 tablet, Rfl: 3    cephALEXin (KEFLEX) 500 MG capsule, Take 1 capsule (500 mg) by mouth 3 times daily (Patient not taking: Reported on 2023), Disp: 15 capsule, Rfl: 0    oxyCODONE-acetaminophen (PERCOCET) 5-325 MG tablet, Take 1 tablet by mouth every 6 hours as needed for severe pain (Patient not taking: Reported on 2023), Disp: 20 tablet, Rfl: 0    rivaroxaban ANTICOAGULANT (XARELTO) 15  MG TABS tablet, Take 1 tablet (15 mg) by mouth 2 times daily (with meals) (Patient not taking: Reported on 9/14/2023), Disp: 32 tablet, Rfl: 0    ALLERGIES: Patient has no known allergies.    SOCIAL HISTORY:    Social History     Socioeconomic History    Marital status:      Spouse name: Not on file    Number of children: Not on file    Years of education: Not on file    Highest education level: Not on file   Occupational History    Not on file   Tobacco Use    Smoking status: Not on file    Smokeless tobacco: Not on file   Substance and Sexual Activity    Alcohol use: Not on file    Drug use: Not on file    Sexual activity: Not on file   Other Topics Concern    Not on file   Social History Narrative    Not on file     Social Determinants of Health     Financial Resource Strain: Not on file   Food Insecurity: Not on file   Transportation Needs: Not on file   Physical Activity: Not on file   Stress: Not on file   Social Connections: Not on file   Intimate Partner Violence: Not on file   Housing Stability: Not on file         FAMILY HISTORY: No family history on file.   Non-contributory for problems with anesthesia    REVIEW OF SYSTEMS:   The patient was asked a 14 point review of systems regarding constitutional symptoms, eye symptoms, ears, nose, mouth, throat symptoms, cardiovascular symptoms, respiratory symptoms, gastrointestinal symptoms, genitourinary symptoms, musculoskeletal symptoms, integumentary symptoms, neurological symptoms, psychiatric symptoms, endocrine symptoms, hematologic/lymphatic symptoms, and allergic/ immunologic symptoms.   The pertinent factors have been included in the HPI and below.  Patient Supplied Answers to Review of Systems       No data to display                Physical Examination   The patient underwent a physical examination as described below. The pertinent positive and negative findings are summarized after the description of the examination.  Constitutional: The  patient's developmental and nutritional status was assessed. The patient's voice quality was assessed.  Head and Face: The head and face were inspected for deformities. The sinuses were palpated. The salivary glands were palpated. Facial muscle strength was assessed bilaterally.  Eyes: Extraocular movements and primary gaze alignment were assessed.  Ears, Nose, Mouth and Throat: The ears and nose were examined for deformities. The nasal septum, mucosa, and turbinates were inspected by anterior rhinoscopy. The lips, teeth, and gums were examined for abnormalities. The oral mucosa, tongue, palate, tonsils, lateral and posterior pharynx were inspected for the presence of asymmetry or mucosal lesions.    Neck: The tracheal position was noted, and the neck mass palpated to determine if there were any asymmetries, abnormal neck masses, thyromegally, or thyroid nodules.  Respiratory: The nature of the breathing and chest expansion/symmetry was observed.  Cardiovascular: The patient was examined to determine the presence of any edema or jugular venous distension.  Abdomen: The contour of the abdomen was noted.  Lymphatic: The patient was examined for infraclavicular lymphadenopathy.  Musculoskeletal: The patient was inspected for the presence of skeletal deformities.  Extremities: The extremities were examined for any clubbing or cyanosis.  Skin: The skin was examined for inflammatory or neoplastic conditions.  Neurologic: The patient's orientation, mood, and affect were noted. The cranial nerve  functions were examined.  Other pertinent positive and negative findings on physical examination:   OC/OP: no lesions, uvula midline, soft palate elevates symmetrically   Neck: no lesions, no TH tenderness to palpation     All other physical examination findings were within normal limits and noncontributory.    Procedures   Flexible laryngoscopy (CPT 82099)      Pre-procedure diagnosis: dysphonia  Post-procedure diagnosis: same as  above  Indication for procedure: Ms. Fry is a 33 year old female with see above  Procedure(s): Fiberoptic Laryngoscopy    Details of Procedure: After informed consent was obtained, the patient was seated in the examination chair.  The areas of the nasopharynx as well as the hypopharynx were anesthetized with topical 4% lidocaine with 0.25% phenylephrine atomizer.  Examination of the base of tongue was performed first.  Attention was directed to any evidence of masses in the area or evidence of leukoplakia or candidal infection.  Attention was directed to the epiglottis where its size and position was determined and its movement on phonation of the vowel  e .  The piriform sinuses were then inspected for any mass lesions or pooling of secretions.  Attention was then directed to the larynx. The vocal folds were inspected for infection or any areas of leukoplakia, for masses, polypoid degeneration, or hemorrhage.  Having done this, the arytenoids and vocal processes were inspected for erythema or evidence of granuloma formation.  The posterior commissure was then inspected for evidence of inflammatory changes in the mucosa and heaping up of mucosal tissue. The patient was then instructed to say the vowel  e .  Adduction of vocal folds to the midline was observed for any evidence of paresis or paralysis of the larynx or asymmetry in rotation of the larynx to the left or right. The patient was asked to breathe and the degree of abduction was noted bilaterally.  Subglottic view of the larynx was obtained for any additional mass lesions or mucosal changes.  Finally the post cricoid was examined for evidence of pooling of secretions, as well as the pharyngeal wall mucosa.   Anesthesia type: 0.25% phenylephrine    Findings:  Anatomic/physiological deviations: RNC, patent subglottis    Right vocal process: No restriction of mobility   Left vocal process: No restriction of mobility  Glottal gap: Complete glottal  "closure  Supraglottic structures: Normal  Hypopharynx: Normal     Estimated Blood Loss: minimal  Complications: None  Disposition: Patient tolerated the procedure well             Review of Relevant Clinical Data   I personally reviewed:  Be shira 8/11/23  HPI: Estela Fry is a 33 year old adult who was otherwise healthy and experienced unwitnessed VT/VF cardiac arrest at home on 8/5, she was intubated and ROSC was obtained after three shocks, she was taken to cath lab where no obstructive CAD was observed. Cardiac work up revealed inferior vena cava US with 40% luminal thrombus so she was started on eliquis. CTA negative for pulmonary embolism. Patient was extubated on 8/7 without complication and without respiratory complaint until earlier this AM when she developed strong barking cough, shortness of breath and \"throat tightness\" that prompted rapid response and concern for anaphylactic reaction. She was given albuterol nebs, racemic epinephrine, solumedrol and benadryl. She subsequently improved however remained fatigued and with quite voice. Ent was consulted for airway evaluation.     On our evaluation she was stable on room air. Her voice was very soft which she says started this morning. She notes mild shortness of breath and severe cough. She was intermittently having significant barking cough during evaluation. She denies any prior medical concerns or allergies. She has never had an allergic reaction. She denies any history of shortness of breath or weak voice prior to this hospitalization. She denies any family history of head/neck cancer. She is a never smoker and notes occasional social alcohol use. She reports being vaccinated for COVID and TB. COVID test was negative.    A/P:  Estela Fry is a 33 year old otherwise healthy who experienced cardiac arrest with unknown cause requiring intubation from 8/5 to 8/7. She developed barking cough and shortness of breath on the morning of 8/11 with acute onset " from previously stable state concerning for anaphylaxis. She improved after receiving racemic epinephrine, albuterol, decadron, and Benadryl and is currently stable on RA without shortness of breath but with continued soft voice and biphasic stridor with effort. 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 This is most likely related to intubation considering location and timing. CT scan would help us better evaluate extent or subglottic stenosis and rule out other obstructive airway causes. Given current status, recommend continuing medical management with steroids and nebulizers. ENT will continue to monitor.           Labs:  Lab Results   Component Value Date    TSH 3.24 2023     Lab Results   Component Value Date     2023    CO2 25 2023    BUN 15.6 2023    PHOS 3.4 2023     Lab Results   Component Value Date    WBC 8.2 2023    HGB 11.6 (L) 2023    HCT 34.2 (L) 2023    MCV 85 2023     2023     Lab Results   Component Value Date    INR 1.37 (H) 2023     No results found for: RAYMUNDO  No components found for: RHEUMATOIDFACTOR,  RF  No results found for: CRP  No components found for: CKTOT, URICACID  No components found for: C3, C4, DSDNAAB, NDNAABIFA  No results found for: MPOAB    Patient reported Quality of Life (QOL) Measures   Patient Supplied Answers To VHI Questionnaire       No data to display                  Patient Supplied Answers To EAT Questionnaire       No data to display                  Patient Supplied Answers To CSI Questionnaire       No data to display                  Patient Supplied Answers to Dyspnea Index Questionnaire:       No data to display                Impression & Plan     IMPRESSION: Ms. Fry is a 33 year old female who is being seen for the followin.Dyspnea  - had SOB after cardiac arrest with intubation on  8/5/2023  - was found to have SGS on exam at that time  - breathing has been good since then  - no stridor  - scope exam today shows no stenosis  - discussed this is resolved  - this can often be seen in the setting of intubation with associated post intubation swelling but today her trachea and subglottis is well mucosalized and no area of concern  Plan   - observation       RETURN VISIT: as needed     Ana Bales MD    Laryngology    [facility]  Department of  Otolaryngology - Head and Neck Surgery  [facility] [place] [address]  Appointment line: [phone]  Fax: [phone]  [email or web address]

## 2023-09-14 NOTE — LETTER
2023       RE: Estela Fry  [address] [place] [address]     Dear Colleague,    Thank you for referring your patient, Estela Fry, to the [facility] EAR NOSE AND THROAT CLINIC [place] [facility]. Please see a copy of my visit note below.        [facility]   at the [facility]   Otolaryngology Clinic     Patient: Estela Fry    MRN: [id number]    : 1990    Age/Gender: 33 year old female  Date of Service: 2023  Rendering Provider:   Ana Bales MD     Chief Complaint   Dyspnea  Interval History   HISTORY OF PRESENT ILLNESS: Ms. Fry is a 33 year old female is being followed for dyspnea.     Today, she presents for follow up. she reports:  -she was seen in the hospital after cardiac surgery     PAST MEDICAL HISTORY: No past medical history on file.    PAST SURGICAL HISTORY:   Past Surgical History:   Procedure Laterality Date    CV CENTRAL VENOUS CATHETER PLACEMENT N/A 2023    Procedure: Central Venous Catheter Placement;  Surgeon: Sid Liz MD;  Location: [facility] CARDIAC CATH LAB    CV CORONARY ANGIOGRAM N/A 2023    Procedure: Coronary Angiogram;  Surgeon: Sid Liz MD;  Location: [facility] CARDIAC CATH LAB    EP SICD INSERT N/A 8/15/2023    Procedure: Subcutaneous Implantable Cardioverter Defibrillator Implant;  Surgeon: Eula Johnson MD;  Location: [facility] CARDIAC CATH LAB       CURRENT MEDICATIONS:   Current Outpatient Medications:     norelgestromin-ethinyl estradiol (XULANE) 150-35 MCG/24HR patch, Place 1 patch onto the skin once a week Remove old patch and apply new patch onto the skin once a week for 3 weeks (21 days). Do not wear patch week 4 (days 22-28), then repeat., Disp: , Rfl:     rivaroxaban ANTICOAGULANT (XARELTO) 20 MG TABS tablet, Take 1 tablet (20 mg) by mouth daily (with dinner), Disp: 30 tablet, Rfl: 3    cephALEXin (KEFLEX) 500 MG capsule, Take 1 capsule (500 mg) by mouth 3  times daily (Patient not taking: Reported on 9/14/2023), Disp: 15 capsule, Rfl: 0    oxyCODONE-acetaminophen (PERCOCET) 5-325 MG tablet, Take 1 tablet by mouth every 6 hours as needed for severe pain (Patient not taking: Reported on 9/14/2023), Disp: 20 tablet, Rfl: 0    rivaroxaban ANTICOAGULANT (XARELTO) 15 MG TABS tablet, Take 1 tablet (15 mg) by mouth 2 times daily (with meals) (Patient not taking: Reported on 9/14/2023), Disp: 32 tablet, Rfl: 0    ALLERGIES: Patient has no known allergies.    SOCIAL HISTORY:    Social History     Socioeconomic History    Marital status:      Spouse name: Not on file    Number of children: Not on file    Years of education: Not on file    Highest education level: Not on file   Occupational History    Not on file   Tobacco Use    Smoking status: Not on file    Smokeless tobacco: Not on file   Substance and Sexual Activity    Alcohol use: Not on file    Drug use: Not on file    Sexual activity: Not on file   Other Topics Concern    Not on file   Social History Narrative    Not on file     Social Determinants of Health     Financial Resource Strain: Not on file   Food Insecurity: Not on file   Transportation Needs: Not on file   Physical Activity: Not on file   Stress: Not on file   Social Connections: Not on file   Intimate Partner Violence: Not on file   Housing Stability: Not on file         FAMILY HISTORY: No family history on file.   Non-contributory for problems with anesthesia    REVIEW OF SYSTEMS:   The patient was asked a 14 point review of systems regarding constitutional symptoms, eye symptoms, ears, nose, mouth, throat symptoms, cardiovascular symptoms, respiratory symptoms, gastrointestinal symptoms, genitourinary symptoms, musculoskeletal symptoms, integumentary symptoms, neurological symptoms, psychiatric symptoms, endocrine symptoms, hematologic/lymphatic symptoms, and allergic/ immunologic symptoms.   The pertinent factors have been included in the HPI and  below.  Patient Supplied Answers to Review of Systems       No data to display                Physical Examination   The patient underwent a physical examination as described below. The pertinent positive and negative findings are summarized after the description of the examination.  Constitutional: The patient's developmental and nutritional status was assessed. The patient's voice quality was assessed.  Head and Face: The head and face were inspected for deformities. The sinuses were palpated. The salivary glands were palpated. Facial muscle strength was assessed bilaterally.  Eyes: Extraocular movements and primary gaze alignment were assessed.  Ears, Nose, Mouth and Throat: The ears and nose were examined for deformities. The nasal septum, mucosa, and turbinates were inspected by anterior rhinoscopy. The lips, teeth, and gums were examined for abnormalities. The oral mucosa, tongue, palate, tonsils, lateral and posterior pharynx were inspected for the presence of asymmetry or mucosal lesions.    Neck: The tracheal position was noted, and the neck mass palpated to determine if there were any asymmetries, abnormal neck masses, thyromegally, or thyroid nodules.  Respiratory: The nature of the breathing and chest expansion/symmetry was observed.  Cardiovascular: The patient was examined to determine the presence of any edema or jugular venous distension.  Abdomen: The contour of the abdomen was noted.  Lymphatic: The patient was examined for infraclavicular lymphadenopathy.  Musculoskeletal: The patient was inspected for the presence of skeletal deformities.  Extremities: The extremities were examined for any clubbing or cyanosis.  Skin: The skin was examined for inflammatory or neoplastic conditions.  Neurologic: The patient's orientation, mood, and affect were noted. The cranial nerve  functions were examined.  Other pertinent positive and negative findings on physical examination:   OC/OP: no lesions, uvula  midline, soft palate elevates symmetrically   Neck: no lesions, no TH tenderness to palpation     All other physical examination findings were within normal limits and noncontributory.    Procedures   Flexible laryngoscopy (CPT 10576)      Pre-procedure diagnosis: dysphonia  Post-procedure diagnosis: same as above  Indication for procedure: Ms. Fry is a 33 year old female with see above  Procedure(s): Fiberoptic Laryngoscopy    Details of Procedure: After informed consent was obtained, the patient was seated in the examination chair.  The areas of the nasopharynx as well as the hypopharynx were anesthetized with topical 4% lidocaine with 0.25% phenylephrine atomizer.  Examination of the base of tongue was performed first.  Attention was directed to any evidence of masses in the area or evidence of leukoplakia or candidal infection.  Attention was directed to the epiglottis where its size and position was determined and its movement on phonation of the vowel  e .  The piriform sinuses were then inspected for any mass lesions or pooling of secretions.  Attention was then directed to the larynx. The vocal folds were inspected for infection or any areas of leukoplakia, for masses, polypoid degeneration, or hemorrhage.  Having done this, the arytenoids and vocal processes were inspected for erythema or evidence of granuloma formation.  The posterior commissure was then inspected for evidence of inflammatory changes in the mucosa and heaping up of mucosal tissue. The patient was then instructed to say the vowel  e .  Adduction of vocal folds to the midline was observed for any evidence of paresis or paralysis of the larynx or asymmetry in rotation of the larynx to the left or right. The patient was asked to breathe and the degree of abduction was noted bilaterally.  Subglottic view of the larynx was obtained for any additional mass lesions or mucosal changes.  Finally the post cricoid was examined for evidence of pooling  "of secretions, as well as the pharyngeal wall mucosa.   Anesthesia type: 0.25% phenylephrine    Findings:  Anatomic/physiological deviations: RNC, patent subglottis    Right vocal process: No restriction of mobility   Left vocal process: No restriction of mobility  Glottal gap: Complete glottal closure  Supraglottic structures: Normal  Hypopharynx: Normal     Estimated Blood Loss: minimal  Complications: None  Disposition: Patient tolerated the procedure well             Review of Relevant Clinical Data   I personally reviewed:  shira Lr 8/11/23  HPI: Estela Fry is a 33 year old adult who was otherwise healthy and experienced unwitnessed VT/VF cardiac arrest at home on 8/5, she was intubated and ROSC was obtained after three shocks, she was taken to cath lab where no obstructive CAD was observed. Cardiac work up revealed inferior vena cava US with 40% luminal thrombus so she was started on eliquis. CTA negative for pulmonary embolism. Patient was extubated on 8/7 without complication and without respiratory complaint until earlier this AM when she developed strong barking cough, shortness of breath and \"throat tightness\" that prompted rapid response and concern for anaphylactic reaction. She was given albuterol nebs, racemic epinephrine, solumedrol and benadryl. She subsequently improved however remained fatigued and with quite voice. Ent was consulted for airway evaluation.     On our evaluation she was stable on room air. Her voice was very soft which she says started this morning. She notes mild shortness of breath and severe cough. She was intermittently having significant barking cough during evaluation. She denies any prior medical concerns or allergies. She has never had an allergic reaction. She denies any history of shortness of breath or weak voice prior to this hospitalization. She denies any family history of head/neck cancer. She is a never smoker and notes occasional social alcohol use. She " reports being vaccinated for COVID and TB. COVID test was negative.    A/P:  Estela Fry is a 33 year old otherwise healthy who experienced cardiac arrest with unknown cause requiring intubation from 8/5 to 8/7. She developed barking cough and shortness of breath on the morning of 8/11 with acute onset from previously stable state concerning for anaphylaxis. She improved after receiving racemic epinephrine, albuterol, decadron, and Benadryl and is currently stable on RA without shortness of breath but with continued soft voice and biphasic stridor with effort. 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 This is most likely related to intubation considering location and timing. CT scan would help us better evaluate extent or subglottic stenosis and rule out other obstructive airway causes. Given current status, recommend continuing medical management with steroids and nebulizers. ENT will continue to monitor.           Labs:  Lab Results   Component Value Date    TSH 3.24 08/05/2023     Lab Results   Component Value Date     08/16/2023    CO2 25 08/16/2023    BUN 15.6 08/16/2023    PHOS 3.4 08/06/2023     Lab Results   Component Value Date    WBC 8.2 08/16/2023    HGB 11.6 (L) 08/16/2023    HCT 34.2 (L) 08/16/2023    MCV 85 08/16/2023     08/16/2023     Lab Results   Component Value Date    INR 1.37 (H) 08/11/2023     No results found for: RAYMUNDO  No components found for: RHEUMATOIDFACTOR,  RF  No results found for: CRP  No components found for: CKTOT, URICACID  No components found for: C3, C4, DSDNAAB, NDNAABIFA  No results found for: MPOAB    Patient reported Quality of Life (QOL) Measures   Patient Supplied Answers To VHI Questionnaire       No data to display                  Patient Supplied Answers To EAT Questionnaire       No data to display                  Patient Supplied Answers To CSI Questionnaire        No data to display                  Patient Supplied Answers to Dyspnea Index Questionnaire:       No data to display                Impression & Plan     IMPRESSION: Ms. Fry is a 33 year old female who is being seen for the followin.Dyspnea  - had SOB after cardiac arrest with intubation on 2023  - was found to have SGS on exam at that time  - breathing has been good since then  - no stridor  - scope exam today shows no stenosis  - discussed this is resolved  - this can often be seen in the setting of intubation with associated post intubation swelling but today her trachea and subglottis is well mucosalized and no area of concern  Plan   - observation       RETURN VISIT: as needed     Ana Bales MD    Laryngology    [facility]  Department of  Otolaryngology - Head and Neck Surgery  Clinics & Surgery Center  [address] [place] [address]  Appointment line: [phone]  Fax: [phone]  [email or web address]

## 2023-09-22 LAB
MDC_IDC_LEAD_IMPLANT_DT: NORMAL
MDC_IDC_LEAD_LOCATION: NORMAL
MDC_IDC_LEAD_LOCATION_DETAIL_1: NORMAL
MDC_IDC_LEAD_MFG: NORMAL
MDC_IDC_LEAD_MODEL: NORMAL
MDC_IDC_LEAD_POLARITY_TYPE: NORMAL
MDC_IDC_LEAD_SERIAL: NORMAL
MDC_IDC_MSMT_BATTERY_DTM: NORMAL
MDC_IDC_MSMT_BATTERY_REMAINING_PERCENTAGE: 100 %
MDC_IDC_MSMT_BATTERY_STATUS: NORMAL
MDC_IDC_MSMT_CAP_CHARGE_TYPE: NORMAL
MDC_IDC_PG_IMPLANT_DTM: NORMAL
MDC_IDC_PG_MFG: NORMAL
MDC_IDC_PG_MODEL: NORMAL
MDC_IDC_PG_SERIAL: NORMAL
MDC_IDC_PG_TYPE: NORMAL
MDC_IDC_SESS_CLINIC_NAME: NORMAL
MDC_IDC_SESS_DTM: NORMAL
MDC_IDC_SESS_TYPE: NORMAL
MDC_IDC_SET_ZONE_DETECTION_INTERVAL: 250 MS
MDC_IDC_SET_ZONE_DETECTION_INTERVAL: 300 MS
MDC_IDC_SET_ZONE_TYPE: NORMAL
MDC_IDC_SET_ZONE_TYPE: NORMAL
MDC_IDC_SET_ZONE_VENDOR_TYPE: NORMAL
MDC_IDC_SET_ZONE_VENDOR_TYPE: NORMAL
MDC_IDC_STAT_EPISODE_RECENT_COUNT: NORMAL
MDC_IDC_STAT_EPISODE_RECENT_COUNT_DTM_END: NORMAL
MDC_IDC_STAT_EPISODE_RECENT_COUNT_DTM_START: NORMAL
MDC_IDC_STAT_EPISODE_TYPE: NORMAL
MDC_IDC_STAT_EPISODE_VENDOR_TYPE: NORMAL

## 2023-09-25 ENCOUNTER — TELEPHONE (OUTPATIENT)
Dept: HEMATOLOGY | Facility: CLINIC | Age: 33
End: 2023-09-25
Payer: COMMERCIAL

## 2023-09-25 NOTE — TELEPHONE ENCOUNTER
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   CBCD Provider: Dr. Wilhelm    Incoming message from patient asking if there are any restriction on toileting prior to CT scan. RN explained to patient that there are no food/drink restrictions and no restrictions on toileting.     Patient expressed understanding. No other questions and/or concerns.    Nica Ugalde RN, BSN, PCCN  Nurse Clinician    [facility]  [address] [place] [address], [address], [place] [address]   Office, direct: [phone]  Main office number: [phone]  Pronouns: She, her, hers       Partially impaired: cannot see medication labels or newsprint, but can see obstacles in path, and the surrounding layout; can count fingers at arm's length

## 2023-09-27 ENCOUNTER — TELEPHONE (OUTPATIENT)
Dept: HEMATOLOGY | Facility: CLINIC | Age: 33
End: 2023-09-27
Payer: COMMERCIAL

## 2023-09-27 NOTE — TELEPHONE ENCOUNTER
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 Wilhelm    Incoming call from Estela. She is wondering if she should stay on 20mg of Xarelto daily and if so can she get a refill ordered. RN reviewed discharge notes that recommend she continue Xarelto therapy. RN advised Estela to keep taking as prescribed. Informed her that there were 3 refills sent to the discharge pharmacy. RN gave Estela phone number to call to arrange pick-up of prescription.    Estela expresses understanding and will call Discharge Pharmacy.    Nica Ugalde RN, BSN, PCCN  Nurse Clinician    [facility]  [address] [place] [address], [address], [place] [address]   Office, direct: [phone]  Main office number: [phone]  Pronouns: She, her, hers

## 2023-10-03 ENCOUNTER — HOSPITAL ENCOUNTER (OUTPATIENT)
Dept: CT IMAGING | Facility: HOSPITAL | Age: 33
Discharge: HOME | End: 2023-10-03
Attending: INTERNAL MEDICINE | Admitting: INTERNAL MEDICINE
Payer: COMMERCIAL

## 2023-10-03 DIAGNOSIS — I82.220 IVC THROMBOSIS (H): ICD-10-CM

## 2023-10-03 PROCEDURE — 74174 CTA ABD&PLVS W/CONTRAST: CPT

## 2023-10-03 PROCEDURE — 250N000011 HC RX IP 250 OP 636: Mod: JZ | Performed by: INTERNAL MEDICINE

## 2023-10-03 RX ORDER — IOPAMIDOL 755 MG/ML
80 INJECTION, SOLUTION INTRAVASCULAR ONCE
Status: COMPLETED | OUTPATIENT
Start: 2023-10-03 | End: 2023-10-03

## 2023-10-03 RX ADMIN — IOPAMIDOL 80 ML: 755 INJECTION, SOLUTION INTRAVENOUS at 10:34

## 2023-10-11 ENCOUNTER — NURSE TRIAGE (OUTPATIENT)
Dept: CARDIOLOGY | Facility: CLINIC | Age: 33
End: 2023-10-11
Payer: COMMERCIAL

## 2023-10-11 NOTE — TELEPHONE ENCOUNTER
"Received a call from the call center to discuss chest pain and shortness of breath.  Hospitalized [facility] 8/5-8/16 for cardiac arrest s/p ICD.  Spoke to Estela, who says she is hoping to get a sooner appointment because ever since she was discharged from the hospital, she has had intermittent chest pressure and shortness of breath. She says the chest pressure can sometimes lasts all day, but is mostly intermittent and is located in the left upper chest near the collar bone and throat. She says the shortness of breath happens with activity and at rest. She says the discomfort has been consistent and has not worsened and rates the pain #6-7. She also mentions having a painful sharp pinching feeling around the ICD site.   She does not check VS at home. She is taking Xarelto as prescribed.  Advised a message will be sent to [facility] for follow up next business day.     1. LOCATION: \"Where does it hurt?\" Left upper chest  2. RADIATION: \"Does the pain go anywhere else?\" (e.g., into neck, jaw, arms, back) throat  3. ONSET: \"When did the chest pain begin?\" (Minutes, hours or days) following discharge from the hospital  4. PATTERN: \"Does the pain come and go, or has it been constant since it started?\" \"Does it get worse with exertion?\" Intermittent, sometimes all day  5. DURATION: \"How long does it last\" (e.g., seconds, minutes, hours)  6. SEVERITY: \"How bad is the pain?\" (e.g., Scale 1-10; mild, moderate, or severe) Moderate #6-7  - MILD (1-3): doesn't interfere with normal activities  - MODERATE (4-7): interferes with normal activities or awakens from sleep  - SEVERE (8-10): excruciating pain, unable to do any normal activities  7. CARDIAC RISK FACTORS: \"Do you have any history of heart problems or risk factors for heart disease?\" (e.g., angina, prior heart attack; diabetes, high blood pressure, high cholesterol, smoker, or strong family history of heart disease) cardiac arrest s/p ICD  8. PULMONARY RISK FACTORS: \"Do " "you have any history of lung disease?\" (e.g., blood clots in lung, asthma, emphysema, birth control pills)  9. CAUSE: \"What do you think is causing the chest pain?\"  10. OTHER SYMPTOMS: \"Do you have any other symptoms?\" (e.g., dizziness, nausea, vomiting, sweating, fever, difficulty breathing, cough) denies  11. PREGNANCY: \"Is there any chance you are pregnant?\" \"When was your last menstrual period?\"  Additional Information   Negative: SEVERE chest pain   Negative: Difficulty breathing   Negative: Dizziness or lightheadedness    Protocols used: Chest Pain-A-AH    "

## 2023-10-12 ENCOUNTER — OFFICE VISIT (OUTPATIENT)
Dept: CARDIOLOGY | Facility: CLINIC | Age: 33
End: 2023-10-12
Payer: COMMERCIAL

## 2023-10-12 ENCOUNTER — ANCILLARY PROCEDURE (OUTPATIENT)
Dept: CARDIOLOGY | Facility: CLINIC | Age: 33
End: 2023-10-12
Attending: INTERNAL MEDICINE
Payer: COMMERCIAL

## 2023-10-12 VITALS
DIASTOLIC BLOOD PRESSURE: 62 MMHG | SYSTOLIC BLOOD PRESSURE: 106 MMHG | HEART RATE: 80 BPM | HEIGHT: 62 IN | BODY MASS INDEX: 20.61 KG/M2 | WEIGHT: 112 LBS

## 2023-10-12 DIAGNOSIS — I46.9 CARDIAC ARREST (H): ICD-10-CM

## 2023-10-12 DIAGNOSIS — I46.9 CARDIAC ARREST (H): Primary | ICD-10-CM

## 2023-10-12 PROCEDURE — 93000 ELECTROCARDIOGRAM COMPLETE: CPT | Performed by: INTERNAL MEDICINE

## 2023-10-12 PROCEDURE — 99214 OFFICE O/P EST MOD 30 MIN: CPT | Mod: 25 | Performed by: INTERNAL MEDICINE

## 2023-10-12 PROCEDURE — 93260 PRGRMG DEV EVAL IMPLTBL SYS: CPT | Performed by: INTERNAL MEDICINE

## 2023-10-12 ASSESSMENT — PATIENT HEALTH QUESTIONNAIRE - PHQ9: SUM OF ALL RESPONSES TO PHQ QUESTIONS 1-9: 20

## 2023-10-12 ASSESSMENT — PAIN SCALES - GENERAL: PAINLEVEL: NO PAIN (0)

## 2023-10-12 NOTE — PATIENT INSTRUCTIONS
It was a pleasure to see you in clinic today.  Please do not hesitate to call with any questions or concerns.  You are scheduled for remote transmissions on 1/16/24, 4/23/24 and 7/30/24.      Leighann Mendosa, RN, MS, CCRN  Electrophysiology Nurse Clinician  [facility]    During Business Hours Please Call:  [phone]  After Hours Please Call:  [phone] - select option #4 and ask for job code [phone]

## 2023-10-12 NOTE — PROGRESS NOTES
I am delighted to see Estela Fry as a new patient in [facility] clinic for evaluation of chest discomfort.    History of Present Illness:  The patient is a 33 year old  female who was found down on 8/5/2023 by family, EMS found her to be in VF, shocked x 3 with ROSC. Normal EF, no CAD, drug screen negative.  No family history of SCD, not peripartum. She underwent SICD implant. SICD was chosen because she and her family have cultural issues with implantable devices in her body, and SICD was an acceptable alternative for them. She was discharged on 8/16/2023. She had her 1 week follow up in device clinic on 8/22/2023.     She called because she is having some diffuse chest discomfort, worse with inspiration. She has an appointment in November and wanted to be seen sooner, and I had an opening at [facility] today.  She has some discomfort along the SICD generator edge with palpation.     Her  is with her today. They are in emotional distress. She has 7 children at home ages 3 to 13 (kids had found her down). She reports that she is not doing much with the children. In fact, she doesn't do anything at all because she is afraid that she might have another cardiac arrest. Her  had spent a lot of time with her when she was in the hospital, and has been anxious to leave her side because he's afraid that she might be found down again. He has lost his job and they are having difficulty paying bills. They don't know where to turn to, and in fact, looking for social support is really the main reason they are here today.    Discharge summary stated that she was to follow up in the post arrest clinic within 1-2 weeks of discharge; unfortunately the appointment was never made, so the only clinic she's been to since hospital discharge was the device clinic.      Past Medical History:  VF arrest, normal heart, unknown etiology  SICD implant secondary prevention 8/15/2023  Nonocclusive DVT right external  iliac vein on Xarelto       Medications:   Rivaroxaban 20 every day  Birth control    Allergies:  No Known Allergies    Family History: No sudden cardiac death    Physical examination  Vitals: 16/62, HR 80  BMI= 20    Constitutional: In general, the patient is in no acute distress although clearly very depressed.    Cardiovascular: Carotids +2/2 bilaterally without bruits.  No jugular venous distension. Regular rate and rhythm. Normal S1, S2. No murmur, rub, click, or gallop.   Extremities: Pulses are normal bilaterally throughout. No peripheral edema.  Respiratory: Clear to asculation.  No ronchi, wheezes, rales.  No dullness to percussion.   Chest area: SICD lateral axillary incision and mid sternal incision sites are clean, well healed, without erythema or fluctuance.    I have personally and independently reviewed the following:  Labs:   8/16/2023: cr 0.60, hgb 11.6, plt 336K    CMR 8/10/2023: LVEF 64%, RVEF 59%, no valve disease, no fibrosis    Coronary angiography 8/5/2023: no CAD    EKG:   TODAY 10/12/2023: sinus 75 bpm, rsR/ in V1, no change  8/15/2023: sinus 80 bpm, rsR' V1/V2, normal QTc    Device interrogation today 10/12/2023:   [employer] A219 Emblem SICD  Normal parameters, no shocks  Software upgrade completed    Assessment :  Chest discomfort, appears musculoskeletal.  VF arrest, idiopathic, normal heart. ? Utility of genetic testing. Will discuss with genetic counselor.  DVT on Xarelto - hematology appt 10/18/2023    She and her entire family has been traumatized by this experience.  I had a long discussion with Estela and her , reassured them that she is protected by the ICD and it is ok for her do things. Her  does not need to watch her all day long. Encouraged her to gradually return to her normal activities. They do need social work and psychological support. I have contacted the post arrest clinic and they will see her Monday 10/16 to connect her with the support  system.      Plan:  Will keep her existing appointment at [facility] (closer to her home) on 11/21/2023 with EP DAX to follow up on how she and family are doing.      I spent a total of 30 minutes face to face with  Estela Fry during today's office visit. I have spend an additional 30 minutes today on chart review and documentation.      The patient is to return as above . The patient understood the treatment plan as outlined above.  There were no barriers to learning.      Eula Johnson MD

## 2023-10-12 NOTE — NURSING NOTE
Patient had a positive PHQ9 alert, pt verbalized concerns related to her sickness and  financial hardship. Spoke with patient, denied suicidal plan. Pt was offered mental health referral, pt agreeable. Referral placed. Pt is scheduled to follow-up with the critical care clinic on Monday. Pt has no further question and aware to notify the clinic if any concerns arise.     Depression Screening Follow-up        10/12/2023     1:58 PM   PHQ   PHQ-9 Total Score 20   Q9: Thoughts of better off dead/self-harm past 2 weeks Several days              No data to display                  Follow Up       Follow Up Actions Taken:  Crisis resource information provided in the After Visit Summary  Mental Health Referral placed    Jacqui Kevin RN

## 2023-10-12 NOTE — PATIENT INSTRUCTIONS
Take your medicines every day, as directed     Changes made today:  No medication changes   You are scheduled with the critical care clinic on Monday 10/16/2023         Cardiology Care Coordinators:      Madonna AUGUSTINE RN     Cardiology Rooming staff:  Gennaro MCLEOD      Phone  [phone]      Fax [phone]    To Contact us     During Business Hours:  [phone]     If you are needing refills please contact your pharmacy.     For urgent after hour care please call the [facility] at [phone] or the [facility] at [phone] and ask to speak to the cardiologist on call.            HOW TO CHECK YOUR BLOOD PRESSURE AT HOME:     Avoid eating, smoking, and exercising for at least 30 minutes before taking a reading.     Be sure you have taken your BP medication at least 2-3 hours before you check it.      Sit quietly for 10 minutes before a reading.      Sit in a chair with your feet flat on the floor. Rest your  arm on a table so that the arm cuff is at the same level as your heart.     Remain still during the reading.  Record your blood pressure and pulse in a log and bring to your next appointment.       Use [employer] allows you to communicate directly with your heart team through secure messaging.  [employer] can be accessed any time on your phone, computer, or tablet.  If you need assistance, we'd be happy to help!             Keep your Heart Appointments:     Keep follow-up with DR Elizabeth vincent in November 2023 as planned

## 2023-10-13 ENCOUNTER — PATIENT OUTREACH (OUTPATIENT)
Dept: CARE COORDINATION | Facility: CLINIC | Age: 33
End: 2023-10-13
Payer: COMMERCIAL

## 2023-10-13 NOTE — PROGRESS NOTES
"Social Work - Intervention  [facility]  Data/Intervention:    Patient Name: Estela Fry Goes By: Estela    /Age: 1990 (33 year old)     Visit Type: telephone and MyChart  Referral Source: Cardiology  Reason for Referral: Financial concerns, pt/caregiver support, resources for support, support for coping with new illness or diagnosis    Collaborated With:    -Attempted to reach Estela via phone ([phone]) and My Chart     Psychosocial Information/Concerns:  Referral received indicating the above needs.     Per chart review, Estela suffered a cardiac arrest in August, which has been traumatizing for her and her family (her child found her down on the floor). Per visit note from yesterday, Estela's  has lost his job and they are having difficulties paying bills. Yesterday's note also reports that Estela has not resumed doing anything with her children because she is afraid she will have another arrest, and her  feels he needs to be with her at all times as he is worried about another arrest as well. Per Dr. Johnson on 10/12/23: \"I had a long discussion with Estela and her , reassured them that she is protected by the ICD and it is ok for her do things. Her  does not need to watch her all day long. Encouraged her to gradually return to her normal activities.\"    It is also noted in the visit note from 10/12 that Estela scored a 20 on her PHQ-9 and in the past 2 weeks, on several days, had thoughts she would be better off dead or thoughts of self harm. It is noted that Estela denied a suicidal plan and was accepting of a mental health referral which was placed.     Per chart review, an [employer]  Worker spoke with Estela and her  in late August and at that time Estela was receiving WIC benefits and food support, but had reached the time limit for being able to receive cash assistance. At that time Estela's  noted that Estela needed to apply for SSDI and she was " provided with information about this. Also, at that time Estela and her  were given information for [facility] for applying for energy assistance. The [employer]  note indicates that Estela does not qualify for the [facility] [employer] [facility] care management program for ongoing support.      Intervention/Education/Resources Provided:  I tried reaching Estela by phone but the phone just rang, never went to a voicemail, and then the call ended.   I sent Estela a My Chart message introducing myself and noting the reason for my outreach. I offered for Estela to call me directly when she has time to talk.      Assessment/Plan:  I will await a return call or My Chart message from Estela and provide assistance at that time.      Provided patient/family with contact information and availability.    CHIKIS Schneider, [facility]    [facility]  Ph: [phone], Pgr: [phone]  10/13/2023

## 2023-10-16 ENCOUNTER — OFFICE VISIT (OUTPATIENT)
Dept: CARDIOLOGY | Facility: CLINIC | Age: 33
End: 2023-10-16
Attending: INTERNAL MEDICINE
Payer: COMMERCIAL

## 2023-10-16 VITALS
BODY MASS INDEX: 21.79 KG/M2 | SYSTOLIC BLOOD PRESSURE: 101 MMHG | HEART RATE: 78 BPM | HEIGHT: 62 IN | DIASTOLIC BLOOD PRESSURE: 68 MMHG | OXYGEN SATURATION: 98 % | WEIGHT: 118.4 LBS

## 2023-10-16 DIAGNOSIS — I46.9 CARDIAC ARREST (H): Primary | ICD-10-CM

## 2023-10-16 PROCEDURE — 99215 OFFICE O/P EST HI 40 MIN: CPT | Mod: 24 | Performed by: INTERNAL MEDICINE

## 2023-10-16 PROCEDURE — G0463 HOSPITAL OUTPT CLINIC VISIT: HCPCS | Performed by: INTERNAL MEDICINE

## 2023-10-16 RX ORDER — GABAPENTIN 300 MG/1
300 CAPSULE ORAL
Qty: 180 CAPSULE | Refills: 1 | Status: SHIPPED | OUTPATIENT
Start: 2023-10-16 | End: 2024-01-18

## 2023-10-16 ASSESSMENT — PAIN SCALES - GENERAL: PAINLEVEL: NO PAIN (1)

## 2023-10-16 NOTE — PATIENT INSTRUCTIONS
Critical Care Cardiology Survivor Clinic                                                                                                           You were seen today in the Critical Care Cardiology Survivor Clinic at the [facility]:       Dr. Devin Liz        Your visit summary and instructions are as follows:    Social work referral placed   Genetics referral placed   Nueropsych referral placed   Cardiac rehab referral   Sertraline 50mg for depression, please trial for four weeks   Gabapentin 300mg twice daily for pain     Continue to work toward the recommendation of 150 minutes of moderate intensity exercise/week and maintain a healthy lifestyle (avoid illicit drugs, smoking and moderate alcohol consumption)    Return to Critical Care Cardiology Survivor Clinic with device check prior (if applicable)     -Support group: This is not required but can be helpful to connect to other survivors.   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     -What is Health Psychology?  Health Psychology is a specialty that helps people cope with the stress and anxiety that often occurs with illness, emotional and psychological issues, and preparation for medical treatment including surgical procedures.    Please contact our psychologists directly with questions or to make an appointment.    Shivani Sanders, Ph.D.,                 [phone]  Suzanne Camara, Ph.D.                      [phone]  Shanda Butler, Ph.D.,                    [phone]          Iglesia Cole, Ph.D., [facility],   [phone]       Thank you for your visit today!     Please MyChart message me or call Brianda Keita RN or Fadia Jennings RN if you have any questions or concerns.      During Business Hours:  [phone], option # 1 ([facility]) then option # 4  (medical questions) and ask to speak with my nurse.     After hours, weekends or holidays:   [phone], Option #4  Ask to speak to the On-Call Cardiologist. Inform them you are a heart failure patient at the [facility].

## 2023-10-16 NOTE — Clinical Note
10/16/2023      RE: Estela Fry  [address] [place] [address]       Dear Colleague,    Thank you for the opportunity to participate in the care of your patient, Estela Fry, at the [facility] HEART [facility] [place] at [facility]. Please see a copy of my visit note below.    Critical Care Cardiology Survivor Clinic  10/16/2023        History of Presenting Illness:  ***Estela Fry is a 33 year old female who presents to clinic today to establish care for *** / for ongoing evaluation and management of ***.    The patient is a 33 year old  female who was found down on 8/5/2023 by family, EMS found her to be in VF, shocked x 3 with ROSC. Normal EF, no CAD, drug screen negative.  No family history of SCD, not peripartum. She underwent SICD implant. SICD was chosen because she and her family have cultural issues with implantable devices in her body, and SICD was an acceptable alternative for them. She was discharged on 8/16/2023. She had her 1 week follow up in device clinic on 8/22/2023.     She called because she is having some diffuse chest discomfort, worse with inspiration. She has an appointment in November and wanted to be seen sooner, and I had an opening at [place] today.  She has some discomfort along the SICD generator edge with palpation.     Her  is with her today. They are in emotional distress. She has 7 children at home ages 3 to 13 (kids had found her down). She reports that she is not doing much with the children. In fact, she doesn't do anything at all because she is afraid that she might have another cardiac arrest. Her  had spent a lot of time with her when she was in the hospital, and has been anxious to leave her side because he's afraid that she might be found down again. He has lost his job and they are having difficulty paying bills. They don't know where to turn to, and in fact, looking for social support is really the main reason  they are here today.    Discharge summary stated that she was to follow up in the post arrest clinic within 1-2 weeks of discharge; unfortunately the appointment was never made, so the only clinic she's been to since hospital discharge was the device clinic.    Current meds:   Rivaroxaban 20 every day  Birth control    Past Medical History:  VF arrest, normal heart, unknown etiology  SICD implant secondary prevention 8/15/2023  Nonocclusive DVT right external iliac vein on Xarelto    Family History:  No family history of sudden cardiac death    Social History:    ROS:  A complete 10-point ROS was negative except as above.    Physical Exam:  There were no vitals taken for this visit.    Gen: alert, interactive, NAD, affect depressed  HEENT: atraumatic, EOMI, MMM  Neck: supple, no JVP elevation appreciated.  CV: RRR, no murmurs, rubs.  Chest: CTAB, no wheezes or crackles, SICD, axillary incision and mid sternal incision are cdi  Abd: soft, NT, ND, +BS  Ext: no LE edema, 2+ peripheral pulses  Skin: warm and dry, no rashes on exposed surfaces  Neuro: alert and oriented, no focal deficits    Labs:   Labs and cardiac data reviewed personally in clinic.    CMP:  Sodium   Date Value Ref Range Status   08/16/2023 137 136 - 145 mmol/L Final     Potassium   Date Value Ref Range Status   08/16/2023 3.8 3.4 - 5.3 mmol/L Final     Potassium POCT   Date Value Ref Range Status   08/05/2023 3.4 (L) 3.5 - 5.0 mmol/L Final     Comment:     CRITICAL RESULTS NOTED BY CCL  and MD     Chloride   Date Value Ref Range Status   08/16/2023 101 98 - 107 mmol/L Final     Carbon Dioxide (CO2)   Date Value Ref Range Status   08/16/2023 25 22 - 29 mmol/L Final     Anion Gap   Date Value Ref Range Status   08/16/2023 11 7 - 15 mmol/L Final     Glucose   Date Value Ref Range Status   08/16/2023 103 (H) 70 - 99 mg/dL Final     GLUCOSE BY METER POCT   Date Value Ref Range Status   08/15/2023 91 70 - 99 mg/dL Final     Urea Nitrogen   Date Value Ref  "Range Status   08/16/2023 15.6 6.0 - 20.0 mg/dL Final     Creatinine   Date Value Ref Range Status   08/16/2023 0.60 0.51 - 1.17 mg/dL Final     Comment:     Male and Female  0-2 Months    0.31-0.88 mg/dL  2-12 Months   0.16-0.39 mg/dL  1-2 Years     0.18-0.35 mg/dL  3-4 Years     0.26-0.42 mg/dL  5-6 Years     0.29-0.47 mg/dL  7-8 Years     0.34-0.53 mg/dL  9-10 Years    0.33-0.64 mg/dL  11-12 Years   0.44-0.68 mg/dL  13-14 Years   0.46-0.77 mg/dL    Female  15 Years and older  0.51-0.95 mg/dL    Male  15 Years and older  0.67-1.17 mg/dL         GFR Estimate   Date Value Ref Range Status   08/16/2023   Final     Comment:     The generation of the estimated GFR is currently based on binary male or female sex. If the electronic health record information indicates another gender identity or if Legal Sex is recorded as \"Unknown\", GFR estimates are not automatically calculated, and application of GFR equations or a direct GFR measurement should be considered according to the individual's appropriate clinical context.     GFR, ESTIMATED POCT   Date Value Ref Range Status   08/05/2023 43 (L) >60 mL/min/1.73m2 Final     Calcium   Date Value Ref Range Status   08/16/2023 8.9 8.6 - 10.0 mg/dL Final     Bilirubin Total   Date Value Ref Range Status   08/16/2023 0.6 <=1.2 mg/dL Final     Alkaline Phosphatase   Date Value Ref Range Status   08/16/2023 47 35 - 129 U/L Final     Comment:     Female:   0-15 days     U/L  15d-1 year   122-469 U/L  1-10 years   142-335 U/L  10-13 years  129-417 U/L  13-15 years   U/L  15-17 years   U/L  17-19 years  45-87 U/L  19 years and older   U/L      Male:  0-15 days     U/L  15d-1 year   122-469 U/L  1-10 years   142-335 U/L  10-13 years  129-417 U/L  13-15 years  116-468 U/L  15-17 years   U/L  17-19 years   U/L  19 years and older   U/L       ALT   Date Value Ref Range Status   08/16/2023 63 0 - 70 U/L Final     Comment:     Female   All ages  "      0-50 U/L     Male   0-20 Years     0-50 U/L  20-Unsp. Years 0-70 U/L      Reference intervals for this test were updated on 6/12/2023 to more accurately reflect our healthy population. There may be differences in the flagging of prior results with similar values performed with this method. Interpretation of those prior results can be made in the context of the updated reference intervals.       AST   Date Value Ref Range Status   08/16/2023 25 0 - 45 U/L Final     Comment:     Reference intervals for this test were updated on 6/12/2023 to more accurately reflect our healthy population. There may be differences in the flagging of prior results with similar values performed with this method. Interpretation of those prior results can be made in the context of the updated reference intervals.       CBC  CBC RESULTS:   Recent Labs   Lab Test 08/16/23  0540   WBC 8.2   RBC 4.04   HGB 11.6*   HCT 34.2*   MCV 85   MCH 28.7   MCHC 33.9   RDW 12.4          LIPIDS  Recent Labs   Lab Test 08/05/23  2108   CHOL 115   HDL 61   LDL 40   TRIG 71       TSH  TSH   Date Value Ref Range Status   08/05/2023 3.24 0.30 - 4.20 uIU/mL Final       HBA1C  Lab Results   Component Value Date    A1C 5.4 08/05/2023       Cardiac Data:     Coronary angiography 8/5/2023: no CAD    CMR 8/10/2023: LVEF 64%, RVEF 59%, no valve disease, no fibrosis    EKG:   TODAY 10/12/2023: sinus 75 bpm, rsR/ in V1, no change  8/15/2023: sinus 80 bpm, rsR' V1/V2, normal QTc    Device interrogation 10/12/2023:   [employer] A219 Emblem SICD  Normal parameters, no shocks  Software upgrade completed        Assessment/Plan:  Estela Fry is a 33 year old female who presents to clinic today to establish care after an/ for ongoing evaluation and management of ***.    Refractory VF VT PEA *** cardiac arrest date ***/***/***    Etiology: ***Ischemic s/p PCI to  Nonischemic    Fully revasc: *** yes no n/a  LVEF:  TTE ***/***/*** LVEF: *** RV function: ***  ICD:   ***Not indicated in place pending; follow up with device clinic  6 min walk data: ***  PHQ9: ***  QOL Screening:***    1. Neuropsych referral (send message to neuro scheduling pool: P CLINIC [id number] )      he and her entire family has been traumatized by this experience.  2. Behavioral health/psychology referral.  3. Send [place] sudden cardiac arrest  group details ([email or web address]; [phone]).  4. Continue work with OT/PT/SLP and cardiac rehab.  5. *** Patient likely unable to return to work due to anoxic brain injury, will rely on evaluation with ot for further instruction and discuss FMLA disabiltiy paperwork.   6. 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   - Comprehensive driving assessment with OT -->cost associated, reviewed with pt.  - Continue to work with OT at home  7. Reviewed med list; eliminated unnecessary meds as mentioned above/below***.  8. Offered to be part of our 'pay it back book' for the nursing and  team.  9. Imaging incidental findings:  10. Lab abnormalities to follow up on:    Ischemic/Non-ischemic cardiomyopathy   -genetics referral      #musculoskeltal chest pain  -gabapentin and tramadol offered to pt  -pt and ot will help    #DVT on Xarelto - hematology appt 10/18/2023      I spent a total of 30 minutes face to face with  Estela Fry during today's office visit. I have spend an additional 30 minutes today on chart review and documentation.      The patient is to return to the post arrest clinic in 3-6 months The patient understood the treatment plan as outlined above.  There were no barriers to learning.    Devin Valadez MD        Please do not hesitate to contact me if you have any questions/concerns.     Sincerely,     Critical Care

## 2023-10-16 NOTE — PROGRESS NOTES
"Critical Care Cardiology Survivor Clinic  10/16/2023        History of Presenting Illness:  Ms Fry is a 33 year old  female with  VF arrest on 8/5/2023 --> shocked x 3 --> ROSC workup was unrevealing now s/p subcutaneous ICD (per pt and family preference). Discharged on 8/16/2023. Of note she has no family history of SCD.     Since discharge she and her  have been having serious difficulty coping. She is feeling down and he has lost his job because he feels he can neither leave her alone or with their kids. They are looking for financial and emotional support. They are in a bit of disbelief that she can go back to a physical job at any time. She also has questions about when her ICD can be removed. We discussed that this is a permeant thing and will not typically be removed. She reports some mild discomfort around the icd.    Current  cardiac meds:   Rivaroxaban 20 every day    Past Medical History:  VF arrest, normal heart, unknown etiology  Uterine prolapse  Situational depression  SICD implant secondary prevention 8/15/2023  Nonocclusive DVT right external iliac vein on Xarelto    Family History:  No family history of sudden cardiac death    Social History:     7 children    ROS:  A complete 10-point ROS was negative except as above.    Physical Exam:  /68 (BP Location: Right arm, Patient Position: Chair, Cuff Size: Adult Small)   Pulse 78   Ht 1.575 m (5' 2\")   Wt 53.7 kg (118 lb 6.4 oz)   SpO2 98%   BMI 21.66 kg/m      Gen: alert, interactive, NAD, affect depressed  HEENT: atraumatic, EOMI, MMM  Neck: supple, no JVP elevation appreciated.  CV: RRR, no murmurs, rubs.  Chest: CTAB, no wheezes or crackles, SICD, axillary incision and mid sternal incision are cdi  Abd: soft, NT, ND, +BS  Ext: no LE edema, 2+ peripheral pulses  Skin: warm and dry, no rashes on exposed surfaces  Neuro: alert and oriented, no focal deficits    Labs:   Labs and cardiac data reviewed personally in " "clinic.    CMP:  Sodium   Date Value Ref Range Status   08/16/2023 137 136 - 145 mmol/L Final     Potassium   Date Value Ref Range Status   08/16/2023 3.8 3.4 - 5.3 mmol/L Final     Potassium POCT   Date Value Ref Range Status   08/05/2023 3.4 (L) 3.5 - 5.0 mmol/L Final     Comment:     CRITICAL RESULTS NOTED BY CCL  and MD     Chloride   Date Value Ref Range Status   08/16/2023 101 98 - 107 mmol/L Final     Carbon Dioxide (CO2)   Date Value Ref Range Status   08/16/2023 25 22 - 29 mmol/L Final     Anion Gap   Date Value Ref Range Status   08/16/2023 11 7 - 15 mmol/L Final     Glucose   Date Value Ref Range Status   08/16/2023 103 (H) 70 - 99 mg/dL Final     GLUCOSE BY METER POCT   Date Value Ref Range Status   08/15/2023 91 70 - 99 mg/dL Final     Urea Nitrogen   Date Value Ref Range Status   08/16/2023 15.6 6.0 - 20.0 mg/dL Final     Creatinine   Date Value Ref Range Status   08/16/2023 0.60 0.51 - 1.17 mg/dL Final     Comment:     Male and Female  0-2 Months    0.31-0.88 mg/dL  2-12 Months   0.16-0.39 mg/dL  1-2 Years     0.18-0.35 mg/dL  3-4 Years     0.26-0.42 mg/dL  5-6 Years     0.29-0.47 mg/dL  7-8 Years     0.34-0.53 mg/dL  9-10 Years    0.33-0.64 mg/dL  11-12 Years   0.44-0.68 mg/dL  13-14 Years   0.46-0.77 mg/dL    Female  15 Years and older  0.51-0.95 mg/dL    Male  15 Years and older  0.67-1.17 mg/dL         GFR Estimate   Date Value Ref Range Status   08/16/2023   Final     Comment:     The generation of the estimated GFR is currently based on binary male or female sex. If the electronic health record information indicates another gender identity or if Legal Sex is recorded as \"Unknown\", GFR estimates are not automatically calculated, and application of GFR equations or a direct GFR measurement should be considered according to the individual's appropriate clinical context.     GFR, ESTIMATED POCT   Date Value Ref Range Status   08/05/2023 43 (L) >60 mL/min/1.73m2 Final     Calcium   Date Value Ref " Range Status   08/16/2023 8.9 8.6 - 10.0 mg/dL Final     Bilirubin Total   Date Value Ref Range Status   08/16/2023 0.6 <=1.2 mg/dL Final     Alkaline Phosphatase   Date Value Ref Range Status   08/16/2023 47 35 - 129 U/L Final     Comment:     Female:   0-15 days     U/L  15d-1 year   122-469 U/L  1-10 years   142-335 U/L  10-13 years  129-417 U/L  13-15 years   U/L  15-17 years   U/L  17-19 years  45-87 U/L  19 years and older   U/L      Male:  0-15 days     U/L  15d-1 year   122-469 U/L  1-10 years   142-335 U/L  10-13 years  129-417 U/L  13-15 years  116-468 U/L  15-17 years   U/L  17-19 years   U/L  19 years and older   U/L       ALT   Date Value Ref Range Status   08/16/2023 63 0 - 70 U/L Final     Comment:     Female   All ages       0-50 U/L     Male   0-20 Years     0-50 U/L  20-Unsp. Years 0-70 U/L      Reference intervals for this test were updated on 6/12/2023 to more accurately reflect our healthy population. There may be differences in the flagging of prior results with similar values performed with this method. Interpretation of those prior results can be made in the context of the updated reference intervals.       AST   Date Value Ref Range Status   08/16/2023 25 0 - 45 U/L Final     Comment:     Reference intervals for this test were updated on 6/12/2023 to more accurately reflect our healthy population. There may be differences in the flagging of prior results with similar values performed with this method. Interpretation of those prior results can be made in the context of the updated reference intervals.       CBC  CBC RESULTS:   Recent Labs   Lab Test 08/16/23  0540   WBC 8.2   RBC 4.04   HGB 11.6*   HCT 34.2*   MCV 85   MCH 28.7   MCHC 33.9   RDW 12.4          LIPIDS  Recent Labs   Lab Test 08/05/23  2108   CHOL 115   HDL 61   LDL 40   TRIG 71       TSH  TSH   Date Value Ref Range Status   08/05/2023 3.24 0.30 - 4.20 uIU/mL Final        HBA1C  Lab Results   Component Value Date    A1C 5.4 08/05/2023       Cardiac Data:     Coronary angiography 8/5/2023: no CAD    CMR 8/10/2023: LVEF 64%, RVEF 59%, no valve disease, no fibrosis    EKG:   TODAY 10/12/2023: sinus 75 bpm, rsR/ in V1, no change  8/15/2023: sinus 80 bpm, rsR' V1/V2, normal QTc    Device interrogation 10/12/2023:   [employer] A219 Emblem SICD  Normal parameters, no shocks  Software upgrade completed        Assessment/Plan:  Estela Fry is a 33 year old female with a pmhx sig for VF arrest on 8/5/2023 with rosc after three shocks, no apparent etiology, who presents to clinic today to establish care after an/ for ongoing evaluation and management.    Cardiac arrest: etiology uncertain, icd in place  -genetics referral  -neuropsych referral   -behavioral health referral she and her entire family has been traumatized by this experience and anxiety/depression etc leading to loss of job etc  -Send [place] sudden cardiac arrest  group details ([email or web address]; [phone]).  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   -discussed icd implantation is permanent and will not typically be removed    #musculoskeltal chest pain  -gabapentin and tramadol offered to pt  -pt and ot will help    #DVT on Xarelto - hematology appt 10/18/2023      I spent a total of 40 minutes face to face with  Estela Fry during today's office visit. I have spend an additional 30 minutes today on chart review and documentation.      The patient is to return to the post arrest clinic in 3-6 months The patient understood the treatment plan as outlined above.  There were no barriers to learning.    Devin Valadez MD

## 2023-10-18 ENCOUNTER — OFFICE VISIT (OUTPATIENT)
Dept: HEMATOLOGY | Facility: CLINIC | Age: 33
End: 2023-10-18
Attending: INTERNAL MEDICINE
Payer: COMMERCIAL

## 2023-10-18 ENCOUNTER — PATIENT OUTREACH (OUTPATIENT)
Dept: CARE COORDINATION | Facility: CLINIC | Age: 33
End: 2023-10-18
Payer: COMMERCIAL

## 2023-10-18 VITALS
WEIGHT: 116.8 LBS | OXYGEN SATURATION: 95 % | TEMPERATURE: 98.1 F | HEIGHT: 62 IN | BODY MASS INDEX: 21.49 KG/M2 | SYSTOLIC BLOOD PRESSURE: 101 MMHG | HEART RATE: 89 BPM | DIASTOLIC BLOOD PRESSURE: 69 MMHG

## 2023-10-18 DIAGNOSIS — I82.C22: ICD-10-CM

## 2023-10-18 DIAGNOSIS — D62 ANEMIA DUE TO BLOOD LOSS, ACUTE: ICD-10-CM

## 2023-10-18 DIAGNOSIS — I82.220 IVC THROMBOSIS (H): Primary | ICD-10-CM

## 2023-10-18 DIAGNOSIS — I46.9 CARDIAC ARREST (H): ICD-10-CM

## 2023-10-18 LAB
ALBUMIN SERPL BCG-MCNC: 4.9 G/DL (ref 3.5–5.2)
ALP SERPL-CCNC: 83 U/L (ref 35–104)
ALT SERPL W P-5'-P-CCNC: 23 U/L (ref 0–50)
ANION GAP SERPL CALCULATED.3IONS-SCNC: 7 MMOL/L (ref 7–15)
AST SERPL W P-5'-P-CCNC: 19 U/L (ref 0–45)
BASO+EOS+MONOS # BLD AUTO: ABNORMAL 10*3/UL
BASO+EOS+MONOS NFR BLD AUTO: ABNORMAL %
BASOPHILS # BLD AUTO: 0 10E3/UL (ref 0–0.2)
BASOPHILS NFR BLD AUTO: 1 %
BILIRUB SERPL-MCNC: 0.5 MG/DL
BUN SERPL-MCNC: 10.4 MG/DL (ref 6–20)
CALCIUM SERPL-MCNC: 9.4 MG/DL (ref 8.6–10)
CHLORIDE SERPL-SCNC: 100 MMOL/L (ref 98–107)
CREAT SERPL-MCNC: 0.72 MG/DL (ref 0.51–0.95)
CRP SERPL-MCNC: <3 MG/L
D DIMER PPP FEU-MCNC: 0.56 UG/ML FEU (ref 0–0.5)
DEPRECATED HCO3 PLAS-SCNC: 30 MMOL/L (ref 22–29)
EGFRCR SERPLBLD CKD-EPI 2021: >90 ML/MIN/1.73M2
EOSINOPHIL # BLD AUTO: 0.3 10E3/UL (ref 0–0.7)
EOSINOPHIL NFR BLD AUTO: 5 %
ERYTHROCYTE [DISTWIDTH] IN BLOOD BY AUTOMATED COUNT: 13.5 % (ref 10–15)
FACTOR 2 INTERPRETATION: NORMAL
FACTOR V INTERPRETATION: NORMAL
FERRITIN SERPL-MCNC: 10 NG/ML (ref 6–175)
GLUCOSE SERPL-MCNC: 87 MG/DL (ref 70–99)
HCT VFR BLD AUTO: 40.1 % (ref 35–47)
HGB BLD-MCNC: 12.6 G/DL (ref 11.7–15.7)
IMM GRANULOCYTES # BLD: 0 10E3/UL
IMM GRANULOCYTES NFR BLD: 0 %
IRON BINDING CAPACITY (ROCHE): 414 UG/DL (ref 240–430)
IRON SATN MFR SERPL: 6 % (ref 15–46)
IRON SERPL-MCNC: 23 UG/DL (ref 37–145)
LAB DIRECTOR COMMENTS: NORMAL
LAB DIRECTOR DISCLAIMER: NORMAL
LAB DIRECTOR INTERPRETATION: NORMAL
LAB DIRECTOR METHODOLOGY: NORMAL
LAB DIRECTOR RESULTS: NORMAL
LYMPHOCYTES # BLD AUTO: 1.5 10E3/UL (ref 0.8–5.3)
LYMPHOCYTES NFR BLD AUTO: 23 %
MCH RBC QN AUTO: 24.9 PG (ref 26.5–33)
MCHC RBC AUTO-ENTMCNC: 31.4 G/DL (ref 31.5–36.5)
MCV RBC AUTO: 79 FL (ref 78–100)
MONOCYTES # BLD AUTO: 0.3 10E3/UL (ref 0–1.3)
MONOCYTES NFR BLD AUTO: 5 %
NEUTROPHILS # BLD AUTO: 4.3 10E3/UL (ref 1.6–8.3)
NEUTROPHILS NFR BLD AUTO: 66 %
NRBC # BLD AUTO: 0 10E3/UL
NRBC BLD AUTO-RTO: 0 /100
PLATELET # BLD AUTO: 313 10E3/UL (ref 150–450)
POTASSIUM SERPL-SCNC: 3.9 MMOL/L (ref 3.4–5.3)
PROT SERPL-MCNC: 8.2 G/DL (ref 6.4–8.3)
RBC # BLD AUTO: 5.06 10E6/UL (ref 3.8–5.2)
RETICS # AUTO: 0.04 10E6/UL (ref 0.03–0.1)
RETICS/RBC NFR AUTO: 0.8 % (ref 0.5–2)
SODIUM SERPL-SCNC: 137 MMOL/L (ref 135–145)
SPECIMEN DESCRIPTION: NORMAL
WBC # BLD AUTO: 6.5 10E3/UL (ref 4–11)

## 2023-10-18 PROCEDURE — G0452 MOLECULAR PATHOLOGY INTERPR: HCPCS | Mod: 26 | Performed by: PATHOLOGY

## 2023-10-18 PROCEDURE — 86147 CARDIOLIPIN ANTIBODY EA IG: CPT | Performed by: INTERNAL MEDICINE

## 2023-10-18 PROCEDURE — 80053 COMPREHEN METABOLIC PANEL: CPT | Performed by: INTERNAL MEDICINE

## 2023-10-18 PROCEDURE — 86140 C-REACTIVE PROTEIN: CPT | Performed by: INTERNAL MEDICINE

## 2023-10-18 PROCEDURE — 83550 IRON BINDING TEST: CPT | Performed by: INTERNAL MEDICINE

## 2023-10-18 PROCEDURE — 86146 BETA-2 GLYCOPROTEIN ANTIBODY: CPT | Performed by: INTERNAL MEDICINE

## 2023-10-18 PROCEDURE — 85306 CLOT INHIBIT PROT S FREE: CPT | Performed by: INTERNAL MEDICINE

## 2023-10-18 PROCEDURE — 81240 F2 GENE: CPT | Performed by: INTERNAL MEDICINE

## 2023-10-18 PROCEDURE — G0463 HOSPITAL OUTPT CLINIC VISIT: HCPCS | Performed by: INTERNAL MEDICINE

## 2023-10-18 PROCEDURE — 36415 COLL VENOUS BLD VENIPUNCTURE: CPT | Performed by: INTERNAL MEDICINE

## 2023-10-18 PROCEDURE — 85300 ANTITHROMBIN III ACTIVITY: CPT | Performed by: INTERNAL MEDICINE

## 2023-10-18 PROCEDURE — 85303 CLOT INHIBIT PROT C ACTIVITY: CPT | Performed by: INTERNAL MEDICINE

## 2023-10-18 PROCEDURE — 85025 COMPLETE CBC W/AUTO DIFF WBC: CPT | Performed by: INTERNAL MEDICINE

## 2023-10-18 PROCEDURE — 85379 FIBRIN DEGRADATION QUANT: CPT | Performed by: INTERNAL MEDICINE

## 2023-10-18 PROCEDURE — 82728 ASSAY OF FERRITIN: CPT | Performed by: INTERNAL MEDICINE

## 2023-10-18 PROCEDURE — 99214 OFFICE O/P EST MOD 30 MIN: CPT | Performed by: INTERNAL MEDICINE

## 2023-10-18 PROCEDURE — 85045 AUTOMATED RETICULOCYTE COUNT: CPT | Performed by: INTERNAL MEDICINE

## 2023-10-18 NOTE — PROGRESS NOTES
Social Work - Follow-Up  [facility]    Data/Intervention:    Patient Name: Estela Fry Goes By: Estela    /Age: 1990 (33 year old)    Reason for Follow-Up:  Financial concerns, pt/caregiver support, resources for support, support for coping with new illness or diagnosis     Collaborated With:    -Estela- [phone]    Intervention/Education/Resources Provided:  After receiving notice from Hematology RN Nica that Estela can be reached on an alternate phone number (number above) I attempted to reach Estela that way but the phone went straight to , but did indicate it was Estela's phone. I left a  introducing myself and noting the reason for my call and asking for a call back.     I sent a message back to Nica reporting being concerned that if Estela is significantly depressed she may not voluntarily reach out to me and asked that I be notified if there is a good time for me to meet with Estela during her visit today, or if someone could find out if there is a good time and best way for me to contact her. I also asked that a consent to communicate be completed if Estela is wanting the clinic to be able to speak with her  (per chart review she has asked that staff speak with her  in the past).     Assessment/Plan:  I will await a call back from Estela or further update from the clinic.     Previously provided patient/family with writer's contact information and availability.      CHIKIS Schneider, [facility]    [facility]  Ph: [phone], Pgr: [phone]  10/18/2023

## 2023-10-18 NOTE — PATIENT INSTRUCTIONS
[facility]  [facility]  [facility], [place] [address]  Main: [phone], Fax: [phone]    It was a pleasure seeing you today.  Thank you for allowing us to be involved in your care. Please let us know if there is anything else we can do for you, so that we can be sure you are leaving completely satisfied with your care experience.    Labs today.  Our lab is located within our clinic space.  We will communicate these to you by [employer]. With your permission we may leave a detailed voicemail, please see below for our contact information should you have any questions about your results. Also, please note that some lab results may take a week or so to get back. Feel free to call or message if you have not heard back from us within 7-10 days.     Please stay on your blood thinner:  Xarelto.  Please call us with any bleeding issues that you may have.    We would like you to repeat your neck ultrasound. This is scheduled for 10/19/23.      Patient Resources:   For additional information, please see the following web link:  [email or web address]    Call the [facility]  at [phone].     -If surgeries or procedures are planned (for holding instructions).     -If off anticoagulation, please call during high risk times (long-distance travel, broken bones or trauma, immobilization, surgery, pregnancy, or taking estrogen).     -Any new symptoms of DVT (deep vein thrombosis) or PE (pulmonary embolism)    -pain     -swelling     -redness    -warmth    -shortness of breath    -chest pain    -coughing up blood    We would like a provider on our team to see you at least annually for optimal care and to allow us to continue to prescribe for you.  Israel Sanots PA-C, Vanessa Baker, MPH, PARubyC  and Alice Hagen PA-C are our physician assistants that are specialized in bleeding and clotting disorders that you may be able to see  more readily.    Return to clinic in  6 months.  Please schedule return appointment with Dr. Wilhelm.    Your nurse clinician is Nica Ugalde RN, BSN, PCCN [phone].   If she is unavailable and you have immediate concerns, please call [phone] and ask for a nurse.

## 2023-10-18 NOTE — PROGRESS NOTES
[facility] Hematology Consultation    Outpatient Visit Note:    Patient: Estela Fry  MRN: [id number]  : 1990  EUN: 2023    Reason for Consultation:  Thrombosis after cardiac arrest and resuscitation    Assessment:  In summary, Estela Fry is a 33 year old woman with no significant past medical history who first presented to attention of hematology after experiencing an unwitnessed VT/VF cardiac arrest with ROSC after 3 shocks. Hematology was consulted due to finding of nonocclusive right external iliac vein VTE.     Provoked R external Iliac Vein thrombosis. 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 However the etiology of her IVC thrombosis and the CT finding of potential chronic left jugular vein occlusion is less clear. Given the severity of her cardiac arrest- would be prudent to continue anticoagulation. Would also be able to use D-dimer testing.We discussed that in female patients the use of a d-dimer to risk stratify the risk of recurrent thrombosis (Joselyn JUNIOR, et al. 2015. Annals Int Med.). After clinic, we assess her level on anticoagulation and found it to be 0.56--- This places her risk for developing recurrent clot at ~7.4 % per year- which would favor continuing anticoagulation as long as tolerated (William JUAREZ, et al. 2017 BMJ).    Next- we dicussed testing her for inherited thrombophilia- which again is reasonable given her young age and extensive cardiac arrest. Prior testing for MPN-associated mutations and PNH was negative. Prior antiphospholipid antibody testing was also negative. Will perform protein S, protein C, AT3 and factor V leiden and prothrombin mutation testing.     Next, we discussed use of birth control. I  would recommend Ms. Fry avoid pregnancy in the next few months. As long as she is on anticoagulation any method of contraception is acceptable. Would encourage use of IUD, depo or Neplaton to reduce systemic hormone exposure.     Ms. Fry does have iron deficiency with iron sat of 6^ and ferritin of <20 g/dL. She at present does not have anemia. Would encourage use of oral iron supplement and will discuss with Cardiology if she would benefit from intravenous iron in the future.     Plan:  1. Majority of today's visit was spent counseling the patient regarding anticoagulation.  2.   Orders Placed This Encounter   Procedures    US Upper Ext Venous Duplex Limited Bilat    Antithrombin III    Beta 2 Glycoprotein 1 Antibody IgG    Beta 2 Glycoprotein 1 Antibody IgM    Cardiolipin Patricia IgG and IgM    Protein C chromogenic    Protein S Antigen Free    Ferritin    Iron and iron binding capacity    CRP inflammation    D dimer quantitative    Comprehensive metabolic panel    Reticulocyte count    CBC with platelets and differential    CBC with Platelets & Differential        The patient is given our center's contact information and is instructed to call if she should have any further questions or concerns.  Otherwise, we will plan on seeing her back Follow up with Provider - 6 months .        Marcia Wilhelm MD/PhD   of Medicine  [facility] [employer]   ----------------------------------------------------------------------------------------------------------------------    History of Present Illness:  Estela Fry is a 33 year old woman Estela Fry is a 33 year old woman with no significant past medical history who first presented to attention of hematology after experiencing an unwitnessed VT/VF cardiac arrest with ROSC after 3 shocks. Hematology was consulted due to finding of nonocclusive right external iliac vein VTE.     Estela reports she is recovering. She does not have any LE  swelling. She does have some neck and right and left arm discomfort. She has not had any dizziness. She is tolerating her Xarelto     Past Medical History:  No past medical history on file.    Past Surgical History:  Past Surgical History:   Procedure Laterality Date    CV CENTRAL VENOUS CATHETER PLACEMENT N/A 8/5/2023    Procedure: Central Venous Catheter Placement;  Surgeon: Sid Liz MD;  Location: [facility] CARDIAC CATH LAB    CV CORONARY ANGIOGRAM N/A 8/5/2023    Procedure: Coronary Angiogram;  Surgeon: Sid Liz MD;  Location: [facility] CARDIAC CATH LAB    EP SICD INSERT N/A 8/15/2023    Procedure: Subcutaneous Implantable Cardioverter Defibrillator Implant;  Surgeon: Eula Johnson MD;  Location: [facility] CARDIAC CATH LAB   History of at least two miscarriages    Medications:  Current Outpatient Medications   Medication Sig Dispense Refill    gabapentin (NEURONTIN) 300 MG capsule Take 1 capsule (300 mg) by mouth two times daily 180 capsule 1    norelgestromin-ethinyl estradiol (XULANE) 150-35 MCG/24HR patch Place 1 patch onto the skin once a week Remove old patch and apply new patch onto the skin once a week for 3 weeks (21 days). Do not wear patch week 4 (days 22-28), then repeat.      oxyCODONE-acetaminophen (PERCOCET) 5-325 MG tablet Take 1 tablet by mouth every 6 hours as needed for severe pain 20 tablet 0    rivaroxaban ANTICOAGULANT (XARELTO) 20 MG TABS tablet Take 1 tablet (20 mg) by mouth daily (with dinner) 30 tablet 3    sertraline (ZOLOFT) 50 MG tablet Take 1 tablet (50 mg) by mouth daily 60 tablet 1        Allergies:  No Known Allergies    ROS:  A 14 point ROS is negative except as stated in the HPI    Social History:  Denies any tobacco use. No significant alcohol use. Denies any illicit drug use. Patient work . Has lived in [place], [place], [place], [place] - family travel    Family History:  6 sisters and 1 brother. Each sister has 3-5 children. Unclear on  miscarriages      Objective:  Vitals: B/P: 101/69, T: 98.1, P: 89, R: Data Unavailable, Wt: 116 lbs 12.8 oz  Exam:   Constitutional: Appears well, no distress  HEENT: Pupils equal and reactive to light. No scleral icterus or hemorrhage. Nares without evidence of telangiectasia. Mucous membranes moist with no wet purpura. Dentition overall ok with no signs of decay. No pharyngeal exudates. No lymphadenopathy, no thyromeagaly  CV: regular rate and rhythm, no murmurs  Respiratory: clear  GI: abdomen soft, nontender, without guarding or rebound. No hepatomeagaly. No splenomegaly. Rectal exam deferred.   Mus/Skele: no edema  Skin: no petechiae, no ecchymosis.  Neuro: CN II-XII intact. Normal gait. AOx3  Heme/Lymph: no supraclavicular, axillary or umbilical adenopathy.     Labs: personally reviewed with relevant trends annotated below  Results for orders placed or performed in visit on 10/18/23   Antithrombin III     Status: Normal   Result Value Ref Range    Antithrombin III 97 85 - 135 %   Beta 2 Glycoprotein 1 Antibody IgG     Status: Normal   Result Value Ref Range    Beta 2 Glycoprotein 1 Antibody IgG 1.4 <7.0 U/mL   Beta 2 Glycoprotein 1 Antibody IgM     Status: Normal   Result Value Ref Range    Beta 2 Glycoprotein 1 Antibody IgM <2.4 <7.0 U/mL   Cardiolipin Patricia IgG and IgM     Status: Normal   Result Value Ref Range    Cardiolipin Patricia IgG Instrument Value <2.0 <10.0 GPL-U/mL    Cardiolipin Antibody IgG Negative Negative    Cardiolipin Patricia IgM Instrument Value 2.2 <10.0 MPL-U/mL    Cardiolipin Antibody IgM Negative Negative   Protein C chromogenic     Status: Normal   Result Value Ref Range    Protein C Chromogenic 75 70 - 170 %   Protein S Antigen Free     Status: Normal   Result Value Ref Range    Protein S Antigen Free 79 55 - 125 %   Ferritin     Status: Normal   Result Value Ref Range    Ferritin 10 6 - 175 ng/mL   Iron and iron binding capacity     Status: Abnormal   Result Value Ref Range    Iron 23 (L) 37 -  145 ug/dL    Iron Binding Capacity 414 240 - 430 ug/dL    Iron Sat Index 6 (L) 15 - 46 %   CRP inflammation     Status: Normal   Result Value Ref Range    CRP Inflammation <3.00 <5.00 mg/L   D dimer quantitative     Status: Abnormal   Result Value Ref Range    D-Dimer Quantitative 0.56 (H) 0.00 - 0.50 ug/mL FEU    Narrative    This D-dimer assay is intended for use in conjunction with a clinical pretest probability assessment model to exclude pulmonary embolism (PE) and deep venous thrombosis (DVT) in outpatients suspected of PE or DVT. The cut-off value is 0.50 ug/mL FEU.   Comprehensive metabolic panel     Status: Abnormal   Result Value Ref Range    Sodium 137 135 - 145 mmol/L    Potassium 3.9 3.4 - 5.3 mmol/L    Carbon Dioxide (CO2) 30 (H) 22 - 29 mmol/L    Anion Gap 7 7 - 15 mmol/L    Urea Nitrogen 10.4 6.0 - 20.0 mg/dL    Creatinine 0.72 0.51 - 0.95 mg/dL    GFR Estimate >90 >60 mL/min/1.73m2    Calcium 9.4 8.6 - 10.0 mg/dL    Chloride 100 98 - 107 mmol/L    Glucose 87 70 - 99 mg/dL    Alkaline Phosphatase 83 35 - 104 U/L    AST 19 0 - 45 U/L    ALT 23 0 - 50 U/L    Protein Total 8.2 6.4 - 8.3 g/dL    Albumin 4.9 3.5 - 5.2 g/dL    Bilirubin Total 0.5 <=1.2 mg/dL   Reticulocyte count     Status: Normal   Result Value Ref Range    % Reticulocyte 0.8 0.5 - 2.0 %    Absolute Reticulocyte 0.040 0.025 - 0.095 10e6/uL   CBC with platelets and differential     Status: Abnormal   Result Value Ref Range    WBC Count 6.5 4.0 - 11.0 10e3/uL    RBC Count 5.06 3.80 - 5.20 10e6/uL    Hemoglobin 12.6 11.7 - 15.7 g/dL    Hematocrit 40.1 35.0 - 47.0 %    MCV 79 78 - 100 fL    MCH 24.9 (L) 26.5 - 33.0 pg    MCHC 31.4 (L) 31.5 - 36.5 g/dL    RDW 13.5 10.0 - 15.0 %    Platelet Count 313 150 - 450 10e3/uL    % Neutrophils 66 %    % Lymphocytes 23 %    % Monocytes 5 %    Mids % (Monos, Eos, Basos)      % Eosinophils 5 %    % Basophils 1 %    % Immature Granulocytes 0 %    NRBCs per 100 WBC 0 <1 /100    Absolute Neutrophils 4.3 1.6 -  8.3 10e3/uL    Absolute Lymphocytes 1.5 0.8 - 5.3 10e3/uL    Absolute Monocytes 0.3 0.0 - 1.3 10e3/uL    Mids Abs (Monos, Eos, Basos)      Absolute Eosinophils 0.3 0.0 - 0.7 10e3/uL    Absolute Basophils 0.0 0.0 - 0.2 10e3/uL    Absolute Immature Granulocytes 0.0 <=0.4 10e3/uL    Absolute NRBCs 0.0 10e3/uL   Factor 2 and 5 mutation analysis     Status: None   Result Value Ref Range    METHODOLOGY       The regions of genomic DNA containing the F5 gene mutation R506Q(1691G>A) and the Factor 2 (Prothrombin [id number]) gene mutation were simultaneously amplified using the polymerase chain reaction. The amplified products were digested with restriction endonuclease TaqI and products were analyzed by gel electrophoresis.        RESULTS         Factor V 1691G>A (Leiden)  RESULTS:  Mutation analyzed: 1691G>A  Factor V 1691G>A (Leiden)  Interpretation:  ABSENT  Factor V 1691G>A (Leiden) mutation  genotype:  G/G    FACTOR 2/PROTHROMBIN RESULTS:  Mutation analyzed: 66453Q>A  Factor 2 Mutation Interpretation:  ABSENT  Factor 2 Mutation genotype:  G/G        INTERPRETATION       The patient is negative for the Factor V 1691G>A (Leiden) and negative for the Factor 2 mutation.  (Electronically signed by: Yemi Anthony MD October 23, 2023 4:43 PM)      COMMENTS       If a patient is the recipient of an allogeneic bone marrow transplant, this test must be done on a pre-transplant sample or buccal swab.  A previous allogeneic bone marrow transplant will interfere with test results.  Call the [facility] ([phone]) for instructions on sample collection for these patients.      DISCLAIMER       This test was developed and its performance characteristics determined by [facility]. It has not been cleared or approved by the FDA. The laboratory is regulated under CLIA as qualified to perform high-complexity testing. This test is used for clinical purposes. It should not be regarded as  investigational or for research.    A resident/fellow in an accredited training program was involved in the selection of testing, review of laboratory data, and/or interpretation of this case.  I, as the senior physician, attest that I: (i) confirmed appropriate testing, (ii) examined the relevant raw data for the specimen(s); and (iii) rendered or confirmed the interpretation(s).        FACTOR 2 INTERPRETATION         Factor 2 Mutation Interpretation:  ABSENT      FACTOR V INTERPRETATION       Factor V 1691G>A (Leiden)  Interpretation:  ABSENT      Specimen Description       Blood: ACD     CBC with Platelets & Differential     Status: Abnormal    Narrative    The following orders were created for panel order CBC with Platelets & Differential.  Procedure                               Abnormality         Status                     ---------                               -----------         ------                     CBC with platelets and d...[[id number]]  Abnormal            Final result                 Please view results for these tests on the individual orders.         Imaging:  CTV (10/23/23)  VEINS: The inferior vena cava and bilateral iliac venous vasculature is widely patent without evidence of filling defect or thrombosis. Patent bilateral femoral venous vasculature were seen. Patent hepatic veins and portal venous vasculature. Prominent   bilateral ovarian veins and uterine varices.     ARTERIES: Normal caliber and appearance of the abdominal aorta. Patent celiac, SMA, LASHONDA, and single bilateral renal arteries. Patent visualized iliofemoral arterial vasculature. No significant arterial atherosclerotic disease.     NON ARTERIAL STRUCTURES: Solid intra-abdominal organs are unremarkable. Decompressed gallbladder. Normal caliber bowel without evidence of obstruction or inflammatory change. Normal appendix. Normal urinary bladder. No abdominopelvic lymphadenopathy.   Visualized lung bases are clear. Osseous  structures are unremarkable.                                                                      IMPRESSION:   1.  Patent IVC and visualized iliofemoral veins without evidence of thrombosis.  2.  Prominent bilateral ovarian veins and uterine varices, which may be physiologic versus suggestive of pelvic congestion in the appropriate clinical scenario.  8/6/23  There has partial luminal echogenic debris spanning approximately 2.4  cm in length within the mid/distal IVC, filling roughly 40% of the  luminal diameter. Normal IVC phasic waveforms throughout. Normal IVC  velocities.    Nonocclusive echogenic clot surrounding the right external iliac vein  line/catheter.    On the cine clips in the right upper quadrant there is an edematous  structure that is either edematous duodenum or edematous gallbladder.  Consider right upper quadrant ultrasound      Impression:  1. There is approximately 40% width luminal focal thrombus within the  mid/distal IVC, with normal phasic waveforms throughout the IVC.  2. Nonocclusive DVT involving the right external iliac vein  surrounding catheter.  3. Structure in the right upper quadrant noted on the cine images  probably edematous duodenum and/or edematous gallbladder. Right upper  quadrant ultrasound would help delineate.     EXAMINATION: DOPPLER VENOUS ULTRASOUND OF BILATERAL UPPER EXTREMITIES,  10/23/2023 11:41 AM     COMPARISON: None.    HISTORY: chronic left IJ clot on CT in August. History of sudden  cardiac death. Persistent R sided pain    TECHNIQUE: Gray-scale evaluation with compression, spectral flow, and  color Doppler assessment of the central deep venous system of both  upper extremities.    FINDINGS:  Normal blood flow and waveforms are demonstrated in the internal  jugular, innominate, subclavian, and axillary veins bilaterally.      IMPRESSION:  No evidence of central deep venous thrombosis in either upper  extremity.    I have personally reviewed the examination and  initial interpretation  and I agree with the findings.    SHAR CAPELLAN, DO         EXAMINATION: CTA pulmonary angiogram, 8/6/2023 4:05 PM    COMPARISON: CT chest abdomen and pelvis 8/5/2023    HISTORY: Cardiac arrest, indeterminate etiology, IVC thrombus noted on  ultrasound.    TECHNIQUE: Volumetric helical acquisition of CT images of the chest  from the lung apices to the kidneys were acquired after the  administration of 80 mL of Isovue-370 IV contrast. Flash technique  with free breathing acquisition. Post-processed multiplanar and/or  MIP reformations were obtained, archived to PACS and used in  interpretation of this study.    FINDINGS: Contrast bolus is: adequate. Exam is negative for acute  pulmonary embolism.    Endotracheal tube at mid thoracic aorta. Enteric tube with tip in the  partially visualized stomach.    Normal cardiac size and shape without pericardial effusion.    Reflux of contrast into the IVC and hepatic veins. Round hyperdense  material within the gallbladder, likely contrast secondary to reflux.    Bilateral small pleural effusions, left greater than right, with  associated atelectasis. Asymmetric predominant groundglass opacities.  No focal consolidative opacities.    No acute or suspicious osseous abnormalities.      IMPRESSION:  1. Exam is negative for acute pulmonary embolism.    2. Trace bilateral pleural effusions, left greater than right with  interlobular septal thickening which may be due to mild pulmonary  edema. No focal consolidative opacities concerning for pulmonary  infection.    3. Reflux of contrast into the IVC and hepatic veins may be due to  limited cardiac function.     EXAM: CT SOFT TISSUE NECK W CONTRAST  8/11/2023 4:26 PM      HISTORY: upper airway stridor/subglottic stenosis         COMPARISON: Chest CT 8/6/2023      TECHNIQUE: Following intravenous administration of nonionic iodinated  contrast medium, thin section helical CT images were obtained from the  skull  base down to the level of the aortic arch.  Axial, coronal and  sagittal reformations were performed with 2-3 mm slice thickness  reconstruction. Images were reviewed in soft tissue, lung and bone  windows.     CONTRAST: iopamidol (ISOVUE-370) solution 100 mL     FINDINGS:   No focal oral cavity, nasopharyngeal, oropharyngeal, hypopharyngeal,  or glottic mucosal space abnormality  Normal tongue base. Salivary  glands are within normal limits.     Endotracheal tube has been removed. Mild subglottic tracheal narrowing  at the level of C7-T1. Tracheal secretion/debris at the level of the  thoracic inlet.      No lymphadenopathy.     Subcentimeter right thyroid hypodense nodule requiring no further  follow-up by imaging criteria.     Chronic left jugular vein occlusion.     No suspicious osseus lesion. No high grade spinal canal stenosis.     Clear paranasal sinuses and mastoid air cells.     Clear lung apices.                                                                      IMPRESSION:   1. Status post removal of endotracheal tube with mild subglottic  tracheal narrowing at the level of C7-T1 which could explain patient's  stridor. Also tracheal secretions/debris.  2. No cervical mass or adenopathy.     I have personally reviewed the examination and initial interpretation  and I agree with the findings.

## 2023-10-18 NOTE — NURSING NOTE
Estela Fry is here for an initial visit and is being seen for history of IVC and right external iliac vein clots.    Vital signs were taken and assessed.  Allergies and medications were reviewed and updated by [facility] staff.    I introduced myself and my role and provided them with a contact card containing our information.    Provider request to touch base with patient and assist her in obtaining social work resources. She suffered cardiac arrest and has been having some ongoing psychosocial stressors.     RN spoke with patient. Estela signed a consent to communicate form for detailed voicemails and gives permission to contact spouse. Files sent to HIM.    Jennifer Beck, MS[facility], [facility], has been trying to get in contact with patient. RN offered to do a three way phone call with , patient, and writer while patient was still in clinic. She declined but did say she will reach out to Jennifer. Phone number for Jennifer Beck given to patient. Additional mental health resources given to patient as well.    The follow up plan was developed and we reviewed this plan point by point and all questions answered.  The patient verbalized understanding of  and agreement with plan.  The AVS instructions were then printed and given to the patient.     I thanked them for coming and encouraged them to call with any concerns or questions.    Nica Ugalde RN, BSN, PCCN  Nurse Clinician    [facility]  [facility] [address], [address], [place] [address]   Office, direct: [phone]  Main office number: [phone]  Pronouns: She, her, hers

## 2023-10-19 ENCOUNTER — MYC MEDICAL ADVICE (OUTPATIENT)
Dept: HEMATOLOGY | Facility: CLINIC | Age: 33
End: 2023-10-19

## 2023-10-19 LAB
AT III ACT/NOR PPP CHRO: 97 % (ref 85–135)
B2 GLYCOPROT1 IGG SERPL IA-ACNC: 1.4 U/ML
B2 GLYCOPROT1 IGM SERPL IA-ACNC: <2.4 U/ML
CARDIOLIPIN IGG SER IA-ACNC: <2 GPL-U/ML
CARDIOLIPIN IGG SER IA-ACNC: NEGATIVE
CARDIOLIPIN IGM SER IA-ACNC: 2.2 MPL-U/ML
CARDIOLIPIN IGM SER IA-ACNC: NEGATIVE
PROT C ACT/NOR PPP CHRO: 75 % (ref 70–170)
PROT S FREE AG ACT/NOR PPP IA: 79 % (ref 55–125)

## 2023-10-20 NOTE — TELEPHONE ENCOUNTER
[id number]  Estela Fry  33 year old female  CBCD Diagnosis: IVC and right external iliac vein clots  CBCD Provider:  Choco Birmingham RN had sent Estela a Bookmate message on 10/19/23 that Estela has not read. RN called Estela and went over message with her. See Bookmate message for more details. Estela verbalized understanding and agrees to plan. She will monitor for further bleeding and go to ED/call our clinic if it worsens. She will make an appointment with PCP to discuss constipation.    Nica Ugalde RN, BSN, PCCN  Nurse Clinician    [facility]  [address] [place] [address], [address], [place] [address]   Office, direct: [phone]  Main office number: [phone]  Pronouns: She, her, hers

## 2023-10-23 ENCOUNTER — TELEPHONE (OUTPATIENT)
Dept: CARDIOLOGY | Facility: CLINIC | Age: 33
End: 2023-10-23

## 2023-10-23 ENCOUNTER — ANCILLARY PROCEDURE (OUTPATIENT)
Dept: ULTRASOUND IMAGING | Facility: CLINIC | Age: 33
End: 2023-10-23
Attending: INTERNAL MEDICINE
Payer: COMMERCIAL

## 2023-10-23 DIAGNOSIS — I82.220 IVC THROMBOSIS (H): ICD-10-CM

## 2023-10-23 DIAGNOSIS — I82.C22: ICD-10-CM

## 2023-10-23 DIAGNOSIS — I50.21 ACUTE SYSTOLIC HEART FAILURE (H): Primary | ICD-10-CM

## 2023-10-23 DIAGNOSIS — I46.9 CARDIAC ARREST (H): ICD-10-CM

## 2023-10-23 PROCEDURE — 93971 EXTREMITY STUDY: CPT | Mod: GC | Performed by: RADIOLOGY

## 2023-10-23 NOTE — TELEPHONE ENCOUNTER
[facility] Call Center    Phone Message    May a detailed message be left on voicemail: yes     Reason for Call: Other: Pt requesting a call back regarding wanting to discontinue taking the gabapentin (NEURONTIN) 300 MG capsule as she states it is giving her headaches. Please return call to discuss.      Action Taken: Other: Cardiology    Travel Screening: Not Applicable    Thank you!  [facility]

## 2023-10-24 ENCOUNTER — VIRTUAL VISIT (OUTPATIENT)
Dept: PSYCHOLOGY | Facility: CLINIC | Age: 33
End: 2023-10-24
Payer: COMMERCIAL

## 2023-10-24 DIAGNOSIS — I46.9 CARDIAC ARREST (H): ICD-10-CM

## 2023-10-24 DIAGNOSIS — F32.2 CURRENT SEVERE EPISODE OF MAJOR DEPRESSIVE DISORDER WITHOUT PSYCHOTIC FEATURES WITHOUT PRIOR EPISODE (H): Primary | ICD-10-CM

## 2023-10-24 PROCEDURE — 90834 PSYTX W PT 45 MINUTES: CPT | Mod: VID | Performed by: SOCIAL WORKER

## 2023-10-24 ASSESSMENT — PATIENT HEALTH QUESTIONNAIRE - PHQ9
SUM OF ALL RESPONSES TO PHQ QUESTIONS 1-9: 7
10. IF YOU CHECKED OFF ANY PROBLEMS, HOW DIFFICULT HAVE THESE PROBLEMS MADE IT FOR YOU TO DO YOUR WORK, TAKE CARE OF THINGS AT HOME, OR GET ALONG WITH OTHER PEOPLE: SOMEWHAT DIFFICULT
SUM OF ALL RESPONSES TO PHQ QUESTIONS 1-9: 7

## 2023-10-24 NOTE — TELEPHONE ENCOUNTER
Mychart message sent to pt. Okay to stop gabapentin if pt does not want to be on it due to HA's. Was using for pain s/p ecmo.

## 2023-10-24 NOTE — PROGRESS NOTES
[facility]: Integrated Behavioral Health  2023      Behavioral Health Clinician Progress Note    Patient Name: Estela Fry           Service Type:  Consult Note      Service Location:   MyChart / Email (patient reached)     Session Start Time: 9:10am  Session End Time: 9:55am      Session Length: 38 - 52      Attendees: Patient     Service Modality:  Video Visit:      Provider verified identity through the following two step process.  Patient provided:  Patient     Telemedicine Visit: The patient's condition can be safely assessed and treated via synchronous audio and visual telemedicine encounter.      Reason for Telemedicine Visit: Patient has requested telehealth visit    Originating Site (Patient Location): Patient's home    Distant Site (Provider Location): [facility] MENTAL HEALTH & ADDI[facility]    Consent:  The patient/guardian has verbally consented to: the potential risks and benefits of telemedicine (video visit) versus in person care; bill my insurance or make self-payment for services provided; and responsibility for payment of non-covered services.     Patient would like the video invitation sent by:  My Chart    Mode of Communication:  Video Conference via [facility]    Distant Location (Provider):  On-site    As the provider I attest to compliance with applicable laws and regulations related to telemedicine.    Visit Activities (Refresh list every visit): NEW, [facility] Only, and Referral - Mental Health    Diagnostic Assessment Date: Third visit  Treatment Plan Review Date: znext visit  See Flowsheets for today's PHQ-9 and FRACISCO-7 results  Previous PHQ-9:       10/12/2023     1:58 PM 10/24/2023     9:04 AM   PHQ-9 SCORE   PHQ-9 Total Score MyChart  7 (Mild depression)   PHQ-9 Total Score 20 7     Previous FRACISCO-7:        No data to display                TABITHA LEVEL:       No data to display                DATA  Extended Session (60+  minutes): No  Interactive Complexity: No  Crisis: No  [facility] Patient: No    Treatment Objective(s) Addressed in This Session:  Target Behavior(s):  Anxiety and depression    Depressed Mood: Increase interest, engagement, and pleasure in doing things  Decrease frequency and intensity of feeling down, depressed, hopeless  Anxiety: will experience a reduction in anxiety, will develop more effective coping skills to manage anxiety symptoms, will develop healthy cognitive patterns and beliefs, and will increase ability to function adaptively    Current Stressors / Issues:  PT and yazmin met for a 1:1 too review her symptoms and MH concerns and to build rapport. PT reports that she had a cardiac arrest incident in August and ever since then her MH symptoms of increased. PT reports no concerns until this incident due to the following stressors it has created: financial, health, memory loss, and making it hard to take care of her 7 kids. PT reports she was working before she got sick and now is no able to work for at least 6 months from the incident due to needing to recovery and build strength. PT reports her  does not work right now either due to fears she will have another incident while taking care of her kids. PT reports that they are struggling with fiances- bills, rent, food (they get food stamps but have increased expenses due to cooking more culturally), and transportation concerns (only one car- PT does not know about medical cab rides). PT is struggling with not being able to work and feeling she is letting her family down, she has a defibrillator and it is a constant reminder of her health issues,  and a past issues that she refuses to talk about because in her culture they don't talk about the past. PT reports she has very little coping skills which include movies, not thinking about stressors and distraction. PT reports that she has -passive SI with thoughts to hurt herself and jump off a bridge but would  never harm herself due to her kids. PT will go to her  or mom if these feelings change. Writer validated PT feelings, asked questions and developed crisis planning with her. PT is going to work on her recovery before meeting with writer again next week.     PT would like help with food shelves, toys for tots, bills/electricity, phone, cable and internet, and more information on medical cab rides. Writer will FWD this message to her [facility] .       Progress on Treatment Objective(s) / Homework:  New Objective established this session - CONTEMPLATION (Considering change and yet undecided); Intervened by assessing the negative and positive thinking (ambivalence) about behavior change    Motivational Interviewing    MI Intervention: Expressed Empathy/Understanding, Supported Autonomy, Collaboration, Evocation, Open-ended questions, Reflections: simple and complex, and Reframe     Change Talk Expressed by the Patient: Reasons to change Need to change    Provider Response to Change Talk: E - Evoked more info from patient about behavior change, A - Affirmed patient's thoughts, decisions, or attempts at behavior change, and R - Reflected patient's change talk    Assessments completed prior to visit:  The following assessments were completed by patient for this visit:  PHQ9:       10/12/2023     1:58 PM 10/24/2023     9:04 AM   PHQ-9 SCORE   PHQ-9 Total Score MyChart  7 (Mild depression)   PHQ-9 Total Score 20 7     GAD7:        No data to display              CAGE-AID:        No data to display              PROMIS 10-Global Health (all questions and answers displayed):        No data to display              S[place]th Suicide Severity Rating Scale (Lifetime/Recent)       No data to display                Care Plan review completed: Yes    Medication Review:  Changes to psychiatric medications, see updated Medication List in EPIC. - PT is refusing to take prescribed Zoloft because she is fearful of  addiction and does not like taking medications.     Medication Compliance:  Yes    Changes in Health Issues:   Yes: Cardiac issues, Associated Psychological Distress    Chemical Use Review:   Substance Use: Chemical use reviewed, no active concerns identified      Tobacco Use: No current tobacco use.      Assessment: Current Emotional / Mental Status (status of significant symptoms):  Risk status (Self / Other harm or suicidal ideation)  Patient has had a history of PT refused to talk about  Patient denies current fears or concerns for personal safety.  Patient reports the following current or recent suicidal ideation or behaviors: passive thoughts .  Patient denies current or recent homicidal ideation or behaviors.  Patient denies current or recent self injurious behavior or ideation.  Patient denies other safety concerns.  A safety and risk management plan has been developed including: Patient consented to co-developed safety plan.  A safety and risk management plan was completed.  Patient agreed to use safety plan should any safety concerns arise.  A copy was given to the patient.    Appearance:   Appropriate   Eye Contact:   Good   Psychomotor Behavior: Normal   Attitude:   Interested Pleasant Guarded   Orientation:   All  Speech   Rate / Production: Normal/ Responsive Normal    Volume:  Normal   Mood:    Anxious  Depressed  Normal Sad  Fearful  Affect:    Appropriate  Blunted  Flat   Thought Content:  Clear  Suicidal  Thought Form:  Coherent  Logical   Insight:    Good  and Poor     Diagnoses:  1. Current severe episode of major depressive disorder without psychotic features without prior episode (H)    2. Cardiac arrest (H)        Collateral Reports Completed:  Not Applicable    Plan: (Homework, other):  Patient was given information about behavioral services and encouraged to schedule a follow up appointment with the clinic [facility] in 1 week.  She was also given information about mental health symptoms and  treatment options .  CD Recommendations: No indications of CD issues.       Michelle Larkin, [facility]    ______________________________________________________________________      Michelle Larkin, [facility]  October 24, 2023    Answers submitted by the patient for this visit:  Patient Health Questionnaire (Submitted on 10/24/2023)  If you checked off any problems, how difficult have these problems made it for you to do your work, take care of things at home, or get along with other people?: Somewhat difficult  PHQ9 TOTAL SCORE: 7

## 2023-10-25 ENCOUNTER — PATIENT OUTREACH (OUTPATIENT)
Dept: CARE COORDINATION | Facility: CLINIC | Age: 33
End: 2023-10-25
Payer: COMMERCIAL

## 2023-10-25 NOTE — PROGRESS NOTES
Social Work - Follow-Up  [facility]    Data/Intervention:    Patient Name: Estela Fry Goes By: Estela    /Age: 1990 (33 year old)    Reason for Follow-Up:  Financial resources    Collaborated With:    -Behavioral Health Clinician Michelle DIOP, [facility]  -Estela- [phone] and My Chart    Intervention/Education/Resources Provided:  I received a message from Michelle WOLFF yesterday after she met with Estela letting me know Estela would like a call from me again to discuss resources. Michelle's note indicates Estela reported financial concerns and looking for help regarding bills, rent, food (they get food stamps but have been cooking cultural food which is more expensive), and transportation (not familiar with medical cab rides).     I called Estela again this morning and the phone went straight to voicemail- voicemail left identifying myself and noting the reason for my call. I also sent Estela another My Chart message with the same information and asked her to contact me directly when she has some time to talk.     Assessment/Plan:  I will await a return call or My Chart message from Estela and provide assistance at that time.     Previously provided patient/family with writer's contact information and availability.      CHIKIS Schneider, [facility]    [facility]  Ph: [phone], Pgr: [phone]  10/25/2023

## 2023-10-30 LAB
MDC_IDC_LEAD_CONNECTION_STATUS: NORMAL
MDC_IDC_LEAD_IMPLANT_DT: NORMAL
MDC_IDC_LEAD_LOCATION: NORMAL
MDC_IDC_LEAD_LOCATION_DETAIL_1: NORMAL
MDC_IDC_LEAD_MFG: NORMAL
MDC_IDC_LEAD_MODEL: NORMAL
MDC_IDC_LEAD_POLARITY_TYPE: NORMAL
MDC_IDC_LEAD_SERIAL: NORMAL
MDC_IDC_MSMT_BATTERY_REMAINING_PERCENTAGE: 99 %
MDC_IDC_MSMT_CAP_CHARGE_TYPE: NORMAL
MDC_IDC_PG_IMPLANT_DTM: NORMAL
MDC_IDC_PG_MFG: NORMAL
MDC_IDC_PG_MODEL: NORMAL
MDC_IDC_PG_SERIAL: NORMAL
MDC_IDC_PG_TYPE: NORMAL
MDC_IDC_SESS_CLINIC_NAME: NORMAL
MDC_IDC_SESS_DTM: NORMAL
MDC_IDC_SESS_TYPE: NORMAL
MDC_IDC_SET_ZONE_DETECTION_INTERVAL: 250 MS
MDC_IDC_SET_ZONE_DETECTION_INTERVAL: 300 MS
MDC_IDC_SET_ZONE_STATUS: NORMAL
MDC_IDC_SET_ZONE_STATUS: NORMAL
MDC_IDC_SET_ZONE_TYPE: NORMAL
MDC_IDC_SET_ZONE_TYPE: NORMAL
MDC_IDC_SET_ZONE_VENDOR_TYPE: NORMAL
MDC_IDC_SET_ZONE_VENDOR_TYPE: NORMAL
MDC_IDC_STAT_EPISODE_RECENT_COUNT: 0
MDC_IDC_STAT_EPISODE_RECENT_COUNT: 0
MDC_IDC_STAT_EPISODE_TYPE: NORMAL
MDC_IDC_STAT_EPISODE_TYPE: NORMAL
MDC_IDC_STAT_EPISODE_VENDOR_TYPE: NORMAL
MDC_IDC_STAT_EPISODE_VENDOR_TYPE: NORMAL

## 2023-11-21 ENCOUNTER — OFFICE VISIT (OUTPATIENT)
Dept: CARDIOLOGY | Facility: CLINIC | Age: 33
End: 2023-11-21
Payer: COMMERCIAL

## 2023-11-21 VITALS
WEIGHT: 120.5 LBS | BODY MASS INDEX: 22.04 KG/M2 | HEART RATE: 78 BPM | OXYGEN SATURATION: 97 % | DIASTOLIC BLOOD PRESSURE: 72 MMHG | SYSTOLIC BLOOD PRESSURE: 103 MMHG

## 2023-11-21 DIAGNOSIS — I49.01 VF (VENTRICULAR FIBRILLATION) (H): ICD-10-CM

## 2023-11-21 DIAGNOSIS — Z95.810 ICD (IMPLANTABLE CARDIOVERTER-DEFIBRILLATOR) IN PLACE: ICD-10-CM

## 2023-11-21 DIAGNOSIS — I46.9 CARDIAC ARREST (H): Primary | ICD-10-CM

## 2023-11-21 PROCEDURE — 99215 OFFICE O/P EST HI 40 MIN: CPT

## 2023-11-21 PROCEDURE — G0463 HOSPITAL OUTPT CLINIC VISIT: HCPCS

## 2023-11-21 ASSESSMENT — PAIN SCALES - GENERAL: PAINLEVEL: NO PAIN (0)

## 2023-11-21 NOTE — PATIENT INSTRUCTIONS
You were seen in the Electrophysiology Clinic today by: Michelle ELIZABETH    Plan:     Follow up Visit:  6 months with device check prior      If you have further questions, please utilize DraftDay to contact us.     Your Care Team:    EP Cardiology   Telephone Number     Nurse Line  Izabel Neff, RN   Latha Crouch, RN  Garcia Dent, TAM   [phone]     For scheduling appointments:   Ashvin   [phone]   For procedure scheduling:    Paulina Nayak     [phone]   For the Device Clinic (Pacemakers, ICDs, Loop Recorders)    During business hours: [phone]  After business hours:   [phone]- select option 4 and ask for job code [id number].       On-call cardiologist for after hours or on weekends:   [phone], option #4, and ask to speak to the on-call cardiologist.     Cardiovascular Clinic:   [address] [place] [address]. [place] [address]      As always, Thank you for trusting us with your health care needs!

## 2023-11-21 NOTE — LETTER
11/21/2023      RE: Estela Fry  [address] [place] [address]       Dear Colleague,    Thank you for the opportunity to participate in the care of your patient, Estela Fry, at the [facility]. Please see a copy of my visit note below.        ELECTROPHYSIOLOGY CLINIC VISIT    Assessment/Recommendations   Assessment/Plan:    Estela Fry is a 33 year old female with past medical history significant for VF arrest (unknown etiology) s/p secondary prevention SICD implant 8/15/23, provoked right external iliac DVT.     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 SICD implanted given no reversible cause identified.    - Device check today with normal device function, stable impedance (70 today, 50 at implant) and no arrhythmias   - She and her  have had significant anxiety following discharge, she has been doing better since she has been connected with further resources and moved in with her husbands parents. Had long discussion on slowly returning to her normal daily activities    - She would like to start cardiac rehab, per insurance this would be out of pocket, will work to see if there are ways around this    - She is scheduled for genetic testing and social work follow up in December    Continue routine device follow up.   Follow up in 6 months.      History of Present Illness/Subjective    Ms. Estela Fry is a 33 year old female who comes in today for EP follow-up of VF arrest s/p SICD implant.    The patient is a 33 year old female who was found down on 8/5/2023 by family, EMS found her to be in VF, shocked x 3 with ROSC. Normal LVEF, no CAD, drug screen negative. No family history of SCD, not peripartum. She underwent SICD implant. SICD was chosen because she and her family have cultural issues with  implantable devices in her body, and SICD was an acceptable alternative for them. She was discharged on 8/16/2023. She had her 1 week follow up in device clinic on 8/22/2023.     She was seen by Dr PEG Johnson 10/12/23 with her . They had been in emotional distress regarding her arrest and adjusting to things at home. Her  had lost his job d/t being worried about staying home with her. They were looking for support regarding the above mentioned issues.     She presents today for follow up. Since she was last seen, she has been seen by the critical care team and social work which has been helpful. She was also seen by hematology, they would like her to continue xarelto as long as it has been tolerated. 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 Her and her family have moved in with her husbands parents. Her  is thinking about looking for a job now, since his parents will be home. She has continued anxiety regarding any physical activity, she walks around the house but has not tried more exertion then walking. She was going to start cardiac rehab, but reports her insurance said this cost would be out of pocket. She still has some occasional chest and incisional discomfort. She denies palpitations, peripheral edema, shortness of breath, pre-syncope, or syncope. Device interrogation shows normal device function, stable impedance (70 today, 50 at implant) and no arrhythmias.     I have reviewed and updated the patient's Past Medical History, Social History, Family History and Medication List.     Cardiographics (Personally Reviewed) :   Coronary Angiogram: 8/5/23   No significant coronary artery disease.     CMR: 8/10/23  1. The LV is normal in cavity size and wall thickness. The global systolic function is normal. The LVEF is  64%. There are no regional wall motion abnormalities.  2. The RV is normal in cavity size. The global systolic function is normal. The RVEF is 59%.   3. Both atria are normal  in size.  4. There is no significant valvular disease.   5. Late gadolinium enhancement imaging shows no MI, fibrosis or infiltrative disease.   6. There is trivial pericardial effusion.  7. There is no intracardiac thrombus.  CONCLUSIONS: Normal cardiac function, LVEF of 64% and RVEF of 59%. There is no evidence of myocardial  fibrosis, given no fibrosis consider genetic evaluation for the etiology of her cardiac arrest.        Physical Examination   /72 (BP Location: Right arm, Patient Position: Chair, Cuff Size: Adult Large)   Pulse 78   Wt 54.7 kg (120 lb 8 oz)   SpO2 97%   BMI 22.04 kg/m    Wt Readings from Last 3 Encounters:   11/21/23 54.7 kg (120 lb 8 oz)   10/18/23 53 kg (116 lb 12.8 oz)   10/16/23 53.7 kg (118 lb 6.4 oz)     General Appearance:   Alert, well-appearing and in no acute distress.   HEENT: Atraumatic, normocephalic. MMM.   Chest/Lungs:   Respirations unlabored.  Lungs are clear to auscultation.   Cardiovascular:   Regular rate and rhythm.  S1/S2. No murmur.    Abdomen:  Soft, nontender, nondistended.   Extremities: No cyanosis or clubbing. No edema.    Musculoskeletal: Moves all extremities.     Skin: Warm, dry, intact.    Neurologic: Mood and affect are appropriate.  Alert and oriented to person, place, time, and situation.          Medications  Allergies   Xarelto 20 mg daily     Zoloft   Gabapentin (not currently taking)   No Known Allergies      Lab Results (Personally Reviewed)    Chemistry/lipid CBC Cardiac Enzymes/BNP/TSH/INR   Lab Results   Component Value Date    BUN 10.4 10/18/2023     10/18/2023    CO2 30 (H) 10/18/2023     Creatinine   Date Value Ref Range Status   10/18/2023 0.72 0.51 - 0.95 mg/dL Final       Lab Results   Component Value Date    CHOL 115 08/05/2023    HDL 61 08/05/2023    LDL 40 08/05/2023      Lab Results   Component Value Date    WBC 6.5 10/18/2023    HGB 12.6 10/18/2023    HCT 40.1 10/18/2023    MCV 79 10/18/2023     10/18/2023    Lab  Results   Component Value Date    TSH 3.24 08/05/2023    INR 1.37 (H) 08/11/2023        The patient states understanding and is agreeable with the plan.     Michelle Kimble PA-C  [facility]  Electrophysiology Consult Service  Pager: [phone]    I spent a total of 30 minutes face to face with Estela Fry during today's office visit. I have spent an additional 20 minutes today on chart review and documentation.

## 2023-11-21 NOTE — PROGRESS NOTES
ELECTROPHYSIOLOGY CLINIC VISIT    Assessment/Recommendations   Assessment/Plan:    Estela Fry is a 33 year old female with past medical history significant for VF arrest (unknown etiology) s/p secondary prevention SICD implant 8/15/23, provoked right external iliac DVT.     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 SICD implanted given no reversible cause identified.    - Device check today with normal device function, stable impedance (70 today, 50 at implant) and no arrhythmias   - She and her  have had significant anxiety following discharge, she has been doing better since she has been connected with further resources and moved in with her husbands parents. Had long discussion on slowly returning to her normal daily activities    - She would like to start cardiac rehab, per insurance this would be out of pocket, will work to see if there are ways around this    - She is scheduled for genetic testing and social work follow up in December    Continue routine device follow up.   Follow up in 6 months.      History of Present Illness/Subjective    Ms. Estela Fry is a 33 year old female who comes in today for EP follow-up of VF arrest s/p SICD implant.    The patient is a 33 year old female who was found down on 8/5/2023 by family, EMS found her to be in VF, shocked x 3 with ROSC. Normal LVEF, no CAD, drug screen negative. No family history of SCD, not peripartum. She underwent SICD implant. SICD was chosen because she and her family have cultural issues with implantable devices in her body, and SICD was an acceptable alternative for them. She was discharged on 8/16/2023. She had her 1 week follow up in device clinic on 8/22/2023.     She was seen by Dr PEG Johnson 10/12/23 with her . They had been in emotional distress regarding her arrest and adjusting to things at home. Her   had lost his job d/t being worried about staying home with her. They were looking for support regarding the above mentioned issues.     She presents today for follow up. Since she was last seen, she has been seen by the critical care team and social work which has been helpful. She was also seen by hematology, they would like her to continue xarelto as long as it has been tolerated. 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 Her and her family have moved in with her husbands parents. Her  is thinking about looking for a job now, since his parents will be home. She has continued anxiety regarding any physical activity, she walks around the house but has not tried more exertion then walking. She was going to start cardiac rehab, but reports her insurance said this cost would be out of pocket. She still has some occasional chest and incisional discomfort. She denies palpitations, peripheral edema, shortness of breath, pre-syncope, or syncope. Device interrogation shows normal device function, stable impedance (70 today, 50 at implant) and no arrhythmias.     I have reviewed and updated the patient's Past Medical History, Social History, Family History and Medication List.     Cardiographics (Personally Reviewed) :   Coronary Angiogram: 8/5/23   No significant coronary artery disease.     CMR: 8/10/23  1. The LV is normal in cavity size and wall thickness. The global systolic function is normal. The LVEF is  64%. There are no regional wall motion abnormalities.  2. The RV is normal in cavity size. The global systolic function is normal. The RVEF is 59%.   3. Both atria are normal in size.  4. There is no significant valvular disease.   5. Late gadolinium enhancement imaging shows no MI, fibrosis or infiltrative disease.   6. There is trivial pericardial effusion.  7. There is no intracardiac thrombus.  CONCLUSIONS: Normal cardiac function, LVEF of 64% and RVEF of 59%. There is no evidence of  myocardial  fibrosis, given no fibrosis consider genetic evaluation for the etiology of her cardiac arrest.        Physical Examination   /72 (BP Location: Right arm, Patient Position: Chair, Cuff Size: Adult Large)   Pulse 78   Wt 54.7 kg (120 lb 8 oz)   SpO2 97%   BMI 22.04 kg/m    Wt Readings from Last 3 Encounters:   11/21/23 54.7 kg (120 lb 8 oz)   10/18/23 53 kg (116 lb 12.8 oz)   10/16/23 53.7 kg (118 lb 6.4 oz)     General Appearance:   Alert, well-appearing and in no acute distress.   HEENT: Atraumatic, normocephalic. MMM.   Chest/Lungs:   Respirations unlabored.  Lungs are clear to auscultation.   Cardiovascular:   Regular rate and rhythm.  S1/S2. No murmur.    Abdomen:  Soft, nontender, nondistended.   Extremities: No cyanosis or clubbing. No edema.    Musculoskeletal: Moves all extremities.     Skin: Warm, dry, intact.    Neurologic: Mood and affect are appropriate.  Alert and oriented to person, place, time, and situation.          Medications  Allergies   Xarelto 20 mg daily     Zoloft   Gabapentin (not currently taking)   No Known Allergies      Lab Results (Personally Reviewed)    Chemistry/lipid CBC Cardiac Enzymes/BNP/TSH/INR   Lab Results   Component Value Date    BUN 10.4 10/18/2023     10/18/2023    CO2 30 (H) 10/18/2023     Creatinine   Date Value Ref Range Status   10/18/2023 0.72 0.51 - 0.95 mg/dL Final       Lab Results   Component Value Date    CHOL 115 08/05/2023    HDL 61 08/05/2023    LDL 40 08/05/2023      Lab Results   Component Value Date    WBC 6.5 10/18/2023    HGB 12.6 10/18/2023    HCT 40.1 10/18/2023    MCV 79 10/18/2023     10/18/2023    Lab Results   Component Value Date    TSH 3.24 08/05/2023    INR 1.37 (H) 08/11/2023        The patient states understanding and is agreeable with the plan.     Michelle Kimble PA-C  [facility]  Electrophysiology Consult Service  Pager: [phone]    I spent a total of 30 minutes face to face with Estela  Lisandra during today's office visit. I have spent an additional 20 minutes today on chart review and documentation.

## 2023-11-21 NOTE — NURSING NOTE
Chief Complaint   Patient presents with    Follow Up     1 mo follow up for vf/icd     Vitals were taken and medications reconciled.    Tain Daniel, EMT  11:34 AM

## 2023-12-12 DIAGNOSIS — I21.4 NSTEMI (NON-ST ELEVATED MYOCARDIAL INFARCTION) (H): Primary | ICD-10-CM

## 2023-12-28 ENCOUNTER — ANCILLARY PROCEDURE (OUTPATIENT)
Dept: CARDIOLOGY | Facility: CLINIC | Age: 33
End: 2023-12-28
Attending: INTERNAL MEDICINE
Payer: COMMERCIAL

## 2023-12-28 DIAGNOSIS — I46.9 CARDIAC ARREST (H): ICD-10-CM

## 2023-12-28 PROCEDURE — 93282 PRGRMG EVAL IMPLANTABLE DFB: CPT | Performed by: INTERNAL MEDICINE

## 2024-01-15 ENCOUNTER — TRANSFERRED RECORDS (OUTPATIENT)
Dept: HEALTH INFORMATION MANAGEMENT | Facility: CLINIC | Age: 34
End: 2024-01-15
Payer: COMMERCIAL

## 2024-01-15 ENCOUNTER — NURSE TRIAGE (OUTPATIENT)
Dept: NURSING | Facility: CLINIC | Age: 34
End: 2024-01-15
Payer: COMMERCIAL

## 2024-01-16 ENCOUNTER — TELEPHONE (OUTPATIENT)
Dept: CARDIOLOGY | Facility: CLINIC | Age: 34
End: 2024-01-16
Payer: COMMERCIAL

## 2024-01-16 NOTE — TELEPHONE ENCOUNTER
Nurse Triage SBAR    Is this a 2nd Level Triage? NO    Situation: Medical question    Background: Patient in the hospital currently at [facility]; says she has a defibrillator and it shocked patient 4 times today; her heart is rapid, what should she do now? Wondering why she would be shocked when she was feeling normal?     Assessment: Patient currently at the hospital    Protocol Recommended Disposition:   Home Care - Information or Advice Only Call    Recommendation: Patient calling from the hospital; advised that patient should discuss these questions with the physician caring for her as they will have the most accurate information regarding her current state - they will be monitoring cardiac rhythm and running tests and should be able to better answer these questions at this time.  Also encouraged patient to discuss with her primary cardiologist if she is feeling she would like a second opinion. Patient will call office tomorrow.    Mae Chaudhary RN on 1/15/2024 at 9:47 PM      Reason for Disposition   General information question, no triage required and triager able to answer question    Protocols used: Information Only Call - No Triage-A-

## 2024-01-16 NOTE — TELEPHONE ENCOUNTER
Health Call Center    Phone Message    May a detailed message be left on voicemail: yes     Reason for Call: Other: Patient called to schedule a return critical care appt with . Please call back to further coordinate.     Action Taken: Message routed to:  Other: Cardiology    Travel Screening: Not Applicable    Thank you!  [facility]

## 2024-01-18 ENCOUNTER — ANCILLARY PROCEDURE (OUTPATIENT)
Dept: CARDIOLOGY | Facility: CLINIC | Age: 34
End: 2024-01-18
Attending: INTERNAL MEDICINE
Payer: COMMERCIAL

## 2024-01-18 ENCOUNTER — OFFICE VISIT (OUTPATIENT)
Dept: CARDIOLOGY | Facility: CLINIC | Age: 34
End: 2024-01-18
Payer: COMMERCIAL

## 2024-01-18 VITALS
DIASTOLIC BLOOD PRESSURE: 74 MMHG | SYSTOLIC BLOOD PRESSURE: 108 MMHG | BODY MASS INDEX: 22.55 KG/M2 | OXYGEN SATURATION: 97 % | HEART RATE: 94 BPM | WEIGHT: 123.3 LBS

## 2024-01-18 DIAGNOSIS — Z95.810 ICD (IMPLANTABLE CARDIOVERTER-DEFIBRILLATOR) IN PLACE: Primary | ICD-10-CM

## 2024-01-18 DIAGNOSIS — I46.9 CARDIAC ARREST (H): ICD-10-CM

## 2024-01-18 PROCEDURE — 93261 INTERROGATE SUBQ DEFIB: CPT | Performed by: INTERNAL MEDICINE

## 2024-01-18 PROCEDURE — G0463 HOSPITAL OUTPT CLINIC VISIT: HCPCS

## 2024-01-18 PROCEDURE — 99215 OFFICE O/P EST HI 40 MIN: CPT | Mod: 25

## 2024-01-18 RX ORDER — SOTALOL HYDROCHLORIDE 80 MG/1
80 TABLET ORAL 2 TIMES DAILY
Qty: 90 TABLET | Refills: 1 | Status: SHIPPED | OUTPATIENT
Start: 2024-01-18 | End: 2024-04-15

## 2024-01-18 ASSESSMENT — PAIN SCALES - GENERAL: PAINLEVEL: MILD PAIN (3)

## 2024-01-18 NOTE — NURSING NOTE
Chief Complaint   Patient presents with    Follow Up     2 mo follow up/ post ED visit       Vitals were taken, medications reviewed.    Carol Beal, EMT   3:21 PM

## 2024-01-18 NOTE — PATIENT INSTRUCTIONS
You were seen in the Electrophysiology Clinic today by: Michelle ELIZABETH    Plan:   Medication Changes:   Start- Sotalol 80 mg twice a day      Follow up Visit:  February 1st with Dr Johnson, with device check prior       If you have further questions, please utilize [employer] to contact us.     Your Care Team:    EP Cardiology   Telephone Number     Nurse Line  Izabel Neff, RN   Latha Crouch, RN  Garcia Dent RN   [phone]     For scheduling appointments:   Ashvin   [phone]   For procedure scheduling:    Paulina Nayak     [phone]   For the Device Clinic (Pacemakers, ICDs, Loop Recorders)    During business hours: [phone]  After business hours:   [phone]- select option 4 and ask for job code [id number].       On-call cardiologist for after hours or on weekends:   [phone], option #4, and ask to speak to the on-call cardiologist.     Cardiovascular Clinic:   [address] [place] [address]. [place] [address]      As always, Thank you for trusting us with your health care needs!

## 2024-01-18 NOTE — PATIENT INSTRUCTIONS
It was a pleasure to see you in clinic today.  Please do not hesitate to call with any questions or concerns.  You are scheduled for a remote transmission from home on 4/23/2024.      Leighann Mendosa, RN, MS, CCRN  Electrophysiology Nurse Clinician  [facility]    During Business Hours Please Call:  [phone]  After Hours Please Call:  [phone] - select option #4 and ask for job code [phone]

## 2024-01-18 NOTE — LETTER
1/18/2024      RE: Estela Fry  [address] [place] [address]       Dear Colleague,    Thank you for the opportunity to participate in the care of your patient, Estela Fry, at the [facility]. Please see a copy of my visit note below.        ELECTROPHYSIOLOGY CLINIC VISIT    Assessment/Recommendations   Assessment/Plan:    Estela rFy is a 33 year old female with past medical history significant for VF arrest (unknown etiology) s/p secondary prevention SICD implant 8/15/23, provoked right external iliac DVT.      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 SICD implanted given no reversible cause identified.    - Device check today with normal device function, stable impedance, 3 ICD shocks delivered for AFL at a rate of 200-240 bpm    - She has had significant fear about receiving more shocks in the future. She has been having a hard time sleeping at night due to fear of receiving another shock. She saw psychotherapist in Oct, they recommended 1 week follow up. It does not appear she followed up with them or has met with social work, I encouraged follow up with both of these resources.    - She is scheduled for genetic testing in February      Atrial Flutter, new  Patient with new atrial flutter 1/15 at rate of 200-240 bpm resulting in ICD shockx3. Presented to [facility], she was given adenosine x3 without success. Underwent successful cardioversion. Of note, she did have similar atrial arrhythmia 8/5 and 8/6 during admission. Today, discussed initiation of AAD medication to avoid further recurrence of atrial arrhythmias and subsequent shocks. She is agreeable to trying a medication, will start sotalol 80 mg bid. Prior ECGs with sinus rates 70-80s, baseline Qtc 415-460 ms. CrCl 85ml/min. Will arrange  follow up in 2 weeks for ECG and reassessment.     Follow up in 2 weeks.      History of Present Illness/Subjective    Ms. Estela Fry is a 33 year old female who comes in today for EP follow-up of SICD, new atrial flutter.    The patient is a 33 year old female who was found down on 8/5/2023 by family, EMS found her to be in VF, shocked x 3 with ROSC. Normal LVEF, no CAD, drug screen negative. No family history of SCD, not peripartum. She underwent SICD implant. SICD was chosen because she and her family have cultural issues with implantable devices in her body, and SICD was an acceptable alternative for them. She was discharged on 8/16/2023. She had her 1 week follow up in device clinic on 8/22/2023.      She was seen by Dr PEG Johnson 10/12/23 with her . They had been in emotional distress regarding her arrest and adjusting to things at home. Her  had lost his job d/t being worried about staying home with her. They were looking for support regarding the above mentioned issues.      I met her in clinic 11/21/23, she had been seen by the critical care team and social work which had been helpful. She was also seen by hematology, they would like her to continue xarelto as long as it has been tolerated. 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 Her and her family have moved in with her husbands parents. Her  is thinking about looking for a job now, since his parents will be home. She has continued anxiety regarding any physical activity, she walks around the house but has not tried more exertion then walking. She was going to start cardiac rehab, but reports her insurance said this cost would be out of pocket. She still has some occasional chest and incisional discomfort. She denies palpitations, peripheral edema, shortness of breath, pre-syncope, or syncope. Device interrogation shows normal device function, stable impedance (70 today, 50 at implant) and no arrhythmias.     She presented to  [facility] on 1/15/24 with palpitations, reported her ICD had fired about 4 times. When EMS arrived HR reportedly in 170s, attempted vagal maneuvers but were unsuccessful. ECG on arrival reportedly consistent with SVT, adenosine x3 (6mg, 12mg and 18mg) tried without successful. She then underwent cardioversion with successful conversion to sinus rhythm.   She presents today for follow up after receiving shocks. She tells me she was helping her kids take a bath when her heart started to race, she told her , went to have a drink of water, and then received the first shock and fell to the ground. Her  then called EMS. Since discharge from ER she has not had any further palpitations or symptoms. She has had significant fear about receiving more shocks in the future. She has been having a hard time sleeping at night due to fear of receiving another shock. She feels as if she would rather accept her risk of SCD then have the ICD and fear of getting further shocks. She feels she is unable to care for her kids due to risk of receiving another shock while holding them.     I have reviewed and updated the patient's Past Medical History, Social History, Family History and Medication List.     Cardiographics (Personally Reviewed) :   Coronary Angiogram: 8/5/23   No significant coronary artery disease.     CMR: 8/10/23  1. The LV is normal in cavity size and wall thickness. The global systolic function is normal. The LVEF is  64%. There are no regional wall motion abnormalities.  2. The RV is normal in cavity size. The global systolic function is normal. The RVEF is 59%.   3. Both atria are normal in size.  4. There is no significant valvular disease.   5. Late gadolinium enhancement imaging shows no MI, fibrosis or infiltrative disease.   6. There is trivial pericardial effusion.  7. There is no intracardiac thrombus.  CONCLUSIONS: Normal cardiac function, LVEF of 64% and RVEF of 59%. There is no evidence of  myocardial  fibrosis, given no fibrosis consider genetic evaluation for the etiology of her cardiac arrest.     EK/15/24      EK23        EK23       Physical Examination   /74 (BP Location: Right arm, Patient Position: Sitting, Cuff Size: Adult Regular)   Pulse 94   Wt 55.9 kg (123 lb 4.8 oz)   SpO2 97%   BMI 22.55 kg/m    Wt Readings from Last 3 Encounters:   24 55.9 kg (123 lb 4.8 oz)   23 54.7 kg (120 lb 8 oz)   10/18/23 53 kg (116 lb 12.8 oz)     General Appearance:   Alert, well-appearing and in no acute distress.   HEENT: Atraumatic, normocephalic. MMM.   Chest/Lungs:   Respirations unlabored.  Lungs are clear to auscultation.   Cardiovascular:   Regular rate and rhythm.  S1/S2. No murmur.    Abdomen:  Soft, nontender, nondistended.   Extremities: No cyanosis or clubbing. No edema.    Musculoskeletal: Moves all extremities.     Skin: Warm, dry, intact.    Neurologic: Mood and affect are appropriate.  Alert and oriented to person, place, time, and situation.          Medications  Allergies   Xarelto 20 mg daily      No Known Allergies      Lab Results (Personally Reviewed)    Chemistry/lipid CBC Cardiac Enzymes/BNP/TSH/INR   Lab Results   Component Value Date    BUN 10.4 10/18/2023     10/18/2023    CO2 30 (H) 10/18/2023     Creatinine   Date Value Ref Range Status   10/18/2023 0.72 0.51 - 0.95 mg/dL Final       Lab Results   Component Value Date    CHOL 115 2023    HDL 61 2023    LDL 40 2023      Lab Results   Component Value Date    WBC 6.5 10/18/2023    HGB 12.6 10/18/2023    HCT 40.1 10/18/2023    MCV 79 10/18/2023     10/18/2023    Lab Results   Component Value Date    TSH 3.24 2023    INR 1.37 (H) 2023        The patient states understanding and is agreeable with the plan.   Discussed plan of care with Dr PEG Johnson.     Michelle Kimble PA-C  [facility]  Electrophysiology Consult Service  Pager:  [phone]    I spent a total of 20 minutes face to face with Estela Fry during today's office visit. I have spent an additional 20 minutes today on chart review and documentation.        Please do not hesitate to contact me if you have any questions/concerns.     Sincerely,     Michelle Kimble PA-C

## 2024-01-19 ENCOUNTER — TELEPHONE (OUTPATIENT)
Dept: CARDIOLOGY | Facility: CLINIC | Age: 34
End: 2024-01-19
Payer: COMMERCIAL

## 2024-01-19 NOTE — TELEPHONE ENCOUNTER
Called pt, pt concerned after reading the warnings about the medication. She did start it this morning around 820 anyi, she states she did get a little lightheaded about 15 minutes after taking it, but it did subside and she has felt fine since.     Advised her that we use this medication quite frequently and it is safe. Advised her to make sure she keeps up with her fluids and perhaps try eating before taking the Sotalol. Advised her to check heart rate and blood pressure if she can if she does experience side effects with continued use and to let us know. Patient verbalized understanding, agreed with plan and denied any further questions. Izabel Neff RN on 1/19/2024 at 11:26 AM

## 2024-01-19 NOTE — TELEPHONE ENCOUNTER
[facility] Call Center    Phone Message    May a detailed message be left on voicemail: yes     Reason for Call: Other: Pt would like a all back to discuss her sotalol as she has a few questions regarding this medication      Action Taken: Other: Cardio    Travel Screening: Not Applicable

## 2024-01-22 LAB
MDC_IDC_EPISODE_DTM: NORMAL
MDC_IDC_EPISODE_DURATION: 355 S
MDC_IDC_EPISODE_ID: 1
MDC_IDC_EPISODE_TYPE: NORMAL
MDC_IDC_EPISODE_TYPE_INDUCED: NO
MDC_IDC_EPISODE_VENDOR_TYPE: NORMAL
MDC_IDC_LEAD_CONNECTION_STATUS: NORMAL
MDC_IDC_LEAD_IMPLANT_DT: NORMAL
MDC_IDC_LEAD_LOCATION: NORMAL
MDC_IDC_LEAD_LOCATION_DETAIL_1: NORMAL
MDC_IDC_LEAD_MFG: NORMAL
MDC_IDC_LEAD_MODEL: NORMAL
MDC_IDC_LEAD_POLARITY_TYPE: NORMAL
MDC_IDC_LEAD_SERIAL: NORMAL
MDC_IDC_MSMT_BATTERY_DTM: NORMAL
MDC_IDC_MSMT_BATTERY_REMAINING_PERCENTAGE: 92 %
MDC_IDC_MSMT_BATTERY_STATUS: NORMAL
MDC_IDC_PG_IMPLANT_DTM: NORMAL
MDC_IDC_PG_MFG: NORMAL
MDC_IDC_PG_MODEL: NORMAL
MDC_IDC_PG_SERIAL: NORMAL
MDC_IDC_PG_TYPE: NORMAL
MDC_IDC_SESS_CLINIC_NAME: NORMAL
MDC_IDC_SESS_DTM: NORMAL
MDC_IDC_SESS_TYPE: NORMAL
MDC_IDC_SET_ZONE_DETECTION_INTERVAL: 250 MS
MDC_IDC_SET_ZONE_DETECTION_INTERVAL: 300 MS
MDC_IDC_SET_ZONE_STATUS: NORMAL
MDC_IDC_SET_ZONE_STATUS: NORMAL
MDC_IDC_SET_ZONE_TYPE: NORMAL
MDC_IDC_SET_ZONE_VENDOR_TYPE: NORMAL
MDC_IDC_STAT_AT_BURDEN_PERCENT: 0 %
MDC_IDC_STAT_EPISODE_RECENT_COUNT: 0
MDC_IDC_STAT_EPISODE_RECENT_COUNT: 0
MDC_IDC_STAT_EPISODE_RECENT_COUNT: 1
MDC_IDC_STAT_EPISODE_RECENT_COUNT_DTM_END: NORMAL
MDC_IDC_STAT_EPISODE_RECENT_COUNT_DTM_START: NORMAL
MDC_IDC_STAT_EPISODE_TOTAL_COUNT: 0
MDC_IDC_STAT_EPISODE_TOTAL_COUNT_DTM_END: NORMAL
MDC_IDC_STAT_EPISODE_TOTAL_COUNT_DTM_START: NORMAL
MDC_IDC_STAT_EPISODE_TYPE: NORMAL
MDC_IDC_STAT_EPISODE_VENDOR_TYPE: NORMAL
MDC_IDC_STAT_EPISODE_VENDOR_TYPE: NORMAL
MDC_IDC_STAT_TACHYTHERAPY_RECENT_DTM_END: NORMAL
MDC_IDC_STAT_TACHYTHERAPY_RECENT_DTM_START: NORMAL
MDC_IDC_STAT_TACHYTHERAPY_SHOCKS_DELIVERED_RECENT: 3
MDC_IDC_STAT_TACHYTHERAPY_SHOCKS_DELIVERED_TOTAL: 3
MDC_IDC_STAT_TACHYTHERAPY_TOTAL_DTM_END: NORMAL
MDC_IDC_STAT_TACHYTHERAPY_TOTAL_DTM_START: NORMAL

## 2024-01-28 ENCOUNTER — MYC REFILL (OUTPATIENT)
Dept: HEMATOLOGY | Facility: CLINIC | Age: 34
End: 2024-01-28
Payer: COMMERCIAL

## 2024-01-28 DIAGNOSIS — I46.9 CARDIAC ARREST (H): ICD-10-CM

## 2024-01-30 NOTE — TELEPHONE ENCOUNTER
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     They were last evaluated by our team on 10/18 with the plan to  remain on 20 mg of Xarelto daily and RTC in six months .    Prescription updated and refills given.     This message will not be routed to  for scheduling. Patient is scheduled to see Dr. Wilhelm on 4/17/24.      Nica Ugalde RN, BSN, PCCN  Nurse Clinician    [facility]  [address] [place] [address], [facility] [address] [address]   Office, direct: [phone]  Main office number: [phone]  Pronouns: She, her, hers

## 2024-02-01 ENCOUNTER — OFFICE VISIT (OUTPATIENT)
Dept: CARDIOLOGY | Facility: CLINIC | Age: 34
End: 2024-02-01
Payer: COMMERCIAL

## 2024-02-01 VITALS
HEART RATE: 67 BPM | WEIGHT: 120.7 LBS | SYSTOLIC BLOOD PRESSURE: 103 MMHG | DIASTOLIC BLOOD PRESSURE: 68 MMHG | BODY MASS INDEX: 22.08 KG/M2 | OXYGEN SATURATION: 99 %

## 2024-02-01 DIAGNOSIS — I46.9 CARDIAC ARREST (H): ICD-10-CM

## 2024-02-01 DIAGNOSIS — Z95.810 ICD (IMPLANTABLE CARDIOVERTER-DEFIBRILLATOR) IN PLACE: ICD-10-CM

## 2024-02-01 DIAGNOSIS — I49.01 VF (VENTRICULAR FIBRILLATION) (H): ICD-10-CM

## 2024-02-01 DIAGNOSIS — Z51.81 ENCOUNTER FOR MONITORING SOTALOL THERAPY: Primary | ICD-10-CM

## 2024-02-01 DIAGNOSIS — Z79.899 ENCOUNTER FOR MONITORING SOTALOL THERAPY: Primary | ICD-10-CM

## 2024-02-01 DIAGNOSIS — I48.92 ATRIAL FLUTTER, UNSPECIFIED TYPE (H): ICD-10-CM

## 2024-02-01 PROCEDURE — 99214 OFFICE O/P EST MOD 30 MIN: CPT | Performed by: INTERNAL MEDICINE

## 2024-02-01 PROCEDURE — 93005 ELECTROCARDIOGRAM TRACING: CPT

## 2024-02-01 PROCEDURE — 93010 ELECTROCARDIOGRAM REPORT: CPT | Performed by: INTERNAL MEDICINE

## 2024-02-01 PROCEDURE — G0463 HOSPITAL OUTPT CLINIC VISIT: HCPCS | Performed by: INTERNAL MEDICINE

## 2024-02-01 ASSESSMENT — PAIN SCALES - GENERAL: PAINLEVEL: MILD PAIN (3)

## 2024-02-01 NOTE — PROGRESS NOTES
I am delighted to see Estela Fry as a new patient in cardiology clinic for evaluation of     History of Present Illness:  The patient is a 34 year old  female          Past Medical History:         Medications:         Allergies:  No Known Allergies      Family History: ***    Psychosocial history: ***  Smoke:  Alcohol:  Recreational drugs;    Pertinent review of systems in additional to listed in HPI:***      Physical examination  Vitals: There were no vitals taken for this visit.  BMI= There is no height or weight on file to calculate BMI.    Constitutional: In general, the patient is a pleasant female in no acute distress.    Cardiovascular: Carotids +2/2 bilaterally without bruits.  No jugular venous distension. Regular rate and rhythm. Normal S1, S2. No murmur, rub, click, or gallop.   Extremities: Pulses are normal bilaterally throughout. No peripheral edema.  Respiratory: Clear to asculation.  No ronchi, wheezes, rales.  No dullness to percussion.   Chest area (*** if device in place)        I have reviewed records from ****. Relevant findings are:        I have personally and independently reviewed the following:  Labs:     Echo:    Angiogram:    Stress test:    EKG:     Patch monitor:     Device interrogation:        Assessment :  ***        Plan:  ***    The following tests will be ordered at this visit:                      I spent a total of 30 minutes face to face with  Estela Fry during today's office visit. I have spend an additional *** minutes today on chart review and documentation.    (Level 5 - 60 minutes; level 4 - 45 minutes)    The patient is to return as above . The patient understood the treatment plan as outlined above.  There were no barriers to learning.      Eula Johnson MD   {PROVIDER CHARTING PREFERENCE:301310}    Subjective   Estela is a 34 year old, presenting for the following health issues:  No chief complaint on file.    @[facility]@     ***    {ROS Picklists (Optional):219785}       Objective    There were no vitals taken for this visit.  There is no height or weight on file to calculate BMI.  [unfilled]   {Exam List (Optional):641155}    {Diagnostic Test Results (Optional):148792}        Signed Electronically by: Eula Johnson MD  {Email feedback regarding this note to [email or web address]   :[id number]}I am delighted to see Estela Fry for follow up of ***     The patient is a 34 year old  female with ***    4+ details              Past Medical History:          Allergies:  No Known Allergies    Medications:         Physical examination  Vitals: There were no vitals taken for this visit.  BMI= There is no height or weight on file to calculate BMI.    Constitutional: In general, the patient is a pleasant female in no acute distress.    Targeted cardiovascular exam:  Regular rate and rhythm. Normal S1, S2. No murmur, rub, click, or gallop.   Extremities:Pulses are normal bilaterally throughout. No peripheral edema.  Respiratory: Clear to asculation.    Chest (if device implant): ***      I have reviewed records from *** . Relevant findings are ***      I have personally and independently reviewed the following:  Labs:    Echo:    Stress test:    Angiogram:    EKG:     Patch monitor:    Device interrogation:      Assessment :  ***        Plan:  ***    The following tests will be ordered at this visit:                I spent a total of 20 minutes face to face with  Estela Fry during today's office visit. I have spent an additional *** minutes today on chart review and documentation.    (level 5 - 40 minutes; level 4- 30 minutes)    The patient is to return  as needed. The patient understood the treatment plan as outlined above.  There were no barriers to learning.      Eula Johnson MD     {PROVIDER CHARTING PREFERENCE:431659}    Subjective   Estela is a 34 year old, presenting for the following health issues:  No chief complaint on file.    @[facility]@     ***    {SHAKIR Lobo  (Optional):097531}      Objective    There were no vitals taken for this visit.  There is no height or weight on file to calculate BMI.  [unfilled]   {Exam List (Optional):317457}    {Diagnostic Test Results (Optional):515850}        Signed Electronically by: Eula Johnson MD  {Email feedback regarding this note to [email or web address]   :[id number]}I am delighted to see Estela Fry for follow up of ***     The patient is a 34 year old  female with ***    4+ details              Past Medical History:          Allergies:  No Known Allergies    Medications:         Physical examination  Vitals: There were no vitals taken for this visit.  BMI= There is no height or weight on file to calculate BMI.    Constitutional: In general, the patient is a pleasant female in no acute distress.    Targeted cardiovascular exam:  Regular rate and rhythm. Normal S1, S2. No murmur, rub, click, or gallop.   Extremities:Pulses are normal bilaterally throughout. No peripheral edema.  Respiratory: Clear to asculation.    Chest (if device implant): ***      I have reviewed records from *** . Relevant findings are ***      I have personally and independently reviewed the following:  Labs:    Echo:    Stress test:    Angiogram:    EKG:     Patch monitor:    Device interrogation:      Assessment :  ***        Plan:  ***    The following tests will be ordered at this visit:                I spent a total of 20 minutes face to face with  Estela Fry during today's office visit. I have spent an additional *** minutes today on chart review and documentation.    (level 5 - 40 minutes; level 4- 30 minutes)    The patient is to return  as needed. The patient understood the treatment plan as outlined above.  There were no barriers to learning.      Eula Johnson MD   I am delighted to see Estela Fry as a new patient in cardiology clinic for evaluation of     History of Present Illness:  The patient is a 34 year old   female          Past Medical History:         Medications:         Allergies:  No Known Allergies      Family History: ***    Psychosocial history: ***  Smoke:  Alcohol:  Recreational drugs;    Pertinent review of systems in additional to listed in HPI:***      Physical examination  Vitals: There were no vitals taken for this visit.  BMI= There is no height or weight on file to calculate BMI.    Constitutional: In general, the patient is a pleasant female in no acute distress.    Cardiovascular: Carotids +2/2 bilaterally without bruits.  No jugular venous distension. Regular rate and rhythm. Normal S1, S2. No murmur, rub, click, or gallop.   Extremities: Pulses are normal bilaterally throughout. No peripheral edema.  Respiratory: Clear to asculation.  No ronchi, wheezes, rales.  No dullness to percussion.   Chest area (*** if device in place)        I have reviewed records from ****. Relevant findings are:        I have personally and independently reviewed the following:  Labs:     Echo:    Angiogram:    Stress test:    EKG:     Patch monitor:     Device interrogation:        Assessment :  ***        Plan:  ***    The following tests will be ordered at this visit:                      I spent a total of 30 minutes face to face with  Estela Fry during today's office visit. I have spend an additional *** minutes today on chart review and documentation.    (Level 5 - 60 minutes; level 4 - 45 minutes)    The patient is to return as above . The patient understood the treatment plan as outlined above.  There were no barriers to learning.      Eula Johnson MD I am delighted to see Estela Fry as a new patient in cardiology clinic for evaluation of I am delighted to see Estela Fry as a new patient in cardiology clinic for evaluation of     History of Present Illness:  The patient is a 34 year old  female          Past Medical History:         Medications:         Allergies:  No Known Allergies      Family History:  ***    Psychosocial history: ***  Smoke:  Alcohol:  Recreational drugs;    Pertinent review of systems in additional to listed in HPI:***      Physical examination  Vitals: There were no vitals taken for this visit.  BMI= There is no height or weight on file to calculate BMI.    Constitutional: In general, the patient is a pleasant female in no acute distress.    Cardiovascular: Carotids +2/2 bilaterally without bruits.  No jugular venous distension. Regular rate and rhythm. Normal S1, S2. No murmur, rub, click, or gallop.   Extremities: Pulses are normal bilaterally throughout. No peripheral edema.  Respiratory: Clear to asculation.  No ronchi, wheezes, rales.  No dullness to percussion.   Chest area (*** if device in place)        I have reviewed records from ****. Relevant findings are:        I have personally and independently reviewed the following:  Labs:     Echo:    Angiogram:    Stress test:    EKG:     Patch monitor:     Device interrogation:        Assessment :  ***        Plan:  ***    The following tests will be ordered at this visit:                      I spent a total of 30 minutes face to face with  Estela Alvarezo during today's office visit. I have spend an additional *** minutes today on chart review and documentation.    (Level 5 - 60 minutes; level 4 - 45 minutes)    The patient is to return as above . The patient understood the treatment plan as outlined above.  There were no barriers to learning.      Eula Johnson MD I am delighted to see Estela Fry for follow up of ***     The patient is a 34 year old  female with ***    4+ details              Past Medical History:          Allergies:  No Known Allergies    Medications:         Physical examination  Vitals: There were no vitals taken for this visit.  BMI= There is no height or weight on file to calculate BMI.    Constitutional: In general, the patient is a pleasant female in no acute distress.    Targeted cardiovascular exam:  Regular rate and  rhythm. Normal S1, S2. No murmur, rub, click, or gallop.   Extremities:Pulses are normal bilaterally throughout. No peripheral edema.  Respiratory: Clear to asculation.    Chest (if device implant): ***      I have reviewed records from *** . Relevant findings are ***      I have personally and independently reviewed the following:  Labs:    Echo:    Stress test:    Angiogram:    EKG:     Patch monitor:    Device interrogation:      Assessment :  ***        Plan:  ***    The following tests will be ordered at this visit:                I spent a total of 20 minutes face to face with  Estela Fry during today's office visit. I have spent an additional *** minutes today on chart review and documentation.    (level 5 - 40 minutes; level 4- 30 minutes)    The patient is to return  as needed. The patient understood the treatment plan as outlined above.  There were no barriers to learning.      Eula Johnson MD     {PROVIDER CHARTING PREFERENCE:648462}    Subjective   Estela is a 34 year old, presenting for the following health issues:  No chief complaint on file.    HPI     ***    {ROS Picklists (Optional):935676}      Objective    There were no vitals taken for this visit.  There is no height or weight on file to calculate BMI.  Physical Exam   {Exam List (Optional):536043}    {Diagnostic Test Results (Optional):361717}        Signed Electronically by: Eula Johnson MD  {Email feedback regarding this note to [email or web address]   :[id number]}    History of Present Illness:  The patient is a 34 year old  female          Past Medical History:         Medications:         Allergies:  No Known Allergies      Family History: ***    Psychosocial history: ***  Smoke:  Alcohol:  Recreational drugs;    Pertinent review of systems in additional to listed in HPI:***      Physical examination  Vitals: There were no vitals taken for this visit.  BMI= There is no height or weight on file to calculate  BMI.    Constitutional: In general, the patient is a pleasant female in no acute distress.    Cardiovascular: Carotids +2/2 bilaterally without bruits.  No jugular venous distension. Regular rate and rhythm. Normal S1, S2. No murmur, rub, click, or gallop.   Extremities: Pulses are normal bilaterally throughout. No peripheral edema.  Respiratory: Clear to asculation.  No ronchi, wheezes, rales.  No dullness to percussion.   Chest area (*** if device in place)        I have reviewed records from ****. Relevant findings are:        I have personally and independently reviewed the following:  Labs:     Echo:    Angiogram:    Stress test:    EKG:     Patch monitor:     Device interrogation:        Assessment :  ***        Plan:  ***    The following tests will be ordered at this visit:                      I spent a total of 30 minutes face to face with  Alfonzovalencia Lisandra during today's office visit. I have spend an additional *** minutes today on chart review and documentation.    (Level 5 - 60 minutes; level 4 - 45 minutes)    The patient is to return as above . The patient understood the treatment plan as outlined above.  There were no barriers to learning.      Eula Johnson MD

## 2024-02-01 NOTE — NURSING NOTE
Chief Complaint   Patient presents with    Follow Up     Return EP- 2 wk follow up ICD/sotalol     Vitals were taken, medications reconciled, and EKG was performed.    SHANI Frost  12:35 PM

## 2024-02-01 NOTE — PROGRESS NOTES
I am delighted to see Estela Fry as a new patient in cardiology clinic for evaluation of atrial flutter. She is here with her .    History of Present Illness:  The patient is a 34 year old  female with a h/o idiopathic VF arrest, found down at home by her  8/5/2023, initial rhythm VF, shocked x 3 with ROSC. Normal EF, no CAD, drug screen negative.  No family history of SCD, not peripartum. She underwent SICD implant. SICD was chosen because she and her family have cultural issues with implantable devices in her body, and SICD was an acceptable alternative for them. She was discharged on 8/16/2023. She had her 1 week follow up in device clinic on 8/22/2023.     I saw her at [facility] 10/12/2023 - she and her  were severely traumatized by the event. She was not able to care for her kids due to concern that she would have another arrest. Her  had been reluctant to leave her and had lost his job. For some reason she was not seen in post arrest clinic after hospital discharge. Post arrest clinic was able to see her soon after our visit and set her up with social work assistance.    She went to [facility] 1/15/2023 after receiving multiple shocks from her SICD. EMS was called to her home and found an initial supraventricular rhythm at 170 bpm. In ED EKG showed atrial flutter and she was cardioverted in ED. Of note, she had paroxysmal episodes of atrial flutter during hospitalization in Aug 2023 post VF arrest. She was seen by [facility] 1/18/2024 and sotalol 80 bid was started.    She continues to have significant anxiety from her arrest and now from ICD shocks. She reports that when she first started taking sotalol she would feel very velazco and irritable after a dose, but these sensations are now resolved. She is scared to take care of her kids, she wakes up with dreams of ICD shocks.      Past Medical History:  VF arrest, normal heart, unknown etiology  SICD implant secondary  prevention 8/15/2023  Nonocclusive DVT right external iliac vein on Xarelto       Medications:   Sotalol 80 bid  Rivaroxaban 20 every day  Birth control    Allergies:  No Known Allergies      Physical examination  Vitals:103/68, HR 76  BMI= 21 (54.7 kg)    Constitutional: In general, the patient is a pleasant female in no acute distress.    Cardiovascular: Carotids +2/2 bilaterally without bruits.  No jugular venous distension. Regular rate and rhythm. Normal S1, S2. No murmur, rub, click, or gallop.   Extremities: Pulses are normal bilaterally throughout. No peripheral edema.  Respiratory: Clear to asculation.  No ronchi, wheezes, rales.  No dullness to percussion.   Left axillary SICD area clean      I have personally and independently reviewed the following:  Labs:  1/15/2024: K 3.1, cr 0.84    CMR 8/10/2023: LVEF 64%, RVEF 59%, no valve disease, no fibrosis     Coronary angiography 8/5/2023: no CAD     EKG:   TODAY 2/1/2024: sinus 62 bpm, QTc 420 ms  1/15/2024: atrial flutter with 2:1, ventricular rate 195 bpm  10/12/2023: sinus 75 bpm, rsR/ in V1, no change  8/15/2023: sinus 80 bpm, rsR' V1/V2, normal QTc     Device interrogation today 10/12/2023:   [employer] A219 Emblem SICD  Normal parameters, no shocks  Software upgrade completed    Device interrogation in clinic 1/18/2024:  3 shocks for atrial flutter on 1/15/2024      Assessment :  Atrial flutter s/p ICD shocks. She is doing well on sotalol and tolerating it. EKG within normal limits. I offered an EP study with possible ablation but they are not interested in more invasive procedures.  Idiopathic VF arrest. All cardiac testings are normal. She has appointment with genetic counseling next week.   DVT, on Xarelto. Seeing hematology.  Depression/anxiety. She needs to continue psychotherapy. She was seeing someone to whom she was referred by post arrest clinic but she was told that psychologist no longer works here. She has an appointment with post  arrest clinic next week, encouraged her to seek another referral.        Plan:  I will see her back in 6 months to follow up sotalol.        I spent a total of 20 minutes face to face with  Estela Lisandra during today's office visit. I have spent an additional 15 minutes today on chart review and documentation.        The patient is to return  as above. The patient understood the treatment plan as outlined above.  There were no barriers to learning.      Eula Johnson MD

## 2024-02-01 NOTE — PATIENT INSTRUCTIONS
You were seen in the Electrophysiology Clinic today by: Dr Eula Johnson    Plan:   Medication Changes: none    Labs/Tests Needed: none    Follow up Visit: 6 months with device check prior      If you have further questions, please utilize [employer] to contact us.     Your Care Team:    EP Cardiology   Telephone Number     Nurse Line  Izabel Neff, RN   Latha Crouch, RN  Garcia Dent RN   [phone]     For scheduling appointments:   [facility]   [phone]   For procedure scheduling:    Paulina Nayak     [phone]   For the Device Clinic (Pacemakers, ICDs, Loop Recorders)    During business hours: [phone]  After business hours:   [phone]- select option 4 and ask for job code [id number].       On-call cardiologist for after hours or on weekends:   [phone], option #4, and ask to speak to the on-call cardiologist.     Cardiovascular Clinic:   [address] [place] [address]. [place] [address]      As always, Thank you for trusting us with your health care needs!

## 2024-02-01 NOTE — PROGRESS NOTES
I am delighted to see Estela Fry as a new patient in cardiology clinic for evaluation of     History of Present Illness:  The patient is a 34 year old  female          Past Medical History:         Medications:         Allergies:  No Known Allergies      Family History: ***    Psychosocial history: ***  Smoke:  Alcohol:  Recreational drugs;    Pertinent review of systems in additional to listed in HPI:***      Physical examination  Vitals: There were no vitals taken for this visit.  BMI= There is no height or weight on file to calculate BMI.    Constitutional: In general, the patient is a pleasant female in no acute distress.    Cardiovascular: Carotids +2/2 bilaterally without bruits.  No jugular venous distension. Regular rate and rhythm. Normal S1, S2. No murmur, rub, click, or gallop.   Extremities: Pulses are normal bilaterally throughout. No peripheral edema.  Respiratory: Clear to asculation.  No ronchi, wheezes, rales.  No dullness to percussion.   Chest area (*** if device in place)        I have reviewed records from ****. Relevant findings are:        I have personally and independently reviewed the following:  Labs:     Echo:    Angiogram:    Stress test:    EKG:     Patch monitor:     Device interrogation:        Assessment :  ***        Plan:  ***    The following tests will be ordered at this visit:                      I spent a total of 30 minutes face to face with  Estela Fry during today's office visit. I have spend an additional *** minutes today on chart review and documentation.    (Level 5 - 60 minutes; level 4 - 45 minutes)    The patient is to return as above . The patient understood the treatment plan as outlined above.  There were no barriers to learning.      Eula Johnson MD   {PROVIDER CHARTING PREFERENCE:108847}    Subjective   Estela is a 34 year old, presenting for the following health issues:  No chief complaint on file.    @[facility]@     ***    {ROS Picklists (Optional):985140}       Objective    There were no vitals taken for this visit.  There is no height or weight on file to calculate BMI.  [unfilled]   {Exam List (Optional):146968}    {Diagnostic Test Results (Optional):100832}        Signed Electronically by: Eula Johnson MD  {Email feedback regarding this note to [email or web address]   :[id number]}I am delighted to see Estela Fry for follow up of ***     The patient is a 34 year old  female with ***    4+ details              Past Medical History:          Allergies:  No Known Allergies    Medications:         Physical examination  Vitals: There were no vitals taken for this visit.  BMI= There is no height or weight on file to calculate BMI.    Constitutional: In general, the patient is a pleasant female in no acute distress.    Targeted cardiovascular exam:  Regular rate and rhythm. Normal S1, S2. No murmur, rub, click, or gallop.   Extremities:Pulses are normal bilaterally throughout. No peripheral edema.  Respiratory: Clear to asculation.    Chest (if device implant): ***      I have reviewed records from *** . Relevant findings are ***      I have personally and independently reviewed the following:  Labs:    Echo:    Stress test:    Angiogram:    EKG:     Patch monitor:    Device interrogation:      Assessment :  ***        Plan:  ***    The following tests will be ordered at this visit:                I spent a total of 20 minutes face to face with  Estela Fry during today's office visit. I have spent an additional *** minutes today on chart review and documentation.    (level 5 - 40 minutes; level 4- 30 minutes)    The patient is to return  as needed. The patient understood the treatment plan as outlined above.  There were no barriers to learning.      Eula Johnson MD     {PROVIDER CHARTING PREFERENCE:205584}    Subjective   Estela is a 34 year old, presenting for the following health issues:  No chief complaint on file.    @[facility]@     ***    {SHAKIR Lobo  (Optional):298578}      Objective    There were no vitals taken for this visit.  There is no height or weight on file to calculate BMI.  [unfilled]   {Exam List (Optional):423705}    {Diagnostic Test Results (Optional):826928}        Signed Electronically by: Eula Johnson MD  {Email feedback regarding this note to [email or web address]   :[id number]}I am delighted to see Estela Fry for follow up of ***     The patient is a 34 year old  female with ***    4+ details              Past Medical History:          Allergies:  No Known Allergies    Medications:         Physical examination  Vitals: There were no vitals taken for this visit.  BMI= There is no height or weight on file to calculate BMI.    Constitutional: In general, the patient is a pleasant female in no acute distress.    Targeted cardiovascular exam:  Regular rate and rhythm. Normal S1, S2. No murmur, rub, click, or gallop.   Extremities:Pulses are normal bilaterally throughout. No peripheral edema.  Respiratory: Clear to asculation.    Chest (if device implant): ***      I have reviewed records from *** . Relevant findings are ***      I have personally and independently reviewed the following:  Labs:    Echo:    Stress test:    Angiogram:    EKG:     Patch monitor:    Device interrogation:      Assessment :  ***        Plan:  ***    The following tests will be ordered at this visit:                I spent a total of 20 minutes face to face with  Estela Fry during today's office visit. I have spent an additional *** minutes today on chart review and documentation.    (level 5 - 40 minutes; level 4- 30 minutes)    The patient is to return  as needed. The patient understood the treatment plan as outlined above.  There were no barriers to learning.      Eula Johnson MD   I am delighted to see Estela Fry as a new patient in cardiology clinic for evaluation of     History of Present Illness:  The patient is a 34 year old   female          Past Medical History:         Medications:         Allergies:  No Known Allergies      Family History: ***    Psychosocial history: ***  Smoke:  Alcohol:  Recreational drugs;    Pertinent review of systems in additional to listed in HPI:***      Physical examination  Vitals: There were no vitals taken for this visit.  BMI= There is no height or weight on file to calculate BMI.    Constitutional: In general, the patient is a pleasant female in no acute distress.    Cardiovascular: Carotids +2/2 bilaterally without bruits.  No jugular venous distension. Regular rate and rhythm. Normal S1, S2. No murmur, rub, click, or gallop.   Extremities: Pulses are normal bilaterally throughout. No peripheral edema.  Respiratory: Clear to asculation.  No ronchi, wheezes, rales.  No dullness to percussion.   Chest area (*** if device in place)        I have reviewed records from ****. Relevant findings are:        I have personally and independently reviewed the following:  Labs:     Echo:    Angiogram:    Stress test:    EKG:     Patch monitor:     Device interrogation:        Assessment :  ***        Plan:  ***    The following tests will be ordered at this visit:                      I spent a total of 30 minutes face to face with  Estela Fry during today's office visit. I have spend an additional *** minutes today on chart review and documentation.    (Level 5 - 60 minutes; level 4 - 45 minutes)    The patient is to return as above . The patient understood the treatment plan as outlined above.  There were no barriers to learning.      Eula Johnson MD I am delighted to see Estela Fry as a new patient in cardiology clinic for evaluation of I am delighted to see Estela Fry as a new patient in cardiology clinic for evaluation of     History of Present Illness:  The patient is a 34 year old  female          Past Medical History:         Medications:         Allergies:  No Known Allergies      Family History:  ***    Psychosocial history: ***  Smoke:  Alcohol:  Recreational drugs;    Pertinent review of systems in additional to listed in HPI:***      Physical examination  Vitals: There were no vitals taken for this visit.  BMI= There is no height or weight on file to calculate BMI.    Constitutional: In general, the patient is a pleasant female in no acute distress.    Cardiovascular: Carotids +2/2 bilaterally without bruits.  No jugular venous distension. Regular rate and rhythm. Normal S1, S2. No murmur, rub, click, or gallop.   Extremities: Pulses are normal bilaterally throughout. No peripheral edema.  Respiratory: Clear to asculation.  No ronchi, wheezes, rales.  No dullness to percussion.   Chest area (*** if device in place)        I have reviewed records from ****. Relevant findings are:        I have personally and independently reviewed the following:  Labs:     Echo:    Angiogram:    Stress test:    EKG:     Patch monitor:     Device interrogation:        Assessment :  ***        Plan:  ***    The following tests will be ordered at this visit:                      I spent a total of 30 minutes face to face with  Estela Alvarezo during today's office visit. I have spend an additional *** minutes today on chart review and documentation.    (Level 5 - 60 minutes; level 4 - 45 minutes)    The patient is to return as above . The patient understood the treatment plan as outlined above.  There were no barriers to learning.      Eula Johnson MD I am delighted to see Estela Fry for follow up of ***     The patient is a 34 year old  female with ***    4+ details              Past Medical History:          Allergies:  No Known Allergies    Medications:         Physical examination  Vitals: There were no vitals taken for this visit.  BMI= There is no height or weight on file to calculate BMI.    Constitutional: In general, the patient is a pleasant female in no acute distress.    Targeted cardiovascular exam:  Regular rate and  rhythm. Normal S1, S2. No murmur, rub, click, or gallop.   Extremities:Pulses are normal bilaterally throughout. No peripheral edema.  Respiratory: Clear to asculation.    Chest (if device implant): ***      I have reviewed records from *** . Relevant findings are ***      I have personally and independently reviewed the following:  Labs:    Echo:    Stress test:    Angiogram:    EKG:     Patch monitor:    Device interrogation:      Assessment :  ***        Plan:  ***    The following tests will be ordered at this visit:                I spent a total of 20 minutes face to face with  Estela Fry during today's office visit. I have spent an additional *** minutes today on chart review and documentation.    (level 5 - 40 minutes; level 4- 30 minutes)    The patient is to return  as needed. The patient understood the treatment plan as outlined above.  There were no barriers to learning.      Eula Johnson MD     {PROVIDER CHARTING PREFERENCE:680376}    Subjective   Estela is a 34 year old, presenting for the following health issues:  No chief complaint on file.    HPII am delighted to see Estela Fry as a new patient in cardiology clinic for evaluation of     History of Present Illness:  The patient is a 34 year old  female          Past Medical History:         Medications:         Allergies:  No Known Allergies      Family History: ***    Psychosocial history: ***  Smoke:  Alcohol:  Recreational drugs;    Pertinent review of systems in additional to listed in HPI:***      Physical examination  Vitals: There were no vitals taken for this visit.  BMI= There is no height or weight on file to calculate BMI.    Constitutional: In general, the patient is a pleasant female in no acute distress.    Cardiovascular: Carotids +2/2 bilaterally without bruits.  No jugular venous distension. Regular rate and rhythm. Normal S1, S2. No murmur, rub, click, or gallop.   Extremities: Pulses are normal bilaterally throughout. No peripheral  edema.  Respiratory: Clear to asculation.  No ronchi, wheezes, rales.  No dullness to percussion.   Chest area (*** if device in place)        I have reviewed records from ****. Relevant findings are:        I have personally and independently reviewed the following:  Labs:     Echo:    Angiogram:    Stress test:    EKG:     Patch monitor:     Device interrogation:        Assessment :  ***        Plan:  ***    The following tests will be ordered at this visit:                      I spent a total of 30 minutes face to face with  Estela Fry during today's office visit. I have spend an additional *** minutes today on chart review and documentation.    (Level 5 - 60 minutes; level 4 - 45 minutes)    The patient is to return as above . The patient understood the treatment plan as outlined above.  There were no barriers to learning.      Eula Johnson MD I am delighted to see Estela Fry for follow up of ***     The patient is a 34 year old  female with ***    4+ details              Past Medical History:          Allergies:  No Known Allergies    Medications:         Physical examination  Vitals: There were no vitals taken for this visit.  BMI= There is no height or weight on file to calculate BMI.    Constitutional: In general, the patient is a pleasant female in no acute distress.    Targeted cardiovascular exam:  Regular rate and rhythm. Normal S1, S2. No murmur, rub, click, or gallop.   Extremities:Pulses are normal bilaterally throughout. No peripheral edema.  Respiratory: Clear to asculation.    Chest (if device implant): ***      I have reviewed records from *** . Relevant findings are ***      I have personally and independently reviewed the following:  Labs:    Echo:    Stress test:    Angiogram:    EKG:     Patch monitor:    Device interrogation:      Assessment :  ***        Plan:  ***    The following tests will be ordered at this visit:                I spent a total of 20 minutes face to face with  Estela Fry  during today's office visit. I have spent an additional *** minutes today on chart review and documentation.    (level 5 - 40 minutes; level 4- 30 minutes)    The patient is to return  as needed. The patient understood the treatment plan as outlined above.  There were no barriers to learning.      Eula Johnson MD     {PROVIDER CHARTING PREFERENCE:297091}    Subjective   Estela is a 34 year old, presenting for the following health issues:  No chief complaint on file.    @[facility]@     ***    {ROS Picklists (Optional):450374}      Objective    There were no vitals taken for this visit.  There is no height or weight on file to calculate BMI.  [unfilled]   {Exam List (Optional):575626}    {Diagnostic Test Results (Optional):730486}        Signed Electronically by: Eula Johnson MD  {Email feedback regarding this note to [email or web address]   :[id number]}     ***    {ROS Picklists (Optional):798105}      Objective    There were no vitals taken for this visit.  There is no height or weight on file to calculate BMI.  Physical Exam   {Exam List (Optional):456474}    {Diagnostic Test Results (Optional):945728}        Signed Electronically by: Eula Johnson MD  {Email feedback regarding this note to [email or web address]   :[id number]}    History of Present Illness:  The patient is a 34 year old  female          Past Medical History:         Medications:         Allergies:  No Known Allergies      Family History: ***    Psychosocial history: ***  Smoke:  Alcohol:  Recreational drugs;    Pertinent review of systems in additional to listed in HPI:***      Physical examination  Vitals: There were no vitals taken for this visit.  BMI= There is no height or weight on file to calculate BMI.    Constitutional: In general, the patient is a pleasant female in no acute distress.    Cardiovascular: Carotids +2/2 bilaterally without bruits.  No jugular venous distension. Regular rate and rhythm.  Normal S1, S2. No murmur, rub, click, or gallop.   Extremities: Pulses are normal bilaterally throughout. No peripheral edema.  Respiratory: Clear to asculation.  No ronchi, wheezes, rales.  No dullness to percussion.   Chest area (*** if device in place)        I have reviewed records from ****. Relevant findings are:        I have personally and independently reviewed the following:  Labs:     Echo:    Angiogram:    Stress test:    EKG:     Patch monitor:     Device interrogation:        Assessment :  ***        Plan:  ***    The following tests will be ordered at this visit:                      I spent a total of 30 minutes face to face with  Estela Fry during today's office visit. I have spend an additional *** minutes today on chart review and documentation.    (Level 5 - 60 minutes; level 4 - 45 minutes)    The patient is to return as above . The patient understood the treatment plan as outlined above.  There were no barriers to learning.      Eula Johnson MD

## 2024-02-02 ENCOUNTER — OFFICE VISIT (OUTPATIENT)
Dept: CARDIOLOGY | Facility: CLINIC | Age: 34
End: 2024-02-02
Attending: GENETIC COUNSELOR, MS
Payer: COMMERCIAL

## 2024-02-02 DIAGNOSIS — I46.9 CARDIAC ARREST (H): ICD-10-CM

## 2024-02-02 LAB
ATRIAL RATE - MUSE: 62 BPM
DIASTOLIC BLOOD PRESSURE - MUSE: NORMAL MMHG
INTERPRETATION ECG - MUSE: NORMAL
P AXIS - MUSE: 58 DEGREES
PR INTERVAL - MUSE: 122 MS
QRS DURATION - MUSE: 80 MS
QT - MUSE: 414 MS
QTC - MUSE: 420 MS
R AXIS - MUSE: 34 DEGREES
SYSTOLIC BLOOD PRESSURE - MUSE: NORMAL MMHG
T AXIS - MUSE: 37 DEGREES
VENTRICULAR RATE- MUSE: 62 BPM

## 2024-02-02 NOTE — LETTER
2/2/2024      RE: Estela Fry  [address] [place] [address]       Dear Colleague,    Thank you for the opportunity to participate in the care of your patient, Estela Fry, at the [facility]. Please see a copy of my visit note below.    Pt rescheduled for 2/7/24    Please do not hesitate to contact me if you have any questions/concerns.     Sincerely,     Abiola Lamas GC

## 2024-02-04 ENCOUNTER — MYC MEDICAL ADVICE (OUTPATIENT)
Dept: HEMATOLOGY | Facility: CLINIC | Age: 34
End: 2024-02-04
Payer: COMMERCIAL

## 2024-02-04 NOTE — PROGRESS NOTES
Critical Care Cardiology Survivor Clinic  02/05/2024        History of Presenting Illness:  Ms Fry is a 33 year old  female with a pmhx of VF arrest on 8/5/2023 --> shocked x 3 --> ROSC s/p subcutaneous ICD, atrial flutter, DVT on xarelto. Discharged on 8/16/2023.    The patient has had a prior work up for cardiac arrest/CM which was negative for CAD, drugs, CMR without LGE or fibrosis. Family history unrevealing. Last seen by Dr. Valadez 10/12/23. She indicated that the patient and the family have had difficulty coping since the cardiac arrest. She was referred to genetics, neuropsych, and behavioral health. She has been following with EP as well. She was hospitalized 1/15 with new atrial flutter at rates of 200-240 bpm resulting in x3 ICD shocks. Cardioverted at that time. EP started sotalol 80 mg BID 1/18/24. EP offered ablation but the patient declined.     Today, she presents alone. She feels that she continues to have intermittent chest tightness. No ICD shocks This can occur with rest or exertion. Intermittently will have SOB with this though this is not consistent and is short lived. She has some SOB at rest in bed and uses 3 pillows to help with this. Denies leg swelling. She has not yet seen Neuropsych or Behavioral Health due to scheduling issues. She is recently pregnant and is almost 5 weeks by LMP. She is concerned that her heart history will preclude her pregnancy or lead to significant complications. She has occasional nausea and food aversions recently after testing positive on her pregnancy test.     Current cardiac meds:   LMWH 40 mg BID  Sotalol 80 mg BID    Past Medical History:  VF arrest, normal heart, unknown etiology  Uterine prolapse  Situational depression  SICD implant secondary prevention 8/15/2023  Nonocclusive DVT right external iliac vein on [facility]    Family History:  No family history of sudden cardiac death    Social History:     7 children    ROS:  A complete 10-point ROS was  negative except as above.    Physical Exam:  BP 96/63 (BP Location: Right arm, Patient Position: Chair, Cuff Size: Adult Regular)   Pulse 62   Wt 55.2 kg (121 lb 9.6 oz)   SpO2 100%   BMI 22.24 kg/m      Gen: alert, interactive, NAD  HEENT: atraumatic, EOMI, MMM  Neck: supple, no JVP elevation appreciated.  CV: RRR, no murmurs, rubs.  Chest: CTAB, no wheezes or crackles  Abd: soft, NT, ND, +BS  Ext: no LE edema, 2+ peripheral pulses  Skin: warm and dry, no rashes on exposed surfaces  Neuro: alert and oriented, no focal deficits    Labs:   Labs and cardiac data reviewed personally in clinic.    CMP:  Sodium   Date Value Ref Range Status   10/18/2023 137 135 - 145 mmol/L Final     Comment:     Reference intervals for this test were updated on 09/26/2023 to more accurately reflect our healthy population. There may be differences in the flagging of prior results with similar values performed with this method. Interpretation of those prior results can be made in the context of the updated reference intervals.      Potassium   Date Value Ref Range Status   10/18/2023 3.9 3.4 - 5.3 mmol/L Final     Potassium POCT   Date Value Ref Range Status   08/05/2023 3.4 (L) 3.5 - 5.0 mmol/L Final     Comment:     CRITICAL RESULTS NOTED BY CCL  and MD     Chloride   Date Value Ref Range Status   10/18/2023 100 98 - 107 mmol/L Final     Carbon Dioxide (CO2)   Date Value Ref Range Status   10/18/2023 30 (H) 22 - 29 mmol/L Final     Anion Gap   Date Value Ref Range Status   10/18/2023 7 7 - 15 mmol/L Final     Glucose   Date Value Ref Range Status   10/18/2023 87 70 - 99 mg/dL Final     GLUCOSE BY METER POCT   Date Value Ref Range Status   08/15/2023 91 70 - 99 mg/dL Final     Urea Nitrogen   Date Value Ref Range Status   10/18/2023 10.4 6.0 - 20.0 mg/dL Final     Creatinine   Date Value Ref Range Status   10/18/2023 0.72 0.51 - 0.95 mg/dL Final     GFR Estimate   Date Value Ref Range Status   10/18/2023 >90 >60 mL/min/1.73m2 Final      GFR, ESTIMATED POCT   Date Value Ref Range Status   2023 43 (L) >60 mL/min/1.73m2 Final     Calcium   Date Value Ref Range Status   10/18/2023 9.4 8.6 - 10.0 mg/dL Final     Bilirubin Total   Date Value Ref Range Status   10/18/2023 0.5 <=1.2 mg/dL Final     Alkaline Phosphatase   Date Value Ref Range Status   10/18/2023 83 35 - 104 U/L Final     ALT   Date Value Ref Range Status   10/18/2023 23 0 - 50 U/L Final     Comment:     Reference intervals for this test were updated on 2023 to more accurately reflect our healthy population. There may be differences in the flagging of prior results with similar values performed with this method. Interpretation of those prior results can be made in the context of the updated reference intervals.       AST   Date Value Ref Range Status   10/18/2023 19 0 - 45 U/L Final     Comment:     Reference intervals for this test were updated on 2023 to more accurately reflect our healthy population. There may be differences in the flagging of prior results with similar values performed with this method. Interpretation of those prior results can be made in the context of the updated reference intervals.       CBC  CBC RESULTS:   Recent Labs   Lab Test 23  0540   WBC 8.2   RBC 4.04   HGB 11.6*   HCT 34.2*   MCV 85   MCH 28.7   MCHC 33.9   RDW 12.4          LIPIDS  Recent Labs   Lab Test 23  2108   CHOL 115   HDL 61   LDL 40   TRIG 71       TSH  TSH   Date Value Ref Range Status   2023 3.24 0.30 - 4.20 uIU/mL Final       HBA1C  Lab Results   Component Value Date    A1C 5.4 2023     Cardiac Data:     Coronary angiography 2023: no CAD    CMR 8/10/2023: LVEF 64%, RVEF 59%, no valve disease, no fibrosis    EK/1/24: NSR  8/15/2023: sinus 80 bpm, rsR' V1/V2, normal QTc    Device interrogation 2024:  x3 ICD shocks on 1/15/24, ventricular rates 200-240 bpm. EGM suggested SVT    Assessment/Plan:  Estela Fry is a 33 year old female with  a pmhx sig for VF arrest on 8/5/2023 with rosc after three shocks, no apparent etiology, who presents to clinic today for ongoing evaluation and management.     #Cardiac arrest: etiology uncertain, icd in place  - Would benefit from Neuropsych and Behavioral Health, both referrals were sent again.  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 Patient states that she has not been driving due to concerns about ICD shocks.   - S-ICD in place, following with EP    #pregnancy:  - Recently test pregnant, about 5 weeks by LMP  - Will refer to M    #Atrial flutter  #c/b ICD shock 1/15/24:  - s/p 3 ICD shocks. EP evaluated, felt to be atrial flutter. Started on sotalol 80 mg BID. No recurrences thus far. Patient declined ablation  - CHADsVASC 0  - EP mgmt of sotalol    #DVT on Xarelto:  - Following with hematology for mgmt     Follow up 6 months.    Seen and discussed with Dr. Liz.    Brett Valle, PGY-5  Cardiovascular Disease Fellow

## 2024-02-05 ENCOUNTER — LAB (OUTPATIENT)
Dept: LAB | Facility: CLINIC | Age: 34
End: 2024-02-05
Payer: COMMERCIAL

## 2024-02-05 ENCOUNTER — TELEPHONE (OUTPATIENT)
Dept: CARDIOLOGY | Facility: CLINIC | Age: 34
End: 2024-02-05

## 2024-02-05 ENCOUNTER — ALLIED HEALTH/NURSE VISIT (OUTPATIENT)
Dept: CARDIOLOGY | Facility: CLINIC | Age: 34
End: 2024-02-05

## 2024-02-05 ENCOUNTER — ALLIED HEALTH/NURSE VISIT (OUTPATIENT)
Dept: HEMATOLOGY | Facility: CLINIC | Age: 34
End: 2024-02-05
Attending: INTERNAL MEDICINE
Payer: COMMERCIAL

## 2024-02-05 ENCOUNTER — OFFICE VISIT (OUTPATIENT)
Dept: CARDIOLOGY | Facility: CLINIC | Age: 34
End: 2024-02-05
Attending: INTERNAL MEDICINE
Payer: COMMERCIAL

## 2024-02-05 ENCOUNTER — MYC MEDICAL ADVICE (OUTPATIENT)
Dept: HEMATOLOGY | Facility: CLINIC | Age: 34
End: 2024-02-05

## 2024-02-05 ENCOUNTER — TELEPHONE (OUTPATIENT)
Dept: MATERNAL FETAL MEDICINE | Facility: CLINIC | Age: 34
End: 2024-02-05

## 2024-02-05 VITALS
WEIGHT: 121.6 LBS | BODY MASS INDEX: 22.24 KG/M2 | OXYGEN SATURATION: 100 % | SYSTOLIC BLOOD PRESSURE: 96 MMHG | HEART RATE: 62 BPM | DIASTOLIC BLOOD PRESSURE: 63 MMHG

## 2024-02-05 DIAGNOSIS — Z86.718 HISTORY OF DEEP VENOUS THROMBOSIS: ICD-10-CM

## 2024-02-05 DIAGNOSIS — I46.9 CARDIAC ARREST (H): Primary | ICD-10-CM

## 2024-02-05 DIAGNOSIS — I82.220 IVC THROMBOSIS (H): ICD-10-CM

## 2024-02-05 DIAGNOSIS — O26.90 PREGNANCY RELATED CONDITION, ANTEPARTUM: Primary | ICD-10-CM

## 2024-02-05 DIAGNOSIS — Z86.718 HISTORY OF DEEP VENOUS THROMBOSIS: Primary | ICD-10-CM

## 2024-02-05 DIAGNOSIS — I82.C22: Primary | ICD-10-CM

## 2024-02-05 DIAGNOSIS — O26.90 PREGNANCY RELATED CONDITION, ANTEPARTUM: ICD-10-CM

## 2024-02-05 DIAGNOSIS — Z00.6 RESEARCH SUBJECT: Primary | ICD-10-CM

## 2024-02-05 DIAGNOSIS — I46.9 CARDIAC ARREST (H): ICD-10-CM

## 2024-02-05 LAB
HCG INTACT+B SERPL-ACNC: ABNORMAL MIU/ML
HCG SERPL QL: POSITIVE
HCG UR QL: POSITIVE

## 2024-02-05 PROCEDURE — 99215 OFFICE O/P EST HI 40 MIN: CPT | Performed by: INTERNAL MEDICINE

## 2024-02-05 PROCEDURE — 36415 COLL VENOUS BLD VENIPUNCTURE: CPT | Performed by: PATHOLOGY

## 2024-02-05 PROCEDURE — G0463 HOSPITAL OUTPT CLINIC VISIT: HCPCS | Performed by: INTERNAL MEDICINE

## 2024-02-05 PROCEDURE — 999N000103 HC STATISTIC NO CHARGE FACILITY FEE

## 2024-02-05 PROCEDURE — 84702 CHORIONIC GONADOTROPIN TEST: CPT | Performed by: PATHOLOGY

## 2024-02-05 PROCEDURE — 84703 CHORIONIC GONADOTROPIN ASSAY: CPT | Performed by: PATHOLOGY

## 2024-02-05 PROCEDURE — 81025 URINE PREGNANCY TEST: CPT | Performed by: PATHOLOGY

## 2024-02-05 RX ORDER — PRENATAL VIT/IRON FUM/FOLIC AC 27MG-0.8MG
1 TABLET ORAL DAILY
Qty: 90 TABLET | Refills: 3 | Status: SHIPPED | OUTPATIENT
Start: 2024-02-05 | End: 2024-04-17

## 2024-02-05 RX ORDER — ENOXAPARIN SODIUM 100 MG/ML
40 INJECTION SUBCUTANEOUS 2 TIMES DAILY
Qty: 24 ML | Refills: 0 | Status: SHIPPED | OUTPATIENT
Start: 2024-02-05 | End: 2024-03-06

## 2024-02-05 ASSESSMENT — PAIN SCALES - GENERAL: PAINLEVEL: NO PAIN (0)

## 2024-02-05 NOTE — NURSING NOTE
Chief Complaint   Patient presents with    Follow Up     Return Critical Care. 02/05/24: - reason for visit: 6 month follow-up s/p cardiac arrest       Vitals were taken and medications reconciled.    Tani Daniel, SHANI  2:29 PM

## 2024-02-05 NOTE — PATIENT INSTRUCTIONS
Critical Care Cardiology Survivor Clinic                                                                You were seen today in the Critical Care Cardiology Survivor Clinic at the [facility]:       Dr. Devin Liz        Your visit summary and instructions are as follows:    Maternal fetal medicine consult placed earlier  Echo   Continue to work toward the recommendation of 150 minutes of moderate intensity exercise/week and maintain a healthy lifestyle (avoid illicit drugs, smoking and moderate alcohol consumption)    Return to Critical Care Cardiology Survivor Clinic with device check prior (if applicable) in 6 months      -Support group: This is not required but can be helpful to connect to other survivors.   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     -What is Health Psychology?  Health Psychology is a specialty that helps people cope with the stress and anxiety that often occurs with illness, emotional and psychological issues, and preparation for medical treatment including surgical procedures.    Please contact our psychologists directly with questions or to make an appointment.    Shivani Sanders, Ph.D.,                 [phone]  Suzanne Camara, Ph.D.                      [phone]  Shanda Butler, Ph.D., LP                   [phone]          Iglesia Cole, Ph.D., [facility]  [phone]       Thank you for your visit today!     Please MyChart message me or call Brianda Keita RN or Fadia Jennings RN if you have any questions or concerns.      During Business Hours:  [phone], option # 1 (University) then option # 4 (medical questions) and ask to speak with my nurse.     After hours, weekends or holidays:   [phone], Option #4  Ask to speak to the On-Call Cardiologist. Inform them you are a heart failure  patient at the [place].

## 2024-02-05 NOTE — NURSING NOTE
Research Consent Note:  IRB#:  [id number]  PI: Chadwick Tabor MD,  [phone]  : Alexandrea Farah RN at [phone]      Trial Name:Long Term Outcomes and Quality of Life in Patients with Out of Hospital Cardiac Arrest    Estimated duration of study: 5 years     I met with Estela in the Critical access hospital for a consent visit and discussion of the Long Term Outcomes and Quality of Life Research trial. She requested a discussion for possible participation in the above study.  The joint decision making document was referred to for points of consent discussion to be completed.  The current IRB approved consent form was reviewed and discussed at length with the patient. This included purpose, research hypothesis, nature of the research, risks & benefits, and procedures required for screening, enrollment, randomization as well as all required follow up. Confidentiality issues and alternatives available were also explained.  Subject was informed that participation is voluntary and that refusal to participate will involve no penalty or decreased benefits to which the subject is otherwise entitled. Patient had the opportunity to read the entire written consent, ask questions and express concerns and offered sufficient time to consider the research trial. Patient was able to clearly state what study participation involved and the associated requirements. All questions and concerns were addressed. Patient voluntarily signed the consent and HIPAA forms on 05 FEB 2024 prior to any research required tests/procedures and was given a copy of the signed forms.    Subject verbalizes understanding that her participation is voluntary and may withdraw participation at any time.  .  Consent version: 2.15.2022. Signed on 05 Feb 2024     Inclusion Criteria   Yes   No Criteria #   Inclusion Criteria (all must be yes)    [x]??   []??  1 All patients treated by [facility] ECMO team [facility], inability to wean off  "cardiopulmonary bypass, post cardiotomy shock and cardiogenic shock who were discharged alive.       Exclusion Criteria  Yes No Criteria #  -all must be \"no\"   []??  [x]??  1 Refusal to participate            "

## 2024-02-05 NOTE — TELEPHONE ENCOUNTER
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 She has been on Xarelto therapy.    RN contacted Dr. Wilhelm. Dr. Wilhelm placed orders for patient to start do Lovenox injections (40 mg) every 12 hours. She also placed maternal fetal medicine consult. RN touched base with care coordinator, Melida TURCIOS. They will work on getting patient scheduled.    Dr. Wilhelm also ordered sone labs. RN assisted patient in making a lab appointment later this afternoon at the [facility].     RN talked with patient and offered her a nurse visit injection teaching this afternoon. Patient accepted and will be coming to clinic at 1 PM (prior to cardiac appointment).     Nica Ugalde RN, BSN, PCCN  Nurse Clinician    [facility]  [address] [place] [address], [address], [place] [address]   Office, direct: [phone]  Main office number: [phone]  Pronouns: She, her, hers

## 2024-02-05 NOTE — LETTER
2/5/2024      RE: Estela Fry  [address] [place] [address]       Dear Colleague,    Thank you for the opportunity to participate in the care of your patient, Estela Fry, at the [facility] [place] at [facility]. Please see a copy of my visit note below.    Critical Care Cardiology Survivor Clinic  02/05/2024        History of Presenting Illness:  Ms Fry is a 33 year old  female with a pmhx of VF arrest on 8/5/2023 --> shocked x 3 --> ROSC s/p subcutaneous ICD, atrial flutter, DVT on xarelto. Discharged on 8/16/2023.    The patient has had a prior work up for cardiac arrest/CM which was negative for CAD, drugs, CMR without LGE or fibrosis. Family history unrevealing. Last seen by Dr. Valadez 10/12/23. She indicated that the patient and the family have had difficulty coping since the cardiac arrest. She was referred to genetics, neuropsych, and behavioral health. She has been following with EP as well. She was hospitalized 1/15 with new atrial flutter at rates of 200-240 bpm resulting in x3 ICD shocks. Cardioverted at that time. EP started sotalol 80 mg BID 1/18/24. EP offered ablation but the patient declined.     Today, she presents alone. She feels that she continues to have intermittent chest tightness. No ICD shocks This can occur with rest or exertion. Intermittently will have SOB with this though this is not consistent and is short lived. She has some SOB at rest in bed and uses 3 pillows to help with this. Denies leg swelling. She has not yet seen Neuropsych or Behavioral Health due to scheduling issues. She is recently pregnant and is almost 5 weeks by LMP. She is concerned that her heart history will preclude her pregnancy or lead to significant complications. She has occasional nausea and food aversions recently after testing positive on her pregnancy test.     Current cardiac meds:   LMWH 40 mg BID  Sotalol 80 mg BID    Past Medical  History:  VF arrest, normal heart, unknown etiology  Uterine prolapse  Situational depression  SICD implant secondary prevention 8/15/2023  Nonocclusive DVT right external iliac vein on [facility]    Family History:  No family history of sudden cardiac death    Social History:     7 children    ROS:  A complete 10-point ROS was negative except as above.    Physical Exam:  BP 96/63 (BP Location: Right arm, Patient Position: Chair, Cuff Size: Adult Regular)   Pulse 62   Wt 55.2 kg (121 lb 9.6 oz)   SpO2 100%   BMI 22.24 kg/m      Gen: alert, interactive, NAD  HEENT: atraumatic, EOMI, MMM  Neck: supple, no JVP elevation appreciated.  CV: RRR, no murmurs, rubs.  Chest: CTAB, no wheezes or crackles  Abd: soft, NT, ND, +BS  Ext: no LE edema, 2+ peripheral pulses  Skin: warm and dry, no rashes on exposed surfaces  Neuro: alert and oriented, no focal deficits    Labs:   Labs and cardiac data reviewed personally in clinic.    CMP:  Sodium   Date Value Ref Range Status   10/18/2023 137 135 - 145 mmol/L Final     Comment:     Reference intervals for this test were updated on 09/26/2023 to more accurately reflect our healthy population. There may be differences in the flagging of prior results with similar values performed with this method. Interpretation of those prior results can be made in the context of the updated reference intervals.      Potassium   Date Value Ref Range Status   10/18/2023 3.9 3.4 - 5.3 mmol/L Final     Potassium POCT   Date Value Ref Range Status   08/05/2023 3.4 (L) 3.5 - 5.0 mmol/L Final     Comment:     CRITICAL RESULTS NOTED BY CCL  and MD     Chloride   Date Value Ref Range Status   10/18/2023 100 98 - 107 mmol/L Final     Carbon Dioxide (CO2)   Date Value Ref Range Status   10/18/2023 30 (H) 22 - 29 mmol/L Final     Anion Gap   Date Value Ref Range Status   10/18/2023 7 7 - 15 mmol/L Final     Glucose   Date Value Ref Range Status   10/18/2023 87 70 - 99 mg/dL Final     GLUCOSE BY METER POCT    Date Value Ref Range Status   08/15/2023 91 70 - 99 mg/dL Final     Urea Nitrogen   Date Value Ref Range Status   10/18/2023 10.4 6.0 - 20.0 mg/dL Final     Creatinine   Date Value Ref Range Status   10/18/2023 0.72 0.51 - 0.95 mg/dL Final     GFR Estimate   Date Value Ref Range Status   10/18/2023 >90 >60 mL/min/1.73m2 Final     GFR, ESTIMATED POCT   Date Value Ref Range Status   08/05/2023 43 (L) >60 mL/min/1.73m2 Final     Calcium   Date Value Ref Range Status   10/18/2023 9.4 8.6 - 10.0 mg/dL Final     Bilirubin Total   Date Value Ref Range Status   10/18/2023 0.5 <=1.2 mg/dL Final     Alkaline Phosphatase   Date Value Ref Range Status   10/18/2023 83 35 - 104 U/L Final     ALT   Date Value Ref Range Status   10/18/2023 23 0 - 50 U/L Final     Comment:     Reference intervals for this test were updated on 6/12/2023 to more accurately reflect our healthy population. There may be differences in the flagging of prior results with similar values performed with this method. Interpretation of those prior results can be made in the context of the updated reference intervals.       AST   Date Value Ref Range Status   10/18/2023 19 0 - 45 U/L Final     Comment:     Reference intervals for this test were updated on 6/12/2023 to more accurately reflect our healthy population. There may be differences in the flagging of prior results with similar values performed with this method. Interpretation of those prior results can be made in the context of the updated reference intervals.       CBC  CBC RESULTS:   Recent Labs   Lab Test 08/16/23  0540   WBC 8.2   RBC 4.04   HGB 11.6*   HCT 34.2*   MCV 85   MCH 28.7   MCHC 33.9   RDW 12.4          LIPIDS  Recent Labs   Lab Test 08/05/23  2108   CHOL 115   HDL 61   LDL 40   TRIG 71       TSH  TSH   Date Value Ref Range Status   08/05/2023 3.24 0.30 - 4.20 uIU/mL Final       HBA1C  Lab Results   Component Value Date    A1C 5.4 08/05/2023     Cardiac Data:     Coronary  angiography 2023: no CAD    CMR 8/10/2023: LVEF 64%, RVEF 59%, no valve disease, no fibrosis    EK/1/24: NSR  8/15/2023: sinus 80 bpm, rsR' V1/V2, normal QTc    Device interrogation 2024:  x3 ICD shocks on 1/15/24, ventricular rates 200-240 bpm. EGM suggested SVT    Assessment/Plan:  Estela Fry is a 33 year old female with a pmhx sig for VF arrest on 2023 with rosc after three shocks, no apparent etiology, who presents to clinic today for ongoing evaluation and management.     #Cardiac arrest: etiology uncertain, icd in place  - Would benefit from Neuropsych and Behavioral Health, both referrals were sent again.  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 Patient states that she has not been driving due to concerns about ICD shocks.   - S-ICD in place, following with EP    #pregnancy:  - Recently test pregnant, about 5 weeks by LMP  - Will refer to [facility]    #Atrial flutter  #c/b ICD shock 1/15/24:  - s/p 3 ICD shocks. EP evaluated, felt to be atrial flutter. Started on sotalol 80 mg BID. No recurrences thus far. Patient declined ablation  - CHADsVASC 0  - EP mgmt of sotalol    #DVT on Xarelto:  - Following with hematology for mgmt     Follow up 6 months.    Seen and discussed with Dr. Liz.    Brett Valle, PGY-5  Cardiovascular Disease Fellow        Attestation signed by Sid Liz MD at 2024  9:48 PM:  ATTENDING ATTESTATION:   I personally examined and evaluated this patient on 2024.  I have personally reviewed today's vital signs, medications, all labs, and all imaging/cardiac studies described above. I have reviewed and edited, as necessary, the history, review of systems, physical examination, and assessment and plan.  I discussed the patient with Dr. Arteaga and  agree with the assessment and plan of care as documented in the note above.      I personally spent 40 min today reviewing the medical record, meeting with the patient, and completing this note.    Thank you for allowing us to take part in the care of this very pleasant patient.  Please do not hesitate to call if any further questions or concerns arise.    Sid Liz MD, PhD  Interventional/Critical Care Cardiology  [phone]    February 5, 2024       Please do not hesitate to contact me if you have any questions/concerns.     Sincerely,     Sid Liz MD
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
## 2024-02-05 NOTE — NURSING NOTE
[id number]  Estela Fry  34 year old female  [facility] Diagnosis: IVC and Right External Iliac Vein Clots, cardiac arrest   [facility] Provider: Choco Palmer came to clinic today to learn how to give herself Lovenox injections. She last took Xarelto on 2/4/24 evening. She is going to take her Lovenox BID at 8 AM and 8 PM. Her last day of her last period was January 5th or 6th. She has not had her period since then.    RN reviewed proper injection technique via written materials, Lovenox teaching kit, and Lovenox video from . As patient is not due for Lovenox until 8 PM, not appopriate to give a dose of Lovenox at time of visit. However, RN offerred to help her inject sterile saline into subcuatneous tissue on abdomen. Patient stated that she wants to wait until she is home and watching the video one more time before infusing. RN emphasized importance of infection preventions such as washing hands, using an alcohol swab, and keeping needle sterile. Sharps container sent home with patient.    Patient is going to try to giver her first dose tonight. RN will call her tomorrow AM to see how the injection went.    Nica Ugalde RN, BSN, PCCN  Nurse Clinician    [facility]  [address] [place] [address], [address], [place] [address]   Office, direct: [phone]  Main office number: [phone]  Pronouns: She, her, hers    Addendum 2/6/2024 10:07 AM:  RN called patient. Her injections went well-no other questions and/or concerns from patient.    SANTO TURCIOS

## 2024-02-05 NOTE — PATIENT INSTRUCTIONS
Lovenox Injection Instructions  Prior to injection, wash hands thoroughly.  Alcohol site of injection and let dry to avoid stinging.  Icing may be used briefly before or after to minimize bruising.  For video instructions, visit [employer]     Directions:  Very slowly squirt out the extra medication.                         Inject dose prescribed by your doctor.                                       [facility] [phone]  MEDICATION: ENOXAPARIN (Brand: Lovenox) (Injection)  Enoxaparin is used to  thin  the blood and prevent new or continued blood clotting within the blood vessels. This drug is used to treat blood clots in the deep veins of the legs. It is also used in certain conditions to prevent blood clots from forming.  DIRECTIONS FOR USE:   This medication is injected under your skin in your fat tissue. Your healthcare provider will teach you how to give yourself injections.   If you miss a dose of this medicine, take it as soon as possible. If it is almost time for your next dose, skip the missed dose and resume your regular schedule. Do not take a double dose to make up for a missed dose.  Put used syringes in a puncture-resistant container, and contact your county for best disposal practice.  POSSIBLE SIDE EFFECTS: Mild injection site irritation, pain, or bruising.  Signs of excess bleeding: increased bruising, prolonged bleeding from cuts, bleeding from the nose or gums, blood in urine, vomit or stools (red or black tarry), coughing up blood, unusually heavy menstrual bleeding, dark urine, severe headache, fatigue.  ALLERGIC REACTIONS: Rash, itching, swelling, trouble swallowing or breathing (Contact your doctor or seek emergency care).  BEFORE STARTING THIS MEDICATION be sure your doctor knows if you have any of the following conditions:  Pregnancy, kidney or liver disease, blood disease or bleeding problems, active stomach ulcer  Head injury or blow to the body, or surgery  (including spinal anesthesia, lumbar puncture) or dental procedure  Taking non-steroidal medications such as Ibuprofen (Advil, Motrin), Naproxen (Aleve);  antiplatelet medications like Plavix or aspirin; or herbal supplements  Allergy to heparin or pork

## 2024-02-05 NOTE — TELEPHONE ENCOUNTER
Pt called and is amenable to appointment tomorrow. Information given about US at 215 pm and Consult at 3 pm. Address information given, as well as pt informed information will be in MyChart. Denies further questions.    Melida Brewer RN

## 2024-02-05 NOTE — TELEPHONE ENCOUNTER
[facility] Call Center    Phone Message    May a detailed message be left on voicemail: yes     Reason for Call: Other: Nica from the center for bleeding and clotting is requesting a call back as soon as possible.     Action Taken: Other: cardio    Travel Screening: Not Applicable  Thank you!  [facility]

## 2024-02-06 ENCOUNTER — OFFICE VISIT (OUTPATIENT)
Dept: MATERNAL FETAL MEDICINE | Facility: CLINIC | Age: 34
End: 2024-02-06
Attending: ADVANCED PRACTICE MIDWIFE
Payer: COMMERCIAL

## 2024-02-06 ENCOUNTER — HOSPITAL ENCOUNTER (OUTPATIENT)
Dept: ULTRASOUND IMAGING | Facility: CLINIC | Age: 34
Discharge: HOME | End: 2024-02-06
Attending: ADVANCED PRACTICE MIDWIFE
Payer: COMMERCIAL

## 2024-02-06 VITALS
OXYGEN SATURATION: 100 % | HEART RATE: 65 BPM | BODY MASS INDEX: 22.11 KG/M2 | RESPIRATION RATE: 16 BRPM | WEIGHT: 120.9 LBS | DIASTOLIC BLOOD PRESSURE: 65 MMHG | SYSTOLIC BLOOD PRESSURE: 92 MMHG

## 2024-02-06 DIAGNOSIS — O99.419 HEART DISEASE DURING PREGNANCY, ANTEPARTUM: Primary | ICD-10-CM

## 2024-02-06 DIAGNOSIS — Z95.810 ICD (IMPLANTABLE CARDIOVERTER-DEFIBRILLATOR) IN PLACE: ICD-10-CM

## 2024-02-06 DIAGNOSIS — I51.9 HEART DISEASE DURING PREGNANCY, ANTEPARTUM: Primary | ICD-10-CM

## 2024-02-06 DIAGNOSIS — O26.90 PREGNANCY RELATED CONDITION, ANTEPARTUM: ICD-10-CM

## 2024-02-06 DIAGNOSIS — I82.90 VTE (VENOUS THROMBOEMBOLISM): ICD-10-CM

## 2024-02-06 LAB
MDC_IDC_LEAD_CONNECTION_STATUS: NORMAL
MDC_IDC_LEAD_IMPLANT_DT: NORMAL
MDC_IDC_LEAD_LOCATION: NORMAL
MDC_IDC_LEAD_LOCATION_DETAIL_1: NORMAL
MDC_IDC_LEAD_MFG: NORMAL
MDC_IDC_LEAD_MODEL: NORMAL
MDC_IDC_LEAD_POLARITY_TYPE: NORMAL
MDC_IDC_LEAD_SERIAL: NORMAL
MDC_IDC_MSMT_BATTERY_DTM: NORMAL
MDC_IDC_MSMT_BATTERY_REMAINING_PERCENTAGE: 96 %
MDC_IDC_MSMT_BATTERY_STATUS: NORMAL
MDC_IDC_PG_IMPLANT_DTM: NORMAL
MDC_IDC_PG_MFG: NORMAL
MDC_IDC_PG_MODEL: NORMAL
MDC_IDC_PG_SERIAL: NORMAL
MDC_IDC_PG_TYPE: NORMAL
MDC_IDC_SESS_CLINIC_NAME: NORMAL
MDC_IDC_SESS_DTM: NORMAL
MDC_IDC_SESS_TYPE: NORMAL
MDC_IDC_SET_ZONE_DETECTION_INTERVAL: 250 MS
MDC_IDC_SET_ZONE_DETECTION_INTERVAL: 300 MS
MDC_IDC_SET_ZONE_STATUS: NORMAL
MDC_IDC_SET_ZONE_STATUS: NORMAL
MDC_IDC_SET_ZONE_TYPE: NORMAL
MDC_IDC_SET_ZONE_TYPE: NORMAL
MDC_IDC_SET_ZONE_VENDOR_TYPE: NORMAL
MDC_IDC_SET_ZONE_VENDOR_TYPE: NORMAL
MDC_IDC_STAT_EPISODE_RECENT_COUNT: 0
MDC_IDC_STAT_EPISODE_RECENT_COUNT_DTM_END: NORMAL
MDC_IDC_STAT_EPISODE_RECENT_COUNT_DTM_START: NORMAL
MDC_IDC_STAT_EPISODE_TOTAL_COUNT: 0
MDC_IDC_STAT_EPISODE_TOTAL_COUNT_DTM_END: NORMAL
MDC_IDC_STAT_EPISODE_TOTAL_COUNT_DTM_START: NORMAL
MDC_IDC_STAT_EPISODE_TYPE: NORMAL
MDC_IDC_STAT_EPISODE_TYPE: NORMAL
MDC_IDC_STAT_TACHYTHERAPY_RECENT_DTM_END: NORMAL
MDC_IDC_STAT_TACHYTHERAPY_RECENT_DTM_START: NORMAL
MDC_IDC_STAT_TACHYTHERAPY_SHOCKS_DELIVERED_RECENT: 0
MDC_IDC_STAT_TACHYTHERAPY_SHOCKS_DELIVERED_TOTAL: 0
MDC_IDC_STAT_TACHYTHERAPY_TOTAL_DTM_END: NORMAL
MDC_IDC_STAT_TACHYTHERAPY_TOTAL_DTM_START: NORMAL

## 2024-02-06 PROCEDURE — G0463 HOSPITAL OUTPT CLINIC VISIT: HCPCS | Mod: 25 | Performed by: OBSTETRICS & GYNECOLOGY

## 2024-02-06 PROCEDURE — 76801 OB US < 14 WKS SINGLE FETUS: CPT | Mod: 26 | Performed by: OBSTETRICS & GYNECOLOGY

## 2024-02-06 PROCEDURE — 99203 OFFICE O/P NEW LOW 30 MIN: CPT | Mod: 25 | Performed by: OBSTETRICS & GYNECOLOGY

## 2024-02-06 PROCEDURE — 76801 OB US < 14 WKS SINGLE FETUS: CPT

## 2024-02-06 PROCEDURE — 76817 TRANSVAGINAL US OBSTETRIC: CPT | Mod: 26 | Performed by: OBSTETRICS & GYNECOLOGY

## 2024-02-06 NOTE — TELEPHONE ENCOUNTER
Returned call to Nica, she states there is no further concerns at this time.   Izabel Neff RN on 2/6/2024 at 1:00 PM

## 2024-02-06 NOTE — NURSING NOTE
"IRB#:  [id number]  PI: Chadwick Tabor MD,  [phone]  : Alexandrea Farah RN at [phone]      Trial Name:Long Term Outcomes and Quality of Life in Patients with Out of Hospital Cardiac Arrest      Inclusion Criteria   Yes   No Criteria #   Inclusion Criteria (all must be yes)    [x]?   []?  1 All patients treated by [facility] ECMO team [facility], inability to wean off cardiopulmonary bypass, post cardiotomy shock and cardiogenic shock who were discharged alive.       Exclusion Criteria  Yes No Criteria #  -all must be \"no\"   []?  [x]?  1 Refusal to participate        "
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
## 2024-02-06 NOTE — Clinical Note
Thank you for allowing us to participate in the care of your patient Estela Fry. Please see attached for details from our visit and don't hesitate to reach out with questions.   MD GRACE Stauffer , OB/GYN Maternal-Fetal Medicine [phone] (Pager)

## 2024-02-07 ENCOUNTER — VIRTUAL VISIT (OUTPATIENT)
Dept: CARDIOLOGY | Facility: CLINIC | Age: 34
End: 2024-02-07
Attending: GENETIC COUNSELOR, MS
Payer: COMMERCIAL

## 2024-02-07 ENCOUNTER — TELEPHONE (OUTPATIENT)
Dept: OBGYN | Facility: CLINIC | Age: 34
End: 2024-02-07
Payer: COMMERCIAL

## 2024-02-07 ENCOUNTER — MYC MEDICAL ADVICE (OUTPATIENT)
Dept: HEMATOLOGY | Facility: CLINIC | Age: 34
End: 2024-02-07
Payer: COMMERCIAL

## 2024-02-07 VITALS — WEIGHT: 120 LBS | HEIGHT: 62 IN | BODY MASS INDEX: 22.08 KG/M2

## 2024-02-07 DIAGNOSIS — Z41.9 ELECTIVE SURGERY: Primary | ICD-10-CM

## 2024-02-07 DIAGNOSIS — I46.9 CARDIAC ARREST (H): Primary | ICD-10-CM

## 2024-02-07 PROCEDURE — 96040 HC GENETIC COUNSELING, EACH 30 MINUTES: CPT | Mod: GT,95 | Performed by: GENETIC COUNSELOR, MS

## 2024-02-07 ASSESSMENT — PAIN SCALES - GENERAL: PAINLEVEL: NO PAIN (0)

## 2024-02-07 NOTE — PATIENT INSTRUCTIONS
"Indication for Genetic Counseling:     Cardiac arrests can occur for lots of different reasons, both genetic and non-genetic.  When there is no structural cardiac abnormalities the possibility of a genetic arrhythmia is raised.      Genetic testing for arrhythmias is available through commercial testing laboratories. It looks at genes associated with LQTS, Brugada syndrome, CPVT, short QT syndrome, and arrhythmogenic cardiomyopathy.  Testing sequences these genes and also looks for deletions or duplications of the DNA.       Since there can be overlap between arrhythmias and cardiomyopathies and the diagnosis of some cardiomyopathies can be very difficult, we also discussed the option of a comprehensive cardio panel. These panels look at genes associated with both arrhythmia and cardiomyopathy.       Explained that the detection rate for identifying a mutation varies depending on the condition, but can range from about 15% with Brugada syndrome to 70% with LQTS.     Inheritance:   Humans have over 20,000 genes that instruct our bodies how to function.  We have two copies of each gene because we inherit one from our mother and one from our father.  In most cardiac cases with a genetic component, the condition is inherited in an autosomal dominant (AD) pattern.  This means that in order to have the condition, a person needs to inherit a mutation on one copy of a particular gene.  This mutation or pathogenic variant dominates the \"normal\" working copy of the gene.  When an affected individual has children, they can either pass on the \"normal\" copy of the gene or the mutation.  Therefore, children have a 50% chance of inheriting the mutation.  Other family members also have an increased risk but the specific risk depends on the degree of relationship.  Additional inheritance patterns can occur within families and may alter the risk of recurrence.      Testing Options:   Genetic testing is available to assess a panel of " genes known to cause this condition.  This test reads through the DNA (sequencing) of these genes to look for spelling mistakes or mutations that could cause the condition.       There are three types of results you could receive from this test.     -Positive result (mutation/pathogenic variant identified) - confirms diagnosis and provides an answer to why this happened.  In addition, identifying a mutation allows family members to have testing to determine their risk.     -Negative result (mutation not identified) - no genetic changes were identified.  This does not rule out a genetic cause for the condition as the genetic testing only identifies a portion of genetic causes for this condition.    -Variant of uncertain significance (VUS) - a genetic change was identified, but there is not enough information to determine whether it is disease-causing or normal human genetic variation.      Although genetic testing may identify a mutation, it cannot provide information about the severity of symptoms or the progression of disease.  We cannot predict age of onset or severity of symptoms due to reduced penetrance and variable expressivity.     Logistics:   Genetic testing involves collecting a sample of DNA, thru blood, saliva, or cheek cells.  The sample will be sent to a laboratory to extract the DNA and sequence the genes for mutations.  The laboratory will work with your insurance company to determine the out of pocket (OOP) cost and will notify you if the OOP cost is greater than $100.  Remember to ask the lab about financial assistance pricing and self pay options as well.  Sometimes those are much lower than insurance pricing.  When testing is initiated, results take about 2-4 weeks to return. I will contact you over the phone when results are available.      Genetic Information and Nondiscrimination Act:  The Genetic Information and Nondiscrimination Act of 2008 (ARI) is a federal law that protects individuals  from genetic discrimination in health insurance and employment. Genetic discrimination is defined as the misuse of genetic information. This law does not address potential discrimination regarding life insurance or disability insurance.       This is especially relevant for at risk individuals who are considering presymptomatic testing.     Screening Recommendations:  Explained that clinical evaluation is recommended for all first degree relatives (parents, siblings, and children) of an affected individual regardless of decision to pursue genetic testing.  Clinical evaluation may include history, cardiac exam, EKG, event monitoring, and exercise stress testing.     Individuals who carry a gene for LQTS or are at increased risk for developing LQTS should avoid medications that can increase risk of a cardiac event.     Resources:  Sudden Arrhythmia Death Syndromes Foundation - sads.org  Heart Rhythm Society - HRSonline.org  [employer] phone analy (for a list of medications to avoid with LQTS)     General   American Heart Association - americanheart.org  Genetics Home Reference - ghr.nlm.nih.gov  Genetic Information and Nondiscrimination Act - Prezmanahelp.org     Contact Information:  Abiola Lamas MS  Licensed Genetic Counselor  [facility]  [facility]     Office:  [phone]  Appointments:  [phone]  Fax: [phone]  Email: [email or web address]

## 2024-02-07 NOTE — NURSING NOTE
FLP at goal, con't current statin, diet/exercise/wgt loss encouraged Is the patient currently in the state of [place]? YES    Visit mode:VIDEO    If the visit is dropped, the patient can be reconnected by: VIDEO VISIT: Text to cell phone:   Telephone Information:   Mobile [phone]       Will anyone else be joining the visit? NO  (If patient encounters technical issues they should call [phone]    How would you like to obtain your AVS? MyChart    Are changes needed to the allergy or medication list? No    Reason for visit: Consult    No other vitals to report per pt    Sandy EVANGELISTAF

## 2024-02-07 NOTE — TELEPHONE ENCOUNTER
Referral received from Dr. Sanchez for D&E.  She is being referred to [facility] due to maternal health.    Gestational age 5w4d  DEBORA 10/5/24    Medical records received.      An email has been sent to [facility]/financial counselor to verify insurance    Per Dr. Sanchez, plan for D&C.

## 2024-02-07 NOTE — LETTER
Day of Surgery: 2/16/24     Your surgery is scheduled at [facility].  [facility].  [address]  [id number].  If parking is needed please park in the [place].  If you are unsure how to get to the hospital - search google maps for [facility] [address].    Just stop at the  and security will tell you where you need to go for your surgery.    Your surgery is scheduled at 1020 AM.  Arrive at the hospital 2 hours before your surgery at 820 AM.  Please do not eat or drink after  Midnight (8 hours prior to ARRIVAL time)  You may have sips of clear liquids (water, black coffee, Gatorade) up to 620 AM (2 hours prior to ARRIVAL time).   If you have prescription medications that you take everyday, you can take those with a sip of water.     There are two prescriptions that will be sent to your pharmacy.     Hibiclens is a special soap that you should use as body wash in the shower the night before and the morning of surgery. If you wash your hair, wash with the special soap after you wash your hair.       Cytotec is a medication that helps your cervix relax and open. You will place the tablets between your lower teeth and cheeks 2 hours prior to surgery, as you arrive to the hospital.  Let them dissolve for 30 minutes and then take a sip of water, swish, and swallow.     A responsible adult (someone over 18) will need to drive you home after surgery and stay with you for 24 hours.     Follow up Telephone call with Nurse: 2/19/24 at 930 AM    An RN from the [facility] care team will call you approximately 3-5 days after your procedure to check on you. During this appointment, we will ask you about your physical health (such as bleeding, cramping, pregnancy symptoms, etc.) along with how you are doing mentally and emotionally.

## 2024-02-07 NOTE — LETTER
2/7/2024      RE: Estela Fry  [address] [place] [address]       Dear Colleague,    Thank you for the opportunity to participate in the care of your patient, Estela Fry, at the [facility]. Please see a copy of my visit note below.    Virtual Visit Details    Type of service:  Video Visit   Video Start Time:  3:30  Video End Time:4:05 PM    Originating Location (pt. Location): Home  Distant Location (provider location):  Off-site  Platform used for Video Visit: [employer]    PROVIDER APPOINTMENT  Here is a copy of the progress note from your recent genetic counseling visit through the [facility] on Date: 2/7/2024.  Patient was seen through a virtual visit.    PROGRESS NOTE:Estela was referred by Lisset Johnson and Patricio for genetic counseling due to her history of a sudden cardiac arrest (SCA).  I had the opportunity to talk with Estela today to discuss the genetic component of SCA and testing options available to her .     MEDICAL HISTORY: Estela had a cardiac arrest with VF on August 5, 2023.  She was found down at home by her .  She was shocked 3 times and brought to hospital.  Clinical screening did not provide any explanation for her arrest.  She had normal EF, no CAD, and a negative drug screen.   She had an ICD placed.       On January 15, 2024 she went to [facility] after receiving multiple shocks from her ICD. EMS was called to her home and found an initial supraventricular rhythm at 170 bpm. In ED EKG showed atrial flutter and she was cardioverted in ED. Of note, she had paroxysmal episodes of atrial flutter during hospitalization in Aug 2023 post VF arrest. She was seen by EP DAX 1/18/2024 and sotalol 80 bid was started.     Estela reports that she just found out she was pregnant on 2/4/24 after thinking she had the flu or COVID.      FAMILY HISTORY:A detailed family  history was obtained during today's consult.  Family history was significant for the following cardiac history:  7 children, two boys (14, 4) and 5 girls (11-3) in good health.  5 sisters and one brother in good health, as are all of their children.  One sister has a heart murmur.  She has 5 children in good health.  Mother (54) in good health.  3 maternal uncles and 1 aunt in good health, as are their children.  Maternal grandfather  in the war.  Grandmother is in good health.  She had at least one other daughter who  at 18 years.  She was born with congenital heart defects, had multiple surgeries and had a pacemaker.  Father  but Estela states that in her culture she can't talk about this.  She did say that she did not believe his death was related to her heart condition.  3 paternal aunts and 2 uncles in good health, as are their children.  Paternal grandparents in good health.  There is no additional history of cardiomyopathy, arrhythmias, heart attacks, fainting, sudden cardiac death, genetic conditions, or birth defects. (A copy of pedigree may be found under media tab).    DISCUSSION: Explained that cardiac arrest can occur for lots of different reasons, both genetic and non-genetic.  When there is no structural cardiac abnormalities the possibility of a genetic arrhythmia is raised.     Genetic testing for arrhythmias is available through commercial testing laboratories. It looks at genes associated with LQTS, Brugada syndrome, CPVT, short QT syndrome, and arrhythmogenic cardiomyopathy.  Testing sequences these genes and also looks for deletions or duplications of the DNA.       Since there can be overlap between arrhythmias and cardiomyopathies and the diagnosis of some cardiomyopathies can be very difficult, we also discussed the option of a comprehensive cardio panel. These panels look at genes associated with both arrhythmia and cardiomyopathy.       Explained that the detection rate for  identifying a mutation varies depending on the condition, but can range from about 15% with Brugada syndrome to 70% with LQTS.     Reviewed autosomal dominant (AD) inheritance pattern most commonly associated with inherited cardiac conditions, including the 50% risk for recurrence.  Explained that cardiac gene mutations are associated with reduced penetrance and variable expressivity, meaning that individuals who carry a gene mutation may or may not get the disease and onset and severity can vary from one family member to the next. This explains why you may not see symptoms in each generation of a family.     Reviewed capabilities, limitations, and logistics of testing.  DNA sample via saliva or blood is collected and sent to testing lab for evaluation of selected genes. The results could directly impact care and treatment.  Test results take approximately 2-4 weeks on average. Discussed cost of testing through commercial labs. Explained that the lab will work with insurance to determine coverage and contact patient if out of pocket costs are expected to exceed $100. If so, patient has the option to pay reported price, cancel testing or elect self pay pricing (typcially around $250).     Explained three possible outcomes of genetic testing including: positive identification of a mutation, no mutation identified, and identification of a variant of unknown significance (VUS). If a mutation, also known as a pathogenic variant, is identified, presymptomatic testing would be available to at risk family members. If no mutation is identified, it does not rule out the possibility of a genetic component to this disease. Family members could still be at risk for developing the same condition. If a VUS is identified, it is unclear if the mutation is disease causing or just a normal variation. It may take time and possibly additional testing to determine the meaning of a VUS result.     Discussed pros and cons of genetic  testing. Explained that results could determine the cause for a sudden cardiac arrest.  In addition, identifying a pathogenic variant would help cardiologist establish a more accurate treatment plan for family members.  If a mutation is identified, presymptomatic testing is available to all at risk relatives.     Explained that clinical evaluation is recommended for all first degree relatives (parents, siblings, and children) of an affected individual regardless of decision to pursue genetic testing.  Clinical evaluation may include history, cardiac exam, EKG, ECHO, heart monitoring, and exercise stress testing.    All questions answered at this time.     PLAN: Estela elected to proceed with genetic testing for the full comprehensive cardio panel.  Requisition and consent forms were completed and signed.  DNA will be collected via saliva sample and sent to [facility] laboratory. I will contact patient when results are available.    TOTAL TIME SPENT IN COUNSELIN minutes    Abiola Lamas MS, [facility]  Licensed, Certified Genetic Counselor  [facility]

## 2024-02-07 NOTE — PROGRESS NOTES
Virtual Visit Details    Type of service:  Video Visit   Video Start Time:  3:30  Video End Time:4:05 PM    Originating Location (pt. Location): Home  Distant Location (provider location):  Off-site  Platform used for Video Visit: [employer]    PROVIDER APPOINTMENT  Here is a copy of the progress note from your recent genetic counseling visit through the [facility] on Date: 2/7/2024.  Patient was seen through a virtual visit.    PROGRESS NOTE:Estela was referred by Lisset Johnson and Patricio for genetic counseling due to her history of a sudden cardiac arrest (SCA).  I had the opportunity to talk with Estela today to discuss the genetic component of SCA and testing options available to her .     MEDICAL HISTORY: Estela had a cardiac arrest with VF on August 5, 2023.  She was found down at home by her .  She was shocked 3 times and brought to hospital.  Clinical screening did not provide any explanation for her arrest.  She had normal EF, no CAD, and a negative drug screen.   She had an ICD placed.       On January 15, 2024 she went to [facility] after receiving multiple shocks from her ICD. EMS was called to her home and found an initial supraventricular rhythm at 170 bpm. In ED EKG showed atrial flutter and she was cardioverted in ED. Of note, she had paroxysmal episodes of atrial flutter during hospitalization in Aug 2023 post VF arrest. She was seen by EP DAX 1/18/2024 and sotalol 80 bid was started.     Estela reports that she just found out she was pregnant on 2/4/24 after thinking she had the flu or COVID.      FAMILY HISTORY:A detailed family history was obtained during today's consult.  Family history was significant for the following cardiac history:  7 children, two boys (14, 4) and 5 girls (11-3) in good health.  5 sisters and one brother in good health, as are all of their children.  One sister has a heart murmur.  She has 5 children in good health.  Mother (54) in  good health.  3 maternal uncles and 1 aunt in good health, as are their children.  Maternal grandfather  in the war.  Grandmother is in good health.  She had at least one other daughter who  at 18 years.  She was born with congenital heart defects, had multiple surgeries and had a pacemaker.  Father  but Estela states that in her culture she can't talk about this.  She did say that she did not believe his death was related to her heart condition.  3 paternal aunts and 2 uncles in good health, as are their children.  Paternal grandparents in good health.  There is no additional history of cardiomyopathy, arrhythmias, heart attacks, fainting, sudden cardiac death, genetic conditions, or birth defects. (A copy of pedigree may be found under media tab).    DISCUSSION: Explained that cardiac arrest can occur for lots of different reasons, both genetic and non-genetic.  When there is no structural cardiac abnormalities the possibility of a genetic arrhythmia is raised.     Genetic testing for arrhythmias is available through commercial testing laboratories. It looks at genes associated with LQTS, Brugada syndrome, CPVT, short QT syndrome, and arrhythmogenic cardiomyopathy.  Testing sequences these genes and also looks for deletions or duplications of the DNA.       Since there can be overlap between arrhythmias and cardiomyopathies and the diagnosis of some cardiomyopathies can be very difficult, we also discussed the option of a comprehensive cardio panel. These panels look at genes associated with both arrhythmia and cardiomyopathy.       Explained that the detection rate for identifying a mutation varies depending on the condition, but can range from about 15% with Brugada syndrome to 70% with LQTS.     Reviewed autosomal dominant (AD) inheritance pattern most commonly associated with inherited cardiac conditions, including the 50% risk for recurrence.  Explained that cardiac gene mutations are associated  with reduced penetrance and variable expressivity, meaning that individuals who carry a gene mutation may or may not get the disease and onset and severity can vary from one family member to the next. This explains why you may not see symptoms in each generation of a family.     Reviewed capabilities, limitations, and logistics of testing.  DNA sample via saliva or blood is collected and sent to testing lab for evaluation of selected genes. The results could directly impact care and treatment.  Test results take approximately 2-4 weeks on average. Discussed cost of testing through commercial labs. Explained that the lab will work with insurance to determine coverage and contact patient if out of pocket costs are expected to exceed $100. If so, patient has the option to pay reported price, cancel testing or elect self pay pricing (typcially around $250).     Explained three possible outcomes of genetic testing including: positive identification of a mutation, no mutation identified, and identification of a variant of unknown significance (VUS). If a mutation, also known as a pathogenic variant, is identified, presymptomatic testing would be available to at risk family members. If no mutation is identified, it does not rule out the possibility of a genetic component to this disease. Family members could still be at risk for developing the same condition. If a VUS is identified, it is unclear if the mutation is disease causing or just a normal variation. It may take time and possibly additional testing to determine the meaning of a VUS result.     Discussed pros and cons of genetic testing. Explained that results could determine the cause for a sudden cardiac arrest.  In addition, identifying a pathogenic variant would help cardiologist establish a more accurate treatment plan for family members.  If a mutation is identified, presymptomatic testing is available to all at risk relatives.     Explained that clinical  evaluation is recommended for all first degree relatives (parents, siblings, and children) of an affected individual regardless of decision to pursue genetic testing.  Clinical evaluation may include history, cardiac exam, EKG, ECHO, heart monitoring, and exercise stress testing.    All questions answered at this time.     PLAN: Youvalencia elected to proceed with genetic testing for the full comprehensive cardio panel.  Requisition and consent forms were completed and signed.  DNA will be collected via saliva sample and sent to [facility] laboratory. I will contact patient when results are available.    TOTAL TIME SPENT IN COUNSELIN minutes    Abiola Lamas MS, [facility]  Licensed, Certified Genetic Counselor  [facility]

## 2024-02-08 NOTE — TELEPHONE ENCOUNTER
"Per Sarah,    \"This patient has [employer] for coverage, which does not cover abortions.  [place] Medicaid covers, please make sure Medical Necessity Statement is signed.         Please let me know when this is scheduled so I can change coverage to [place].\"      Called patient to discuss referral. Scheduled for visit with Dr. Tavera per Dr. Sanchez. Answered all questions at this time, provided clinic phone number to call back with additional questions/concerns.  "

## 2024-02-13 ENCOUNTER — VIRTUAL VISIT (OUTPATIENT)
Dept: OBGYN | Facility: CLINIC | Age: 34
End: 2024-02-13
Attending: OBSTETRICS & GYNECOLOGY
Payer: COMMERCIAL

## 2024-02-13 ENCOUNTER — TELEPHONE (OUTPATIENT)
Dept: SURGERY | Facility: CLINIC | Age: 34
End: 2024-02-13

## 2024-02-13 DIAGNOSIS — Z33.2 TERMINATION OF PREGNANCY (FETUS): ICD-10-CM

## 2024-02-13 DIAGNOSIS — I25.2 HISTORY OF MI (MYOCARDIAL INFARCTION): ICD-10-CM

## 2024-02-13 DIAGNOSIS — Z95.810 ICD (IMPLANTABLE CARDIOVERTER-DEFIBRILLATOR) IN PLACE: Primary | ICD-10-CM

## 2024-02-13 LAB
ABO/RH(D): NORMAL
ANTIBODY SCREEN: NEGATIVE
SPECIMEN EXPIRATION DATE: NORMAL

## 2024-02-13 PROCEDURE — 99215 OFFICE O/P EST HI 40 MIN: CPT | Mod: 95 | Performed by: OBSTETRICS & GYNECOLOGY

## 2024-02-13 RX ORDER — MISOPROSTOL 200 UG/1
TABLET ORAL
Qty: 2 TABLET | Refills: 0 | Status: ON HOLD | OUTPATIENT
Start: 2024-02-13 | End: 2024-02-16

## 2024-02-13 RX ORDER — ACETAMINOPHEN 325 MG/1
975 TABLET ORAL ONCE
Status: CANCELLED | OUTPATIENT
Start: 2024-02-13 | End: 2024-02-13

## 2024-02-13 NOTE — PROGRESS NOTES
Complex Family Planning History and Physical  Estela Fry is a 34 year old female who is being evaluated via a billable virtual visit.      History of Present Illness:  Estela Fry is a 34 year old  at 6w3d who presents for surgical  counseling. She is sure of her decision.    Estela has a history of MI in 2023 and currently has an ICD in place. She has normal cardiac function per a recent echo. Given this recent event, she does not desire to undergo another pregnancy at this time; furthermore, she would like permanent contraception so as to prevent pregnancy in the future. She is sure her family is complete.    Estela is currently using lovenox for a DVT. She states that prior to pregnancy she did not experience heavy menses while on xarelto. She has used an IUD in the past, and had it removed because she could feel the strings.    Finally, Estela has been experiencing morning sickness. She is mostly managing with eating something small and resting in the morning. She is not vomiting, but sometimes still feels quite poorly despite these interventions.     Blood type: O POS  US 24: single viable IUP measuring 6w0d, DEBORA 10/5/24 c/w LMP    Past Medical History:  Past Medical History:   Diagnosis Date    Automatic implantable cardioverter-defibrillator in situ     Cardiac arrest (H)     History of atrial flutter     History of venous thromboembolism        Past Surgical History:  Past Surgical History:   Procedure Laterality Date    CV CENTRAL VENOUS CATHETER PLACEMENT N/A 2023    Procedure: Central Venous Catheter Placement;  Surgeon: Sid Liz MD;  Location:  HEART CARDIAC CATH LAB    CV CORONARY ANGIOGRAM N/A 2023    Procedure: Coronary Angiogram;  Surgeon: Sid Liz MD;  Location: [facility] CARDIAC CATH LAB    EP SICD INSERT N/A 8/15/2023    Procedure: Subcutaneous Implantable Cardioverter Defibrillator Implant;  Surgeon: Eula Johnson MD;  Location:  HEART CARDIAC CATH LAB        Obstetric History:  OB History    Para Term  AB Living   12 8 5 3 3 7   SAB IAB Ectopic Multiple Live Births   1 2 0 0 8      # Outcome Date GA Lbr Gulshan/2nd Weight Sex Delivery Anes PTL Lv   12 Current            11 IAB 21           10 IAB 20           9  20 32w0d   F    MELISSA   8  19 32w0d   M Vag-Spont   MELISSA   7 Term 18 40w0d   F Vag-Spont   MELISSA   6 Term 17 40w0d   F Vag-Spont   MELISSA   5 SAB  12w0d          4 Term 13 40w0d   F Vag-Spont   MELISSA   3  13 22w0d   M Vag-Spont   DEC      Birth Comments: System Generated. Please review and update pregnancy details.   2 Term 12 40w0d  3.6 kg (7 lb 15 oz) F Vag-Spont   MELISSA      Name: noemí Alejandro Term 01/29/10 38w0d  3.317 kg (7 lb 5 oz) M Vag-Spont   MELISSA      Name: Salomón       Medications:    Current Outpatient Medications:     enoxaparin ANTICOAGULANT (LOVENOX) 40 MG/0.4ML syringe, Inject 0.4 mLs (40 mg) Subcutaneous 2 times daily for 30 days, Disp: 24 mL, Rfl: 0    Prenatal Vit-Fe Fumarate-FA (PRENATAL MULTIVITAMIN W/IRON) 27-0.8 MG tablet, Take 1 tablet by mouth daily, Disp: 90 tablet, Rfl: 3    sotalol (BETAPACE) 80 MG tablet, Take 1 tablet (80 mg) by mouth 2 times daily, Disp: 90 tablet, Rfl: 1    Allergies:   No Known Allergies    Social History:  Social History     Social History Narrative    Not on file       Family History:  No family history on file.    Physical Exam:  She was well-appearing. Further examination deferred during this video visit    Assessment and Plan:    Estela and NIRAV reviewed the risks of suction dilation and curettage for the purposes of pregnancy termination, including: including pain, infection, bleeding requiring transfusion, and uterine perforation requiring repair including hysterectomy. As she is in the first trimester, she can continue lovenox as scheduled prior to the procedure. We will avoid methergine and TXA given her recent MI and active DVT;  "would plan hemabate as first line uterotonic if needed.    We then turned the conversation to contraception. We discussed that my recommendation for permanent sterilization is for her partner to have a vasectomy, given the additional risk factors anesthesia poses for her. She is unsure if this is a viable alternative, so we then discussed that LARC has equivalent efficacy to tubal sterilization and could be placed at the time of the  procedure without need for deep/general anesthesia. We discussed that LARC methods furthermore can improve vaginal bleeding when people need anticoagulation which often produces heavy menses.    Finally, we discussed timing of tubal ligation. I would not recommend performing at the time of the  procedure because she needs to wait 30d from the time of consultation for FTP to be valid. Furthermore, I would like to plan the procedure when she could hold or stop anticoagulation. We therefore will plan an interval sterilization procedure. She understands that bilateral salpingectomy is permanent and irreversible and that she can change her mind at any time.    Given the need for an interval procedure, Estela would like to try a Mirena IUD to be placed after the D&C. We will plan to trim the strings short in hopes of avoiding the sensation of poking she experienced prior.    Case request for suction D&C, mirena IUD placement ordered. She will see PAC prior to make a sedation plan and to talk about turning off her ICD during the case.     She is RhPOS and does not require Rhogam.    We discussed the use of B6/doxylamine for nausea and vomiting of pregnancy.     I spent 60 minutes on the date of the encounter doing chart review/review of test results/on the visit with the patient/documentation/discussion of care with other providers.    The patient was notified of following prior to conducting the virtual visit:   \"This virtual visit will be conducted via a call between you and " "your physician/provider. We have found that certain health care needs can be provided without the need for a physical exam. This service lets us provide the care you need with a short virtual conversation. If a prescription is necessary we can send it directly to your pharmacy. If lab work is needed we can place an order for that and you can then stop by our lab to have the test done at a later time. If during the course of the call the physician/provider feels a virtual visit is not appropriate, you will not be charged for this service.\"     I was in my office on the [facility] campus for the duration of the video visit.    Phone call start: [phone]  Phone call end: [phone]  Phone call duration:  18 minutes    Leyla Tavera MD, MSCI  02/13/24  "

## 2024-02-13 NOTE — TELEPHONE ENCOUNTER
FUTURE VISIT INFORMATION      SURGERY INFORMATION:  Date: 24  Location: ur or  Surgeon:  Shreya Mcdonnell MD   Anesthesia Type:  choice  Procedure: DILATION AND CURETTAGE, UTERUS, USING SUCTION, placement of Mirena intrauterine device     RECORDS REQUESTED FROM:       Primary Care Provider: Gerri Bay NP     Pertinent Medical History: Cardiac arrest    Most recent EKG+ Tracin24    Most recent ECHO:  24

## 2024-02-13 NOTE — TELEPHONE ENCOUNTER
Estela was able to schedule PAC appointment for 2/14.  Surgery scheduled on 2/16.  Reviewed instruction.  Letter placed in chart.

## 2024-02-13 NOTE — LETTER
2024       RE: Estela Fry  [address] [place] [address]     Dear Colleague,    Thank you for referring your patient, Estela Fry, to the [facility] [place] at [facility]. Please see a copy of my visit note below.    Complex Family Planning History and Physical  Estela Fry is a 34 year old female who is being evaluated via a billable virtual visit.      History of Present Illness:  Estela Fry is a 34 year old  at 6w3d who presents for surgical  counseling. She is sure of her decision.    Estela has a history of MI in 2023 and currently has an ICD in place. She has normal cardiac function per a recent echo. Given this recent event, she does not desire to undergo another pregnancy at this time; furthermore, she would like permanent contraception so as to prevent pregnancy in the future. She is sure her family is complete.    Estela is currently using lovenox for a DVT. She states that prior to pregnancy she did not experience heavy menses while on xarelto. She has used an IUD in the past, and had it removed because she could feel the strings.    Finally, Estela has been experiencing morning sickness. She is mostly managing with eating something small and resting in the morning. She is not vomiting, but sometimes still feels quite poorly despite these interventions.     Blood type: O POS  US 24: single viable IUP measuring 6w0d, DEBORA 10/5/24 c/w LMP    Past Medical History:  Past Medical History:   Diagnosis Date    Automatic implantable cardioverter-defibrillator in situ     Cardiac arrest (H)     History of atrial flutter     History of venous thromboembolism        Past Surgical History:  Past Surgical History:   Procedure Laterality Date    CV CENTRAL VENOUS CATHETER PLACEMENT N/A 2023    Procedure: Central Venous Catheter Placement;  Surgeon: Sid Liz MD;  Location:  [facility]    CV  CORONARY ANGIOGRAM N/A 2023    Procedure: Coronary Angiogram;  Surgeon: Sid Liz MD;  Location:  [facility]    EP SICD INSERT N/A 8/15/2023    Procedure: Subcutaneous Implantable Cardioverter Defibrillator Implant;  Surgeon: Eula Johnson MD;  Location:  [facility]       Obstetric History:  OB History    Para Term  AB Living   12 8 5 3 3 7   SAB IAB Ectopic Multiple Live Births   1 2 0 0 8      # Outcome Date GA Lbr Gulshan/2nd Weight Sex Delivery Anes PTL Lv   12 Current            11 IAB 21           10 IAB 20           9  20 32w0d   F    MELISSA   8  19 32w0d   M Vag-Spont   MELISSA   7 Term 18 40w0d   F Vag-Spont   MELISSA   6 Term 17 40w0d   F Vag-Spont   MELISSA   5 SAB  12w0d          4 Term 13 40w0d   F Vag-Spont   MELISSA   3  13 22w0d   M Vag-Spont   DEC      Birth Comments: System Generated. Please review and update pregnancy details.   2 Term 12 40w0d  3.6 kg (7 lb 15 oz) F Vag-Spont   MELISSA      Name: noemí   1 Term 01/29/10 38w0d  3.317 kg (7 lb 5 oz) M Vag-Spont   MELISSA      Name: Salomón       Medications:    Current Outpatient Medications:     enoxaparin ANTICOAGULANT (LOVENOX) 40 MG/0.4ML syringe, Inject 0.4 mLs (40 mg) Subcutaneous 2 times daily for 30 days, Disp: 24 mL, Rfl: 0    Prenatal Vit-Fe Fumarate-FA (PRENATAL MULTIVITAMIN W/IRON) 27-0.8 MG tablet, Take 1 tablet by mouth daily, Disp: 90 tablet, Rfl: 3    sotalol (BETAPACE) 80 MG tablet, Take 1 tablet (80 mg) by mouth 2 times daily, Disp: 90 tablet, Rfl: 1    Allergies:   No Known Allergies    Social History:  Social History     Social History Narrative    Not on file       Family History:  No family history on file.    Physical Exam:  She was well-appearing. Further examination deferred during this video visit    Assessment and Plan:    Estela and NIRAV reviewed the risks of suction dilation and curettage for the purposes of pregnancy  termination, including: including pain, infection, bleeding requiring transfusion, and uterine perforation requiring repair including hysterectomy. As she is in the first trimester, she can continue lovenox as scheduled prior to the procedure. We will avoid methergine and TXA given her recent MI and active DVT; would plan hemabate as first line uterotonic if needed.    We then turned the conversation to contraception. We discussed that my recommendation for permanent sterilization is for her partner to have a vasectomy, given the additional risk factors anesthesia poses for her. She is unsure if this is a viable alternative, so we then discussed that LARC has equivalent efficacy to tubal sterilization and could be placed at the time of the  procedure without need for deep/general anesthesia. We discussed that LARC methods furthermore can improve vaginal bleeding when people need anticoagulation which often produces heavy menses.    Finally, we discussed timing of tubal ligation. I would not recommend performing at the time of the  procedure because she needs to wait 30d from the time of consultation for FTP to be valid. Furthermore, I would like to plan the procedure when she could hold or stop anticoagulation. We therefore will plan an interval sterilization procedure. She understands that bilateral salpingectomy is permanent and irreversible and that she can change her mind at any time.    Given the need for an interval procedure, Estela would like to try a Mirena IUD to be placed after the D&C. We will plan to trim the strings short in hopes of avoiding the sensation of poking she experienced prior.    Case request for suction D&C, mirena IUD placement ordered. She will see PAC prior to make a sedation plan and to talk about turning off her ICD during the case.     She is RhPOS and does not require Rhogam.    We discussed the use of B6/doxylamine for nausea and vomiting of pregnancy.     I spent 60  "minutes on the date of the encounter doing chart review/review of test results/on the visit with the patient/documentation/discussion of care with other providers.    The patient was notified of following prior to conducting the virtual visit:   \"This virtual visit will be conducted via a call between you and your physician/provider. We have found that certain health care needs can be provided without the need for a physical exam. This service lets us provide the care you need with a short virtual conversation. If a prescription is necessary we can send it directly to your pharmacy. If lab work is needed we can place an order for that and you can then stop by our lab to have the test done at a later time. If during the course of the call the physician/provider feels a virtual visit is not appropriate, you will not be charged for this service.\"     I was in my office on the [facility] campus for the duration of the video visit.    Phone call start: [phone]  Phone call end: [phone]  Phone call duration:  18 minutes    Leyla Tavera MD, MSCI  02/13/24    "

## 2024-02-13 NOTE — PATIENT INSTRUCTIONS
For nausea/morning sickness due to early pregnancy, you may take doxylamine 25mg at night. If your nausea does not improve with this after 2 nights, then add vitamin B6 20mg with the doxylamine. If after 2 nights your nausea is still not improved, you can add vitamin B6 20mg and doxylamine 25 mg in the morning and at the same dose at night.

## 2024-02-14 ENCOUNTER — TELEPHONE (OUTPATIENT)
Dept: SURGERY | Facility: CLINIC | Age: 34
End: 2024-02-14

## 2024-02-14 ENCOUNTER — ANCILLARY PROCEDURE (OUTPATIENT)
Dept: CARDIOLOGY | Facility: CLINIC | Age: 34
End: 2024-02-14
Attending: NURSE PRACTITIONER
Payer: COMMERCIAL

## 2024-02-14 ENCOUNTER — LAB (OUTPATIENT)
Dept: LAB | Facility: CLINIC | Age: 34
End: 2024-02-14
Payer: COMMERCIAL

## 2024-02-14 ENCOUNTER — MYC MEDICAL ADVICE (OUTPATIENT)
Dept: HEMATOLOGY | Facility: CLINIC | Age: 34
End: 2024-02-14

## 2024-02-14 ENCOUNTER — ANESTHESIA EVENT (OUTPATIENT)
Dept: SURGERY | Facility: CLINIC | Age: 34
End: 2024-02-14
Payer: MEDICAID

## 2024-02-14 ENCOUNTER — PRE VISIT (OUTPATIENT)
Dept: SURGERY | Facility: CLINIC | Age: 34
End: 2024-02-14

## 2024-02-14 ENCOUNTER — ANCILLARY PROCEDURE (OUTPATIENT)
Dept: CARDIOLOGY | Facility: CLINIC | Age: 34
End: 2024-02-14
Attending: INTERNAL MEDICINE
Payer: MEDICAID

## 2024-02-14 DIAGNOSIS — I46.9 CARDIAC ARREST (H): ICD-10-CM

## 2024-02-14 DIAGNOSIS — Z33.2 TERMINATION OF PREGNANCY (FETUS): ICD-10-CM

## 2024-02-14 LAB
MDC_IDC_LEAD_CONNECTION_STATUS: NORMAL
MDC_IDC_LEAD_IMPLANT_DT: NORMAL
MDC_IDC_LEAD_LOCATION: NORMAL
MDC_IDC_LEAD_LOCATION_DETAIL_1: NORMAL
MDC_IDC_LEAD_MFG: NORMAL
MDC_IDC_LEAD_MODEL: NORMAL
MDC_IDC_LEAD_POLARITY_TYPE: NORMAL
MDC_IDC_LEAD_SERIAL: NORMAL
MDC_IDC_MSMT_BATTERY_DTM: NORMAL
MDC_IDC_MSMT_BATTERY_REMAINING_PERCENTAGE: 92 %
MDC_IDC_MSMT_BATTERY_STATUS: NORMAL
MDC_IDC_PG_IMPLANT_DTM: NORMAL
MDC_IDC_PG_MFG: NORMAL
MDC_IDC_PG_MODEL: NORMAL
MDC_IDC_PG_SERIAL: NORMAL
MDC_IDC_PG_TYPE: NORMAL
MDC_IDC_SESS_CLINIC_NAME: NORMAL
MDC_IDC_SESS_DTM: NORMAL
MDC_IDC_SESS_TYPE: NORMAL
MDC_IDC_SET_ZONE_DETECTION_INTERVAL: 250 MS
MDC_IDC_SET_ZONE_DETECTION_INTERVAL: 300 MS
MDC_IDC_SET_ZONE_STATUS: NORMAL
MDC_IDC_SET_ZONE_STATUS: NORMAL
MDC_IDC_SET_ZONE_TYPE: NORMAL
MDC_IDC_SET_ZONE_TYPE: NORMAL
MDC_IDC_SET_ZONE_VENDOR_TYPE: NORMAL
MDC_IDC_SET_ZONE_VENDOR_TYPE: NORMAL
MDC_IDC_STAT_AT_BURDEN_PERCENT: 0 %
MDC_IDC_STAT_EPISODE_RECENT_COUNT: 0
MDC_IDC_STAT_EPISODE_RECENT_COUNT_DTM_END: NORMAL
MDC_IDC_STAT_EPISODE_RECENT_COUNT_DTM_START: NORMAL
MDC_IDC_STAT_EPISODE_TOTAL_COUNT: 0
MDC_IDC_STAT_EPISODE_TOTAL_COUNT_DTM_END: NORMAL
MDC_IDC_STAT_EPISODE_TOTAL_COUNT_DTM_START: NORMAL
MDC_IDC_STAT_EPISODE_TYPE: NORMAL
MDC_IDC_STAT_EPISODE_TYPE: NORMAL
MDC_IDC_STAT_TACHYTHERAPY_RECENT_DTM_END: NORMAL
MDC_IDC_STAT_TACHYTHERAPY_RECENT_DTM_START: NORMAL
MDC_IDC_STAT_TACHYTHERAPY_SHOCKS_DELIVERED_RECENT: 0
MDC_IDC_STAT_TACHYTHERAPY_SHOCKS_DELIVERED_TOTAL: 3
MDC_IDC_STAT_TACHYTHERAPY_TOTAL_DTM_END: NORMAL
MDC_IDC_STAT_TACHYTHERAPY_TOTAL_DTM_START: NORMAL

## 2024-02-14 PROCEDURE — 93295 DEV INTERROG REMOTE 1/2/MLT: CPT | Performed by: INTERNAL MEDICINE

## 2024-02-14 PROCEDURE — 86900 BLOOD TYPING SEROLOGIC ABO: CPT | Performed by: NURSE PRACTITIONER

## 2024-02-14 PROCEDURE — 93296 REM INTERROG EVL PM/IDS: CPT

## 2024-02-14 PROCEDURE — 93306 TTE W/DOPPLER COMPLETE: CPT | Performed by: STUDENT IN AN ORGANIZED HEALTH CARE EDUCATION/TRAINING PROGRAM

## 2024-02-14 PROCEDURE — 36415 COLL VENOUS BLD VENIPUNCTURE: CPT | Performed by: PATHOLOGY

## 2024-02-14 NOTE — PROGRESS NOTES
Spoke with Moon at [employer]. Estela has an ICD and it does not pace. Can use a magnet. The magnet will inhibit shocks. When the magnet is removed, the device will return to normal settings.  Local rep is Anthony Juares. Call [employer] at 1 [phone] and ask for the local rep to be paged if needed.

## 2024-02-14 NOTE — TELEPHONE ENCOUNTER
Updated Estela that her echo appointment is today, 2/14 at 1:00 pm at the [facility].  Her device check appointment will also be today, 2/14 at 2:30 at the [facility].  She expressed understanding.  Mckenzie Haas RN

## 2024-02-14 NOTE — TELEPHONE ENCOUNTER
Updated [employer] her device check appointment is today, 2/14 at 2:30 pm at the [facility].  Estela expressed understanding.  Mckenzie Haas RN

## 2024-02-15 ASSESSMENT — LIFESTYLE VARIABLES: TOBACCO_USE: 1

## 2024-02-15 ASSESSMENT — ENCOUNTER SYMPTOMS: SEIZURES: 0

## 2024-02-15 NOTE — ANESTHESIA PREPROCEDURE EVALUATION
Pre-Operative H & P       Primary Care Physician:  Gerri Bay  Estela Fry is a 34 year old female who presents for pre-operative H & P in preparation for  Procedure Information       Case: [id number] Date/Time: 24 1020    Procedures:       DILATION AND CURETTAGE, UTERUS, USING SUCTION, (Uterus)      placement of Mirena intrauterine device (Uterus)    Anesthesia type: Choice    Diagnosis:       ICD (implantable cardioverter-defibrillator) in place [Z95.810]      Termination of pregnancy (fetus) [Z33.2]      History of MI (myocardial infarction) [I25.2]    Pre-op diagnosis:       ICD (implantable cardioverter-defibrillator) in place [Z95.810]      Termination of pregnancy (fetus) [Z33.2]      History of MI (myocardial infarction) [I25.2]    Location:  OR  /  OR    Providers: Shreya Mcdonnell MD          Estela Fry has a past medical history for ventricular fibrillation arrest on 2023 s/p shocked X3 with return of spontaneous circulation s/p subcutaneous ICD, atrial flutter and DVT on anticoagulation. Cardiology work up was unrevealing for etiology. Following with EP and commenced Sotalol after 3 ICD shocks on 1/15/24.  Following with hematology for DVT on anticoagulation.  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  She also had a provoked right external iliac vein thrombosis 2/2 femoral line.  She is anticoagulated. Patient does have a h/o anemia.     The patient recently had a positive pregnancy test an was seen by Dr. Tavera in OB/Gyn on 24 for surgical  counseling. The patient is   at 6w4d.  The patient has now been scheduled for the procedure as listed above.      The patient presents to the PAC in person today in preparation for the above scheduled procedure with comorbid conditions as above with the addition of iron deficient anemia and situational depression.  .     History is obtained from the patient and chart review    Hx of abnormal bleeding or anti-platelet use: yes    Menstrual history: Patient's last menstrual period was 12/30/2023.:      Past Medical History  Past Medical History:   Diagnosis Date    Automatic implantable cardioverter-defibrillator in situ     Cardiac arrest (H)     History of atrial flutter     History of venous thromboembolism        Past Surgical History  Past Surgical History:   Procedure Laterality Date    CV CENTRAL VENOUS CATHETER PLACEMENT N/A 8/5/2023    Procedure: Central Venous Catheter Placement;  Surgeon: Sid Liz MD;  Location: [facility] CARDIAC CATH LAB    CV CORONARY ANGIOGRAM N/A 8/5/2023    Procedure: Coronary Angiogram;  Surgeon: Sid Liz MD;  Location: [facility] CARDIAC CATH LAB    EP SICD INSERT N/A 8/15/2023    Procedure: Subcutaneous Implantable Cardioverter Defibrillator Implant;  Surgeon: Eula Johnson MD;  Location: [facility] CARDIAC CATH LAB       Prior to Admission Medications  Current Outpatient Medications   Medication Sig Dispense Refill    chlorhexidine (HIBICLENS) 4 % liquid Shower with hibiclens night before and morning of surgery 118 mL 0    enoxaparin ANTICOAGULANT (LOVENOX) 40 MG/0.4ML syringe Inject 0.4 mLs (40 mg) Subcutaneous 2 times daily for 30 days 24 mL 0    misoprostol (CYTOTEC) 200 MCG tablet Take 2 hours before procedure. Place 1 tab between cheek and gum on the right, and 2nd tab on the left. Dissolve for 30 min, then swallow. 2 tablet 0    Prenatal Vit-Fe Fumarate-FA (PRENATAL MULTIVITAMIN W/IRON) 27-0.8 MG tablet Take 1 tablet by mouth daily (Patient not taking: Reported on 2/14/2024) 90 tablet 3    sotalol (BETAPACE) 80 MG tablet Take 1 tablet (80 mg) by mouth 2 times daily 90 tablet 1       Allergies  No Known Allergies    Social History  Social History     Socioeconomic History    Marital status:      Spouse name: Not on file    Number of children: Not on file    Years of  education: Not on file    Highest education level: Not on file   Occupational History    Not on file   Tobacco Use    Smoking status: Never     Passive exposure: Never    Smokeless tobacco: Never   Substance and Sexual Activity    Alcohol use: Never    Drug use: Never    Sexual activity: Not on file   Other Topics Concern    Not on file   Social History Narrative    Not on file     Social Determinants of Health     Financial Resource Strain: Not on file   Food Insecurity: Not on file   Transportation Needs: Not on file   Physical Activity: Not on file   Stress: Not on file   Social Connections: Not on file   Interpersonal Safety: Not on file   Housing Stability: Not on file       Family History  No family history on file.    Review of Systems  The complete review of systems is negative other than noted in the HPI or here.   Anesthesia Evaluation   Pt has had prior anesthetic. Type: MAC.    No history of anesthetic complications       ROS/MED HX  ENT/Pulmonary:     (+)                tobacco use (Very remote history.), Past use,                    (-) asthma, sleep apnea and LUIS risk factors   Neurologic:  - neg neurologic ROS   (+)    no peripheral neuropathy                         (-) no seizures, no CVA, no TIA and migraines   Cardiovascular: Comment: Denies cardiac symptoms including SOB, palpitations, syncope, ALFONSO, orthopnea, or PND.  Intermittent chest pain that does not follow any specific pattern.  Resolves quickly.       (+)  - -   -  - -   Taking blood thinners                ICD Reason placed:VT with cardiac arrest..  type;[employer]            Previous cardiac testing (2/14/24)   Echo: Date: 2/14/24 Results:  Global and regional left ventricular function is normal with an EF of 60-65%.  Mild right ventricular dilation is present.Global right ventricular function  is normal.  IVC diameter <2.1 cm collapsing >50% with sniff suggests a normal RA pressure  of 3 mmHg.  No pericardial effusion is  present.    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    Hematologic:     (+) History of blood clots,    pt is anticoagulated, anemia,       (-) history of blood transfusion   Musculoskeletal:  - neg musculoskeletal ROS     GI/Hepatic:  - neg GI/hepatic ROS     Renal/Genitourinary:  - neg Renal ROS     Endo:  - neg endo ROS     Psychiatric/Substance Use:     (+) psychiatric history (Follows with psychology) depression    (-) alcohol abuse history and chronic opioid use history   Infectious Disease:     (+)   MRSA,      (-) HIV and TB   Malignancy:  - neg malignancy ROS     Other:      (+)  LMP: 12/30/23, ,         LMP 12/30/2023     Physical Exam   Constitutional: Awake, alert, cooperative, no apparent distress, and appears stated age.  Eyes: Pupils equal, round and reactive to light, extra ocular muscles intact, sclera clear, conjunctiva normal.  HENT: Normocephalic, oral pharynx with moist mucus membranes, good dentition. Small mouth with opening <3 FB. No goiter appreciated.   Respiratory: Clear to auscultation bilaterally, no crackles or wheezing.  Cardiovascular: Regular rate and rhythm, normal S1 and S2, and no murmur noted.  Carotids +2, no bruits. No edema. Palpable pulses to radial  DP and PT arteries.   GI: Normal bowel sounds, soft, non-distended, non-tender, no masses palpated, no hepatosplenomegaly.    Lymph/Hematologic: No cervical lymphadenopathy and no supraclavicular lymphadenopathy.  Skin: Warm and dry.  No rashes at anticipated surgical site.   Musculoskeletal: Full ROM of neck. There is no redness, warmth, or swelling of the joints. Gross motor strength is normal.    Neurologic: Awake, alert, oriented to name, place and time. Cranial nerves II-XII are grossly intact. Gait is normal.   Neuropsychiatric: Calm,  cooperative. Normal affect.     Prior Labs/Diagnostic Studies   All labs and imaging personally reviewed     Complete Blood Count no Diff  Specimen: Blood  Component  Ref Range & Units 4 wk ago   WBC  3.5 - 10.5 x10(9)/L 7.3   RBC  3.90 - 5.03 x10(12)/L 4.73   Hemoglobin  12.0 - 15.5 g/dL 11.5 Low    HCT  34.9 - 44.5 % 37.1   MCV  80.0 - 100.0 fL 78.4 Low    MCH  27.6 - 33.3 pg 24.3 Low    MCHC  31.5 - 35.2 g/dL 31.0 Low    RDW  11.9 - 15.5 % 15.4   Platelets  150 - 450 x10(9)/L 209   Automated NRBC  <=0 /100 WBC 0   [facility]   Specimen Collected: 01/15/24  7:15 PM     Basic Metabolic Panel  Specimen: Blood  Component  Ref Range & Units 4 wk ago Comments   Sodium  136 - 145 mmol/L 138    Potassium  3.5 - 5.1 mmol/L 3.1 Low     Chloride  98 - 109 mmol/L 110 High     CO2  20 - 29 mmol/L 17 Low     Anion Gap  7 - 16 mmol/L 11    Calcium  8.4 - 10.4 mg/dL 8.4    BUN  7 - 26 mg/dL 12    Creatinine  0.55 - 1.02 mg/dL 0.84    Glucose  70 - 100 mg/dL 91 The given reference range is for the fasting state. Non-fasting reference range for glucose is 70 - 180 mg/dL.   GFR, Estimated  >60 mL/min/1.73m2 >60    Resulting [facility]    Specimen Collected: 01/15/24  7:15 PM     Magnesium  Specimen: Blood  Component  Ref Range & Units 4 wk ago   Magnesium  1.6 - 2.6 mg/dL 1.7   Resulting [facility]   Specimen Collected: 01/15/24  7:15 PM      Latest Reference Range & Units 10/18/23 12:36   Sodium 135 - 145 mmol/L 137   Potassium 3.4 - 5.3 mmol/L 3.9   Chloride 98 - 107 mmol/L 100   Carbon Dioxide (CO2) 22 - 29 mmol/L 30 (H)   Urea Nitrogen 6.0 - 20.0 mg/dL 10.4   Creatinine 0.51 - 0.95 mg/dL 0.72   GFR Estimate >60 mL/min/1.73m2 >90   Calcium 8.6 - 10.0 mg/dL 9.4   Anion Gap 7 - 15 mmol/L 7   Albumin 3.5 - 5.2 g/dL 4.9   Protein Total 6.4 - 8.3 g/dL 8.2   Alkaline Phosphatase 35 - 104 U/L 83   ALT 0 - 50 U/L 23   AST 0 - 45 U/L 19   Bilirubin Total <=1.2 mg/dL 0.5   Cardiolipin IgG  Patricia Negative  Negative   Cardiolipin IgM Patricia Negative  Negative   CRP Inflammation <5.00 mg/L <3.00   Ferritin 6 - 175 ng/mL 10   Glucose 70 - 99 mg/dL 87   Iron 37 - 145 ug/dL 23 (L)   Iron Binding Capacity 240 - 430 ug/dL 414   Iron Sat Index 15 - 46 % 6 (L)   Cardiolipin Patricia IgG Instrument Value <10.0 GPL-U/mL <2.0   Cardiolipin Patricia IgM Instrument Value <10.0 MPL-U/mL 2.2   WBC 4.0 - 11.0 10e3/uL 6.5   Hemoglobin 11.7 - 15.7 g/dL 12.6   Hematocrit 35.0 - 47.0 % 40.1   Platelet Count 150 - 450 10e3/uL 313   RBC Count 3.80 - 5.20 10e6/uL 5.06   MCV 78 - 100 fL 79   MCH 26.5 - 33.0 pg 24.9 (L)   MCHC 31.5 - 36.5 g/dL 31.4 (L)   RDW 10.0 - 15.0 % 13.5   % Neutrophils % 66   % Lymphocytes % 23   % Monocytes % 5   % Eosinophils % 5   % Basophils % 1   Absolute Basophils 0.0 - 0.2 10e3/uL 0.0   Absolute Eosinophils 0.0 - 0.7 10e3/uL 0.3   Absolute Immature Granulocytes <=0.4 10e3/uL 0.0   Absolute Lymphocytes 0.8 - 5.3 10e3/uL 1.5   Absolute Monocytes 0.0 - 1.3 10e3/uL 0.3   % Immature Granulocytes % 0   Absolute Neutrophils 1.6 - 8.3 10e3/uL 4.3   Absolute NRBCs 10e3/uL 0.0   NRBCs per 100 WBC <1 /100 0   % Retic 0.5 - 2.0 % 0.8   Absolute Retic 0.025 - 0.095 10e6/uL 0.040   D-Dimer Quantitative 0.00 - 0.50 ug/mL FEU 0.56 (H)   FACTOR V INTERPRETATION  This result contains rich text formatting which cannot be displayed here.   Antithrombin III Chromogenic 85 - 135 % 97   Prot C Chromogenic 70 - 170 % 75   Protein S Antigen Free 55 - 125 % 79   Beta 2 Glycoprotein 1 Antibody IgG <7.0 U/mL 1.4   Beta 2 Glycoprotein 1 Antibody IgM <7.0 U/mL <2.4   FACTOR 2 AND 5 MUTATION ANALYSIS  Rpt   FACTOR 2 INTERPRETATION  This result contains rich text formatting which cannot be displayed here.   COMMENTS  This result contains rich text formatting which cannot be displayed here.   DISCLAIMER  This result contains rich text formatting which cannot be displayed here.   INTERPRETATION  This result contains rich text formatting which  cannot be displayed here.   METHODOLOGY  This result contains rich text formatting which cannot be displayed here.   (H): Data is abnormally high  (L): Data is abnormally low  Rpt: View report in Results Review for more information    PROCEDURES  Echocardiogram with two-dimensional, color and spectral Doppler performed. 2/14/24  ______________________________________________________________________________  Interpretation Summary  Global and regional left ventricular function is normal with an EF of 60-65%.  Mild right ventricular dilation is present.Global right ventricular function  is normal.  IVC diameter <2.1 cm collapsing >50% with sniff suggests a normal RA pressure  of 3 mmHg.  No pericardial effusion is present.      Cardiac divide check 2/14/2024  Remote ICD transmission received and reviewed. Device transmission sent per MD orders.  Device: [employer] SQ ICD  Presenting EGM: VS @ 64 bpm  Electrode Impedance Status: 85 ohms  Lead Trends Appear Stable.  Estimated battery longevity to RRT = 92 %.  Ventricular Episodes: None  Plan for patient to send a remote transmission in 3 months as scheduled.  Joanna Hogan RN  Remote ICD transmission    Please see the EMR for further information    CARDIAC DEVICE CHECK 1/18/2024  Narrative & Impression    Patient seen in clinic for evaluation and iterative programming of their [employer] subcutaneous ICD per MD orders.      Device: [employer] A219 EMBLEM MRI S-ICD  Presenting EGM:  bpm  System Impedance: 60 ohms  Estimated battery longevity to RRT = 92 %.   Ventricular Episodes: 1 treated episode recorded on 1/15/24 @ 1828 - 200-240 bpm, 109 sec, ICD shock x 3 delivered. EGM reveals what appears to be SVT.  Atrial Episodes: NONE  Setting Changes: NONE  Patient has an appointment to see CLARA Carrion today.     Plan: Device follow-up every 3 months.  Rimma Payne, RN        EKG 2/1/24    Sinus rhythm  Nonspecific T wave  abnormality  Abnormal ECG  When compared with ECG of 15-AUG-2023 12:04,  No significant change was found  Confirmed by MD DIMITRI, DARRICK ([id number]) on 2/2/2024        CARDIAC MRI 8/5/2023  SUMMARY   ==========================================================================================================     Clinical history: 33 year old female with prior cardiac arrest.      1. The LV is normal in cavity size and wall thickness. The global systolic function is normal. The LVEF is  64%. There are no regional wall motion abnormalities.     2. The RV is normal in cavity size. The global systolic function is normal. The RVEF is 59%.      3. Both atria are normal in size.     4. There is no significant valvular disease.      5. Late gadolinium enhancement imaging shows no MI, fibrosis or infiltrative disease.      6. There is trivial pericardial effusion.     7. There is no intracardiac thrombus.     CONCLUSIONS: Normal cardiac function, LVEF of 64% and RVEF of 59%. There is no evidence of myocardial  fibrosis, given no fibrosis consider genetic evaluation for the etiology of her cardiac arrest.       CARDIAC CATHETERIZATION 8/5/2023  Conclusion    No significant coronary artery disease.   Successful placement of a CVC in the RFV  The patient's records and results personally reviewed by this provider.     CTA HEAD NECK 8/5/2023  IMPRESSION:  1. Head CTA demonstrates patent major intracranial arteries. No  aneurysm or hemodynamically significant stenosis.  2. Neck CTA demonstrates patent major cervical arteries. No  hemodynamically significant stenosis.  3. The endotracheal tube terminates just above the tory. Consider  slight retraction.     I have personally reviewed the examination and initial interpretation  and I agree with the findings.     KANDICE BLANCO MD     Outside records reviewed from: Care Everywhere    LAB/DIAGNOSTIC STUDIES TODAY:     Latest Reference Range & Units 02/14/24 08:12   ABO/Rh(D)  O POS  "  Antibody Screen Negative  Negative   SPECIMEN EXPIRATION DATE  [id number]       Assessment    Estela Fry is a 34 year old female seen as a PAC referral for risk assessment and optimization for anesthesia.    Plan/Recommendations  Pt will be optimized for the proposed procedure.  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 She then saw Dr Bales for follow up, per 9/14/23 note:     1.Dyspnea   - had SOB after cardiac arrest with intubation on 8/5/2023   - was found to have SGS on exam at that time   - breathing has been good since then   - no stridor   - scope exam today shows no stenosis   - discussed this is resolved   - this can often be seen in the setting of intubation with associated post intubation swelling but today her trachea and subglottis is well mucosalized and no area of concern     On exam today:   - Small mouth with opening <3 FB  Mallampati: II  TM: > 3    CARDIAC  - VF arrest on 8/5/2023 s/p shocked x 3 with ROSC s/p subcutaneous ICD, atrial flutter, DVT on xarelto.     ~ extensive cardiac work up with unclear etiology >> see cardiac testing above   ~ 1/15/2024 AFlutter 200-240 bpm s/p x3 ICD shocks and commenced Sotatol by EP.    ~ EP offered ablation but patient declined.    ~ followed by EP>>[employer] A219 EMBLEM MRI S-ICD with most recent device check above    *Scheduled for device check later this afternoon - please see EMR.      Discussed patient with Joanna Hogan RN for device.  She reported that while magnet is on the device, the device is disabled.  Once magnet is off of device, the device returns to normal functioning and can deliver therapeutic shock if needed.     Kelly, PAC RN, contacted [employer].  Per her note from today:  \"Spoke with Moon at [employer] " "[employer]. Estela has an ICD and it does not pace. Can use a magnet. The magnet will inhibit shocks. When the magnet is removed, the device will return to normal settings.   Local rep is Anthony Juares. Call [employer] at 1 [phone] and ask for the local rep to be paged if needed.\"       - METS (Metabolic Equivalents)  Patient performs 4 or more METS exercise without symptoms            Total Score: 0      - RCRI-Low risk: Class 2 0.9% complication rate            Total Score: 1    RCRI: CAD        PULMONARY  - LUIS Low Risk            Total Score: 0      - Denies asthma or inhaler use    - Tobacco History    History   Smoking Status    Never   Smokeless Tobacco    Never       GI  - Denies h/o GERD    - PONV High Risk  Total Score: 3           1 AN PONV: Pt is Female    1 AN PONV: Patient is not a current smoker    1 AN PONV: Intended Post Op Opioids        /RENAL  - Baseline Creatinine  see above     ENDOCRINE    - BMI: Estimated body mass index is 22.13 kg/m  as calculated from the following:    Height as of 2/14/24: 1.575 m (5' 2\").    Weight as of 2/14/24: 54.9 kg (121 lb).  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 Hematology consultation with Dr. Wilhelm.    ~ anticoagulated on LMWH/Lovenox   ~ Per Dr. Tavera's 2/13/24 note:  \"As she is in the first trimester, she can continue lovenox as scheduled prior to the procedure. We will avoid methergine and TXA given her recent MI and active DVT; would plan hemabate as first line uterotonic if needed.\"      VTE Low Risk 0.5%            Total Score: 3    VTE: Pt history of VTE      Hemoglobin   Date Value Ref Range Status   10/18/2023 12.6 11.7 - 15.7 g/dL Final   ]   ~ known h/o anemia   ~ Hgb and T&S today     ID  - MRSA (+) per record review. Patient is not aware " of this.   ~ contact precautions    OB/Gyn   -   at 6w4d.   ~ above procedure scheduled    ~ misoprostol per instructed    PSYCH  - situational depression     Different anesthesia methods/types have been discussed with the patient, but they are aware that the final plan will be decided by the assigned anesthesia provider on the date of service.  Patient was discussed with Dr Jo    The patient is optimized for their procedure. AVS with information on surgery time/arrival time, meds and NPO status given by nursing staff. No further diagnostic testing indicated.      On the day of service:     Prep time: 25 minutes  Visit time: 13 minutes  Documentation time: 20 minutes  Coordinating care:  12 minutes  ------------------------------------------  Total time: 70 minutes      MELODY Keene CNP  Preoperative Assessment Center  [facility]  [facility]  Phone: [phone]  Fax: [phone]    Physical Exam    Airway  airway exam normal      Mallampati: II   TM distance: > 3 FB   Neck ROM: full   Mouth opening: > 3 cm    Respiratory Devices and Support         Dental       (+) Minor Abnormalities - some fillings, tiny chips      Cardiovascular   cardiovascular exam normal       Rhythm and rate: regular and normal (-) no murmur    Pulmonary   pulmonary exam normal        breath sounds clear to auscultation           Anesthesia Plan    ASA Status:  3    NPO Status:  NPO Appropriate    Anesthesia Type: MAC.     - Reason for MAC: chronic cardiopulmonary disease, straight local not clinically adequate   Induction: Propofol.   Maintenance: TIVA.        Consents    Anesthesia Plan(s) and associated risks, benefits, and realistic alternatives discussed. Questions answered and patient/representative(s) expressed understanding.     - Discussed:     - Discussed with:  Patient      - Extended Intubation/Ventilatory Support Discussed: No.      - Patient is DNR/DNI Status: No     Use of  blood products discussed: No .     Postoperative Care    Pain management: Oral pain medications, Multi-modal analgesia.   PONV prophylaxis: Background Propofol Infusion, Ondansetron (or other 5HT-3)     Comments:    Other Comments: Discussed risks of anesthesia including nausea, vomiting, sore throat, dental damage, cardiopulmonary complications, agitation, neurologic complications, and serious complications. Discussed increased risk of cardiac complicaitons given underlying disease, will plan to keep bp within 10% of baseline.

## 2024-02-16 ENCOUNTER — HOSPITAL ENCOUNTER (OUTPATIENT)
Facility: CLINIC | Age: 34
Discharge: HOME | End: 2024-02-16
Attending: OBSTETRICS & GYNECOLOGY | Admitting: OBSTETRICS & GYNECOLOGY
Payer: MEDICAID

## 2024-02-16 ENCOUNTER — ANESTHESIA (OUTPATIENT)
Dept: SURGERY | Facility: CLINIC | Age: 34
End: 2024-02-16
Payer: MEDICAID

## 2024-02-16 VITALS
RESPIRATION RATE: 21 BRPM | DIASTOLIC BLOOD PRESSURE: 62 MMHG | SYSTOLIC BLOOD PRESSURE: 100 MMHG | OXYGEN SATURATION: 100 % | TEMPERATURE: 98.4 F | BODY MASS INDEX: 21.91 KG/M2 | HEART RATE: 67 BPM | WEIGHT: 119.05 LBS | HEIGHT: 62 IN

## 2024-02-16 DIAGNOSIS — Z33.2 TERMINATION OF PREGNANCY (FETUS): ICD-10-CM

## 2024-02-16 LAB — HGB BLD-MCNC: 13.3 G/DL (ref 11.7–15.7)

## 2024-02-16 PROCEDURE — 250N000009 HC RX 250: Performed by: OBSTETRICS & GYNECOLOGY

## 2024-02-16 PROCEDURE — 258N000003 HC RX IP 258 OP 636

## 2024-02-16 PROCEDURE — 250N000011 HC RX IP 250 OP 636

## 2024-02-16 PROCEDURE — 250N000013 HC RX MED GY IP 250 OP 250 PS 637: Performed by: OBSTETRICS & GYNECOLOGY

## 2024-02-16 PROCEDURE — 76998 US GUIDE INTRAOP: CPT | Mod: 26 | Performed by: OBSTETRICS & GYNECOLOGY

## 2024-02-16 PROCEDURE — 88305 TISSUE EXAM BY PATHOLOGIST: CPT | Mod: TC | Performed by: OBSTETRICS & GYNECOLOGY

## 2024-02-16 PROCEDURE — 58300 INSERT INTRAUTERINE DEVICE: CPT | Mod: GC | Performed by: OBSTETRICS & GYNECOLOGY

## 2024-02-16 PROCEDURE — 59812 TREATMENT OF MISCARRIAGE: CPT | Performed by: STUDENT IN AN ORGANIZED HEALTH CARE EDUCATION/TRAINING PROGRAM

## 2024-02-16 PROCEDURE — 258N000003 HC RX IP 258 OP 636: Performed by: OBSTETRICS & GYNECOLOGY

## 2024-02-16 PROCEDURE — 370N000017 HC ANESTHESIA TECHNICAL FEE, PER MIN: Performed by: OBSTETRICS & GYNECOLOGY

## 2024-02-16 PROCEDURE — 85018 HEMOGLOBIN: CPT | Performed by: OBSTETRICS & GYNECOLOGY

## 2024-02-16 PROCEDURE — 250N000009 HC RX 250

## 2024-02-16 PROCEDURE — 999N000141 HC STATISTIC PRE-PROCEDURE NURSING ASSESSMENT: Performed by: OBSTETRICS & GYNECOLOGY

## 2024-02-16 PROCEDURE — 360N000075 HC SURGERY LEVEL 2, PER MIN: Performed by: OBSTETRICS & GYNECOLOGY

## 2024-02-16 PROCEDURE — 710N000012 HC RECOVERY PHASE 2, PER MINUTE: Performed by: OBSTETRICS & GYNECOLOGY

## 2024-02-16 PROCEDURE — 36415 COLL VENOUS BLD VENIPUNCTURE: CPT | Performed by: OBSTETRICS & GYNECOLOGY

## 2024-02-16 PROCEDURE — 88305 TISSUE EXAM BY PATHOLOGIST: CPT | Mod: 26 | Performed by: PATHOLOGY

## 2024-02-16 PROCEDURE — 250N000011 HC RX IP 250 OP 636: Performed by: OBSTETRICS & GYNECOLOGY

## 2024-02-16 PROCEDURE — 272N000001 HC OR GENERAL SUPPLY STERILE: Performed by: OBSTETRICS & GYNECOLOGY

## 2024-02-16 PROCEDURE — 59840 INDUCED ABORTION D&C: CPT | Mod: GC | Performed by: OBSTETRICS & GYNECOLOGY

## 2024-02-16 DEVICE — IUD CONTRACEPTIVE DEVICE MIRENA 50419-4230-01: Type: IMPLANTABLE DEVICE | Site: UTERUS | Status: FUNCTIONAL

## 2024-02-16 RX ORDER — LABETALOL HYDROCHLORIDE 5 MG/ML
10 INJECTION, SOLUTION INTRAVENOUS
Status: DISCONTINUED | OUTPATIENT
Start: 2024-02-16 | End: 2024-02-16 | Stop reason: HOSPADM

## 2024-02-16 RX ORDER — PROPOFOL 10 MG/ML
INJECTION, EMULSION INTRAVENOUS CONTINUOUS PRN
Status: DISCONTINUED | OUTPATIENT
Start: 2024-02-16 | End: 2024-02-16

## 2024-02-16 RX ORDER — ACETAMINOPHEN 325 MG/1
975 TABLET ORAL ONCE
Status: COMPLETED | OUTPATIENT
Start: 2024-02-16 | End: 2024-02-16

## 2024-02-16 RX ORDER — LIDOCAINE HYDROCHLORIDE 10 MG/ML
INJECTION, SOLUTION INFILTRATION; PERINEURAL PRN
Status: DISCONTINUED | OUTPATIENT
Start: 2024-02-16 | End: 2024-02-16 | Stop reason: HOSPADM

## 2024-02-16 RX ORDER — LIDOCAINE HYDROCHLORIDE 20 MG/ML
INJECTION, SOLUTION INFILTRATION; PERINEURAL PRN
Status: DISCONTINUED | OUTPATIENT
Start: 2024-02-16 | End: 2024-02-16

## 2024-02-16 RX ORDER — ONDANSETRON 4 MG/1
4 TABLET, ORALLY DISINTEGRATING ORAL EVERY 30 MIN PRN
Status: DISCONTINUED | OUTPATIENT
Start: 2024-02-16 | End: 2024-02-16 | Stop reason: HOSPADM

## 2024-02-16 RX ORDER — NALOXONE HYDROCHLORIDE 0.4 MG/ML
0.2 INJECTION, SOLUTION INTRAMUSCULAR; INTRAVENOUS; SUBCUTANEOUS
Status: DISCONTINUED | OUTPATIENT
Start: 2024-02-16 | End: 2024-02-16 | Stop reason: HOSPADM

## 2024-02-16 RX ORDER — PROPOFOL 10 MG/ML
INJECTION, EMULSION INTRAVENOUS PRN
Status: DISCONTINUED | OUTPATIENT
Start: 2024-02-16 | End: 2024-02-16

## 2024-02-16 RX ORDER — SODIUM CHLORIDE, SODIUM LACTATE, POTASSIUM CHLORIDE, CALCIUM CHLORIDE 600; 310; 30; 20 MG/100ML; MG/100ML; MG/100ML; MG/100ML
INJECTION, SOLUTION INTRAVENOUS CONTINUOUS
Status: DISCONTINUED | OUTPATIENT
Start: 2024-02-16 | End: 2024-02-16 | Stop reason: HOSPADM

## 2024-02-16 RX ORDER — FENTANYL CITRATE 50 UG/ML
INJECTION, SOLUTION INTRAMUSCULAR; INTRAVENOUS PRN
Status: DISCONTINUED | OUTPATIENT
Start: 2024-02-16 | End: 2024-02-16

## 2024-02-16 RX ORDER — ACETAMINOPHEN 325 MG/1
975 TABLET ORAL ONCE
Status: DISCONTINUED | OUTPATIENT
Start: 2024-02-16 | End: 2024-02-16 | Stop reason: HOSPADM

## 2024-02-16 RX ORDER — ONDANSETRON 2 MG/ML
4 INJECTION INTRAMUSCULAR; INTRAVENOUS EVERY 30 MIN PRN
Status: DISCONTINUED | OUTPATIENT
Start: 2024-02-16 | End: 2024-02-16 | Stop reason: HOSPADM

## 2024-02-16 RX ORDER — IBUPROFEN 200 MG
800 TABLET ORAL ONCE
Status: DISCONTINUED | OUTPATIENT
Start: 2024-02-16 | End: 2024-02-16 | Stop reason: HOSPADM

## 2024-02-16 RX ORDER — FENTANYL CITRATE 50 UG/ML
50 INJECTION, SOLUTION INTRAMUSCULAR; INTRAVENOUS EVERY 5 MIN PRN
Status: DISCONTINUED | OUTPATIENT
Start: 2024-02-16 | End: 2024-02-16 | Stop reason: HOSPADM

## 2024-02-16 RX ORDER — OXYCODONE HYDROCHLORIDE 10 MG/1
10 TABLET ORAL
Status: DISCONTINUED | OUTPATIENT
Start: 2024-02-16 | End: 2024-02-16 | Stop reason: HOSPADM

## 2024-02-16 RX ORDER — LIDOCAINE 40 MG/G
CREAM TOPICAL
Status: DISCONTINUED | OUTPATIENT
Start: 2024-02-16 | End: 2024-02-16 | Stop reason: HOSPADM

## 2024-02-16 RX ORDER — MISOPROSTOL 200 UG/1
TABLET ORAL PRN
Status: DISCONTINUED | OUTPATIENT
Start: 2024-02-16 | End: 2024-02-16 | Stop reason: HOSPADM

## 2024-02-16 RX ORDER — HYDROXYZINE HYDROCHLORIDE 25 MG/1
25 TABLET, FILM COATED ORAL EVERY 6 HOURS PRN
Status: DISCONTINUED | OUTPATIENT
Start: 2024-02-16 | End: 2024-02-16 | Stop reason: HOSPADM

## 2024-02-16 RX ORDER — NALOXONE HYDROCHLORIDE 0.4 MG/ML
0.4 INJECTION, SOLUTION INTRAMUSCULAR; INTRAVENOUS; SUBCUTANEOUS
Status: DISCONTINUED | OUTPATIENT
Start: 2024-02-16 | End: 2024-02-16 | Stop reason: HOSPADM

## 2024-02-16 RX ORDER — HYDROMORPHONE HYDROCHLORIDE 1 MG/ML
0.4 INJECTION, SOLUTION INTRAMUSCULAR; INTRAVENOUS; SUBCUTANEOUS EVERY 5 MIN PRN
Status: DISCONTINUED | OUTPATIENT
Start: 2024-02-16 | End: 2024-02-16 | Stop reason: HOSPADM

## 2024-02-16 RX ORDER — HYDROMORPHONE HYDROCHLORIDE 1 MG/ML
0.2 INJECTION, SOLUTION INTRAMUSCULAR; INTRAVENOUS; SUBCUTANEOUS EVERY 5 MIN PRN
Status: DISCONTINUED | OUTPATIENT
Start: 2024-02-16 | End: 2024-02-16 | Stop reason: HOSPADM

## 2024-02-16 RX ORDER — OXYCODONE HYDROCHLORIDE 5 MG/1
5 TABLET ORAL
Status: DISCONTINUED | OUTPATIENT
Start: 2024-02-16 | End: 2024-02-16 | Stop reason: HOSPADM

## 2024-02-16 RX ORDER — FENTANYL CITRATE 50 UG/ML
25 INJECTION, SOLUTION INTRAMUSCULAR; INTRAVENOUS EVERY 5 MIN PRN
Status: DISCONTINUED | OUTPATIENT
Start: 2024-02-16 | End: 2024-02-16 | Stop reason: HOSPADM

## 2024-02-16 RX ADMIN — FENTANYL CITRATE 25 MCG: 50 INJECTION INTRAMUSCULAR; INTRAVENOUS at 10:30

## 2024-02-16 RX ADMIN — ACETAMINOPHEN 975 MG: 325 TABLET, FILM COATED ORAL at 09:41

## 2024-02-16 RX ADMIN — PROPOFOL 15 MG: 10 INJECTION, EMULSION INTRAVENOUS at 10:24

## 2024-02-16 RX ADMIN — ONDANSETRON 4 MG: 2 INJECTION INTRAMUSCULAR; INTRAVENOUS at 12:21

## 2024-02-16 RX ADMIN — SODIUM CHLORIDE, POTASSIUM CHLORIDE, SODIUM LACTATE AND CALCIUM CHLORIDE: 600; 310; 30; 20 INJECTION, SOLUTION INTRAVENOUS at 10:01

## 2024-02-16 RX ADMIN — FENTANYL CITRATE 25 MCG: 50 INJECTION INTRAMUSCULAR; INTRAVENOUS at 10:26

## 2024-02-16 RX ADMIN — PROPOFOL 25 MG: 10 INJECTION, EMULSION INTRAVENOUS at 10:28

## 2024-02-16 RX ADMIN — DOXYCYCLINE 200 MG: 100 INJECTION, POWDER, LYOPHILIZED, FOR SOLUTION INTRAVENOUS at 09:57

## 2024-02-16 RX ADMIN — PROPOFOL 100 MCG/KG/MIN: 10 INJECTION, EMULSION INTRAVENOUS at 10:19

## 2024-02-16 RX ADMIN — LIDOCAINE HYDROCHLORIDE 50 MG: 20 INJECTION, SOLUTION INFILTRATION; PERINEURAL at 10:19

## 2024-02-16 RX ADMIN — PHENYLEPHRINE HYDROCHLORIDE 50 MCG: 10 INJECTION INTRAVENOUS at 10:31

## 2024-02-16 RX ADMIN — PROPOFOL 25 MG: 10 INJECTION, EMULSION INTRAVENOUS at 10:20

## 2024-02-16 ASSESSMENT — ACTIVITIES OF DAILY LIVING (ADL)
ADLS_ACUITY_SCORE: 35
ADLS_ACUITY_SCORE: 35
ADLS_ACUITY_SCORE: 32

## 2024-02-16 NOTE — OP NOTE
[facility] Operative Note    Preoperative diagnosis:  IUP measuring 6 weeks, desires termination  Rh positive  Postoperative diagnosis:  Same  Procedure:  Ultrasound guided suction  dilation and curettage, Mirena IUD placement  Surgeon:  Shreya Lew MD  Assistant: Beata Walker MD PGY-4  Anesthesia:  MAC, Paracervical block  Complications: none  Specimens:    ID Type Source Tests Collected by Time Destination   1 :  Products of Conception Products of Conception SURGICAL PATHOLOGY EXAM Shreya Mcdonnell MD 2024 10:31 AM      Condition:  Stable to PACU    EBL:  100 mL  IVF:  see MAR  UOP:  not drained    Findings:  Anteverted uterus, midline, and measuring 6 week size.  Normal appearing cervix with os visually dilated to 0.5 cm.  Moderate blood in the vagina.  Moderate return to tissue with passage.    Indication:  Estela Fry is a 34 year old  at 6w6d with significant medical history of V-fib arrest with ICD in place, as well as VTE on anticoagulation desired termination. Reviewed risks, benefits and alternatives and she desired to proceed. Consent signed.    Procedure:  After the brief the patient was taken to the operating room where she underwent MAC anesthesia without difficulty. She was placed in a deep dorsal lithotomy position using yellow fin stirrups. The patient was examined for the above noted findings and then prepped and draped in the usual sterile fashion. A speculum was inserted into the vagina and the findings noted above were seen. A total of 20 cc of 1% lidocaine was injected into the anterior lip of the cervix as well as the 4 o'clock and 8 o'clock positions of the cervicovaginal junction.  An Allis was placed on the anterior cervical lip.  The endocervical canal was serially dilated to 21 Kyrgyz using Alvarez dilators.  The suction device was then activated and a size 7 mm suction curette was placed into the cervical canal.  The curette was rotated to clear the uterus. There was  return of tissue. Several more passes were made with removal of tissue with each pass until gritty texture throughout. The uterus sounded to 8 cm. The Mirena IUD insertion apparatus was prepared and placed through the cervix without significant resistance and deployed at the fundus in the usual fashion. The strings were trimmed 3 cm from the external os.  The Allis was removed from the cervix and there was minimal bleeding coming from the cervical os at the end of the procedure.  600 mcg misoprostol was administered NV in the OR for prophylactic bleeding given anticoagulation.  The patient was repositioned to the supine position. The patient tolerated the procedure well and was taken to the recovery room in stable condition.  Dr. Mcdonnell was present for the entire procedure. A debrief was performed.    Mirena IUD Lot#[id number]  Exp 2026 Kevin Walker MD  ObGyn Resident, PGY4  10:44 AM 2/16/2024    I was present and scrubbed for the entire case. I have reviewed and edited this note.   Shreya Mcdonnell MD

## 2024-02-16 NOTE — DISCHARGE INSTRUCTIONS

## 2024-02-16 NOTE — TELEPHONE ENCOUNTER
[id number]  Estela Fry  34 year old female  CBCD Diagnosis: IVC and Right External Iliac Vein Clots, cardiac arrest  CBCD Provider: Choco    Patient is having a D & C today. Dr. Wilhelm messaged her advising she hold 2/15 night dose of Lovenox and 2/16/24 AM dose-see her message for more details. Patient messaged saying she did not hold Lovenox dose last night (2/15).     RN called patient. [facility] Her last dose of Lovenox was at 8 PM last night. Huddled with CLARA Hagen PA-C. Lovenox would still be in her system at this point. RN encouraged patient to inform her surgery team that she did take a dose of Lovenox last night but held this AM. If they proceed with surgery, she could see increased bleeding. If she is having to change her pad more than every 2 hours, she should hold off on starting Xarelto tonight and instead take first dose tomorrow.    Patient verbalized understanding.    Nica Ugalde RN, BSN, PCCN  Nurse Clinician    [facility]  [address] [place] [address], [address], [place] [address]   Office, direct: [phone]  Main office number: [phone]  Pronouns: She, her, hers

## 2024-02-16 NOTE — ANESTHESIA CARE TRANSFER NOTE
Patient: Estela Fry    Procedure: Procedure(s):  DILATION AND CURETTAGE, UTERUS, USING SUCTION,  placement of Mirena intrauterine device       Diagnosis: ICD (implantable cardioverter-defibrillator) in place [Z95.810]  Termination of pregnancy (fetus) [Z33.2]  History of MI (myocardial infarction) [I25.2]  Diagnosis Additional Information: No value filed.    Anesthesia Type:   MAC     Note:    Oropharynx: oropharynx clear of all foreign objects and spontaneously breathing  Level of Consciousness: awake  Oxygen Supplementation: face mask  Level of Supplemental Oxygen (L/min / FiO2): 6  Independent Airway: airway patency satisfactory and stable  Dentition: dentition unchanged  Vital Signs Stable: post-procedure vital signs reviewed and stable  Report to RN Given: handoff report given  Patient transferred to: PACU    Handoff Report: Identifed the Patient, Identified the Reponsible Provider, Reviewed the pertinent medical history, Discussed the surgical course, Reviewed Intra-OP anesthesia mangement and issues during anesthesia, Set expectations for post-procedure period and Allowed opportunity for questions and acknowledgement of understanding  Vitals:  Vitals Value Taken Time   BP     Temp     Pulse     Resp     SpO2         Electronically Signed By: Sofie Christensen MD  February 16, 2024  10:42 AM

## 2024-02-17 ENCOUNTER — NURSE TRIAGE (OUTPATIENT)
Dept: NURSING | Facility: CLINIC | Age: 34
End: 2024-02-17
Payer: COMMERCIAL

## 2024-02-17 NOTE — TELEPHONE ENCOUNTER
"Pt reports being given Xarelto 20 mg to be taken following D&C procedure for termination of pregnancy.  Intended to be taken once only.  Pt states \"Forgot to take Xarelto last evening; should I take now?\"  Vag bleeding is \"fine.  \"Bleeding was normal last night.\"    Paged Hematologist on-call -> Dr Michael Uriostegui @ 12:53 pm.  No callback rec'd.  After reviewing add'l chart notes, called back to pt, as pt had indicated \"chest pressure\" in her [employer] message of today, however pt had not mentioned this symptom when asking her medication question.  Due to uncovering this new symptomatic issue, decision it so cancel page to Dr Uriostegui.    \"Having chest pressure.\"  Onset this morning.  \"Past few days, had chest pressure too.\"  Right now \"feeling tightness in my chest.\"  \"Whenever I inhale, I feel like I only get FPC, then have to exhale.\"  \"I never get a full breath.\"  Pt has significant cardiac history.    Advised prompt ED eval.  Discussed calling 911.  Pt agrees to have  drive her immediately to hospital.  States preference for [facility].    Secondly, per advice of pt's hematologist (Dr Wilhelm), advised pt to take her missed dose of Xarelto immediately.  This is per 2/16/2024 chart notes by Dr Wilhelm:  If she is having to change her pad more than every 2 hours, she should hold off on starting Xarelto tonight and instead take first dose tomorrow.   \"Tomorrow\" would indicate today's date of 2/17/2024.  Therefore advised pt to take Xarelto immediately and then go to ED.    Pt agrees to plan.    Nika Linares RN  [facility] Nurse Advisor     Reason for Disposition   [1] Caller has URGENT medicine question about med that PCP or specialist prescribed AND [2] triager unable to answer question   Chest pain lasting longer than 5 minutes and ANY of the following:    history of heart disease  (i.e., heart attack, bypass surgery, angina, angioplasty, CHF; not just a heart murmur)    described as " crushing, pressure-like, or heavy    age > 50    age > 30 AND at least one cardiac risk factor (i.e., hypertension, diabetes, obesity, smoker or strong family history of heart disease)    not relieved with nitroglycerin    Additional Information   Negative: SEVERE difficulty breathing (e.g., struggling for each breath, speaks in single words)   Negative: Difficult to awaken or acting confused (e.g., disoriented, slurred speech)    Protocols used: Medication Question Call-A-AH, Chest Pain-A-AH

## 2024-02-19 ENCOUNTER — VIRTUAL VISIT (OUTPATIENT)
Dept: OBGYN | Facility: CLINIC | Age: 34
End: 2024-02-19
Attending: OBSTETRICS & GYNECOLOGY
Payer: COMMERCIAL

## 2024-02-19 DIAGNOSIS — Z33.2 LEGAL TERMINATION OF PREGNANCY: Primary | ICD-10-CM

## 2024-02-19 NOTE — LETTER
"2/19/2024       RE: Estela Fry  [address] [place] [address]     Dear Colleague,    Thank you for referring your patient, Estela Fry, to the [facility] [place] [facility]. Please see a copy of my visit note below.    Called patient to follow up after D&C procedure. Procedure was on 02/16/2024 at 6+6 (gest age) for maternal health conditions.    Bleeding/clots: light bleeding and passing a minimal amount of small clots    Pain/cramping: denies    Pregnancy symptoms: Gone away a this point     Mood: patient states, \"better now\".   EPDS score: pending    Follow up appointment: Had Mirena IUD placed during procedure.    Reviewed recommendation for pelvic rest for 2 weeks. Discussed need to seek emergency care if experiencing fever, chills, diarrhea, purulent discharge/lochia, or if saturating a pad front to back, side to side within an hour. Patient verbalized understanding. Encouraged patient to call back with questions or concerns.    [facility] OBGYN NURSE EDUCATION    "

## 2024-02-19 NOTE — PROGRESS NOTES
"Called patient to follow up after D&C procedure. Procedure was on 02/16/2024 at 6+6 (gest age) for maternal health conditions.    Bleeding/clots: light bleeding and passing a minimal amount of small clots    Pain/cramping: denies    Pregnancy symptoms: Gone away a this point     Mood: patient states, \"better now\".   EPDS score: pending    Follow up appointment: Had Mirena IUD placed during procedure.    Reviewed recommendation for pelvic rest for 2 weeks. Discussed need to seek emergency care if experiencing fever, chills, diarrhea, purulent discharge/lochia, or if saturating a pad front to back, side to side within an hour. Patient verbalized understanding. Encouraged patient to call back with questions or concerns.  "

## 2024-02-19 NOTE — TELEPHONE ENCOUNTER
[id number]  Estela Fry  34 year old female  CBCD Diagnosis:  IVC and Right External Iliac Vein Clots, cardiac arrest    CBCD Provider: Choco    Incoming message from Estela. She restarted Xarelto after procedure at 1 PM on 2/17/24. She went to the emergency department over the weekend d/t chest pressure. CT was negative for pulmonary embolism. She was discharged home same day.    RN called patient and let her know she can transition her Xarelto dosing to usual time of 8 PM. She can take Xarelto at 2 PM today, 3 PM tomorrow, 4 PM two days from now, etc. Until she is back at 8 PM. Reiterated that this is a medication with once every 24 hours dosing. She states that her chest pain is resolved. She is having very light vaginal bleeding, not having to change a pad < 2 hours.     Patient verbalizes understanding and agrees to plan. She will call with any questions and/or bleeding concerns. She is next scheduled to see Dr. Wilhelm on 4/17/2024.    Nica Ugalde RN, BSN, PCCN  Nurse Clinician    [facility]  [address] [place] [address], [address] [address]   Office, direct: [phone]  Main office number: [phone]  Pronouns: She, her, hers

## 2024-02-20 NOTE — ANESTHESIA POSTPROCEDURE EVALUATION
Patient: Estela Fry    Procedure: Procedure(s):  DILATION AND CURETTAGE, UTERUS, USING SUCTION,  placement of Mirena intrauterine device       Anesthesia Type:  MAC    Note:  Disposition: Outpatient   Postop Pain Control: Uneventful            Sign Out: Well controlled pain   PONV: No   Neuro/Psych: Uneventful            Sign Out: Acceptable/Baseline neuro status   Airway/Respiratory: Uneventful            Sign Out: Acceptable/Baseline resp. status   CV/Hemodynamics: Uneventful            Sign Out: Acceptable CV status; No obvious hypovolemia; No obvious fluid overload   Other NRE: NONE   DID A NON-ROUTINE EVENT OCCUR? No    Event details/Postop Comments:  No arrhythmias. Youa without questions re anesthesia. No magnet or other manipulation of ICD perioperatively           Last vitals:  Vitals Value Taken Time   /62 02/16/24 1245   Temp 36.9  C (98.4  F) 02/16/24 1230   Pulse 67 02/16/24 1245   Resp 21 02/16/24 1245   SpO2 100 % 02/16/24 1245       Electronically Signed By: Lynn Sloan MD  February 19, 2024  8:37 PM

## 2024-02-21 LAB
PATH REPORT.COMMENTS IMP SPEC: NORMAL
PATH REPORT.COMMENTS IMP SPEC: NORMAL
PATH REPORT.FINAL DX SPEC: NORMAL
PATH REPORT.GROSS SPEC: NORMAL
PATH REPORT.MICROSCOPIC SPEC OTHER STN: NORMAL
PATH REPORT.RELEVANT HX SPEC: NORMAL
PHOTO IMAGE: NORMAL

## 2024-02-22 ENCOUNTER — MYC MEDICAL ADVICE (OUTPATIENT)
Dept: OBGYN | Facility: CLINIC | Age: 34
End: 2024-02-22
Payer: COMMERCIAL

## 2024-02-22 ENCOUNTER — VIRTUAL VISIT (OUTPATIENT)
Dept: PSYCHOLOGY | Facility: CLINIC | Age: 34
End: 2024-02-22
Payer: COMMERCIAL

## 2024-02-22 DIAGNOSIS — F06.4 ANXIETY DISORDER DUE TO MEDICAL CONDITION: Primary | ICD-10-CM

## 2024-02-22 PROCEDURE — 90791 PSYCH DIAGNOSTIC EVALUATION: CPT | Mod: 95

## 2024-02-22 NOTE — PROGRESS NOTES
Health Psychology Psychodiagnostic Evaluation     Health Psychology Office   Health Psychology Clinic   Department [place] [address] [facility]  3rd Floor  [address] [place] [address]     Health Psychology Team:  Shivani Sanders, Ph.D., L.P [phone]  Moon Lucia, Ph.D., L.P. [phone]  Jax Palacios, Ph.D. [phone]  Shanda Butler, Ph.D., A.B.P.P., L.P. [phone]  Iglesia Cole, Ph.D., A.B.P.P., L.P. [phone]         Juliann Patiño, Ph.D., L.P. [phone]   Tequila Haas, Ph.D., A.B.P.P., L.P. [phone]  Johnny Escobar, Ph.D. (fellow) [phone]    Modality: Telemedicine Information:  Type of service:   Telemedicine psychotherapy  Patient location:   Patient home in [place]    Patient telephone number: [phone]  Provider location:  [facility] Health Psychology Office  Mode of Communication:   Video Conference via [employer]   Patient consent to telemedicine services? Yes  It has been determined that telemedicine service is appropriate and effective for this patient.   The patient has been notified that: Video visits will be conducted via a call with their psychologist to provide the care they need with a video conversation. Video visits may be billed at different rates depending on insurance coverage.  Patients are advised to contact their insurance provider with any questions about their health insurance coverage. If during the course of a call the psychologist feels a video visit is not appropriate, patients will not be charged for this service.    Confidential Summary of Health Psychology Evaluation:  Patient was provided information about Health Psychology Services, including billing and limits to confidentiality and documentation in electronic medical records. Provided opportunity to ask questions and obtained verbal consent for treatment.    Referral  Source/Reason:  Ms. Fry was referred to Health Psychology by the Post-ICU Cardiology Care team for ICD shock anxiety treatment.     Patient Demographics (per chart):   Age: 34 year old  Biological Sex: female  Race:   Ethnicity: Not  or     Medical History (per chart):  Patient Active Problem List   Diagnosis    Cardiac arrest (H)    Heart disease during pregnancy, antepartum      Past Medical History:   Diagnosis Date    Automatic implantable cardioverter-defibrillator in situ     Cardiac arrest (H)     History of atrial flutter     History of venous thromboembolism      Past Surgical History:   Procedure Laterality Date    CV CENTRAL VENOUS CATHETER PLACEMENT N/A 8/5/2023    CV CORONARY ANGIOGRAM N/A 8/5/2023    DILATION AND CURETTAGE SUCTION N/A 2/16/2024    EP SICD INSERT N/A 8/15/2023    INSERT INTRAUTERINE DEVICE N/A 2/16/2024     Current Outpatient Medications   Medication    chlorhexidine (HIBICLENS) 4 % liquid    enoxaparin ANTICOAGULANT (LOVENOX) 40 MG/0.4ML syringe    Prenatal Vit-Fe Fumarate-FA (PRENATAL MULTIVITAMIN W/IRON) 27-0.8 MG tablet    sotalol (BETAPACE) 80 MG tablet     No current facility-administered medications for this visit.       Patient's treatment team at the [facility] is:   Patient Care Team         Relationship Specialty Notifications Start End    Gerri Bay NP PCP - General   8/16/23     Phone: [phone] Fax: [phone]         [address] [facility] [address]    Ana Bales MD Assigned Surgical Provider   9/23/23     Phone: [phone] Fax: [phone]         [address] [facility] [address]    Marcia Wilhelm MD Assigned Cancer Care Provider   10/28/23     Phone: [phone] Fax: [phone]         [address] [facility] [address]    Michelle Kimble PA-C Assigned Heart and Vascular Provider   12/2/23     Phone: [phone] Fax: [phone]         [address] [facility] [address]          History of Presenting  Concerns:  Ms. Fry is a 34 year oldy/o, , female who presents to the Health Psychology clinic with ICD shock anxiety. Pt  pmhx of VF arrest on 8/5/2023 --> shocked x 3 --> ROSC s/p subcutaneous ICD, atrial flutter, DVT on xarelto. Pt reported that she has been experiencing anxiety related to her ICD since January after having been shocked multiple times. Pt reported high daily anxiety and worry related to the possibility of future shocks and reported that she frequently wakes up at night with panic like sxs and becomes aware of a vibration coming from her ICD that increases distress, or thinking that her device shocked her while sleeping without actual shock.     Social History:  Current living arrangements: Patient reported that she currently lives in her in-laws single-family home living with her in-laws, , and 7 children.    Relationship status/family: Patient reported that she is currently .  Patient denied any previous marriages.  Patient reported having 7 children with her , aged between 3-13 y/o.  Patient reported that she is close with her in-laws, that she talks to her mother, who does not live locally, on the phone at least weekly, and is in frequent contact with her sister.    Social support network: Patient reported a social support network including her mother and .  Abuse/trauma history: Patient reported a history of medical trauma including cardiac arrest.  Occupational history: Patient denied current employment.    Health Behaviors:  Caffeine: Patient denied caffeine use.  ETOH: Patient denied alcohol use.  Cannabis use: Patient denied cannabis use.  Illicit substance use: Patient denied illicit substance use.  Tobacco use: Patient denied tobacco use.  Physical activity: Patient denied regular physical activity or exercise.  Patient reported that she did not complete cardiac rehab due to her insurance not covering the service.  Sleep: Patient reported sleep-related  concerns.  As stated above, patient reported that she frequently wakes up at night with ICD related worry.  Medication adherence: Patient denied concerns related to medication adherence.    Psychiatric History:  Depression: Patient reported current sxs of depression including: Change in sleep, Lack of interest, Change in energy level, Ruminations, and Feeling sad, down, or depressed.  [place]: Patient denied manic sxs.   Psychosis: Patient denied psychotic sxs.   Anxiety: Patient reported current sxs of anxiety including: Excessive worry, Nervousness, Sleep disturbance, Psychomotor agitation, and Ruminations.  Panic: Patient reported sxs of panic including: Shortness of breath, Tingling, and Sense of impending doom.  Agoraphobia: Patient denied symptoms of agoraphobia.   Post Traumatic Stress Disorder: Patient reported sxs consistent with PTSD including: Hypervigilance and Increased arousal and possible nightmares.   Eating Disorder: Patient denied disordered eating sxs.   ADHD: Patient denied ADHD sxs.   Autism Spectrum Disorder: Patient denied ASD sxs.   Obsessive Compulsive Disorder: Patient denied OCD sxs.     Mental health treatment: Patient denied a history of mental health care utilization.  Substance use treatment: Patient denied history of substance use treatment.  Psychiatric hospitalization: Patient denied a history of psychiatric hospitalization.    Assessment:   Depression Symptoms (PROMIS Depression short form 8a)  Question 2/22/2024 10:36 AM CST - Filed by Patient   In the past 7 days    I felt worthless Sometimes   I felt helpless Sometimes   I felt depressed Always   I felt hopeless Sometimes   I felt like a failure Sometimes   I felt unhappy Sometimes   I felt that I had nothing to look forward to Sometimes   I felt that nothing could cheer me up Rarely   PROMIS Depression T-Score (range: 10 - 90) 63 (moderate)     Anxiety Symptoms (PROMIS Anxiety short form 8a)  Question 2/22/2024 10:35 AM CST -  Filed by Patient   In the past 7 days    I felt fearful Sometimes   I found it hard to focus on anything other than my anxiety Sometimes   My worries overwhelmed me Sometimes   I felt uneasy Sometimes   I felt nervous Sometimes   I felt like I needed help for my anxiety Always   I felt anxious Rarely   I felt tense Rarely   PROMIS Anxiety T-Score (range: 10 - 90) 62 (moderate)     Alcohol and Drug Abuse (CAGE - Adapted to Include Drugs; CAGE-AID)  The CAGE-AID is a screening tool to assess for symptoms of alcohol or drug abuse or dependence. Scores on this measure range from 0 to 4, with higher scores reflecting experiences consistent with problem use.  CAGE-AID score  > 1 is a positive screen, suggesting further discussion is needed to determine if evaluation for alcohol or substance abuse is appropriate.  A score > 2 is considered clinically significant, suggesting further evaluation of alcohol or substance-related problems is indicated.      2/22/2024    10:39 AM   CAGE-AID Total Score   Total Score 0   Total Score MyChart 0 (A total score of 2 or greater is considered clinically significant)     Global Health (Patient-Reported Outcomes Measurement Information System; PROMIS-10)  The PROMIS-10 is a measure of global physical and mental health. Total scores on this measure range from 8 to 40, with higher scores reflecting greater levels of perceived health.       2/22/2024    10:38 AM   PROMIS-10 Scores Only   Global Mental Health Score 11   Global Physical Health Score 14   PROMIS TOTAL - SUBSCORES 25     Suicidality ([place] Suicide Severity Rating Scale Screener Past Month)      2/16/2024     9:25 AM   [place] Suicide Severity Rating (Short Version)   Over the past 2 weeks have you felt down, depressed, or hopeless? no   Over the past 2 weeks have you had thoughts of killing yourself? no   Have you ever attempted to kill yourself? no       Mental Status/Interview:  Appearance:   Appropriate   Eye  Contact:   Good   Psychomotor Behavior: Normal   Attitude:   Cooperative  Friendly  Orientation:   All  Speech   Rate / Production: Normal    Volume:  Normal   Mood:    Euthymic  Affect:    Appropriate   Thought Content:   Clear  Thought Form:  Coherent  Logical     Insight:    Did not formally assess at this time.     Suicidal ideation: Pt denied active suicidal ideation.   Homicidal ideation: Pt denied active homicidal ideation.     Impression:  Based on this interview and results from the assessments administered on this date, Ms. Fry appears to be experiencing symptoms of anxiety that appear to be related to fear of ICD shock. According to self-report and interviewed data patient appears to be experiencing depression symptoms in the moderate range and anxiety symptoms in the moderate range. Currently, the patient appears to be coping with minimal success. This patient has the following strengths: engagement in health care, social support. Patient engages in the following healthy behaviors: abstinence from ETOH and substances. Patient would benefit from CBT for anxiety.     Diagnosis:  Anxiety disorder due to medical condition (ICD-10: F06.4)    Date of Service:  02/22/24    Session Length:  Start Time: 11:00am  End Time: 11:55am    Recommendations/Plan:  Return for therapy sessions.     Johnny Escobar, PhD  Clinical Health Psychology Fellow    This case is being supervised by Shivani Sanders, PhD, LP.    This note was completed using Dragon voice recognition software. Although reviewed after completion, some word and grammatical errors may occur.    *In accordance with the Rules of the [place] Board of [employer], it is noted that psychological descriptions and scientific procedures underlying psychological evaluations have limitations.  Absolute predictions cannot be made based on information in this report.

## 2024-02-29 PROBLEM — R49.0 DYSPHONIA: Status: ACTIVE | Noted: 2024-02-29

## 2024-03-05 NOTE — PLAN OF CARE
"  THORACIC SURGERY CLINIC CONSULT NOTE    REASON FOR CONSULT: Incidental pulmonary nodules    REFERRING PROVIDER: Nitza Salas APRN     Subjective   HISTORY OF PRESENTING ILLNESS:   Bassam Cedeno is a 86 y.o. male who has significant medical problems as mentioned in the medical chart.     He had a CT scan of the abdomen pelvis on 11/10/2023 for nausea vomiting with difficulty urinating.  There is a 6 mm noncalcified nodule in the left lower lobe identified.  There were linear opacity in the inferior lingula and right middle lobe likely due to atelectasis.  He had a CT angiogram of the chest on 11/22/2023 for chest pain which noted stable 1.6 cm linear opacity in the right lung .  There was also 4.2 cm ascending thoracic aortic aneurysm.  He had subsequent PET/CT scan on 12/1/2023 which reported right lower lobe pleural-based 13 mm nodule which was not hypermetabolic.  There was mild enlarged and hypermetabolic low paratracheal lymph node.  There was new small bilateral pleural effusion with bibasilar atelectasis.    He quit smoking in 1968 or 1969. He smoked approximately 1 pack per day. 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 He can walk a block without becoming short of breath. He does not use oxygen at home. He coughs daily, predominantly in the evening. He occasionally experiences dyspnea when talking. He sleeps on his right or left side because he is unable to sleep on his back. He confirms that he has always been thin as he is now.     The patient had severe acid reflux, but it resolved after his open-heart surgery in 2002. His cardiologist is Dr. Aleman. He was in the hospital on 11/22/2023 for a heart problem.     He saw a specialist for his prostate, who recommended a UroLift. He had a cystoscope done and was cleared of tumors, cancers, and stones. He was told there was \"a spot\" on his colon and lymph nodes. He has not had a colonoscopy. He denies any bilateral " Goal Outcome Evaluation:      Plan of Care Reviewed With: patient, family    Overall Patient Progress: no changeOverall Patient Progress: no change    Outcome Evaluation: Awaiting cardiac MRI/ICD placement and IMC bed availability.    Major Shift Events:  No acute events. Remains intermittently confused to place and situation, forgetful. SR/ST. BP WDL. Consistent 6/10 chest pain, PRN tylenol and heat therapy provided. O2 sats >92% on RA. Eating well, BM x2 today. Voiding spontaneously, currently ~ 800 net negative since 0000.     Plan: Awaiting cardiac MRI/ICD placement. IMC orders, awaiting bed availability.    For vital signs and complete assessments, please see documentation flowsheets.      lower extremity edema.     His father passed away of lung cancer, but he was a heavy smoker. His mother  of intestinal cancer and his sister passed of a reproductive cancer.    At the time of initial evaluation, I recommended CT scan of the chest in 3 months, which was performed on 2024 and reported large bilateral pleural effusion.  There is patchy bibasilar disease and subtle reticular nodular interstitial changes suggestive of viral syndrome.  He came to clinic for follow-up visit.  He had worsening shortness of breath and was symptomatic.    Past Medical History:   Diagnosis Date    A-fib     Jose F disease     CHF (congestive heart failure)     CKD (chronic kidney disease) stage 3, GFR 30-59 ml/min     COPD (chronic obstructive pulmonary disease)     Coronary artery disease     Coronary artery disease     Degenerative arthritis     Dizziness     Dysphagia     Frequent headaches     Heart attack     History of percutaneous coronary intervention     Hyperlipidemia     Hypertension     Peripheral vascular disease     Renal disorder     Sepsis     Stroke        Past Surgical History:   Procedure Laterality Date    BACK SURGERY      lumbar    CARDIAC CATHETERIZATION N/A 2019    Procedure: Left Heart Cath with angiogram;  Surgeon: Lex Aleman MD;  Location: [facility] CATH INVASIVE LOCATION;  Service: Cardiovascular    CARDIAC CATHETERIZATION N/A 2019    Procedure: Coronary angiography;  Surgeon: Lex Aleman MD;  Location: [facility] CATH INVASIVE LOCATION;  Service: Cardiovascular    CARDIAC CATHETERIZATION N/A 2019    Procedure: Stent RADHA bypass graft;  Surgeon: Lex Aleman MD;  Location: [facility] CATH INVASIVE LOCATION;  Service: Cardiovascular    CARDIAC CATHETERIZATION Right 2023    Procedure: Coronary angiography;  Surgeon: Lex Aleman MD;  Location: [facility] CATH INVASIVE LOCATION;  Service: Cardiovascular;  Laterality: Right;    CARDIAC CATHETERIZATION N/A 2023     Procedure: Left Heart Cath;  Surgeon: Lex Aleman MD;  Location: [facility] CATH INVASIVE LOCATION;  Service: Cardiovascular;  Laterality: N/A;    CARDIAC CATHETERIZATION  05/23/2023    Procedure: Saphenous Vein Graft;  Surgeon: Lex Aleman MD;  Location: [facility] CATH INVASIVE LOCATION;  Service: Cardiovascular;;    CARDIAC CATHETERIZATION Right 11/24/2023    Procedure: Coronary angiography;  Surgeon: Lex Aleman MD;  Location: [facility] CATH INVASIVE LOCATION;  Service: Cardiovascular;  Laterality: Right;    CARDIAC CATHETERIZATION N/A 11/24/2023    Procedure: Left Heart Cath;  Surgeon: Lex Aleman MD;  Location: [facility] CATH INVASIVE LOCATION;  Service: Cardiovascular;  Laterality: N/A;    CARDIAC CATHETERIZATION  11/24/2023    Procedure: Saphenous Vein Graft;  Surgeon: Lex Aleman MD;  Location: [facility] CATH INVASIVE LOCATION;  Service: Cardiovascular;;    CARDIAC ELECTROPHYSIOLOGY PROCEDURE N/A 10/20/2021    Procedure: Ablation atrial fibrillation-No cryoablation;  Surgeon: Yohannes Easton MD;  Location: [facility] CATH INVASIVE LOCATION;  Service: Cardiovascular;  Laterality: N/A;    CARDIAC SURGERY      CHOLECYSTECTOMY      CORONARY ARTERY BYPASS GRAFT      CORONARY STENT PLACEMENT      CYSTOSCOPY      ENDOSCOPY N/A 08/23/2021    Procedure: ESOPHAGOGASTRODUODENOSCOPY with dilation (54 american dilator);  Surgeon: Kim Galan MD;  Location: [facility] ENDOSCOPY;  Service: Gastroenterology;  Laterality: N/A;  Post: gastritis, EUS stricture, HH,     ENDOSCOPY N/A 2/29/2024    Procedure: ESOPHAGOGASTRODUODENOSCOPY WITH BIOPSIES AND ESOPHAGEAL DILATION USING NONGUIDED BOUGIE DILATOR (#40, #44, #48);  Surgeon: Regulo Bassett MD;  Location: [facility] ENDOSCOPY;  Service: Gastroenterology;  Laterality: N/A;  POST-GASTRITIS, DYSPHAGIA    GALLBLADDER SURGERY      HERNIA REPAIR      NECK SURGERY      CO RT/LT HEART CATHETERS N/A 08/23/2019    Procedure: Percutaneous Coronary Intervention;  Surgeon: Ash  MD Lex;  Location: [facility] INVASIVE LOCATION;  Service: Cardiovascular    SPINE SURGERY      THORACENTESIS Right        Family History   Problem Relation Age of Onset    Cancer Mother     Cancer Father         lung    Cancer Sister     Heart disease Sister        Social History     Socioeconomic History    Marital status:     Number of children: 6    Years of education: 7   Tobacco Use    Smoking status: Former     Current packs/day: 0.00     Average packs/day: 1.5 packs/day for 15.0 years (22.5 ttl pk-yrs)     Types: Cigarettes     Start date:      Quit date:      Years since quittin.3     Passive exposure: Past    Smokeless tobacco: Never   Vaping Use    Vaping status: Never Used   Substance and Sexual Activity    Alcohol use: Not Currently    Drug use: No    Sexual activity: Defer         Current Outpatient Medications:     aspirin (ASPIR) 81 MG EC tablet, Take 1 tablet by mouth Daily. Indications: antiplatelet, Disp: , Rfl:     Cholecalciferol (VITAMIN D3) 2000 units capsule, Take 1 capsule by mouth Daily. Indications: Vitamin D Deficiency, Disp: , Rfl:     dapagliflozin Propanediol (Farxiga) 10 MG tablet, Take 10 mg by mouth Daily., Disp: 90 tablet, Rfl: 1    midodrine (PROAMATINE) 5 MG tablet, Take 1 tablet by mouth Every 6 (Six) Hours As Needed (Low Blood Pressure). Indications: Disorder of Low Blood Pressure, Disp: , Rfl:     nitroglycerin (NITROSTAT) 0.4 MG SL tablet, Place 1 tablet under the tongue Every 5 (Five) Minutes As Needed for Chest Pain. Take no more than 3 doses in 15 minutes.  Indications: Acute Angina Pectoris, Disp: , Rfl:     rosuvastatin (CRESTOR) 10 MG tablet, Take 1 tablet by mouth once daily, Disp: 90 tablet, Rfl: 1    bumetanide (BUMEX) 1 MG tablet, Take 1 tablet by mouth Daily., Disp: , Rfl:     ferrous sulfate 324 (65 Fe) MG tablet delayed-release EC tablet, Take 1 tablet by mouth twice daily, Disp: 60 tablet, Rfl: 0    finasteride (PROSCAR) 5 MG tablet, Take  "1 tablet by mouth Daily., Disp: , Rfl:     fludrocortisone 0.1 MG tablet, Take 0.5 tablets by mouth 2 (Two) Times a Day., Disp: , Rfl:     metOLazone (ZAROXOLYN) 2.5 MG tablet, Take 1 tablet by mouth Daily., Disp: 30 tablet, Rfl: 0    mirtazapine (REMERON) 15 MG tablet, Take 1 tablet by mouth Every Night., Disp: 30 tablet, Rfl: 0    potassium chloride 10 MEQ CR tablet, Take 2 tablets by mouth Daily. Indications: Low Amount of Potassium in the Blood, Disp: 90 tablet, Rfl: 3    predniSONE (DELTASONE) 1 MG tablet, Take 4 tablets by mouth Daily. Indications: addisons, Disp: 360 tablet, Rfl: 3    ranolazine (RANEXA) 500 MG 12 hr tablet, Take 1 tablet by mouth 2 (Two) Times a Day., Disp: , Rfl:      Allergies   Allergen Reactions    Hydrocodone Itching     Depends on dose             Objective    OBJECTIVE:     VITAL SIGNS:  /68 (BP Location: Left arm, Patient Position: Sitting, Cuff Size: Adult)   Pulse 67   Ht 177.8 cm (70\")   Wt 56.8 kg (125 lb 3.2 oz)   SpO2 100%   BMI 17.96 kg/m²     PHYSICAL EXAM:  Constitutional:       Appearance: Normal appearance.   HENT:      Head: Normocephalic.      Right Ear: External ear normal.      Left Ear: External ear normal.      Nose: Nose normal.      Mouth/Throat: No obvious deformity     Pharynx: Oropharynx is clear.   Eyes:      Conjunctiva/sclera: Conjunctivae normal.   Cardiovascular:      Rate and Rhythm: Normal rate.      Pulses: Normal pulses.   Pulmonary:      Effort: Unlabored breathing  Abdominal:      Palpations: Abdomen is soft.   Musculoskeletal:         General: Normal range of motion.      Cervical back: Normal range of motion.   Skin:     General: Skin is warm.   Neurological:      General: No focal deficit present.      Mental Status: He is alert and oriented to person, place, and time.     LAB RESULTS:  I have reviewed all the available laboratory results in the chart.    RESULTS REVIEW:  I have reviewed the patient's all relevant laboratory and imaging " findings.     CT scan was reviewed:  There is some evidence of fibrosis or damage in the lower part of the lungs. The top part of the right lung shows an area of possible scar tissue. Pleural effusion present.     The PET-CT scan from 11/30/2023 was reviewed:  It does not suggest anything concerning. Scar tissue is suspected.      ASSESSMENT & PLAN:  Bassam Cedeno is a 86 y.o. male with significant medical conditions as mentioned above presented to my clinic.    He has severe congestive heart failure with mitral insufficiency. He is getting a work-up for mitral clip placement. He also has stage 3 kidney disease. I recommended right-sided ultrasound-guided drainage for symptomatic relief. If he has worsening shortness of breath and requiring another thoracentesis, I recommended right-sided PleurX catheter placement. He will follow-up in our office in 2 months with repeat x-ray and he will get ultrasound thoracentesis in the interim.    I discussed the patients findings and my recommendations with the patient. The patient was given adequate time to ask questions and all questions were answered to patient satisfaction.     Rajesh Lamb MD  Thoracic Surgeon  [facility]        Dictated utilizing Dragon dictation    I spent 40 minutes caring for Bassam on this date of service. This time includes time spent by me in the following activities:preparing for the visit, reviewing tests, obtaining and/or reviewing a separately obtained history, performing a medically appropriate examination and/or evaluation , counseling and educating the patient/family/caregiver, ordering medications, tests, or procedures, referring and communicating with other health care professionals , documenting information in the medical record, independently interpreting results and communicating that information with the patient/family/caregiver, and care coordination and more than half the time was spent in direct face to  face evaluation and decision making.       Transcribed from ambient dictation for Rajesh Lamb MD by Anastasiya Simons.  03/05/24   11:41 EST    Patient or patient representative verbalized consent to the visit recording.  I have personally performed the services described in this document as transcribed by the above individual, and it is both accurate and complete.

## 2024-03-11 ENCOUNTER — TELEPHONE (OUTPATIENT)
Dept: CARDIOLOGY | Facility: CLINIC | Age: 34
End: 2024-03-11

## 2024-03-11 NOTE — TELEPHONE ENCOUNTER
Spoke with Estela today to review results of genetic testing. She underwent genetic testing for the Comprehensive Cardio panel. DNA was collected via saliva on February 11, 2024 and sent to [facility] laboratory.   Testing revealed that Estela DOES NOT carry any actionable gene mutation.  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  A variant of unknown significance (VUS) means that there is a genetic change in which we do not have enough information to determine if it is disease causing or not. Labs review published data, functional studies, presences in population databases, similarity to normal sequence, and computer prediction models.    The RBM20 gene is known to be associated with dilated cardiomyopathy (DCM). This particular variant occurs in a position that is well conserved (meaning that it is not used to changes) and creates an amino acid with highly dissimilar properties.  However, the new amino acid is standard in other vertebrate species (which may lower levels of concern).  Computer models predict this variant will be tolerated, which also lowers level of concern.  Therefore, based on the current information it is unclear if this VUS is associated with disease or not.    Risk to relatives could still be as high as 50% but genetic testing is not predictive or recommended because we cannot interpret the results and make predictions.    Reviewed recommendation for cardiac screening in all first degree relatives (parents, siblings, children).  Clinical screening should include physical exam with a cardiologist, history, EKG, and ECHO. In addition, Holter montior and MRI may be recommended.  If other family members are found to have cardiomyopathy, genetic testing through [facility]'s family study program could be performed to help better understand this variant.  A summary letter and copy of the results will be sent to patient. All questions answered at this  time.  Abiola Lamas MS, [facility] [place]  Licensed, Certified Genetic Counselor  [facility]  [facility]

## 2024-03-14 ENCOUNTER — VIRTUAL VISIT (OUTPATIENT)
Dept: PSYCHOLOGY | Facility: CLINIC | Age: 34
End: 2024-03-14
Payer: COMMERCIAL

## 2024-03-14 DIAGNOSIS — F06.4 ANXIETY DISORDER DUE TO MEDICAL CONDITION: Primary | ICD-10-CM

## 2024-03-14 PROCEDURE — 90834 PSYTX W PT 45 MINUTES: CPT | Mod: 95

## 2024-03-14 ASSESSMENT — ANXIETY QUESTIONNAIRES
3. WORRYING TOO MUCH ABOUT DIFFERENT THINGS: NOT AT ALL
7. FEELING AFRAID AS IF SOMETHING AWFUL MIGHT HAPPEN: NEARLY EVERY DAY
4. TROUBLE RELAXING: SEVERAL DAYS
IF YOU CHECKED OFF ANY PROBLEMS ON THIS QUESTIONNAIRE, HOW DIFFICULT HAVE THESE PROBLEMS MADE IT FOR YOU TO DO YOUR WORK, TAKE CARE OF THINGS AT HOME, OR GET ALONG WITH OTHER PEOPLE: SOMEWHAT DIFFICULT
8. IF YOU CHECKED OFF ANY PROBLEMS, HOW DIFFICULT HAVE THESE MADE IT FOR YOU TO DO YOUR WORK, TAKE CARE OF THINGS AT HOME, OR GET ALONG WITH OTHER PEOPLE?: SOMEWHAT DIFFICULT
GAD7 TOTAL SCORE: 9
5. BEING SO RESTLESS THAT IT IS HARD TO SIT STILL: NOT AT ALL
1. FEELING NERVOUS, ANXIOUS, OR ON EDGE: NEARLY EVERY DAY
GAD7 TOTAL SCORE: 9
7. FEELING AFRAID AS IF SOMETHING AWFUL MIGHT HAPPEN: NEARLY EVERY DAY
2. NOT BEING ABLE TO STOP OR CONTROL WORRYING: SEVERAL DAYS
6. BECOMING EASILY ANNOYED OR IRRITABLE: SEVERAL DAYS

## 2024-03-14 NOTE — PROGRESS NOTES
Health Psychology Outpatient Treatment Plan and Follow Up     Health Psychology Office   Health Psychology Clinic   Department [place] [address] [facility]  3rd Floor  [address] [place] [address]     Health Psychology Team:  Shivani Sanders, Ph.D., L.P [phone]  Moon Lucia, Ph.D., L.P. [phone]  Jax Palacios, Ph.D. [phone]  Shanda Butler, Ph.D., A.B.P.P., L.P. [phone]  Iglesia Cole, Ph.D., A.B.P.P., L.P. [phone]         Juliann Patiño, Ph.D., L.P. [phone]   Tequila Haas, Ph.D., A.B.P.P., L.P. [phone]  Johnny Escobar, Ph.D. [phone]    Modality: Telemedicine Information:  Type of service:   Telemedicine psychotherapy  Patient location:   Patient home in [place]    Patient telephone number: [phone]  Provider location:  [facility] Floor 3 Health Psychology Office  Mode of Communication:   Video Conference via Cinnamon   Patient consent to telemedicine services? Yes  It has been determined that telemedicine service is appropriate and effective for this patient.   The patient has been notified that: Video visits will be conducted via a call with their psychologist to provide the care they need with a video conversation. Video visits may be billed at different rates depending on insurance coverage.  Patients are advised to contact their insurance provider with any questions about their health insurance coverage. If during the course of a call the psychologist feels a video visit is not appropriate, patients will not be charged for this service.    Patient Demographics (per chart):   Age: 34 year old  Biological Sex: female  Race:   Ethnicity: Not  or     Subjective:  Reviewed updates to physical and emotional health since previous session. Reviewed treatment plan and goals. Patient reported treatment goals including increasing anxiety  management skills and decreasing hypersensitivity to ICD sensations. Patient was provided basic psychoeducation on CBT for anxiety/ICD anxiety. Patient reported agreement with treatment plan.    Patient agreed to the following goals for next follow up appointment:  - Continue practicing physiological stress management strategies (diaphragmatic breathing, body scan, mindful meditation)  - Begin practicing cognitive grounding strategies (5 senses discovery, saying alphabet backwards, counting backwards from 100 by 7s)    Objective   Appearance:   Appropriate   Eye Contact:   Good   Psychomotor Behavior:  Normal   Attitude:   Cooperative   Orientation:   All  Speech   Rate / Production: Normal    Volume:  Normal   Mood:    Euthymic  Affect:    Appropriate   Thought Content:   Clear  Thought Form:   Coherent  Logical     Insight:    Did not formally assess at this time.     Assessment:  Depression Symptoms (PROMIS Depression short form 8a)  Question 2/22/2024 10:36 AM CST - Filed by Patient   In the past 7 days     I felt worthless Sometimes   I felt helpless Sometimes   I felt depressed Always   I felt hopeless Sometimes   I felt like a failure Sometimes   I felt unhappy Sometimes   I felt that I had nothing to look forward to Sometimes   I felt that nothing could cheer me up Rarely   PROMIS Depression T-Score (range: 10 - 90) 63 (moderate)      Anxiety Symptoms (PROMIS Anxiety short form 8a)  Question 2/22/2024 10:35 AM CST - Filed by Patient   In the past 7 days     I felt fearful Sometimes   I found it hard to focus on anything other than my anxiety Sometimes   My worries overwhelmed me Sometimes   I felt uneasy Sometimes   I felt nervous Sometimes   I felt like I needed help for my anxiety Always   I felt anxious Rarely   I felt tense Rarely   PROMIS Anxiety T-Score (range: 10 - 90) 62 (moderate)     Alcohol and Drug Abuse (CAGE - Adapted to Include Drugs; CAGE-AID)  The CAGE-AID is a screening tool to assess for  symptoms of alcohol or drug abuse or dependence. Scores on this measure range from 0 to 4, with higher scores reflecting experiences consistent with problem use.  CAGE-AID score  > 1 is a positive screen, suggesting further discussion is needed to determine if evaluation for alcohol or substance abuse is appropriate.  A score > 2 is considered clinically significant, suggesting further evaluation of alcohol or substance-related problems is indicated.      2/22/2024    10:39 AM   CAGE-AID Total Score   Total Score 0   Total Score MyChart 0 (A total score of 2 or greater is considered clinically significant)     Global Health (Patient-Reported Outcomes Measurement Information System; PROMIS-10)  The PROMIS-10 is a measure of global physical and mental health. Subscale scores on this measure range from 4 to 20, with higher scores reflecting greater levels of perceived health.       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     Johnny Escobar, PhD  Clinical Health Psychology Fellow    This case is being supervised by Shivani Sanders, PhD, LP.    This note was completed using Dragon voice recognition software. Although reviewed after completion, some word and grammatical errors may occur.    Outpatient Treatment Plan Summary     Date of Treatment Plan: 03/14/24  Date of Initial Service: 02/22/24  90-Day Review Date: NA    DSM-V Diagnosis: Anxiety disorder due to medical condition (ICD-10:  F06.4)  Current symptoms and circumstances that substantiate the diagnosis: See diagnostic assessment.   How symptoms and/or behaviors are affecting level of functioning: See diagnostic assessment.    Risk Assessment:   Suicide:  Assessed Level of Immediate Risk: None  Ideation: No   Plan:  No  Means: No  Intent: No     Homicide/Violence:  Assessed Level of Immediate Risk: None  Ideation: No  Plan:  No  Means: No  Intent: No    If on a medication, please include name and dosage:  See chart.     Symptom/Problem Measurable Goals Interventions  Gains Made   High anxiety sxs related to ICD Self reported increase in anxiety coping strategies  CBT for anxiety  NA   Hypersensitivity to ICD related sensations (chest tightness, vibrations) Self reported decrease in sensitivity to bodily sensations  CBT for anxiety  NA     Frequency of Sessions: ~1x a week   Discharge and Aftercare Goals: TBD  Expected duration of treatment:  ~12 appointments   Participants in therapy plan (family, friends, support network): TBD    Discussed and created this plan with the patient who has access to this treatment plan via [employer].     Regulatory Guidelines for Updating Treatment Plan  [place] Medical Assistance: Reviewed & signed at least every 90days  Medicare:  Update per policy

## 2024-03-18 ENCOUNTER — TEAM CONFERENCE (OUTPATIENT)
Dept: CARDIOLOGY | Facility: CLINIC | Age: 34
End: 2024-03-18
Payer: COMMERCIAL

## 2024-03-18 DIAGNOSIS — Z95.810 ICD (IMPLANTABLE CARDIOVERTER-DEFIBRILLATOR) IN PLACE: Primary | ICD-10-CM

## 2024-03-18 DIAGNOSIS — Z15.89: ICD-10-CM

## 2024-03-18 DIAGNOSIS — I50.21 ACUTE SYSTOLIC HEART FAILURE (H): ICD-10-CM

## 2024-03-28 ENCOUNTER — VIRTUAL VISIT (OUTPATIENT)
Dept: PSYCHOLOGY | Facility: CLINIC | Age: 34
End: 2024-03-28
Payer: COMMERCIAL

## 2024-03-28 DIAGNOSIS — F06.4 ANXIETY DISORDER DUE TO MEDICAL CONDITION: Primary | ICD-10-CM

## 2024-03-28 DIAGNOSIS — I46.9 CARDIAC ARREST (H): ICD-10-CM

## 2024-03-28 PROCEDURE — 90832 PSYTX W PT 30 MINUTES: CPT | Mod: 95

## 2024-03-28 ASSESSMENT — ANXIETY QUESTIONNAIRES
GAD7 TOTAL SCORE: 8
3. WORRYING TOO MUCH ABOUT DIFFERENT THINGS: NOT AT ALL
1. FEELING NERVOUS, ANXIOUS, OR ON EDGE: NEARLY EVERY DAY
6. BECOMING EASILY ANNOYED OR IRRITABLE: SEVERAL DAYS
7. FEELING AFRAID AS IF SOMETHING AWFUL MIGHT HAPPEN: NEARLY EVERY DAY
GAD7 TOTAL SCORE: 8
4. TROUBLE RELAXING: NOT AT ALL
IF YOU CHECKED OFF ANY PROBLEMS ON THIS QUESTIONNAIRE, HOW DIFFICULT HAVE THESE PROBLEMS MADE IT FOR YOU TO DO YOUR WORK, TAKE CARE OF THINGS AT HOME, OR GET ALONG WITH OTHER PEOPLE: SOMEWHAT DIFFICULT
7. FEELING AFRAID AS IF SOMETHING AWFUL MIGHT HAPPEN: NEARLY EVERY DAY
8. IF YOU CHECKED OFF ANY PROBLEMS, HOW DIFFICULT HAVE THESE MADE IT FOR YOU TO DO YOUR WORK, TAKE CARE OF THINGS AT HOME, OR GET ALONG WITH OTHER PEOPLE?: SOMEWHAT DIFFICULT
5. BEING SO RESTLESS THAT IT IS HARD TO SIT STILL: NOT AT ALL
2. NOT BEING ABLE TO STOP OR CONTROL WORRYING: SEVERAL DAYS

## 2024-03-28 NOTE — PROGRESS NOTES
Health Psychology Outpatient Follow Up     Health Psychology Office   Health Psychology Clinic   Department of Medicine   [facility]  [address] [place] [address] [facility]  3rd Floor  [address] [place] [address]     Health Psychology Team:  Shivani Sanders, Ph.D., L.P [phone]  Moon Lucia, Ph.D., L.P. [phone]  Jax Palacios, Ph.D. [phone]  Iglesia Cole, Ph.D., A.B.P.P., L.P. [phone]         Juliann Patiño, Ph.D., L.P. [phone]   Tequila Haas, Ph.D., A.B.P.P., L.P. [phone]  Johnny Escobar, Ph.D. (fellow) [phone]    Modality: Telemedicine Information:  Type of service:   Telemedicine psychotherapy  Patient location:   Patient home in [place]    Patient telephone number: [phone]  Provider [place]  [facility] [facility] Health Psychology Office  Mode of Communication:   Video Conference via [employer]   Patient consent to telemedicine services? Yes  It has been determined that telemedicine service is appropriate and effective for this patient.   The patient has been notified that: Video visits will be conducted via a call with their psychologist to provide the care they need with a video conversation. Video visits may be billed at different rates depending on insurance coverage.  Patients are advised to contact their insurance provider with any questions about their health insurance coverage. If during the course of a call the psychologist feels a video visit is not appropriate, patients will not be charged for this service.    Patient Demographics (per chart):   Age: 34 year old  Biological Sex: female  Race:   Ethnicity: Not  or     Subjective:  Reviewed updates to physical and emotional health since previous session and progress with treatment plan goals. Discussed ongoing ICD anxiety sxs. Pt reported that anxiety has remained fairly stable since last appointment. Pt reported  "finding grounding and relaxation skills broadly helpful but noted still experiencing negative automatic thoughts about her ICD. Writer engaged pt in supportive listening and cognitive processing of ongoing sxs. Writer additionally engaged pt in psychoeducation and skills training in cognitive defusion including the \"Im noticing I am having the thought\" exercise and the leaves on a stream mindful meditation. 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     Pt agreed to the following assignment(s) to complete by next follow up:   - continue practicing relaxation and grounding skills  - begin practicing defusion skills and leaves on a stream meditation     Patient reported understanding of education and agreement with plan.     Objective   Appearance:   Appropriate   Eye Contact:   Good   Psychomotor Behavior:  Normal   Attitude:   Cooperative   Orientation:   All  Speech   Rate / Production: Normal    Volume:  Normal   Mood:    Euthymic  Affect:    Appropriate   Thought Content:   Clear  Thought Form:   Coherent  Logical     Insight:    Did not formally assess at this time.     Assessment:  Depression Symptoms (PROMIS Depression short form 8a)  Question 2/22/2024 10:36 AM CST - Filed by Patient   In the past 7 days     I felt worthless Sometimes   I felt helpless Sometimes   I felt depressed Always   I felt hopeless Sometimes   I felt like a failure Sometimes   I felt unhappy Sometimes   I felt that I had nothing to look forward to Sometimes   I felt that nothing could cheer me up Rarely   PROMIS Depression T-Score (range: 10 - 90) 63 (moderate)      Anxiety Symptoms (PROMIS Anxiety short form 8a)  Question 2/22/2024 10:35 AM CST - Filed by Patient   In the past 7 days     I felt fearful Sometimes   I found it hard to focus on anything other than my anxiety Sometimes   My worries overwhelmed me Sometimes   I felt uneasy Sometimes   I felt nervous Sometimes   I felt like I needed help for my anxiety Always "   I felt anxious Rarely   I felt tense Rarely   PROMIS Anxiety T-Score (range: 10 - 90) 62 (moderate)      Alcohol and Drug Abuse (CAGE - Adapted to Include Drugs; CAGE-AID)  The CAGE-AID is a screening tool to assess for symptoms of alcohol or drug abuse or dependence. Scores on this measure range from 0 to 4, with higher scores reflecting experiences consistent with problem use.  CAGE-AID score  > 1 is a positive screen, suggesting further discussion is needed to determine if evaluation for alcohol or substance abuse is appropriate.  A score > 2 is considered clinically significant, suggesting further evaluation of alcohol or substance-related problems is indicated.       2/22/2024    10:39 AM   CAGE-AID Total Score   Total Score 0   Total Score MyChart 0 (A total score of 2 or greater is considered clinically significant)      Global Health (Patient-Reported Outcomes Measurement Information System; PROMIS-10)  The PROMIS-10 is a measure of global physical and mental health. Subscale scores on this measure range from 4 to 20, with higher scores reflecting greater levels of perceived health.        2/22/2024    10:38 AM   PROMIS-10 Total Score w/o Sub Scores   PROMIS TOTAL - SUBSCORES 25        Suicidality ([place] Suicide Severity Rating Scale Screener Past Month)      3/14/2024    10:01 AM   [place] Suicide Severity Rating (Short Version)   Q1 Wished to be Dead (Past Month) 0-->no   Q2 Suicidal Thoughts (Past Month) 0-->no   Q6 Suicide Behavior (Lifetime) 0-->no     Assessment:  Pt was engaged in treatment. Patient appears to be experiencing depression symptoms in the moderate range and anxiety symptoms in the moderate range. Currently, the patient appears to be coping with some success. Progress towards treatment goals: ongoing.     Plan:  Return for therapy sessions.     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     Johnny Escobar, PhD  Clinical Health Psychology Fellow    This case is being supervised by Shivani Sanders, PhD, LP.    This note was completed using Dragon voice recognition software. Although reviewed after completion, some word and grammatical errors may occur.    Last treatment plan completed: 3/14/24  Next treatment plan due: 3/14/25

## 2024-04-09 NOTE — CONFIDENTIAL NOTE
[facility] Conference  24    Demographic Information on Estela Fry:    Estela Fry  Gender: female  : 1990    Abiola Lamas  Providers: Brea Johnson, Post arrest clinic  AC RN Coordinator:  Tanner Andino    Conference Participants:  Dr. Green; Dr. Leon;  Dr. Montes; Dr. Stevens;  Dr. Argueta; Joanna Zuñiga, APRN; Ana Maria Alvarado, APRN; Abiola Lamas, ; Elaine Yoon, RNUSHA and Tanner Andino RNCC    Patient Medical History:    Background: Admitted 23 after suffering out of hospital cardiac arrest. Work up unrevealing. Had SQ ICD implanted. She went to [facility] 1/15/2023 after receiving multiple shocks from her SICD. EMS was called to her home and found an initial supraventricular rhythm at 170 bpm. In ED EKG showed atrial flutter and she was cardioverted in ED. Of note, she had paroxysmal episodes of atrial flutter during hospitalization in Aug 2023 post VF arrest. She was seen by EP DAX 2024 and sotalol 80 bid was started.     Imagin/10/23 CMR: Normal cardiac function, LVEF of 64% and RVEF of 59%. There is no evidence of myocardial fibrosis, given no fibrosis consider genetic evaluation for the etiology of her cardiac arrest.     24 Echo: Interpretation Summary Global and regional left ventricular function is normal with an EF of 60-65%. Mild right ventricular dilation is present. Global right ventricular function is normal. IVC diameter <2.1 cm collapsing >50% with sniff suggests a normal RA pressure of 3 mmHg. No pericardial effusion is present.    Family History:    7 children, two boys (14, 4) and 5 girls (11-3) in good health.   5 sisters and one brother in good health, as are all of their children.   One sister has a heart murmur. She has 5 children in good health.   Mother (54) in good health.   3 maternal uncles and 1 aunt in good health, as are their children.   Maternal grandfather  in the war. Grandmother is in good health. She had at least one other daughter who   at 18 years. She was born with congenital heart defects, had multiple surgeries and had a pacemaker.   Father  but Estela states that in her culture she can't talk about this. She did say that she did not believe his death was related to her heart condition.   3 paternal aunts and 2 uncles in good health, as are their children.   Paternal grandparents in good health.    Genetic Testing: RBM20 VU on 3/11/24 with Abiola Lamas    Discussion Notes:  Patient is phenotype negative. Recommendation for clinical screening for patient's 1st degree relatives including patient's mother and 6 siblings with echo and EKG. If cardiomyopathy found in echo, genetic testing recommended to check VUS.     Plan: Echo every 2-3 years. No CMR unless there is a drop in EF.   Establish care with Dr. Montes in 2-3 years with echo prior.

## 2024-04-14 DIAGNOSIS — Z95.810 ICD (IMPLANTABLE CARDIOVERTER-DEFIBRILLATOR) IN PLACE: ICD-10-CM

## 2024-04-15 RX ORDER — SOTALOL HYDROCHLORIDE 80 MG/1
80 TABLET ORAL 2 TIMES DAILY
Qty: 90 TABLET | Refills: 1 | OUTPATIENT
Start: 2024-04-15

## 2024-04-17 ENCOUNTER — OFFICE VISIT (OUTPATIENT)
Dept: HEMATOLOGY | Facility: CLINIC | Age: 34
End: 2024-04-17
Attending: INTERNAL MEDICINE
Payer: COMMERCIAL

## 2024-04-17 VITALS
SYSTOLIC BLOOD PRESSURE: 98 MMHG | WEIGHT: 124.9 LBS | HEART RATE: 62 BPM | OXYGEN SATURATION: 98 % | BODY MASS INDEX: 22.98 KG/M2 | TEMPERATURE: 98.5 F | HEIGHT: 62 IN | DIASTOLIC BLOOD PRESSURE: 67 MMHG | RESPIRATION RATE: 12 BRPM

## 2024-04-17 DIAGNOSIS — D62 ANEMIA DUE TO BLOOD LOSS, ACUTE: ICD-10-CM

## 2024-04-17 DIAGNOSIS — I46.9 CARDIAC ARREST (H): ICD-10-CM

## 2024-04-17 DIAGNOSIS — I82.C22: Primary | ICD-10-CM

## 2024-04-17 DIAGNOSIS — Z79.01 CHRONIC ANTICOAGULATION: ICD-10-CM

## 2024-04-17 DIAGNOSIS — E61.1 IRON DEFICIENCY: ICD-10-CM

## 2024-04-17 PROCEDURE — 99214 OFFICE O/P EST MOD 30 MIN: CPT | Mod: GC | Performed by: INTERNAL MEDICINE

## 2024-04-17 PROCEDURE — G0463 HOSPITAL OUTPT CLINIC VISIT: HCPCS | Performed by: INTERNAL MEDICINE

## 2024-04-17 PROCEDURE — G2211 COMPLEX E/M VISIT ADD ON: HCPCS | Mod: GC | Performed by: INTERNAL MEDICINE

## 2024-04-17 NOTE — PROGRESS NOTES
[facility] Hematology Consultation    Outpatient Visit Note:    Patient: Estela Fry  MRN: [id number]  : 1990  EUN: 24    Reason for Consultation:  Thrombosis after cardiac arrest and resuscitation    Assessment:  In summary, Estela Fry is a 33 year old woman with no significant past medical history who first presented to attention of hematology after experiencing an unwitnessed VT/VF cardiac arrest with ROSC after 3 shocks. Hematology was consulted due to finding of nonocclusive right external iliac vein VTE. She was seen in clinic by Dr. Wilhelm on 10/18/23.     Provoked R external Iliac Vein thrombosis. 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 As there is a clear provoking factor during a period of holding her routine anticoagulation, it does not necessarily represent failure of anticoagulation warranting change in management. We will check upper extremity ultrasounds duplex. For now, recommended warm compresses.     The etiology of her R external iliac clot is provoked (femoral line in place). However the etiology of her IVC thrombosis and the CT finding of potential chronic left jugular vein occlusion is less clear. Given the severity of her cardiac arrest- would be prudent to continue anticoagulation. During previous visit, the use of a d-dimer in female patients to risk stratify the risk of recurrent thrombosis (Joselyn JUNIOR, et al. 2015. Annals Int Med.) was discussed. D-dimer on anticoagulation was 0.56 (10/18/23)--- This places her risk for developing recurrent clot at ~7.4 % per year- which would favor  continuing anticoagulation as long as tolerated (William JUAREZ, et al. 2017 BMJ). We plan to continue for 12 months since the event (August 2024). She is tolerating anticoagulation well without any bleeding concerns. She recently underwent D&C for non-viable pregnancy and had an IUD placed. Menstrual bleeding since then has not been excessive. It is advisable to avoid pregnancy in next few months.    She was tested for inherited thrombophilia- due to her young age and extensive cardiac arrest. MPN-associated mutations and PNH was negative. Prior antiphospholipid antibody testing was also negative. Protein S, protein C, AT3 and factor V leiden and prothrombin mutation testing were negative.     Ms. Fry does have iron deficiency anemia with hemoglobin of 11.3, iron sat of 6 and ferritin of <20 g/dL. We recommended oral iron supplementation 3 times a week (MWF). If iron stores do not improve on labs in Aug 24, we will consider IV iron.     Plan:  1. Majority of today's visit was spent counseling the patient regarding anticoagulation.  2. Continue Xarelto 20 mg daily  3. Start taking ferrous sulfate 325 mg once a day on Monday Wednesday Friday  4. Upper extremity ultrasound  5. Warm arm compress for right forearm  6. Follow up in August 2024    Patient was seen and plan of care discussed with Dr. Choco Mckinney MD MS  Hematology fellow, PGY4  Pager: [phone]    Physician Attestation   I, Marcia Wilhelm MD/PhD, saw this patient and agree with the findings and plan of care as documented in the note.      Items personally reviewed/procedural attestation: vitals, labs, and imaging and agree with the interpretation documented in the note.    Marcia Wilhelm MD/PhD    The longitudinal plan of care for the diagnosis(es)/condition(s) as documented were addressed during this visit. Due to the added complexity in care, I will continue to support Estela in the subsequent management and with ongoing continuity  of care.    ----------------------------------------------------------------------------------------------------------------------    History of Present Illness:  Estela Fry is a 34 year old woman with no significant past medical history until Aug 2023 when she experienced an unwitnessed VT/VF cardiac arrest with ROSC after 3 shocks. Hematology was consulted due to finding of nonocclusive right external iliac vein VTE. She is on Xarelto 20 mg daily which she takes with food and is tolerating it very well.     She reported to be pregnant on 2/4/24, when she was advised to immediately stop Xarelto and Lovenox was started. She underwent dilatation and curettage for undesired pregnancy on 2/16/2024., Xarelto was resumed postprocedure next day.  She had an IUD placed at the time of procedure.  She has been having intermittent vaginal bleeding since then which has been minimal, and heaviest flow, has to change 2-3 pads a day.  Denies bruising, epistaxis, gum bleeding, hematochezia, melena, hematuria.  She did notice right forearm cordlike palpable mass at the site of peripheral IV.  She is not sure if the IV was infiltrated but her forearm was swollen and painful after waking up from anesthesia.      On 2/17, she went to the ED due to chest pressure.  She has been experiencing this chest pressure in the upper part of her sternal region ever since her cardiac arrest.  This is worse on exertion and improves with rest but not associated with dyspnea or palpitations.  She denies any worsening in intensity or frequency of chest pain.  Her cardiology team is aware of this and it is deemed to be secondary to extensive CPR attempt in August 2023.    She is currently not taking iron pills.  She took these in the early part of pregnancy and noted some nausea.      She denies rash, joint pains or mouth sores. No pica or restless legs. Appetite and weight stable.    Past Medical History:  Past Medical History:   Diagnosis Date     "Automatic implantable cardioverter-defibrillator in situ     Cardiac arrest (H)     History of atrial flutter     History of venous thromboembolism        Past Surgical History:  Past Surgical History:   Procedure Laterality Date    CV CENTRAL VENOUS CATHETER PLACEMENT N/A 8/5/2023    Procedure: Central Venous Catheter Placement;  Surgeon: Sid Liz MD;  Location: [facility] CARDIAC CATH LAB    CV CORONARY ANGIOGRAM N/A 8/5/2023    Procedure: Coronary Angiogram;  Surgeon: Sid Liz MD;  Location: [facility] CARDIAC CATH LAB    DILATION AND CURETTAGE SUCTION N/A 2/16/2024    Procedure: DILATION AND CURETTAGE, UTERUS, USING SUCTION,;  Surgeon: Shreya Mcdonnell MD;  Location: [facility] OR    EP SICD INSERT N/A 8/15/2023    Procedure: Subcutaneous Implantable Cardioverter Defibrillator Implant;  Surgeon: Eula Johnson MD;  Location: [facility] CARDIAC CATH LAB    INSERT INTRAUTERINE DEVICE N/A 2/16/2024    Procedure: placement of Mirena intrauterine device;  Surgeon: Shreya Mcdonnell MD;  Location: [facility] OR   History of at least two miscarriages    Medications:  Current Outpatient Medications   Medication Sig Dispense Refill    rivaroxaban ANTICOAGULANT (XARELTO) 20 MG TABS tablet Take 20 mg by mouth daily (with dinner)      sotalol (BETAPACE) 80 MG tablet Take 1 tablet (80 mg) by mouth 2 times daily 180 tablet 1        Allergies:  No Known Allergies    ROS:  A 14 point ROS is negative except as stated in the HPI    Social History:  Denies any tobacco use. No significant alcohol use. Denies any illicit drug use. Patient work . Has lived in [place], [place], [place], [place] - family travel    Family History:  6 sisters and 1 brother. Each sister has 3-5 children. Unclear on miscarriages      Objective:  Vital signs:  Temp: 98.5  F (36.9  C) Temp src: Oral BP: 98/67 Pulse: 62   Resp: 12 SpO2: 98 %     Height: 157.5 cm (5' 2\") Weight: 56.7 kg (124 lb 14.4 oz)  Estimated body mass index is 22.84 " "kg/m  as calculated from the following:    Height as of this encounter: 1.575 m (5' 2\").    Weight as of this encounter: 56.7 kg (124 lb 14.4 oz).  Exam:   Constitutional: Appears well, no distress  HEENT: no pallor, icterus  CV: regular rate and rhythm, no murmurs  Respiratory: normal effort, clear to auscultation  GI: abdomen soft, nontender, without guarding or rebound. No hepatomeagaly. No splenomegaly  Mus/Skele: no edema  Skin: no petechiae, no ecchymosis. No bruises over upper chest where she experiences pain  Neuro: Normal gait. AOx3  Heme/Lymph: no supraclavicular adenopathy.     Labs: personally reviewed in care everywhere with relevant trends annotated below  CBC with normal WBC and platelet count. Anemia with Hb 11/3 and low normal MCV 80.8  Normal renal function    Imaging:  CTV (10/23/23)  VEINS: The inferior vena cava and bilateral iliac venous vasculature is widely patent without evidence of filling defect or thrombosis. Patent bilateral femoral venous vasculature were seen. Patent hepatic veins and portal venous vasculature. Prominent   bilateral ovarian veins and uterine varices.     ARTERIES: Normal caliber and appearance of the abdominal aorta. Patent celiac, SMA, LASHONDA, and single bilateral renal arteries. Patent visualized iliofemoral arterial vasculature. No significant arterial atherosclerotic disease.     NON ARTERIAL STRUCTURES: Solid intra-abdominal organs are unremarkable. Decompressed gallbladder. Normal caliber bowel without evidence of obstruction or inflammatory change. Normal appendix. Normal urinary bladder. No abdominopelvic lymphadenopathy.   Visualized lung bases are clear. Osseous structures are unremarkable.                                                                      IMPRESSION:   1.  Patent IVC and visualized iliofemoral veins without evidence of thrombosis.  2.  Prominent bilateral ovarian veins and uterine varices, which may be physiologic versus suggestive of pelvic " congestion in the appropriate clinical scenario.  8/6/23  There has partial luminal echogenic debris spanning approximately 2.4  cm in length within the mid/distal IVC, filling roughly 40% of the  luminal diameter. Normal IVC phasic waveforms throughout. Normal IVC  velocities.    Nonocclusive echogenic clot surrounding the right external iliac vein  line/catheter.    On the cine clips in the right upper quadrant there is an edematous  structure that is either edematous duodenum or edematous gallbladder.  Consider right upper quadrant ultrasound      Impression:  1. There is approximately 40% width luminal focal thrombus within the  mid/distal IVC, with normal phasic waveforms throughout the IVC.  2. Nonocclusive DVT involving the right external iliac vein  surrounding catheter.  3. Structure in the right upper quadrant noted on the cine images  probably edematous duodenum and/or edematous gallbladder. Right upper  quadrant ultrasound would help delineate.     EXAMINATION: DOPPLER VENOUS ULTRASOUND OF BILATERAL UPPER EXTREMITIES,  10/23/2023 11:41 AM     COMPARISON: None.    HISTORY: chronic left IJ clot on CT in August. History of sudden  cardiac death. Persistent R sided pain    TECHNIQUE: Gray-scale evaluation with compression, spectral flow, and  color Doppler assessment of the central deep venous system of both  upper extremities.    FINDINGS:  Normal blood flow and waveforms are demonstrated in the internal  jugular, innominate, subclavian, and axillary veins bilaterally.      IMPRESSION:  No evidence of central deep venous thrombosis in either upper  extremity.    I have personally reviewed the examination and initial interpretation  and I agree with the findings.    SHAR CAPELLAN, DO         EXAMINATION: CTA pulmonary angiogram, 8/6/2023 4:05 PM    COMPARISON: CT chest abdomen and pelvis 8/5/2023    HISTORY: Cardiac arrest, indeterminate etiology, IVC thrombus noted on  ultrasound.    TECHNIQUE: Volumetric  helical acquisition of CT images of the chest  from the lung apices to the kidneys were acquired after the  administration of 80 mL of Isovue-370 IV contrast. Flash technique  with free breathing acquisition. Post-processed multiplanar and/or  MIP reformations were obtained, archived to PACS and used in  interpretation of this study.    FINDINGS: Contrast bolus is: adequate. Exam is negative for acute  pulmonary embolism.    Endotracheal tube at mid thoracic aorta. Enteric tube with tip in the  partially visualized stomach.    Normal cardiac size and shape without pericardial effusion.    Reflux of contrast into the IVC and hepatic veins. Round hyperdense  material within the gallbladder, likely contrast secondary to reflux.    Bilateral small pleural effusions, left greater than right, with  associated atelectasis. Asymmetric predominant groundglass opacities.  No focal consolidative opacities.    No acute or suspicious osseous abnormalities.      IMPRESSION:  1. Exam is negative for acute pulmonary embolism.    2. Trace bilateral pleural effusions, left greater than right with  interlobular septal thickening which may be due to mild pulmonary  edema. No focal consolidative opacities concerning for pulmonary  infection.    3. Reflux of contrast into the IVC and hepatic veins may be due to  limited cardiac function.     EXAM: CT SOFT TISSUE NECK W CONTRAST  8/11/2023 4:26 PM      HISTORY: upper airway stridor/subglottic stenosis         COMPARISON: Chest CT 8/6/2023      TECHNIQUE: Following intravenous administration of nonionic iodinated  contrast medium, thin section helical CT images were obtained from the  skull base down to the level of the aortic arch.  Axial, coronal and  sagittal reformations were performed with 2-3 mm slice thickness  reconstruction. Images were reviewed in soft tissue, lung and bone  windows.     CONTRAST: iopamidol (ISOVUE-370) solution 100 mL     FINDINGS:   No focal oral cavity,  nasopharyngeal, oropharyngeal, hypopharyngeal,  or glottic mucosal space abnormality  Normal tongue base. Salivary  glands are within normal limits.     Endotracheal tube has been removed. Mild subglottic tracheal narrowing  at the level of C7-T1. Tracheal secretion/debris at the level of the  thoracic inlet.      No lymphadenopathy.     Subcentimeter right thyroid hypodense nodule requiring no further  follow-up by imaging criteria.     Chronic left jugular vein occlusion.     No suspicious osseus lesion. No high grade spinal canal stenosis.     Clear paranasal sinuses and mastoid air cells.     Clear lung apices.                                                                      IMPRESSION:   1. Status post removal of endotracheal tube with mild subglottic  tracheal narrowing at the level of C7-T1 which could explain patient's  stridor. Also tracheal secretions/debris.  2. No cervical mass or adenopathy.     I have personally reviewed the examination and initial interpretation  and I agree with the findings.

## 2024-04-18 RX ORDER — FERROUS SULFATE 325(65) MG
325 TABLET, DELAYED RELEASE (ENTERIC COATED) ORAL
Qty: 30 TABLET | Refills: 2 | Status: SHIPPED | OUTPATIENT
Start: 2024-04-19 | End: 2024-09-17

## 2024-04-20 PROBLEM — E61.1 IRON DEFICIENCY: Status: ACTIVE | Noted: 2024-04-20

## 2024-04-20 PROBLEM — Z79.01 CHRONIC ANTICOAGULATION: Status: ACTIVE | Noted: 2024-04-20

## 2024-04-22 ENCOUNTER — ANCILLARY PROCEDURE (OUTPATIENT)
Dept: ULTRASOUND IMAGING | Facility: CLINIC | Age: 34
End: 2024-04-22
Attending: INTERNAL MEDICINE
Payer: COMMERCIAL

## 2024-04-22 DIAGNOSIS — I46.9 CARDIAC ARREST (H): ICD-10-CM

## 2024-04-22 PROCEDURE — 93971 EXTREMITY STUDY: CPT | Mod: RT | Performed by: STUDENT IN AN ORGANIZED HEALTH CARE EDUCATION/TRAINING PROGRAM

## 2024-05-07 ENCOUNTER — TELEPHONE (OUTPATIENT)
Dept: CARDIOLOGY | Facility: CLINIC | Age: 34
End: 2024-05-07
Payer: COMMERCIAL

## 2024-05-07 DIAGNOSIS — I82.C22: ICD-10-CM

## 2024-05-07 NOTE — TELEPHONE ENCOUNTER
Patient confirmed scheduled appointment:  Date: 09/09/24  Time: 8:30 am   Visit type: RTN CARDIO  Provider: BRADY   [place] [facility] [place]   Testing/imaging: ECHO   Additional notes: N/A

## 2024-05-07 NOTE — PROGRESS NOTES
Patient called requesting refill of Xarelto. Pt last seen 4/17/24 with instruction to remain on Xarelto 20mg daily. Refill sent to pts preferred pharm.    Lisa HARMONN, RN, PHN   [facility]   Office: [phone]  Fax: [phone]

## 2024-05-08 ENCOUNTER — ANCILLARY PROCEDURE (OUTPATIENT)
Dept: CARDIOLOGY | Facility: CLINIC | Age: 34
End: 2024-05-08
Attending: INTERNAL MEDICINE
Payer: COMMERCIAL

## 2024-05-08 DIAGNOSIS — I46.9 CARDIAC ARREST (H): ICD-10-CM

## 2024-05-08 LAB
MDC_IDC_LEAD_CONNECTION_STATUS: NORMAL
MDC_IDC_LEAD_IMPLANT_DT: NORMAL
MDC_IDC_LEAD_LOCATION: NORMAL
MDC_IDC_LEAD_LOCATION_DETAIL_1: NORMAL
MDC_IDC_LEAD_MFG: NORMAL
MDC_IDC_LEAD_MODEL: NORMAL
MDC_IDC_LEAD_POLARITY_TYPE: NORMAL
MDC_IDC_LEAD_SERIAL: NORMAL
MDC_IDC_MSMT_BATTERY_DTM: NORMAL
MDC_IDC_MSMT_BATTERY_REMAINING_PERCENTAGE: 90 %
MDC_IDC_MSMT_BATTERY_STATUS: NORMAL
MDC_IDC_PG_IMPLANT_DTM: NORMAL
MDC_IDC_PG_MFG: NORMAL
MDC_IDC_PG_MODEL: NORMAL
MDC_IDC_PG_SERIAL: NORMAL
MDC_IDC_PG_TYPE: NORMAL
MDC_IDC_SESS_CLINIC_NAME: NORMAL
MDC_IDC_SESS_DTM: NORMAL
MDC_IDC_SESS_TYPE: NORMAL
MDC_IDC_SET_ZONE_DETECTION_INTERVAL: 250 MS
MDC_IDC_SET_ZONE_DETECTION_INTERVAL: 300 MS
MDC_IDC_SET_ZONE_STATUS: NORMAL
MDC_IDC_SET_ZONE_STATUS: NORMAL
MDC_IDC_SET_ZONE_TYPE: NORMAL
MDC_IDC_SET_ZONE_TYPE: NORMAL
MDC_IDC_SET_ZONE_VENDOR_TYPE: NORMAL
MDC_IDC_SET_ZONE_VENDOR_TYPE: NORMAL
MDC_IDC_STAT_EPISODE_RECENT_COUNT: 0
MDC_IDC_STAT_EPISODE_RECENT_COUNT_DTM_END: NORMAL
MDC_IDC_STAT_EPISODE_RECENT_COUNT_DTM_START: NORMAL
MDC_IDC_STAT_EPISODE_TOTAL_COUNT: 0
MDC_IDC_STAT_EPISODE_TOTAL_COUNT_DTM_END: NORMAL
MDC_IDC_STAT_EPISODE_TOTAL_COUNT_DTM_START: NORMAL
MDC_IDC_STAT_EPISODE_TYPE: NORMAL
MDC_IDC_STAT_EPISODE_TYPE: NORMAL
MDC_IDC_STAT_TACHYTHERAPY_RECENT_DTM_END: NORMAL
MDC_IDC_STAT_TACHYTHERAPY_RECENT_DTM_START: NORMAL
MDC_IDC_STAT_TACHYTHERAPY_SHOCKS_DELIVERED_RECENT: 0
MDC_IDC_STAT_TACHYTHERAPY_SHOCKS_DELIVERED_TOTAL: 3
MDC_IDC_STAT_TACHYTHERAPY_TOTAL_DTM_END: NORMAL
MDC_IDC_STAT_TACHYTHERAPY_TOTAL_DTM_START: NORMAL

## 2024-05-08 PROCEDURE — 99207 CARDIAC DEVICE CHECK - REMOTE: CPT | Performed by: INTERNAL MEDICINE

## 2024-05-09 ENCOUNTER — VIRTUAL VISIT (OUTPATIENT)
Dept: PSYCHOLOGY | Facility: CLINIC | Age: 34
End: 2024-05-09
Payer: COMMERCIAL

## 2024-05-09 DIAGNOSIS — F06.4 ANXIETY DISORDER DUE TO MEDICAL CONDITION: Primary | ICD-10-CM

## 2024-05-09 PROCEDURE — 90832 PSYTX W PT 30 MINUTES: CPT | Mod: 95

## 2024-05-09 ASSESSMENT — ANXIETY QUESTIONNAIRES
3. WORRYING TOO MUCH ABOUT DIFFERENT THINGS: SEVERAL DAYS
7. FEELING AFRAID AS IF SOMETHING AWFUL MIGHT HAPPEN: SEVERAL DAYS
8. IF YOU CHECKED OFF ANY PROBLEMS, HOW DIFFICULT HAVE THESE MADE IT FOR YOU TO DO YOUR WORK, TAKE CARE OF THINGS AT HOME, OR GET ALONG WITH OTHER PEOPLE?: SOMEWHAT DIFFICULT
5. BEING SO RESTLESS THAT IT IS HARD TO SIT STILL: NOT AT ALL
6. BECOMING EASILY ANNOYED OR IRRITABLE: NOT AT ALL
GAD7 TOTAL SCORE: 5
1. FEELING NERVOUS, ANXIOUS, OR ON EDGE: SEVERAL DAYS
4. TROUBLE RELAXING: SEVERAL DAYS
2. NOT BEING ABLE TO STOP OR CONTROL WORRYING: SEVERAL DAYS
7. FEELING AFRAID AS IF SOMETHING AWFUL MIGHT HAPPEN: SEVERAL DAYS
GAD7 TOTAL SCORE: 5
IF YOU CHECKED OFF ANY PROBLEMS ON THIS QUESTIONNAIRE, HOW DIFFICULT HAVE THESE PROBLEMS MADE IT FOR YOU TO DO YOUR WORK, TAKE CARE OF THINGS AT HOME, OR GET ALONG WITH OTHER PEOPLE: SOMEWHAT DIFFICULT

## 2024-05-11 ENCOUNTER — MYC REFILL (OUTPATIENT)
Dept: HEMATOLOGY | Facility: CLINIC | Age: 34
End: 2024-05-11
Payer: COMMERCIAL

## 2024-05-11 DIAGNOSIS — I82.C22: ICD-10-CM

## 2024-05-13 NOTE — PROGRESS NOTES
Health Psychology Outpatient Follow Up     Health Psychology Office   Health Psychology Clinic   Department of Medicine   [facility]  [address] [place] [address] Clinics and [facility]  3rd Floor  [address] [place] [address]     Health Psychology Team:  Shivani Sanders, Ph.D., L.P [phone]  Moon Lucia, Ph.D., L.P. [phone]  Jax Palacios, Ph.D. [phone]  Iglesia Cole, Ph.D., A.B.P.P., L.P. [phone]         Juliann Patiño, Ph.D., L.P. [phone]   Tequila Haas, Ph.D., A.B.P.P., L.P. [phone]  Johnny Escobar, Ph.D. (fellow) [phone]    Modality: Telemedicine Information:  Type of service:   Telemedicine psychotherapy  Patient location:   Patient home in [place]    Patient telephone number: [phone]  [place]  [facility] [place] [facility] [place] Psychology Office  Mode of Communication:   Video Conference via Lookback   Patient consent to telemedicine services? Yes  It has been determined that telemedicine service is appropriate and effective for this patient.   The patient has been notified that: Video visits will be conducted via a call with their psychologist to provide the care they need with a video conversation. Video visits may be billed at different rates depending on insurance coverage.  Patients are advised to contact their insurance provider with any questions about their health insurance coverage. If during the course of a call the psychologist feels a video visit is not appropriate, patients will not be charged for this service.    Patient Demographics (per chart):   Age: 34 year old  Biological Sex: female  Race:   Ethnicity: Not  or     Subjective:  Reviewed updates to physical and emotional health since previous session and progress with treatment plan goals. Discussed ongoing anxiety related to ICD. Pt reported minimal symptoms of anxiety since last appointment citing utilization  of mindful meditation and cognitive defusion skills. Writer engaged pt in supportive listening and cognitive processing of ongoing sxs and progress with symptom management.  Patient reported feeling happy regarding positive changes in anxiety. Writer additionally engaged pt in continued psychoeducation and skills training in cognitive defusion skills and writer and patient discussed ways to better adapt skills to certain situations such as right before going to sleep.     Pt agreed to the following assignment(s) to complete by next follow up:   -Continue practicing mindful meditation and cognitive defusion skills, and documenting cases of heightened anxiety.    Patient reported understanding of education and agreement with plan.     Objective   Appearance:   Appropriate   Eye Contact:   Good   Psychomotor Behavior:  Normal   Attitude:   Cooperative   Orientation:   All  Speech   Rate / Production: Normal    Volume:  Normal   Mood:    Euthymic  Affect:    Appropriate   Thought Content:   Clear  Thought Form:   Coherent  Logical     Insight:    Did not formally assess at this time.     Depression Symptoms (Patient Health Questionnaire 9; PHQ-9)  The PHQ-9 is a measure of depressive symptoms.  Scores on this measure range from 0 to 27 with higher scores reflecting greater levels and frequency of depressive symptoms. Typical symptom ranges include: 0-4 minimal, 5-9 mild, 10-14 moderate, 15-19 moderately severe, 20-27 severe.       10/12/2023     1:58 PM 10/24/2023     9:04 AM   PHQ-9 SCORE   PHQ-9 Total Score MyChart  7 (Mild depression)   PHQ-9 Total Score 20 7     Anxiety Symptoms (Generalized Anxiety Disorder 7; FARCISCO-7)  The FRACISCO-7 is a measure of anxiety and panic symptoms.  Scores on this measure range from 0 to 21 with higher scores reflecting greater levels and frequency of anxiety symptoms. Typical symptom ranges include: 0-4 minimal, 5-9 mild, 10-14 moderate, 15-21 severe.       3/14/2024     9:49 AM 3/28/2024      9:24 AM 5/9/2024     1:53 PM   FRACISCO-7 SCORE   Total Score 9 (mild anxiety) 8 (mild anxiety) 5 (mild anxiety)   Total Score 9 8 5     Alcohol and Drug Abuse (CAGE - Adapted to Include Drugs; CAGE-AID)  The CAGE-AID is a screening tool to assess for symptoms of alcohol or drug abuse or dependence. Scores on this measure range from 0 to 4, with higher scores reflecting experiences consistent with problem use.  CAGE-AID score  > 1 is a positive screen, suggesting further discussion is needed to determine if evaluation for alcohol or substance abuse is appropriate.  A score > 2 is considered clinically significant, suggesting further evaluation of alcohol or substance-related problems is indicated.      2/22/2024    10:39 AM   CAGE-AID Total Score   Total Score 0   Total Score MyChart 0 (A total score of 2 or greater is considered clinically significant)     Global Health (Patient-Reported Outcomes Measurement Information System; PROMIS-10)  The PROMIS-10 is a measure of global physical and mental health. Total scores on this measure range from 8 to 40, with higher scores reflecting greater levels of perceived health.       2/22/2024    10:38 AM   PROMIS-10 Scores Only   Global Mental Health Score 11   Global Physical Health Score 14   PROMIS TOTAL - SUBSCORES 25     Suicidality ([place] Suicide Severity Rating Scale Screener Past Month)      3/14/2024    10:01 AM   [place] Suicide Severity Rating (Short Version)   Q1 Wished to be Dead (Past Month) 0-->no   Q2 Suicidal Thoughts (Past Month) 0-->no   Q6 Suicide Behavior (Lifetime) 0-->no     Assessment:  Pt was engaged in treatment. Patient appears to be experiencing depression symptoms in the mild range and anxiety symptoms in the mild range. Currently, the patient appears to be coping with some success. Progress towards treatment goals: ongoing.     Plan:  Return for therapy sessions.     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    This note was completed using Dragon voice recognition software. Although reviewed after completion, some word and grammatical errors may occur.    Last treatment plan completed: 3/14/24  Next treatment plan due: 3/14/25

## 2024-05-14 ENCOUNTER — ANCILLARY PROCEDURE (OUTPATIENT)
Dept: CARDIOLOGY | Facility: CLINIC | Age: 34
End: 2024-05-14
Attending: INTERNAL MEDICINE
Payer: COMMERCIAL

## 2024-05-14 ENCOUNTER — NURSE TRIAGE (OUTPATIENT)
Dept: CARDIOLOGY | Facility: CLINIC | Age: 34
End: 2024-05-14

## 2024-05-14 DIAGNOSIS — I46.9 CARDIAC ARREST (H): ICD-10-CM

## 2024-05-14 PROCEDURE — 93295 DEV INTERROG REMOTE 1/2/MLT: CPT | Performed by: INTERNAL MEDICINE

## 2024-05-14 NOTE — TELEPHONE ENCOUNTER
Reason for Disposition    Heart beating very rapidly (e.g., > 140 / minute) and not present now  (Exception: During exercise.)    History of heart disease (i.e., heart attack, bypass surgery, angina, angioplasty)  (Exception: Brief heartbeat symptoms that went away and now feels well.)    Additional Information    Negative: Passed out (i.e., lost consciousness, collapsed and was not responding)    Negative: Shock suspected (e.g., cold/pale/clammy skin, too weak to stand, low BP, rapid pulse)    Negative: Difficult to awaken or acting confused (e.g., disoriented, slurred speech)    Negative: Visible sweat on face or sweat dripping down face    Negative: Unable to walk, or can only walk with assistance (e.g., requires support)    Negative: Received SHOCK from implantable cardiac defibrillator and has persisting symptoms (i.e., palpitations, lightheadedness)    Negative: Dizziness, lightheadedness, or weakness and heart beating very rapidly (e.g., > 140 / minute)    Negative: Dizziness, lightheadedness, or weakness and heart beating very slowly (e.g., < 50 / minute)    Negative: Sounds like a life-threatening emergency to the triager    Negative: Chest pain    Negative: Difficulty breathing    Negative: Dizziness, lightheadedness, or weakness    Negative: Heart beating very rapidly (e.g., > 140 / minute) and present now  (Exception: During exercise.)    Negative: Heart beating very slowly (e.g., < 50 / minute)  (Exception: Athlete and heart rate normal for caller.)    Negative: New or worsened shortness of breath with activity (dyspnea on exertion)    Negative: Patient sounds very sick or weak to the triager    Negative: Skipped or extra beat(s) and increases with exercise or exertion    Negative: Skipped or extra beat(s) and occurs 4 or more times per minute    Negative: Age > 60 years  (Exception: Brief heartbeat symptoms that went away and now feels well.)    Negative: Patient wants to be seen    Negative: Taking  "water pill (i.e., diuretic) or heart medication (e.g., digoxin)    Answer Assessment - Initial Assessment Questions  1. DESCRIPTION: \"Please describe your heart rate or heartbeat that you are having\" (e.g., fast/slow, regular/irregular, skipped or extra beats, \"palpitations\")      Very fast  2. ONSET: \"When did it start?\" (Minutes, hours or days)       2:07 PM today  3. DURATION: \"How long does it last\" (e.g., seconds, minutes, hours)      minutes  4. PATTERN \"Does it come and go, or has it been constant since it started?\"  \"Does it get worse with exertion?\"   \"Are you feeling it now?\"      No longer having rapid heart rate  6. HEART RATE: \"Can you tell me your heart rate?\" \"How many beats in 15 seconds?\"  (Note: not all patients can do this)        She didn't tell me.  7. RECURRENT SYMPTOM: \"Have you ever had this before?\" If Yes, ask: \"When was the last time?\" and \"What happened that time?\"       yes  8. CAUSE: \"What do you think is causing the palpitations?\"      She doesn't know- at the time she was lying on the bed with her baby  9. CARDIAC HISTORY: \"Do you have any history of heart disease?\" (e.g., heart attack, angina, bypass surgery, angioplasty, arrhythmia)       Cardiac arrest, s/p ICD  10. OTHER SYMPTOMS: \"Do you have any other symptoms?\" (e.g., dizziness, chest pain, sweating, difficulty breathing)       Hard to breathe when heart was beating fast  11. PREGNANCY: \"Is there any chance you are pregnant?\" \"When was your last menstrual period?\"        I didn't ask    Protocols used: Heart Rate and Heartbeat [id number]    "

## 2024-05-16 LAB
MDC_IDC_LEAD_CONNECTION_STATUS: NORMAL
MDC_IDC_LEAD_IMPLANT_DT: NORMAL
MDC_IDC_LEAD_LOCATION: NORMAL
MDC_IDC_LEAD_LOCATION_DETAIL_1: NORMAL
MDC_IDC_LEAD_MFG: NORMAL
MDC_IDC_LEAD_MODEL: NORMAL
MDC_IDC_LEAD_POLARITY_TYPE: NORMAL
MDC_IDC_LEAD_SERIAL: NORMAL
MDC_IDC_MSMT_BATTERY_DTM: NORMAL
MDC_IDC_MSMT_BATTERY_REMAINING_PERCENTAGE: 89 %
MDC_IDC_MSMT_BATTERY_STATUS: NORMAL
MDC_IDC_PG_IMPLANT_DTM: NORMAL
MDC_IDC_PG_MFG: NORMAL
MDC_IDC_PG_MODEL: NORMAL
MDC_IDC_PG_SERIAL: NORMAL
MDC_IDC_PG_TYPE: NORMAL
MDC_IDC_SESS_CLINIC_NAME: NORMAL
MDC_IDC_SESS_DTM: NORMAL
MDC_IDC_SESS_TYPE: NORMAL
MDC_IDC_SET_ZONE_DETECTION_INTERVAL: 250 MS
MDC_IDC_SET_ZONE_DETECTION_INTERVAL: 300 MS
MDC_IDC_SET_ZONE_STATUS: NORMAL
MDC_IDC_SET_ZONE_STATUS: NORMAL
MDC_IDC_SET_ZONE_TYPE: NORMAL
MDC_IDC_SET_ZONE_TYPE: NORMAL
MDC_IDC_SET_ZONE_VENDOR_TYPE: NORMAL
MDC_IDC_SET_ZONE_VENDOR_TYPE: NORMAL
MDC_IDC_STAT_EPISODE_RECENT_COUNT: 0
MDC_IDC_STAT_EPISODE_RECENT_COUNT_DTM_END: NORMAL
MDC_IDC_STAT_EPISODE_RECENT_COUNT_DTM_START: NORMAL
MDC_IDC_STAT_EPISODE_TOTAL_COUNT: 0
MDC_IDC_STAT_EPISODE_TOTAL_COUNT_DTM_END: NORMAL
MDC_IDC_STAT_EPISODE_TOTAL_COUNT_DTM_START: NORMAL
MDC_IDC_STAT_EPISODE_TYPE: NORMAL
MDC_IDC_STAT_EPISODE_TYPE: NORMAL
MDC_IDC_STAT_TACHYTHERAPY_RECENT_DTM_END: NORMAL
MDC_IDC_STAT_TACHYTHERAPY_RECENT_DTM_START: NORMAL
MDC_IDC_STAT_TACHYTHERAPY_SHOCKS_DELIVERED_RECENT: 0
MDC_IDC_STAT_TACHYTHERAPY_SHOCKS_DELIVERED_TOTAL: 3
MDC_IDC_STAT_TACHYTHERAPY_TOTAL_DTM_END: NORMAL
MDC_IDC_STAT_TACHYTHERAPY_TOTAL_DTM_START: NORMAL

## 2024-05-30 ENCOUNTER — TELEPHONE (OUTPATIENT)
Dept: CARDIOLOGY | Facility: CLINIC | Age: 34
End: 2024-05-30
Payer: COMMERCIAL

## 2024-05-30 NOTE — TELEPHONE ENCOUNTER
5/30 Patient confirmed scheduled appointment:  Date: 10/15/2024  Time: 10:00 am  Visit type: Return EP   Provider: Lamin  Location: [facility]  Testing/imaging: device check prior   Additional notes: n/a

## 2024-06-05 ENCOUNTER — MYC MEDICAL ADVICE (OUTPATIENT)
Dept: CARDIOLOGY | Facility: CLINIC | Age: 34
End: 2024-06-05
Payer: COMMERCIAL

## 2024-06-05 ENCOUNTER — TELEPHONE (OUTPATIENT)
Dept: HEMATOLOGY | Facility: CLINIC | Age: 34
End: 2024-06-05
Payer: COMMERCIAL

## 2024-06-05 DIAGNOSIS — E61.1 IRON DEFICIENCY: Primary | ICD-10-CM

## 2024-06-05 NOTE — CONFIDENTIAL NOTE
[id number]  Estela Fry  34 year old female  CBCD Diagnosis: Provoked r external iliac vein thrombosis, IVC thrombosis, left jugular vein occlusion, iron deficiency with hx of prior acute blood loss anemia  CBCD Provider: Choco TURCIOS received a phone call from Estela regarding effects from her oral iron. She reports she experiences stomach pains consistently on M,W,F when she takes her medication. She is wondering if this is normal. RN did let her know that people can experience GI distress with this medication and that she should hold off on taking it for now. RN will follow up with Dr. Wilhelm regarding if ongoing iron needed, or if we can recheck levels and pursue IV iron if indicated.     Jenni GONG, RN, N  [facility]  Office: [phone]  Fax: [phone]    Addendum  RN confirmed okay with Dr. Wilhelm to stop oral iron. We will recheck labs in 6 weeks. Appointment made. Estela aware. She has our contact information for any further questions, comments or concerns.    Jenni GONG, RN, N  [facility]  Office: [phone]  Fax: [phone]

## 2024-06-10 ENCOUNTER — MYC MEDICAL ADVICE (OUTPATIENT)
Dept: HEMATOLOGY | Facility: CLINIC | Age: 34
End: 2024-06-10
Payer: COMMERCIAL

## 2024-07-13 ENCOUNTER — HOSPITAL ENCOUNTER (EMERGENCY)
Facility: CLINIC | Age: 34
Discharge: HOME | End: 2024-07-14
Attending: EMERGENCY MEDICINE | Admitting: EMERGENCY MEDICINE
Payer: COMMERCIAL

## 2024-07-13 ENCOUNTER — MYC MEDICAL ADVICE (OUTPATIENT)
Dept: OBGYN | Facility: CLINIC | Age: 34
End: 2024-07-13
Payer: COMMERCIAL

## 2024-07-13 DIAGNOSIS — N93.9 VAGINAL BLEEDING: ICD-10-CM

## 2024-07-13 DIAGNOSIS — T83.32XA INTRAUTERINE DEVICE (IUD) MIGRATION, INITIAL ENCOUNTER: ICD-10-CM

## 2024-07-13 LAB
ALBUMIN UR-MCNC: 10 MG/DL
APPEARANCE UR: ABNORMAL
BILIRUB UR QL STRIP: NEGATIVE
COLOR UR AUTO: ABNORMAL
GLUCOSE UR STRIP-MCNC: NEGATIVE MG/DL
HGB UR QL STRIP: ABNORMAL
KETONES UR STRIP-MCNC: NEGATIVE MG/DL
LEUKOCYTE ESTERASE UR QL STRIP: ABNORMAL
NITRATE UR QL: NEGATIVE
PH UR STRIP: 6.5 [PH] (ref 5–7)
RBC URINE: >182 /HPF
SP GR UR STRIP: 1.01 (ref 1–1.03)
UROBILINOGEN UR STRIP-MCNC: NORMAL MG/DL
WBC URINE: 22 /HPF

## 2024-07-13 PROCEDURE — 81001 URINALYSIS AUTO W/SCOPE: CPT | Performed by: EMERGENCY MEDICINE

## 2024-07-13 PROCEDURE — 81025 URINE PREGNANCY TEST: CPT | Performed by: EMERGENCY MEDICINE

## 2024-07-13 PROCEDURE — 99284 EMERGENCY DEPT VISIT MOD MDM: CPT | Mod: 25 | Performed by: EMERGENCY MEDICINE

## 2024-07-13 PROCEDURE — 76857 US EXAM PELVIC LIMITED: CPT | Mod: 26 | Performed by: EMERGENCY MEDICINE

## 2024-07-13 PROCEDURE — 87086 URINE CULTURE/COLONY COUNT: CPT | Performed by: EMERGENCY MEDICINE

## 2024-07-13 PROCEDURE — 76857 US EXAM PELVIC LIMITED: CPT | Performed by: EMERGENCY MEDICINE

## 2024-07-13 ASSESSMENT — COLUMBIA-SUICIDE SEVERITY RATING SCALE - C-SSRS
1. IN THE PAST MONTH, HAVE YOU WISHED YOU WERE DEAD OR WISHED YOU COULD GO TO SLEEP AND NOT WAKE UP?: NO
6. HAVE YOU EVER DONE ANYTHING, STARTED TO DO ANYTHING, OR PREPARED TO DO ANYTHING TO END YOUR LIFE?: NO
2. HAVE YOU ACTUALLY HAD ANY THOUGHTS OF KILLING YOURSELF IN THE PAST MONTH?: NO

## 2024-07-14 ENCOUNTER — HEALTH MAINTENANCE LETTER (OUTPATIENT)
Age: 34
End: 2024-07-14

## 2024-07-14 VITALS
RESPIRATION RATE: 16 BRPM | DIASTOLIC BLOOD PRESSURE: 75 MMHG | BODY MASS INDEX: 24.05 KG/M2 | OXYGEN SATURATION: 96 % | SYSTOLIC BLOOD PRESSURE: 100 MMHG | TEMPERATURE: 98.4 F | HEART RATE: 68 BPM | WEIGHT: 131.5 LBS

## 2024-07-14 LAB — HCG UR QL: NEGATIVE

## 2024-07-14 ASSESSMENT — ACTIVITIES OF DAILY LIVING (ADL): ADLS_ACUITY_SCORE: 38

## 2024-07-14 NOTE — DISCHARGE INSTRUCTIONS
Please call your gynecologist on Monday to discuss IUD replacement or contraception.    Return to the emergency department for any worsening bleeding, lightheadedness or weakness, abdominal pain cramping or any other concerns for worsening symptoms.    Continue taking your regular medications.

## 2024-07-14 NOTE — ED TRIAGE NOTES
Heavier than normal menstrual bleeding (1 pad every 2 hours); passed large clot today then noticed she could feel her IUD. OB clinic recommended pt come to ED to have IUD removed bc she is on xarelto.     Triage Assessment (Adult)       Row Name 07/13/24 0670          Triage Assessment    Airway WDL WDL        Respiratory WDL    Respiratory WDL WDL        Skin Circulation/Temperature WDL    Skin Circulation/Temperature WDL WDL        Cardiac WDL    Cardiac WDL WDL        Peripheral/Neurovascular WDL    Peripheral Neurovascular WDL WDL        Cognitive/Neuro/Behavioral WDL    Cognitive/Neuro/Behavioral WDL WDL

## 2024-07-14 NOTE — ED PROVIDER NOTES
ED Provider Note  July 14, 2024  [facility]      History     Chief Complaint: IUD (Heavier than normal menstrual bleeding (1 pad every 2 hours); passed large clot today then noticed she could feel her IUD. 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 Today also felt strings of IUD coming out of the vaginal canal.  No pelvic pain no weakness of lightheadedness   No dysuria or GI/ changes otherwise    Otherwise no trauma and feling well     IUD initially placed last feb 2024 per pt            Past Medical History  Past Medical History:   Diagnosis Date    Automatic implantable cardioverter-defibrillator in situ     Cardiac arrest (H)     History of atrial flutter     History of venous thromboembolism      Past Surgical History:   Procedure Laterality Date    CV CENTRAL VENOUS CATHETER PLACEMENT N/A 8/5/2023    Procedure: Central Venous Catheter Placement;  Surgeon: Sid Liz MD;  Location: [facility] CARDIAC CATH LAB    CV CORONARY ANGIOGRAM N/A 8/5/2023    Procedure: Coronary Angiogram;  Surgeon: Sid Liz MD;  Location: [facility] CARDIAC CATH LAB    DILATION AND CURETTAGE SUCTION N/A 2/16/2024    Procedure: DILATION AND CURETTAGE, UTERUS, USING SUCTION,;  Surgeon: Shreya Mcdonnell MD;  Location: [facility] OR    EP SICD INSERT N/A 8/15/2023    Procedure: Subcutaneous Implantable Cardioverter Defibrillator Implant;  Surgeon: Eula Johnson MD;  Location: [facility] CARDIAC CATH LAB    INSERT INTRAUTERINE DEVICE N/A 2/16/2024    Procedure: placement of Mirena intrauterine device;  Surgeon: Shreya Mcdonnell MD;   Location: [facility] OR     ferrous sulfate (FE TABS) 325 (65 Fe) MG EC tablet  rivaroxaban ANTICOAGULANT (XARELTO) 20 MG TABS tablet  rivaroxaban ANTICOAGULANT (XARELTO) 20 MG TABS tablet  sotalol (BETAPACE) 80 MG tablet      No Known Allergies  Family History  History reviewed. No pertinent family history.  Social History   Social History     Tobacco Use    Smoking status: Never     Passive exposure: Never    Smokeless tobacco: Never   Substance Use Topics    Alcohol use: Never    Drug use: Never        Past medical history, past surgical history, medications, allergies, family history, and social history were reviewed with the patient. No additional pertinent items.      A medically appropriate review of systems was performed with pertinent positives and negatives noted in the HPI, and all other systems negative.    Physical Exam   BP 93/64   Pulse 68   Temp 98.2  F (36.8  C) (Oral)   Resp 16   Wt 59.6 kg (131 lb 8 oz)   LMP 12/30/2023   SpO2 95%   BMI 24.05 kg/m      GEN: Well appearing, non toxic, cooperative and conversant.   HEENT: The head is normocephalic and atraumatic. Pupils are equal round and reactive to light. Extraocular motions are intact. There is no facial swelling.   CV: Regular rate   PULM: Unlabored breathing     EXT: Full range of motion.  No edema.  NEURO: Cranial nerves II through XII are intact and symmetric. Bilateral upper and lower extremities grossly show full range of motion without any focal deficits.     PSYCH: Calm and cooperative, interactive.   Pelvic exam with normal external genitalia.  Some clots and fluid in vaginal vault, removed with swab.  No active bleeding through the os.  IUD string seen.  IUD quickly removed with very light traction.  Well-tolerated.    ED Course, Procedures, & Data      Procedures  Results for orders placed during the hospital encounter of 07/13/24    POC US PELVIC NON-OB LIMITED    Impression  Bedside limited transabdominal ultrasound for evaluation of  IUD.  Performed any interpreted by me.  Indication: vaginal bleding    The lower abdomen was interrogated with a curvilinear probe. The uterus was identified. IUD appears positioned low in cervix.  Impression:  IUD in cervix.                    Results for orders placed or performed during the hospital encounter of 07/13/24   POC US PELVIC NON-OB LIMITED     Status: None    Impression    Bedside limited transabdominal ultrasound for evaluation of IUD.  Performed any interpreted by me.  Indication: vaginal bleding     The lower abdomen was interrogated with a curvilinear probe. The uterus was identified. IUD appears positioned low in cervix.   Impression:  IUD in cervix.      UA with Microscopic reflex to Culture     Status: Abnormal    Specimen: Urine, Clean Catch   Result Value Ref Range    Color Urine Orange (A) Colorless, Straw, Light Yellow, Yellow    Appearance Urine Slightly Cloudy (A) Clear    Glucose Urine Negative Negative mg/dL    Bilirubin Urine Negative Negative    Ketones Urine Negative Negative mg/dL    Specific Gravity Urine 1.015 1.003 - 1.035    Blood Urine Large (A) Negative    pH Urine 6.5 5.0 - 7.0    Protein Albumin Urine 10 (A) Negative mg/dL    Urobilinogen Urine Normal Normal, 2.0 mg/dL    Nitrite Urine Negative Negative    Leukocyte Esterase Urine Small (A) Negative    RBC Urine >182 (H) <=2 /HPF    WBC Urine 22 (H) <=5 /HPF    Narrative    Urine Culture ordered based on laboratory criteria   HCG qualitative urine     Status: Normal   Result Value Ref Range    hCG Urine Qualitative Negative Negative     Medications - No data to display  Labs Ordered and Resulted from Time of ED Arrival to Time of ED Departure   ROUTINE UA WITH MICROSCOPIC REFLEX TO CULTURE - Abnormal       Result Value    Color Urine Orange (*)     Appearance Urine Slightly Cloudy (*)     Glucose Urine Negative      Bilirubin Urine Negative      Ketones Urine Negative      Specific Gravity Urine 1.015      Blood Urine Large  (*)     pH Urine 6.5      Protein Albumin Urine 10 (*)     Urobilinogen Urine Normal      Nitrite Urine Negative      Leukocyte Esterase Urine Small (*)     RBC Urine >182 (*)     WBC Urine 22 (*)    HCG QUALITATIVE URINE - Normal    hCG Urine Qualitative Negative     URINE CULTURE     POC US PELVIC NON-OB LIMITED   Final Result   Bedside limited transabdominal ultrasound for evaluation of IUD.  Performed any interpreted by me.   Indication: vaginal bleding       The lower abdomen was interrogated with a curvilinear probe. The uterus was identified. IUD appears positioned low in cervix.    Impression:  IUD in cervix.                   Medical Decision Making Matrix    Problems Addressed  MDM Moderate: 1 acute illness with systemic symptoms      Data   Considered  Data Moderate: Order of (or considering) each unique test (Cat 1) and Review of the results of each unique test (Cat 1)    Risk of Patient Management                  Assessment & Plan    34-year-old female with history as noted on anticoagulation for history of IVC thrombosis.  Patient has been doing well on her current medical regimen.  Had some vaginal bleeding and felt strings as noted suspicious for IUD displacement/migration which is confirmed on bedside ultrasound.    Clinically she is otherwise well.  Bleeding by report of 5 pads throughout the entire day.  No lightheadedness or weakness.  Bleeding has slowed.  IUD removed and patient well-tolerated.  Monitored additionally for 1 hour only scant spotting currently and feels well wishes to go home.    Serum studies had not been performed as patient is otherwise quite well.  And does not display any symptomatic anemia.  Urinalysis consistent with hematuria.  Has had no symptoms to suggest UTI.  Discussed tricked ED return precautions for bleeding infection.  She will continue her regular medications and follow-up with gynecology    - Patient agrees to our plan and is ready and eager for discharge.  Care plan, follow up plan, and reasons to return immediately to the ED were dicussed in detail and summarized as noted in the discharge instructions.      I have reviewed the nursing notes. I have reviewed the findings, diagnosis, plan and need for follow up with the patient.    New Prescriptions    No medications on file       Final diagnoses:   Vaginal bleeding   Intrauterine device (IUD) migration, initial encounter       Anibal Mitchell MD  [facility] EMERGENCY DEPARTMENT  July 14, 2024       Anibal Mitchell MD  07/14/24 0151       Anibal Mitchell MD  07/14/24 0152

## 2024-07-15 LAB — BACTERIA UR CULT: NORMAL

## 2024-07-15 NOTE — RESULT ENCOUNTER NOTE
Final urine culture report is negative.  Adult: Negative urine culture parameters per protocol: Any # urogenital coretta, single or mixed   [facility] Emergency Dept discharge antibiotic prescribed (If applicable): None  Treatment recommendations per [facility] ED Lab Result Urine Culture protocol: No change in plan of care.

## 2024-07-17 ENCOUNTER — TELEPHONE (OUTPATIENT)
Dept: HEMATOLOGY | Facility: CLINIC | Age: 34
End: 2024-07-17

## 2024-07-17 ENCOUNTER — LAB (OUTPATIENT)
Dept: LAB | Facility: CLINIC | Age: 34
End: 2024-07-17
Payer: COMMERCIAL

## 2024-07-17 DIAGNOSIS — E61.1 IRON DEFICIENCY: ICD-10-CM

## 2024-07-17 LAB
ERYTHROCYTE [DISTWIDTH] IN BLOOD BY AUTOMATED COUNT: 13.5 % (ref 10–15)
FERRITIN SERPL-MCNC: 20 NG/ML (ref 6–175)
HCT VFR BLD AUTO: 38.8 % (ref 35–47)
HGB BLD-MCNC: 12.7 G/DL (ref 11.7–15.7)
IRON BINDING CAPACITY (ROCHE): 347 UG/DL (ref 240–430)
IRON SATN MFR SERPL: 9 % (ref 15–46)
IRON SERPL-MCNC: 30 UG/DL (ref 37–145)
MCH RBC QN AUTO: 27.2 PG (ref 26.5–33)
MCHC RBC AUTO-ENTMCNC: 32.7 G/DL (ref 31.5–36.5)
MCV RBC AUTO: 83 FL (ref 78–100)
PLATELET # BLD AUTO: 211 10E3/UL (ref 150–450)
RBC # BLD AUTO: 4.67 10E6/UL (ref 3.8–5.2)
RETICS # AUTO: 0.06 10E6/UL (ref 0.03–0.1)
RETICS/RBC NFR AUTO: 1.3 % (ref 0.5–2)
WBC # BLD AUTO: 7.4 10E3/UL (ref 4–11)

## 2024-07-17 PROCEDURE — 85027 COMPLETE CBC AUTOMATED: CPT | Performed by: PATHOLOGY

## 2024-07-17 PROCEDURE — 83540 ASSAY OF IRON: CPT | Performed by: PATHOLOGY

## 2024-07-17 PROCEDURE — 85045 AUTOMATED RETICULOCYTE COUNT: CPT | Performed by: PATHOLOGY

## 2024-07-17 PROCEDURE — 83550 IRON BINDING TEST: CPT | Performed by: PATHOLOGY

## 2024-07-17 PROCEDURE — 82728 ASSAY OF FERRITIN: CPT | Performed by: PATHOLOGY

## 2024-07-17 PROCEDURE — 36415 COLL VENOUS BLD VENIPUNCTURE: CPT | Performed by: PATHOLOGY

## 2024-07-17 NOTE — TELEPHONE ENCOUNTER
"[id number]  Estela Fry  34 year old female  CBCD Diagnosis: Provoked r external iliac vein thrombosis, IVC thrombosis, left jugular vein occlusion, iron deficiency with hx of prior acute blood loss anemia   CBCD Provider: Choco    Incoming voicemail from Estela with concerns for vaginal bleeding. RN called patient to further assess. Estela went to ED on 7/13 for heavy vaginal bleeding and had her IUD removed.     She is still continuing to have vaginal bleeding-thumb size clots, needing to change pad/tampon every 4 to 6 hours. Denies any weakness/dizziness. She is continuing to take her blood thinner.     RN also reviewed Dr. Wilhelm's result notes on labs that were completed earlier today. \"Anemia has improved but still have low body iron stores.   Dr. Wilhelm\" As patient does not tolerate oral iron, Dr. Wilhelm okay with IV iron if patient is willing.    RN messaged Dr. Wilhelm. Dr. Wilhelm would like her to hold her anticoagulation for one week and follow-up with gynecology as scheduled on 7/23/24. Estela will plan on calling our team in one week's time to update us on bleeding.     In terms of iron levels, Estela gets abdominal pain with oral iron. She is willing to try IV iron again. Will route to provider and [facility] [place] to obtain IV iron orders and assist patient in scheduling.    Reviewed with patient that she needs to go to emergency department if bleeding is unmanageable and/or she needs to change a pad/tampon every hour. Also recommended she go in if she develops dizziness or weakness. Estela verbalized understanding.    Nica Ugalde RN, BSN, PCCN  Nurse Clinician    [facility]  [address] [place] [address], [address], [place] [address]   Office, direct: [phone]  Main office number: [phone]  Pronouns: She, her, hers      "

## 2024-07-18 DIAGNOSIS — E61.1 IRON DEFICIENCY: Primary | ICD-10-CM

## 2024-07-18 RX ORDER — ALBUTEROL SULFATE 0.83 MG/ML
2.5 SOLUTION RESPIRATORY (INHALATION)
Status: CANCELLED | OUTPATIENT
Start: 2024-07-25

## 2024-07-18 RX ORDER — HEPARIN SODIUM (PORCINE) LOCK FLUSH IV SOLN 100 UNIT/ML 100 UNIT/ML
5 SOLUTION INTRAVENOUS
Status: CANCELLED | OUTPATIENT
Start: 2024-07-25

## 2024-07-18 RX ORDER — MEPERIDINE HYDROCHLORIDE 25 MG/ML
25 INJECTION INTRAMUSCULAR; INTRAVENOUS; SUBCUTANEOUS EVERY 30 MIN PRN
Status: CANCELLED | OUTPATIENT
Start: 2024-07-25

## 2024-07-18 RX ORDER — HEPARIN SODIUM,PORCINE 10 UNIT/ML
5-20 VIAL (ML) INTRAVENOUS DAILY PRN
Status: CANCELLED | OUTPATIENT
Start: 2024-07-25

## 2024-07-18 RX ORDER — DIPHENHYDRAMINE HYDROCHLORIDE 50 MG/ML
50 INJECTION INTRAMUSCULAR; INTRAVENOUS
Status: CANCELLED
Start: 2024-07-25

## 2024-07-18 RX ORDER — EPINEPHRINE 1 MG/ML
0.3 INJECTION, SOLUTION, CONCENTRATE INTRAVENOUS EVERY 5 MIN PRN
Status: CANCELLED | OUTPATIENT
Start: 2024-07-25

## 2024-07-18 RX ORDER — ALBUTEROL SULFATE 90 UG/1
1-2 AEROSOL, METERED RESPIRATORY (INHALATION)
Status: CANCELLED
Start: 2024-07-25

## 2024-07-19 ENCOUNTER — MYC MEDICAL ADVICE (OUTPATIENT)
Dept: HEMATOLOGY | Facility: CLINIC | Age: 34
End: 2024-07-19
Payer: COMMERCIAL

## 2024-07-19 ENCOUNTER — MYC MEDICAL ADVICE (OUTPATIENT)
Dept: CARDIOLOGY | Facility: CLINIC | Age: 34
End: 2024-07-19
Payer: COMMERCIAL

## 2024-07-21 ENCOUNTER — MYC MEDICAL ADVICE (OUTPATIENT)
Dept: HEMATOLOGY | Facility: CLINIC | Age: 34
End: 2024-07-21
Payer: COMMERCIAL

## 2024-07-21 DIAGNOSIS — E61.1 IRON DEFICIENCY: Primary | ICD-10-CM

## 2024-07-22 ENCOUNTER — TELEPHONE (OUTPATIENT)
Dept: OBGYN | Facility: CLINIC | Age: 34
End: 2024-07-22
Payer: COMMERCIAL

## 2024-07-22 PROBLEM — Z95.810 S/P ICD (INTERNAL CARDIAC DEFIBRILLATOR) PROCEDURE: Status: ACTIVE | Noted: 2024-01-15

## 2024-07-22 PROBLEM — Z86.74 HISTORY OF CARDIAC ARREST: Status: ACTIVE | Noted: 2024-01-15

## 2024-07-22 PROBLEM — Z86.718 HISTORY OF DVT (DEEP VEIN THROMBOSIS): Status: ACTIVE | Noted: 2024-01-15

## 2024-07-22 NOTE — TELEPHONE ENCOUNTER
Received call from CNM re: patient's upcoming appointment for  a birth control consult. Of note, patient with significant cardiac Hx (V-fib arrest s/p ICD placement) with TOP in February with IUD placement during this procedure.     Based on Hx, patient is not a CNM candidate and needs appt with MD.

## 2024-07-23 ENCOUNTER — OFFICE VISIT (OUTPATIENT)
Dept: OBGYN | Facility: CLINIC | Age: 34
End: 2024-07-23
Attending: OBSTETRICS & GYNECOLOGY
Payer: COMMERCIAL

## 2024-07-23 ENCOUNTER — LAB (OUTPATIENT)
Dept: LAB | Facility: CLINIC | Age: 34
End: 2024-07-23
Attending: OBSTETRICS & GYNECOLOGY
Payer: COMMERCIAL

## 2024-07-23 VITALS
BODY MASS INDEX: 24.11 KG/M2 | SYSTOLIC BLOOD PRESSURE: 103 MMHG | HEART RATE: 68 BPM | HEIGHT: 62 IN | WEIGHT: 131 LBS | DIASTOLIC BLOOD PRESSURE: 70 MMHG

## 2024-07-23 DIAGNOSIS — Z30.430 ENCOUNTER FOR INSERTION OF INTRAUTERINE CONTRACEPTIVE DEVICE: ICD-10-CM

## 2024-07-23 DIAGNOSIS — N93.9 ABNORMAL UTERINE BLEEDING (AUB): Primary | ICD-10-CM

## 2024-07-23 DIAGNOSIS — Z12.4 CERVICAL CANCER SCREENING: ICD-10-CM

## 2024-07-23 DIAGNOSIS — N93.9 ABNORMAL UTERINE BLEEDING (AUB): ICD-10-CM

## 2024-07-23 LAB — TSH SERPL DL<=0.005 MIU/L-ACNC: 1.84 UIU/ML (ref 0.3–4.2)

## 2024-07-23 PROCEDURE — 84443 ASSAY THYROID STIM HORMONE: CPT

## 2024-07-23 PROCEDURE — 36415 COLL VENOUS BLD VENIPUNCTURE: CPT

## 2024-07-23 PROCEDURE — 99213 OFFICE O/P EST LOW 20 MIN: CPT | Mod: 25 | Performed by: OBSTETRICS & GYNECOLOGY

## 2024-07-23 PROCEDURE — G0463 HOSPITAL OUTPT CLINIC VISIT: HCPCS | Mod: 25 | Performed by: OBSTETRICS & GYNECOLOGY

## 2024-07-23 PROCEDURE — 87624 HPV HI-RISK TYP POOLED RSLT: CPT | Performed by: OBSTETRICS & GYNECOLOGY

## 2024-07-23 PROCEDURE — G0145 SCR C/V CYTO,THINLAYER,RESCR: HCPCS | Performed by: OBSTETRICS & GYNECOLOGY

## 2024-07-23 PROCEDURE — 250N000011 HC RX IP 250 OP 636: Performed by: OBSTETRICS & GYNECOLOGY

## 2024-07-23 PROCEDURE — 58300 INSERT INTRAUTERINE DEVICE: CPT | Performed by: OBSTETRICS & GYNECOLOGY

## 2024-07-23 RX ADMIN — LEVONORGESTREL 1 EACH: 52 INTRAUTERINE DEVICE INTRAUTERINE at 12:22

## 2024-07-23 NOTE — PATIENT INSTRUCTIONS
Thank you for trusting us with your care!   Please be aware, if you are on Mychart, you may see your results prior to your providers review. If labs are abnormal, we will call or message you on [email or web address] with a follow up plan.    If you need to contact us for questions about:  Symptoms, Scheduling & Medical Questions; Non-urgent (2-3 day response) [employer] message, Urgent (needing response today) [phone] (if after 3:30pm next day response)   Prescriptions: Please call your Pharmacy   Billing: Cathy [phone] or [employer]:[phone]

## 2024-07-23 NOTE — PROGRESS NOTES
"SUBJECTIVE   Estela Fry is a 34 year old , here for follow-up after recent ED visit on 24 for IUD displacement and removal and ongoing heavy menstrual periods. 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     Had D&C on 2024 that was uncomplicated, IUD was placed at that time. Had one \"normal\" period after that procedure prior to this recent episode of bleeding. She presented to the ED on  with heavier than normal menstrual bleeding for multiple days, passing large clots, and pelvic discomfort she related to her IUD as well as noticing strings coming out of vaginal canal. ED workup with pelvic US notable for IUD positioned in lower cervix, was removed at that time. Hgb was stable at 12.7 with improvement in bleeding at discharge.    Has been on rivaroxaban for almost one year. Denies heavy periods prior to this episode while on rivaroxaban.     Has been thinking about getting a tubal ligation for permanent birth control as she does not desire any future pregnancies. Is wondering if her blood thinners and cardiac history would be a contraindication for this procedure.  currently not agreeable with vasectomy.     Current contraception: Condoms     Previous contraception: mIUD     Gynecologic History  LMP: end of  with ongoing bleeding today  Menstrual History:      2024     3:00 PM   Menstrual History   LAST MENSTRUAL PERIOD 2023     Pap: last pap 23 was unsatisfactory for eval, HPV negative  History of abnormal Pap smear: No    Obstetric History  OB History    Para Term  AB Living   12 8 5 3 4 7   SAB IAB Ectopic Multiple Live Births   1 3 0 0 8      # Outcome Date GA Lbr Gulshan/2nd Weight Sex Type Anes PTL Lv   12 IAB 24 6w6d    IAB      11 IAB 21           10 IAB 20           9  20 32w0d   F    MELISSA   8  19 32w0d   M Vag-Spont   MELISSA   7 Term 18 40w0d   F " Vag-Spont   MELISSA   6 Term 17 40w0d   F Vag-Spont   MELISSA   5 2014 12w0d          4 Term 13 40w0d   F Vag-Spont   MELISSA   3  13 22w0d   M Vag-Spont   DEC      Birth Comments: System Generated. Please review and update pregnancy details.   2 Term 12 40w0d  3.6 kg (7 lb 15 oz) F Vag-Spont   MELISSA      Name: noemí   1 Term 01/29/10 38w0d  3.317 kg (7 lb 5 oz) M Vag-Spont   MELISSA      Name: Salomón        Past Medical History  Past Medical History:   Diagnosis Date    Automatic implantable cardioverter-defibrillator in situ     Cardiac arrest (H)     History of atrial flutter     History of venous thromboembolism        Past Surgical History  Past Surgical History:   Procedure Laterality Date    CV CENTRAL VENOUS CATHETER PLACEMENT N/A 2023    Procedure: Central Venous Catheter Placement;  Surgeon: Sid Liz MD;  Location: [facility] CARDIAC CATH LAB    CV CORONARY ANGIOGRAM N/A 2023    Procedure: Coronary Angiogram;  Surgeon: Sid Liz MD;  Location: [facility] CARDIAC CATH LAB    DILATION AND CURETTAGE SUCTION N/A 2024    Procedure: DILATION AND CURETTAGE, UTERUS, USING SUCTION,;  Surgeon: Shreya Mcdonnell MD;  [facility]    EP SICD INSERT N/A 8/15/2023    Procedure: Subcutaneous Implantable Cardioverter Defibrillator Implant;  Surgeon: Eula Johnson MD;  Location: [facility] CARDIAC CATH LAB    INSERT INTRAUTERINE DEVICE N/A 2024    Procedure: placement of Mirena intrauterine device;  Surgeon: Shreya Mcdonnell MD;  Location: [facility] OR       Medications  Current Outpatient Medications   Medication Sig Dispense Refill    ferrous sulfate (FE TABS) 325 (65 Fe) MG EC tablet Take 1 tablet (325 mg) by mouth Every Mon, Wed, Fri Morning 30 tablet 2    levonorgestrel (MIRENA) 52 MG (20 mcg/day) IUD 1 each by Intrauterine route once      rivaroxaban ANTICOAGULANT (XARELTO) 20 MG TABS tablet Take 1 tablet (20 mg) by mouth daily (with dinner) 90 tablet 3    rivaroxaban  "ANTICOAGULANT (XARELTO) 20 MG TABS tablet Take 20 mg by mouth daily (with dinner)      sotalol (BETAPACE) 80 MG tablet Take 1 tablet (80 mg) by mouth 2 times daily 180 tablet 1     No current facility-administered medications for this visit.       Allergies   No Known Allergies    Social History  Social History     Tobacco Use    Smoking status: Never     Passive exposure: Never    Smokeless tobacco: Never   Substance Use Topics    Alcohol use: Never    Drug use: Never        Family History  No family history on file.  No family history of breast, uterine, ovarian or colon cancer.    Review of Systems  CONSTITUTIONAL: NEGATIVE for fever, chills  EYES: NEGATIVE for vision changes   RESP: NEGATIVE for significant cough or SOB  CV: NEGATIVE for chest pain, palpitations   GI: NEGATIVE for nausea, abdominal pain, heartburn, or change in bowel habits  : NEGATIVE for frequency, dysuria, or hematuria  MUSCULOSKELETAL: NEGATIVE for significant arthralgias or myalgia  NEURO: NEGATIVE for weakness, dizziness or paresthesias or headache    OBJECTIVE   /70   Pulse 68   Ht 1.575 m (5' 2\")   Wt 59.4 kg (131 lb)   LMP 12/30/2023   Breastfeeding No   BMI 23.96 kg/m    BMI: Body mass index is 23.96 kg/m .  General:  Alert, no distress   Head:  Normocephalic, without obvious abnormality   Lungs:  Breathing well on room air   Heart:  Appears well perfused   Abdomen:  Non-distended   Pelvic: - normal external genitalia without erythema or lesions  - vagina normal without discharge  -multiparous cervix normal in appearance, no masses, scant blood noted in os   Extremities:  normal   ?   Procedure:    IUD Procedure Note  Estela Fry and I discussed the risks and benefits of the Mirena IUD.  We discussed that the risks of IUD insertion include infection, especially during the first 40d after insertion, changes in menstrual flow, IUD expulsion, and uterine perforation. We discussed that no contraceptive method is 100% effective " and therefore there is a possibility of contraceptive failure.     After this discussion, I answered all of Estela Fry's questions and signed the consent form.    A speculum was placed into the vagina. The cervix was cleansed with betadine. The anterior lip of the cervix was grasped with a single-toothed tenaculum. A uterine pipelle was passed into the uterine cavity and the uterus sounded to 6.5 cm. The Mirena IUD was inserted according to  instructions without difficulty.The strings were trimmed to 3 cm. The instruments were removed and the tenaculum sites hemostatic. She tolerated the procedure well.     ASSESSMENT   Estela Fry is a 34 year old , who is following up for abnormal uterine bleeding s/p mIUD expulsion on  and to discuss contraception. Medical history is notable for h/o idiopathic cardiac arrest s/p ICD (2023) and h/o IVC thrombosis, now on rivaroxaban.     PLAN     Abnormal uterine bleeding  Discussed her recent heavy menstrual cycle with passage of large clots as likely being related to her rivaroxaban use. Differential includes possible intrauterine causes of bleeding such as polyps, fibroids, retained POCs (recent D&C) but less likely given asymptomatic. Was instructed to hold her rivaroxaban per her hematology team while having vaginal bleeding and told to restart tomorrow (). Scant bleeding noted on exam today. Discussed options for management of heavy uterine bleeding including POPs, Implant (Nexplanon), Depo, IUDs, ablation, and hysterectomy. Patient elected to proceed with Mirena IUD placement at this time. Had previously tolerated clinic placement of IUD.   - Ordered pelvic US to evaluate for possible intrauterine causes of bleeding, can coordinate when coming back for follow-up  - Ordered TSH to be checked today, last was normal in   - Placed Mirena IUD today to help with heavy bleeding   - LOT# [id number], Exp: 2026  - Ok to restart rivaroxaban today per  hematology  - Follow-up in 4-6 weeks    Contraception  Discussed options for contraception with the patient including permanent contraception with laparoscopic salpingectomy and that it would provide contraception but would not change her heavy menstrual cycles. Discussed that we could help coordinate possible procedure with her hematology and cardiology teams and the risks with the procedure and being off anticoagulants for the short time and her increased DVT risk. Partner not agreeable to get vasectomy at this time. Given need for AUB management as well the patient elected to proceed with mIUD placement.  - Mirena IUD placed    Cervical Cancer Screening  Previous pap smear obtained 6/7/23 was unsatisfactory for eval, HPV negative  - pap smear with HPV obtained today    Ashley Martinez MD  Obstetrics, Gynecology & Women's Health  Resident, PGY-1  07/23/2024 12:42 PM

## 2024-07-23 NOTE — LETTER
"2024       RE: Estela Fry  [address] [place] [address]     Dear Colleague,    Thank you for referring your patient, Estela Fry, to the [facility] [place] [facility]. Please see a copy of my visit note below.    SUBJECTIVE   Estela Fry is a 34 year old , here for follow-up after recent ED visit on 24 for IUD displacement and removal and ongoing heavy menstrual periods. 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     Had D&C on 2024 that was uncomplicated, IUD was placed at that time. Had one \"normal\" period after that procedure prior to this recent episode of bleeding. She presented to the ED on  with heavier than normal menstrual bleeding for multiple days, passing large clots, and pelvic discomfort she related to her IUD as well as noticing strings coming out of vaginal canal. ED workup with pelvic US notable for IUD positioned in lower cervix, was removed at that time. Hgb was stable at 12.7 with improvement in bleeding at discharge.    Has been on rivaroxaban for almost one year. Denies heavy periods prior to this episode while on rivaroxaban.     Has been thinking about getting a tubal ligation for permanent birth control as she does not desire any future pregnancies. Is wondering if her blood thinners and cardiac history would be a contraindication for this procedure.  currently not agreeable with vasectomy.     Current contraception: Condoms     Previous contraception: mIUD     Gynecologic History  LMP: end of  with ongoing bleeding today  Menstrual History:      2024     3:00 PM   Menstrual History   LAST MENSTRUAL PERIOD 2023     Pap: last pap 23 was unsatisfactory for eval, HPV negative  History of abnormal Pap smear: No    Obstetric History  OB History    Para Term  AB Living   12 8 5 3 4 7   SAB IAB " Ectopic Multiple Live Births   1 3 0 0 8      # Outcome Date GA Lbr Gulshan/2nd Weight Sex Type Anes PTL Lv   12 IAB 24 6w6d    IAB      11 IAB 21           10 IAB 20           9  20 32w0d   F    MELISSA   8  19 32w0d   M Vag-Spont   MELISSA   7 Term 18 40w0d   F Vag-Spont   MELISSA   6 Term 17 40w0d   F Vag-Spont   MELISSA   5 SAB  12w0d          4 Term 13 40w0d   F Vag-Spont   MELISSA   3  13 22w0d   M Vag-Spont   DEC      Birth Comments: System Generated. Please review and update pregnancy details.   2 Term 12 40w0d  3.6 kg (7 lb 15 oz) F Vag-Spont   MELISSA      Name: noemí   1 Term 01/29/10 38w0d  3.317 kg (7 lb 5 oz) M Vag-Spont   MELISSA      Name: Salomón        Past Medical History  Past Medical History:   Diagnosis Date     Automatic implantable cardioverter-defibrillator in situ      Cardiac arrest (H)      History of atrial flutter      History of venous thromboembolism        Past Surgical History  Past Surgical History:   Procedure Laterality Date     CV CENTRAL VENOUS CATHETER PLACEMENT N/A 2023    Procedure: Central Venous Catheter Placement;  Surgeon: Sid Liz MD;  Location: [facility] CARDIAC CATH LAB     CV CORONARY ANGIOGRAM N/A 2023    Procedure: Coronary Angiogram;  Surgeon: Sid Liz MD;  Location: [facility] CARDIAC CATH LAB     DILATION AND CURETTAGE SUCTION N/A 2024    Procedure: DILATION AND CURETTAGE, UTERUS, USING SUCTION,;  Surgeon: Shreya Mcdonnell MD;  [facility]     EP SICD INSERT N/A 8/15/2023    Procedure: Subcutaneous Implantable Cardioverter Defibrillator Implant;  Surgeon: Eula Johnson MD;  Location: [facility] CARDIAC CATH LAB     INSERT INTRAUTERINE DEVICE N/A 2024    Procedure: placement of Mirena intrauterine device;  Surgeon: Shreya Mcdonnell MD;  [facility]       Medications  Current Outpatient Medications   Medication Sig Dispense Refill     ferrous sulfate (FE TABS) 325 (65  "Fe) MG EC tablet Take 1 tablet (325 mg) by mouth Every Mon, Wed, Fri Morning 30 tablet 2     levonorgestrel (MIRENA) 52 MG (20 mcg/day) IUD 1 each by Intrauterine route once       rivaroxaban ANTICOAGULANT (XARELTO) 20 MG TABS tablet Take 1 tablet (20 mg) by mouth daily (with dinner) 90 tablet 3     rivaroxaban ANTICOAGULANT (XARELTO) 20 MG TABS tablet Take 20 mg by mouth daily (with dinner)       sotalol (BETAPACE) 80 MG tablet Take 1 tablet (80 mg) by mouth 2 times daily 180 tablet 1     No current facility-administered medications for this visit.       Allergies   No Known Allergies    Social History  Social History     Tobacco Use     Smoking status: Never     Passive exposure: Never     Smokeless tobacco: Never   Substance Use Topics     Alcohol use: Never     Drug use: Never        Family History  No family history on file.  No family history of breast, uterine, ovarian or colon cancer.    Review of Systems  CONSTITUTIONAL: NEGATIVE for fever, chills  EYES: NEGATIVE for vision changes   RESP: NEGATIVE for significant cough or SOB  CV: NEGATIVE for chest pain, palpitations   GI: NEGATIVE for nausea, abdominal pain, heartburn, or change in bowel habits  : NEGATIVE for frequency, dysuria, or hematuria  MUSCULOSKELETAL: NEGATIVE for significant arthralgias or myalgia  NEURO: NEGATIVE for weakness, dizziness or paresthesias or headache    OBJECTIVE   /70   Pulse 68   Ht 1.575 m (5' 2\")   Wt 59.4 kg (131 lb)   LMP 12/30/2023   Breastfeeding No   BMI 23.96 kg/m    BMI: Body mass index is 23.96 kg/m .  General:  Alert, no distress   Head:  Normocephalic, without obvious abnormality   Lungs:  Breathing well on room air   Heart:  Appears well perfused   Abdomen:  Non-distended   Pelvic: - normal external genitalia without erythema or lesions  - vagina normal without discharge  -multiparous cervix normal in appearance, no masses, scant blood noted in os   Extremities:  normal       Procedure:    IUD " Procedure Note  Estela Fry and I discussed the risks and benefits of the Mirena IUD.  We discussed that the risks of IUD insertion include infection, especially during the first 40d after insertion, changes in menstrual flow, IUD expulsion, and uterine perforation. We discussed that no contraceptive method is 100% effective and therefore there is a possibility of contraceptive failure.     After this discussion, I answered all of Estela Fry's questions and signed the consent form.    A speculum was placed into the vagina. The cervix was cleansed with betadine. The anterior lip of the cervix was grasped with a single-toothed tenaculum. A uterine pipelle was passed into the uterine cavity and the uterus sounded to 6.5 cm. The Mirena IUD was inserted according to  instructions without difficulty.The strings were trimmed to 3 cm. The instruments were removed and the tenaculum sites hemostatic. She tolerated the procedure well.     ASSESSMENT   Estela Fry is a 34 year old , who is following up for abnormal uterine bleeding s/p mIUD expulsion on  and to discuss contraception. Medical history is notable for h/o idiopathic cardiac arrest s/p ICD (2023) and h/o IVC thrombosis, now on rivaroxaban.     PLAN     Abnormal uterine bleeding  Discussed her recent heavy menstrual cycle with passage of large clots as likely being related to her rivaroxaban use. Differential includes possible intrauterine causes of bleeding such as polyps, fibroids, retained POCs (recent D&C) but less likely given asymptomatic. Was instructed to hold her rivaroxaban per her hematology team while having vaginal bleeding and told to restart tomorrow (). Scant bleeding noted on exam today. Discussed options for management of heavy uterine bleeding including POPs, Implant (Nexplanon), Depo, IUDs, ablation, and hysterectomy. Patient elected to proceed with Mirena IUD placement at this time. Had previously tolerated clinic  placement of IUD.   - Ordered pelvic US to evaluate for possible intrauterine causes of bleeding, can coordinate when coming back for follow-up  - Ordered TSH to be checked today, last was normal in   - Placed Mirena IUD today to help with heavy bleeding   - LOT# [id number], Exp: 2026  - Ok to restart rivaroxaban today per hematology  - Follow-up in 4-6 weeks    Contraception  Discussed options for contraception with the patient including permanent contraception with laparoscopic salpingectomy and that it would provide contraception but would not change her heavy menstrual cycles. Discussed that we could help coordinate possible procedure with her hematology and cardiology teams and the risks with the procedure and being off anticoagulants for the short time and her increased DVT risk. Partner not agreeable to get vasectomy at this time. Given need for AUB management as well the patient elected to proceed with mIUD placement.  - Mirena IUD placed    Cervical Cancer Screening  Previous pap smear obtained 23 was unsatisfactory for eval, HPV negative  - pap smear with HPV obtained today    Ashley Martinez MD  Obstetrics, Gynecology & Women's Health  Resident, PGY-1  2024 12:42 PM     Attestation signed by Leyla Tavera MD at 2024  4:59 PM:  OBGYN Attending Addendum     I, Leyla Tavera, saw Estela Fry with the resident, Dr. Martinez, and agree with their findings and plan of care as documented in the above note.    I personally reviewed vitals, meds. I supervised all aspects of the exam and procedure.     Key findings:  who is medically complex and needing highly effective contraception, also with AUB, suspected cause is iatrogenic (anticoagulation due to DVT history).     I agree with the plan for another trial of mirena IUD to see if AUB and contraception can be co-managed. Happy to consider salpingectomy at any time but as Estela and I discussed today, her periods will likely continue to be  heavy and require hormonal management vs ablation vs hysterectomy. TSH today normal. Plan US and string check in 4-6 weeks.    Leyla Tavera MD, MSCI  Date of Service: 7/23/2024      Again, thank you for allowing me to participate in the care of your patient.      Sincerely,    Leyla Tavera MD

## 2024-07-24 LAB
HPV HR 12 DNA CVX QL NAA+PROBE: NEGATIVE
HPV16 DNA CVX QL NAA+PROBE: NEGATIVE
HPV18 DNA CVX QL NAA+PROBE: NEGATIVE
HUMAN PAPILLOMA VIRUS FINAL DIAGNOSIS: NORMAL

## 2024-07-29 ENCOUNTER — MYC MEDICAL ADVICE (OUTPATIENT)
Dept: OBGYN | Facility: CLINIC | Age: 34
End: 2024-07-29
Payer: COMMERCIAL

## 2024-07-29 LAB
BKR LAB AP GYN ADEQUACY: NORMAL
BKR LAB AP GYN INTERPRETATION: NORMAL
BKR LAB AP PREVIOUS ABNORMAL: NORMAL
PATH REPORT.COMMENTS IMP SPEC: NORMAL
PATH REPORT.COMMENTS IMP SPEC: NORMAL
PATH REPORT.RELEVANT HX SPEC: NORMAL

## 2024-08-11 ENCOUNTER — MYC MEDICAL ADVICE (OUTPATIENT)
Dept: CARDIOLOGY | Facility: CLINIC | Age: 34
End: 2024-08-11
Payer: COMMERCIAL

## 2024-08-12 ENCOUNTER — INFUSION THERAPY VISIT (OUTPATIENT)
Dept: INFUSION THERAPY | Facility: CLINIC | Age: 34
End: 2024-08-12
Attending: INTERNAL MEDICINE
Payer: COMMERCIAL

## 2024-08-12 VITALS
HEART RATE: 66 BPM | RESPIRATION RATE: 16 BRPM | SYSTOLIC BLOOD PRESSURE: 100 MMHG | DIASTOLIC BLOOD PRESSURE: 69 MMHG | TEMPERATURE: 98.4 F | OXYGEN SATURATION: 100 %

## 2024-08-12 DIAGNOSIS — E61.1 IRON DEFICIENCY: Primary | ICD-10-CM

## 2024-08-12 PROCEDURE — 258N000003 HC RX IP 258 OP 636: Performed by: INTERNAL MEDICINE

## 2024-08-12 PROCEDURE — 96365 THER/PROPH/DIAG IV INF INIT: CPT

## 2024-08-12 PROCEDURE — 250N000011 HC RX IP 250 OP 636: Performed by: INTERNAL MEDICINE

## 2024-08-12 PROCEDURE — 96376 TX/PRO/DX INJ SAME DRUG ADON: CPT

## 2024-08-12 RX ORDER — DIPHENHYDRAMINE HYDROCHLORIDE 50 MG/ML
50 INJECTION INTRAMUSCULAR; INTRAVENOUS
Start: 2024-08-12

## 2024-08-12 RX ORDER — ALBUTEROL SULFATE 0.83 MG/ML
2.5 SOLUTION RESPIRATORY (INHALATION)
OUTPATIENT
Start: 2024-08-12

## 2024-08-12 RX ORDER — METHYLPREDNISOLONE SODIUM SUCCINATE 125 MG/2ML
125 INJECTION, POWDER, LYOPHILIZED, FOR SOLUTION INTRAMUSCULAR; INTRAVENOUS
Start: 2024-08-12

## 2024-08-12 RX ORDER — HEPARIN SODIUM,PORCINE 10 UNIT/ML
5-20 VIAL (ML) INTRAVENOUS DAILY PRN
OUTPATIENT
Start: 2024-08-12

## 2024-08-12 RX ORDER — MEPERIDINE HYDROCHLORIDE 25 MG/ML
25 INJECTION INTRAMUSCULAR; INTRAVENOUS; SUBCUTANEOUS EVERY 30 MIN PRN
OUTPATIENT
Start: 2024-08-12

## 2024-08-12 RX ORDER — EPINEPHRINE 1 MG/ML
0.3 INJECTION, SOLUTION, CONCENTRATE INTRAVENOUS EVERY 5 MIN PRN
OUTPATIENT
Start: 2024-08-12

## 2024-08-12 RX ORDER — HEPARIN SODIUM (PORCINE) LOCK FLUSH IV SOLN 100 UNIT/ML 100 UNIT/ML
5 SOLUTION INTRAVENOUS
OUTPATIENT
Start: 2024-08-12

## 2024-08-12 RX ORDER — ALBUTEROL SULFATE 90 UG/1
1-2 AEROSOL, METERED RESPIRATORY (INHALATION)
Start: 2024-08-12

## 2024-08-12 RX ADMIN — SODIUM CHLORIDE 25 MG: 9 INJECTION, SOLUTION INTRAVENOUS at 08:48

## 2024-08-12 RX ADMIN — SODIUM CHLORIDE 250 ML: 9 INJECTION, SOLUTION INTRAVENOUS at 08:48

## 2024-08-12 RX ADMIN — SODIUM CHLORIDE 475 MG: 9 INJECTION, SOLUTION INTRAVENOUS at 10:10

## 2024-08-12 ASSESSMENT — PAIN SCALES - GENERAL: PAINLEVEL: NO PAIN (0)

## 2024-08-12 NOTE — PROGRESS NOTES
Infusion Nursing Note:  Estela Fry presents today for first dose of infed.    Patient seen by provider today: No   present during visit today: Not Applicable.    Note: Reviewed infed with patient.    Intravenous Access:  Peripheral IV placed.    Treatment Conditions:  Not Applicable.      Post Infusion Assessment:  Test dose was given without incidence.  Patient observed for 60 minutes and remainder of infed was given.  Patient tolerated infusion without incident.  Blood return noted pre and post infusion.  Site patent and intact, free from redness, edema or discomfort.  No evidence of extravasations.  Access discontinued per protocol.       Discharge Plan:   Patient discharged in stable condition accompanied by: self.  Departure Mode: Ambulatory.  Therapy complete    Janay Agosto RN

## 2024-08-12 NOTE — TELEPHONE ENCOUNTER
S-(situation): patient states that over the last few days she has had episodes of upper chest pressure. She states that it isn't pain, just pressure and fullness. It goes away after she uses her albuterol inhaler that was prescribed to her at a ER visit in February this year. PE was ruled out,   CT angio 2/17/2024  IMPRESSION:   1. No CT evidence of pulmonary embolism.   2.  Lungs are clear.   3.  Heart size upper limits of normal. External AICD note   She denies any palpitations and or fluttering in her chest. Denies any syncopal episodes.     B-(background): Ms Fry is a 33 year old  female with a pmhx of VF arrest on 8/5/2023 --> shocked x 3 --> ROSC s/p subcutaneous ICD, atrial flutter, DVT on xarelto. Discharged on 8/16/2023.     The patient has had a prior work up for cardiac arrest/CM which was negative for CAD, drugs, CMR without LGE or fibrosis. Family history unrevealing. Last seen by Dr. Valadez 10/12/23. She indicated that the patient and the family have had difficulty coping since the cardiac arrest. She was referred to genetics, neuropsych, and behavioral health. She has been following with EP as well. She was hospitalized 1/15 with new atrial flutter at rates of 200-240 bpm resulting in x3 ICD shocks. Cardioverted at that time. EP started sotalol 80 mg BID 1/18/24. EP offered ablation but the patient declined.      Today, she presents alone. She feels that she continues to have intermittent chest tightness. No ICD shocks This can occur with rest or exertion. Intermittently will have SOB with this though this is not consistent and is short lived. She has some SOB at rest in bed and uses 3 pillows to help with this. Denies leg swelling. She has not yet seen Neuropsych or Behavioral Health due to scheduling issues. She is recently pregnant and is almost 5 weeks by LMP. She is concerned that her heart history will preclude her pregnancy or lead to significant complications. She has occasional nausea and  food aversions recently after testing positive on her pregnancy test.     A-(assessment): chest pressure relieved with albuterol inhaler.    R-(recommendations): follow up with Dr. Liz & KALEE in September. Asked her to contact us if this should happen again.

## 2024-08-21 ENCOUNTER — OFFICE VISIT (OUTPATIENT)
Dept: HEMATOLOGY | Facility: CLINIC | Age: 34
End: 2024-08-21
Attending: INTERNAL MEDICINE
Payer: COMMERCIAL

## 2024-08-21 VITALS
HEIGHT: 62 IN | OXYGEN SATURATION: 98 % | SYSTOLIC BLOOD PRESSURE: 96 MMHG | HEART RATE: 73 BPM | BODY MASS INDEX: 24.71 KG/M2 | WEIGHT: 134.3 LBS | TEMPERATURE: 98.2 F | DIASTOLIC BLOOD PRESSURE: 68 MMHG

## 2024-08-21 DIAGNOSIS — D62 ANEMIA DUE TO BLOOD LOSS, ACUTE: ICD-10-CM

## 2024-08-21 DIAGNOSIS — I82.220 IVC THROMBOSIS (H): ICD-10-CM

## 2024-08-21 DIAGNOSIS — Z79.01 CHRONIC ANTICOAGULATION: Primary | ICD-10-CM

## 2024-08-21 PROCEDURE — 99214 OFFICE O/P EST MOD 30 MIN: CPT | Performed by: INTERNAL MEDICINE

## 2024-08-21 PROCEDURE — G2211 COMPLEX E/M VISIT ADD ON: HCPCS | Performed by: INTERNAL MEDICINE

## 2024-08-21 PROCEDURE — G0463 HOSPITAL OUTPT CLINIC VISIT: HCPCS | Performed by: INTERNAL MEDICINE

## 2024-08-21 NOTE — PROGRESS NOTES
[facility] Hematology Consultation    Outpatient Visit Note:    Patient: Estela Fry  MRN: [id number]  : 1990  EUN: 2024    Reason for Consultation:  Thrombosis after cardiac arrest and resuscitation    Assessment:  In summary, Estela Fry is a 34 year old woman with no significant past medical history who first presented to attention of hematology after experiencing an unwitnessed VT/VF cardiac arrest with ROSC after 3 shocks. Hematology was consulted due to finding of nonocclusive right external iliac vein VTE.     Provoked R external Iliac Vein thrombosis. 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 However the etiology of her IVC thrombosis and the CT finding of potential chronic left jugular vein occlusion is less clear. Given the severity of her cardiac arrest- would be prudent to continue anticoagulation. D-dimer level on anticoagulation and found it to be 0.56--- This places her risk for developing recurrent clot at ~7.4 % per year- which would favor continuing anticoagulation as long as tolerated (William JUAREZ, et al. 2017 BMJ).    Currently has IUD to prevent pregnancy and reduce heavy menstrual bleeding. Had iron deficiency and received InFed on 24 without complications.     Plan:  1. Majority of today's visit was spent counseling the patient regarding anticoagulation.  2.   Orders Placed This Encounter   Procedures    Iron and iron binding capacity    Ferritin    Reticulocyte count    CBC with Platelets & Differential   3. Continue xarelto 20 mg  4. Follow up labs Oct 2024 scheduled       The patient is given our center's contact information and is instructed to call if she should have any further questions or concerns.  Otherwise, we will plan on seeing her back Follow  up with Provider - 6 months .      The longitudinal plan of care for the diagnosis(es)/condition(s) as documented were addressed during this visit. Due to the added complexity in care, I will continue to support Estela in the subsequent management and with ongoing continuity of care.    Marcia Wilhelm MD/PhD   of Medicine  [facility]   ----------------------------------------------------------------------------------------------------------------------    History of Present Illness:  Estela Fry is a 33 year old woman Estela Fry is a 33 year old woman with no significant past medical history who first presented to attention of hematology after experiencing an unwitnessed VT/VF cardiac arrest with ROSC after 3 shocks. Hematology was consulted due to finding of nonocclusive right external iliac vein VTE.     Estela reports she is recovering. She does not have any LE swelling. She does have some neck and right and left arm discomfort. She has not had any dizziness. She is tolerating her Xarelto     Past Medical History:  Past Medical History:   Diagnosis Date    Automatic implantable cardioverter-defibrillator in situ     Cardiac arrest (H)     History of atrial flutter     History of venous thromboembolism        Past Surgical History:  Past Surgical History:   Procedure Laterality Date    CV CENTRAL VENOUS CATHETER PLACEMENT N/A 8/5/2023    Procedure: Central Venous Catheter Placement;  Surgeon: Sid Liz MD;  Location: [facility] CARDIAC CATH LAB    CV CORONARY ANGIOGRAM N/A 8/5/2023    Procedure: Coronary Angiogram;  Surgeon: Sid Liz MD;  Location: [facility] CARDIAC CATH LAB    DILATION AND CURETTAGE SUCTION N/A 2/16/2024    Procedure: DILATION AND CURETTAGE, UTERUS, USING SUCTION,;  Surgeon: Shreya Mcdonnell MD;  Location: [facility] OR    EP SICD INSERT N/A 8/15/2023    Procedure: Subcutaneous Implantable Cardioverter Defibrillator Implant;  Surgeon: Elizabeth  MD Eula;  Location:  [facility] CARD[facility]AC CATH LAB    INSERT INTRAUTERINE DEVICE N/A 2/16/2024    Procedure: placement of Mirena intrauterine device;  Surgeon: Shreya Mcdonnell MD;  Location: [facility] OR   History of at least two miscarriages    Medications:  Current Outpatient Medications   Medication Sig Dispense Refill    levonorgestrel (MIRENA) 52 MG (20 mcg/day) IUD 1 each by Intrauterine route once      rivaroxaban ANTICOAGULANT (XARELTO) 20 MG TABS tablet Take 1 tablet (20 mg) by mouth daily (with dinner) 90 tablet 3    rivaroxaban ANTICOAGULANT (XARELTO) 20 MG TABS tablet Take 20 mg by mouth daily (with dinner)      sotalol (BETAPACE) 80 MG tablet Take 1 tablet (80 mg) by mouth 2 times daily 180 tablet 1    ferrous sulfate (FE TABS) 325 (65 Fe) MG EC tablet Take 1 tablet (325 mg) by mouth Every Mon, Wed, Fri Morning (Patient not taking: Reported on 8/21/2024) 30 tablet 2        Allergies:  No Known Allergies    ROS:  A 14 point ROS is negative except as stated in the HPI    Social History:  Denies any tobacco use. No significant alcohol use. Denies any illicit drug use. Patient work . Has lived in [place], [place], [place], [place] - family travel    Family History:  6 sisters and 1 brother. Each sister has 3-5 children. Unclear on miscarriages      Objective:  Vitals: B/P: 101/69, T: 98.1, P: 89, R: Data Unavailable, Wt: 134 lbs 4.8 oz  Exam:   Constitutional: Appears well, no distress  HEENT: Pupils equal and reactive to light. No scleral icterus or hemorrhage. Nares without evidence of telangiectasia. Mucous membranes moist with no wet purpura. Dentition overall ok with no signs of decay. No pharyngeal exudates. No lymphadenopathy, no thyromeagaly  CV: regular rate and rhythm, no murmurs  Respiratory: clear  GI: abdomen soft, nontender, without guarding or rebound. No hepatomeagaly. No splenomegaly. Rectal exam deferred.   Mus/Skele: no edema  Skin: no petechiae, no ecchymosis.  Neuro: CN  II-XII intact. Normal gait. AOx3  Heme/Lymph: no supraclavicular, axillary or umbilical adenopathy.     Labs: personally reviewed with relevant trends annotated below  No results found for any visits on 08/21/24.        Imaging:  CTV (10/23/23)  VEINS: The inferior vena cava and bilateral iliac venous vasculature is widely patent without evidence of filling defect or thrombosis. Patent bilateral femoral venous vasculature were seen. Patent hepatic veins and portal venous vasculature. Prominent   bilateral ovarian veins and uterine varices.     ARTERIES: Normal caliber and appearance of the abdominal aorta. Patent celiac, SMA, LASHONDA, and single bilateral renal arteries. Patent visualized iliofemoral arterial vasculature. No significant arterial atherosclerotic disease.     NON ARTERIAL STRUCTURES: Solid intra-abdominal organs are unremarkable. Decompressed gallbladder. Normal caliber bowel without evidence of obstruction or inflammatory change. Normal appendix. Normal urinary bladder. No abdominopelvic lymphadenopathy.   Visualized lung bases are clear. Osseous structures are unremarkable.                                                                      IMPRESSION:   1.  Patent IVC and visualized iliofemoral veins without evidence of thrombosis.  2.  Prominent bilateral ovarian veins and uterine varices, which may be physiologic versus suggestive of pelvic congestion in the appropriate clinical scenario.  8/6/23  There has partial luminal echogenic debris spanning approximately 2.4  cm in length within the mid/distal IVC, filling roughly 40% of the  luminal diameter. Normal IVC phasic waveforms throughout. Normal IVC  velocities.    Nonocclusive echogenic clot surrounding the right external iliac vein  line/catheter.    On the cine clips in the right upper quadrant there is an edematous  structure that is either edematous duodenum or edematous gallbladder.  Consider right upper quadrant  ultrasound      Impression:  1. There is approximately 40% width luminal focal thrombus within the  mid/distal IVC, with normal phasic waveforms throughout the IVC.  2. Nonocclusive DVT involving the right external iliac vein  surrounding catheter.  3. Structure in the right upper quadrant noted on the cine images  probably edematous duodenum and/or edematous gallbladder. Right upper  quadrant ultrasound would help delineate.     EXAMINATION: DOPPLER VENOUS ULTRASOUND OF BILATERAL UPPER EXTREMITIES,  10/23/2023 11:41 AM     COMPARISON: None.    HISTORY: chronic left IJ clot on CT in August. History of sudden  cardiac death. Persistent R sided pain    TECHNIQUE: Gray-scale evaluation with compression, spectral flow, and  color Doppler assessment of the central deep venous system of both  upper extremities.    FINDINGS:  Normal blood flow and waveforms are demonstrated in the internal  jugular, innominate, subclavian, and axillary veins bilaterally.      IMPRESSION:  No evidence of central deep venous thrombosis in either upper  extremity.    I have personally reviewed the examination and initial interpretation  and I agree with the findings.    SHAR CAPELLAN, DO         EXAMINATION: CTA pulmonary angiogram, 8/6/2023 4:05 PM    COMPARISON: CT chest abdomen and pelvis 8/5/2023    HISTORY: Cardiac arrest, indeterminate etiology, IVC thrombus noted on  ultrasound.    TECHNIQUE: Volumetric helical acquisition of CT images of the chest  from the lung apices to the kidneys were acquired after the  administration of 80 mL of Isovue-370 IV contrast. Flash technique  with free breathing acquisition. Post-processed multiplanar and/or  MIP reformations were obtained, archived to PACS and used in  interpretation of this study.    FINDINGS: Contrast bolus is: adequate. Exam is negative for acute  pulmonary embolism.    Endotracheal tube at mid thoracic aorta. Enteric tube with tip in the  partially visualized stomach.    Normal  cardiac size and shape without pericardial effusion.    Reflux of contrast into the IVC and hepatic veins. Round hyperdense  material within the gallbladder, likely contrast secondary to reflux.    Bilateral small pleural effusions, left greater than right, with  associated atelectasis. Asymmetric predominant groundglass opacities.  No focal consolidative opacities.    No acute or suspicious osseous abnormalities.      IMPRESSION:  1. Exam is negative for acute pulmonary embolism.    2. Trace bilateral pleural effusions, left greater than right with  interlobular septal thickening which may be due to mild pulmonary  edema. No focal consolidative opacities concerning for pulmonary  infection.    3. Reflux of contrast into the IVC and hepatic veins may be due to  limited cardiac function.     EXAM: CT SOFT TISSUE NECK W CONTRAST  8/11/2023 4:26 PM      HISTORY: upper airway stridor/subglottic stenosis         COMPARISON: Chest CT 8/6/2023      TECHNIQUE: Following intravenous administration of nonionic iodinated  contrast medium, thin section helical CT images were obtained from the  skull base down to the level of the aortic arch.  Axial, coronal and  sagittal reformations were performed with 2-3 mm slice thickness  reconstruction. Images were reviewed in soft tissue, lung and bone  windows.     CONTRAST: iopamidol (ISOVUE-370) solution 100 mL     FINDINGS:   No focal oral cavity, nasopharyngeal, oropharyngeal, hypopharyngeal,  or glottic mucosal space abnormality  Normal tongue base. Salivary  glands are within normal limits.     Endotracheal tube has been removed. Mild subglottic tracheal narrowing  at the level of C7-T1. Tracheal secretion/debris at the level of the  thoracic inlet.      No lymphadenopathy.     Subcentimeter right thyroid hypodense nodule requiring no further  follow-up by imaging criteria.     Chronic left jugular vein occlusion.     No suspicious osseus lesion. No high grade spinal canal  stenosis.     Clear paranasal sinuses and mastoid air cells.     Clear lung apices.                                                                      IMPRESSION:   1. Status post removal of endotracheal tube with mild subglottic  tracheal narrowing at the level of C7-T1 which could explain patient's  stridor. Also tracheal secretions/debris.  2. No cervical mass or adenopathy.     I have personally reviewed the examination and initial interpretation  and I agree with the findings.

## 2024-08-21 NOTE — PATIENT INSTRUCTIONS
[facility]  Center for Bleeding and Clotting Disorders  [facility] [address], [place] [address]  Main: [phone], Fax: [phone]    It was a pleasure seeing you today.  Thank you for allowing us to be involved in your care. Please let us know if there is anything else we can do for you, so that we can be sure you are leaving completely satisfied with your care experience.    Labs 10/4/24.  Our lab is located within our clinic space.  We will communicate these to you by VOLITIONRXt. With your permission we may leave a detailed voicemail, please see below for our contact information should you have any questions about your results. Also, please note that some lab results may take a week or so to get back. Feel free to call or message if you have not heard back from us within 7-10 days.     Please stay on your blood thinner:  Xarelto  Please call us with any bleeding issues that you may have.    [facility]ication Monitoring [facility] phone number is [phone].    Wear compression stockings if you have swelling in your leg. Take them off while you are sleeping. You may need to get new stockings every 3-6 months with regular wear.    Patient Resources:   For additional information, please see the following web link:  [email or web address]    Call the Center for Bleeding [facility] Clotting [facility]  at [phone].     -If surgeries or procedures are planned (for holding instructions).     -If off anticoagulation, please call during high risk times (long-distance travel, broken bones or trauma, immobilization, surgery, pregnancy, or taking estrogen).     -Any new symptoms of DVT (deep vein thrombosis) or PE (pulmonary embolism)    -pain     -swelling     -redness    -warmth    -shortness of breath    -chest pain    -coughing up blood    We would like a provider on our team to see you at least annually for optimal care and to allow us to continue to prescribe for you.  Israel Santos PA-C, Vanessa  Samuel, MPH, PA-C  and CORINA GarciaC are our physician assistants that are specialized in bleeding and clotting disorders that you may be able to see more readily.    Return to clinic in  Jan 2025.  Please schedule return appointment with Dr. Wilhelm.    Your nurse clinician is Jenni Ruiz,  RN, MSN, [phone].   If she is unavailable and you have immediate concerns, please call [phone] and ask for a nurse.

## 2024-09-06 ENCOUNTER — VIRTUAL VISIT (OUTPATIENT)
Dept: PSYCHOLOGY | Facility: CLINIC | Age: 34
End: 2024-09-06
Payer: COMMERCIAL

## 2024-09-06 ENCOUNTER — ANCILLARY PROCEDURE (OUTPATIENT)
Dept: CARDIOLOGY | Facility: CLINIC | Age: 34
End: 2024-09-06
Payer: COMMERCIAL

## 2024-09-06 DIAGNOSIS — I46.9 CARDIAC ARREST (H): ICD-10-CM

## 2024-09-06 DIAGNOSIS — F06.4 ANXIETY DISORDER DUE TO MEDICAL CONDITION: Primary | ICD-10-CM

## 2024-09-06 LAB — LVEF ECHO: NORMAL

## 2024-09-06 PROCEDURE — 93306 TTE W/DOPPLER COMPLETE: CPT | Performed by: INTERNAL MEDICINE

## 2024-09-06 PROCEDURE — 90834 PSYTX W PT 45 MINUTES: CPT | Mod: 95

## 2024-09-06 ASSESSMENT — ANXIETY QUESTIONNAIRES
7. FEELING AFRAID AS IF SOMETHING AWFUL MIGHT HAPPEN: NEARLY EVERY DAY
GAD7 TOTAL SCORE: 10
8. IF YOU CHECKED OFF ANY PROBLEMS, HOW DIFFICULT HAVE THESE MADE IT FOR YOU TO DO YOUR WORK, TAKE CARE OF THINGS AT HOME, OR GET ALONG WITH OTHER PEOPLE?: SOMEWHAT DIFFICULT
GAD7 TOTAL SCORE: 10
GAD7 TOTAL SCORE: 10

## 2024-09-06 NOTE — PROGRESS NOTES
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 30 tablet 3    rivaroxaban ANTICOAGULANT (XARELTO) 20 MG TABS tablet Take 1 tablet (20 mg) by mouth daily (with dinner) 90 tablet 3    sotalol (BETAPACE) 80 MG tablet Take 1 tablet (80 mg) by mouth 2 times daily 180 tablet 1     No current facility-administered medications for this visit.     Subjective:  Engaged pt in psychotherapy (CBT) for anxiety following ICD placement. Reviewed updates to physical and emotional health since previous session (May 2024) and progress with treatment plan goals. Patient reported that she has been experiencing increased worry about ICD shock since last appointment. Engaged pt in supportive listening and cognitive processing of sxs. Pt reported that she has not had an ICD shock since January, but that the constant worry has been \"overwhelming.\" Pt stated that worry is daily, lasting much of the day and leads pt feeling has though she cannot engage in daily life activities until she stops her worry. Pt reported trying deep breathing and \"telling [her] mind to stop worrying.\" Provided pt with psychoeducation on cognitive defusion and creative hopelessness skills and engaged pt in " "skills training in \"dropping the anchor,\" and \"thanking the thought\" cognitive defusion activities. Pt demonstrated appropriate skill level with both activity.      Patient reported understanding of education and agreement with plan.     Objective   Appearance:   Appropriate   Eye Contact:   Good   Psychomotor Behavior:  Normal   Attitude:   Cooperative   Orientation:   All  Speech   Rate / Production: Normal    Volume:  Normal   Mood:    Anxious   Affect:    Appropriate   Thought Content:   Clear  Thought Form:   Coherent  Logical     Insight:    Did not formally assess at this time.   Safety:    No safety concerns at this time.     Weight (per chart):  Wt Readings from Last 4 Encounters:   08/21/24 60.9 kg (134 lb 4.8 oz)   07/23/24 59.4 kg (131 lb)   07/13/24 59.6 kg (131 lb 8 oz)   04/17/24 56.7 kg (124 lb 14.4 oz)      BMI (per chart):  Estimated body mass index is 24.56 kg/m  as calculated from the following:    Height as of 8/21/24: 1.575 m (5' 2.01\").    Weight as of 8/21/24: 60.9 kg (134 lb 4.8 oz).     Depression Symptoms (Patient Health Questionnaire 9; PHQ-9)  The PHQ-9 is a measure of depressive symptoms.  Scores on this measure range from 0 to 27 with higher scores reflecting greater levels and frequency of depressive symptoms. Typical symptom ranges include: 0-4 minimal, 5-9 mild, 10-14 moderate, 15-19 moderately severe, 20-27 severe.       10/12/2023     1:58 PM 10/24/2023     9:04 AM   PHQ-9 SCORE   PHQ-9 Total Score MyChart  7 (Mild depression)   PHQ-9 Total Score 20 7     Anxiety Symptoms (Generalized Anxiety Disorder 7; FRACISCO-7)  The FRACISCO-7 is a measure of anxiety and panic symptoms.  Scores on this measure range from 0 to 21 with higher scores reflecting greater levels and frequency of anxiety symptoms. Typical symptom ranges include: 0-4 minimal, 5-9 mild, 10-14 moderate, 15-21 severe.       3/28/2024     9:24 AM 5/9/2024     1:53 PM 9/6/2024     8:30 AM   FRACISCO-7 SCORE   Total Score 8 (mild " anxiety) 5 (mild anxiety) 10 (moderate anxiety)   Total Score 8 5 10     Alcohol and Drug Abuse (CAGE - Adapted to Include Drugs; CAGE-AID)  The CAGE-AID is a screening tool to assess for symptoms of alcohol or drug abuse or dependence. Scores on this measure range from 0 to 4, with higher scores reflecting experiences consistent with problem use.  CAGE-AID score  > 1 is a positive screen, suggesting further discussion is needed to determine if evaluation for alcohol or substance abuse is appropriate.  A score > 2 is considered clinically significant, suggesting further evaluation of alcohol or substance-related problems is indicated.      2/22/2024    10:39 AM   CAGE-AID Total Score   Total Score 0   Total Score MyChart 0 (A total score of 2 or greater is considered clinically significant)     Global Health (Patient-Reported Outcomes Measurement Information System; PROMIS-10)  The PROMIS-10 is a measure of global physical and mental health. Total scores on this measure range from 8 to 40, with higher scores reflecting greater levels of perceived health.       2/22/2024    10:38 AM 9/6/2024     8:33 AM   PROMIS-10 Scores Only   Global Mental Health Score 11 8   Global Physical Health Score 14 16   PROMIS TOTAL - SUBSCORES 25 24     Suicidality (Davin Suicide Severity Rating Scale Screener Past Month)      7/13/2024    11:22 PM   Davin Suicide Severity Rating (Short Version)   Q1 Wished to be Dead (Past Month) 0-->no   Q2 Suicidal Thoughts (Past Month) 0-->no   Q6 Suicide Behavior (Lifetime) 0-->no     Assessment:  Patient was engaged in treatment. Patient appears to be experiencing depression symptoms in the mild range and anxiety symptoms in the moderate range. Currently, the patient appears to be coping with minimal success. Progress towards treatment goals: ongoing.     Plan:  Return for therapy sessions.     Session Length:  Start Time: 9:00am  End Time: 9:40am    Modality: Telemedicine Information:  Type  of service:   Telemedicine psychotherapy  Patient location:   Patient home in [place]    Patient telephone number: [phone]  Provider location:  [facility] 3 Health Psychology Office  Mode of Communication:   Video Conference via Mastodon C   Patient consent to telemedicine services? Yes  It has been determined that telemedicine service is appropriate and effective for this patient.   The patient has been notified that: Video visits will be conducted via a call with their psychologist to provide the care they need with a video conversation. Video visits may be billed at different rates depending on insurance coverage.  Patients are advised to contact their insurance provider with any questions about their health insurance coverage. If during the course of a call the psychologist feels a video visit is not appropriate, patients will not be charged for this service.    Johnny Escobar, PhD  Clinical Health Psychology Fellow    *This case is being supervised by Iglesia Cole, Ph.D., A.B.P.P., L.P..  *This note was completed using Dragon voice recognition software. Although reviewed after completion, some word and grammatical errors may occur.    Last treatment plan completed: 3/14/24  Next treatment plan due: 3/14/25

## 2024-09-09 ENCOUNTER — VIRTUAL VISIT (OUTPATIENT)
Dept: CARDIOLOGY | Facility: CLINIC | Age: 34
End: 2024-09-09
Attending: INTERNAL MEDICINE
Payer: COMMERCIAL

## 2024-09-09 VITALS — HEIGHT: 62 IN | BODY MASS INDEX: 24.56 KG/M2

## 2024-09-09 DIAGNOSIS — O99.419 HEART DISEASE DURING PREGNANCY, ANTEPARTUM: Primary | ICD-10-CM

## 2024-09-09 DIAGNOSIS — Z95.810 ICD (IMPLANTABLE CARDIOVERTER-DEFIBRILLATOR) IN PLACE: ICD-10-CM

## 2024-09-09 DIAGNOSIS — I50.21 ACUTE SYSTOLIC HEART FAILURE (H): ICD-10-CM

## 2024-09-09 DIAGNOSIS — I51.9 HEART DISEASE DURING PREGNANCY, ANTEPARTUM: Primary | ICD-10-CM

## 2024-09-09 DIAGNOSIS — I46.9 CARDIAC ARREST (H): ICD-10-CM

## 2024-09-09 PROCEDURE — 99214 OFFICE O/P EST MOD 30 MIN: CPT | Mod: 95 | Performed by: INTERNAL MEDICINE

## 2024-09-09 ASSESSMENT — PAIN SCALES - GENERAL: PAINLEVEL: NO PAIN (0)

## 2024-09-09 ASSESSMENT — PATIENT HEALTH QUESTIONNAIRE - PHQ9: SUM OF ALL RESPONSES TO PHQ QUESTIONS 1-9: 10

## 2024-09-09 NOTE — PATIENT INSTRUCTIONS
Critical Care Cardiology Survivor Clinic                                                                                                                You were seen today in the Critical Care Cardiology Survivor Clinic at the [facility]:       Dr. 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     -What is Health Psychology?  Health Psychology is a specialty that helps people cope with the stress and anxiety that often occurs with illness, emotional and psychological issues, and preparation for medical treatment including surgical procedures.    Please contact our psychologists directly with questions or to make an appointment.    Johnny Escobar, Ph.D.    [phone]  Shivani Sanders, Ph.D.,                 [phone]  Suzanne Camara, Ph.D.                      [phone]  Shanda Butler, Ph.D.,                    [phone]          Iglesia Cole, Ph.D., [facility],   [phone]       Thank you for your visit today!     Please MyChart message me or call Brianda Keita RN or Fadia Jennings RN if you have any questions or concerns.      During Business Hours:  [phone], option # 1 (University) then option # 4 (medical questions) and ask to speak with my nurse.     After hours, weekends or holidays:   [phone], Option #4  Ask to speak to the On-Call Cardiologist. Inform them you are a heart failure patient at the [facility].

## 2024-09-09 NOTE — LETTER
9/9/2024      RE: Estela Fry  [address] [place] [address]       Dear Colleague,    Thank you for the opportunity to participate in the care of your patient, Estela Fry, at the [facility] [place] at [facility]. Please see a copy of my visit note below.    Virtual Visit Details    Type of service:  Video Visit   Video Start Time:  8:50AM  Video End Time:9:05 AM    Originating Location (pt. Location): Home    Distant Location (provider location):  On-site  Platform used for Video Visit: [facility]      Critical Care Cardiology Surviv[facility] Clinic  09/09/2024        History of Presenting Illness:  Ms Fry is a 34 year old  female with a pmhx of VF arrest on 8/5/2023 --> shocked x 3 --> ROSC s/p subcutaneous ICD, atrial flutter, DVT on xarelto. Discharged on 8/16/2023.     The patient has had a prior work up for cardiac arrest/CM which was negative for CAD, drugs, CMR without LGE or fibrosis. Family history unrevealing. Last seen by Dr. Valadez 10/12/23. She indicated that the patient and the family have had difficulty coping since the cardiac arrest. She was referred to genetics, neuropsych, and behavioral health. She has been following with EP as well. She was hospitalized 1/15 with new atrial flutter at rates of 200-240 bpm resulting in x3 ICD shocks. Cardioverted at that time. EP started sotalol 80 mg BID 1/18/24. EP offered ablation but the patient declined.     Today she presents for her 1 year visit.  She is doing well.  Still with some anxiety related to her event and working through the health concerns.  She has occasional chest pressure/pain lasting seconds over her implanted ICD.  She notes that this occurs at rest and during exertion without pattern.  She also feels occasional dropped beats unrelated to exertion or body position.  She has had routine device evaluations revealing no concerning arrhythmias or treatment.  She routinely walks in  "the park with her  and is active with her children without symptoms.  She otherwise denies any SOB, edema, orthopnea, PND, syncope or near syncope.    Home medications:   Current Outpatient Medications   Medication Sig Dispense Refill     levonorgestrel (MIRENA) 52 MG (20 mcg/day) IUD 1 each by Intrauterine route once       rivaroxaban ANTICOAGULANT (XARELTO) 20 MG TABS tablet Take 1 tablet (20 mg) by mouth daily (with dinner). 30 tablet 3     sotalol (BETAPACE) 80 MG tablet Take 1 tablet (80 mg) by mouth 2 times daily 180 tablet 1     ferrous sulfate (FE TABS) 325 (65 Fe) MG EC tablet Take 1 tablet (325 mg) by mouth Every Mon, Wed, Fri Morning (Patient not taking: Reported on 8/21/2024) 30 tablet 2     rivaroxaban ANTICOAGULANT (XARELTO) 20 MG TABS tablet Take 1 tablet (20 mg) by mouth daily (with dinner) 90 tablet 3       Allergies:  No Known Allergies    Past Medical History:  Past Medical History:   Diagnosis Date     Automatic implantable cardioverter-defibrillator in situ      Cardiac arrest (H)      History of atrial flutter      History of venous thromboembolism        Family History:  No family history on file.    Social History:  Estela  reports no history of alcohol use. and  reports that she has never smoked. She has never been exposed to tobacco smoke. She has never used smokeless tobacco..    ROS:  A complete 10-point ROS was negative except as above.    Physical Exam:  Ht 1.575 m (5' 2\")   BMI 24.56 kg/m    Body mass index is 24.56 kg/m .  Wt Readings from Last 2 Encounters:   08/21/24 60.9 kg (134 lb 4.8 oz)   07/23/24 59.4 kg (131 lb)     Gen: alert, interactive, NAD  HEENT: atraumatic, EOMI, MMM  Neck: supple, no JVP elevation appreciated.  CV: RRR, no murmurs, rubs.  Chest: CTAB, no wheezes or crackles  Abd: soft, NT, ND, +BS  Ext: no LE edema, 2+ peripheral pulses  Skin: warm and dry, no rashes on exposed surfaces  Neuro: alert and oriented, no focal deficits    Labs:   Labs and cardiac " data reviewed personally in clinic.    CMP:  Sodium   Date Value Ref Range Status   10/18/2023 137 135 - 145 mmol/L Final     Comment:     Reference intervals for this test were updated on 09/26/2023 to more accurately reflect our healthy population. There may be differences in the flagging of prior results with similar values performed with this method. Interpretation of those prior results can be made in the context of the updated reference intervals.      Potassium   Date Value Ref Range Status   10/18/2023 3.9 3.4 - 5.3 mmol/L Final     Potassium POCT   Date Value Ref Range Status   08/05/2023 3.4 (L) 3.5 - 5.0 mmol/L Final     Comment:     CRITICAL RESULTS NOTED BY CCL  and MD     Chloride   Date Value Ref Range Status   10/18/2023 100 98 - 107 mmol/L Final     Carbon Dioxide (CO2)   Date Value Ref Range Status   10/18/2023 30 (H) 22 - 29 mmol/L Final     Anion Gap   Date Value Ref Range Status   10/18/2023 7 7 - 15 mmol/L Final     Glucose   Date Value Ref Range Status   10/18/2023 87 70 - 99 mg/dL Final     GLUCOSE BY METER POCT   Date Value Ref Range Status   08/15/2023 91 70 - 99 mg/dL Final     Urea Nitrogen   Date Value Ref Range Status   10/18/2023 10.4 6.0 - 20.0 mg/dL Final     Creatinine   Date Value Ref Range Status   10/18/2023 0.72 0.51 - 0.95 mg/dL Final     GFR Estimate   Date Value Ref Range Status   10/18/2023 >90 >60 mL/min/1.73m2 Final     GFR, ESTIMATED POCT   Date Value Ref Range Status   08/05/2023 43 (L) >60 mL/min/1.73m2 Final     Calcium   Date Value Ref Range Status   10/18/2023 9.4 8.6 - 10.0 mg/dL Final     Bilirubin Total   Date Value Ref Range Status   10/18/2023 0.5 <=1.2 mg/dL Final     Alkaline Phosphatase   Date Value Ref Range Status   10/18/2023 83 35 - 104 U/L Final     ALT   Date Value Ref Range Status   10/18/2023 23 0 - 50 U/L Final     Comment:     Reference intervals for this test were updated on 6/12/2023 to more accurately reflect our healthy population. There may be  differences in the flagging of prior results with similar values performed with this method. Interpretation of those prior results can be made in the context of the updated reference intervals.       AST   Date Value Ref Range Status   10/18/2023 19 0 - 45 U/L Final     Comment:     Reference intervals for this test were updated on 2023 to more accurately reflect our healthy population. There may be differences in the flagging of prior results with similar values performed with this method. Interpretation of those prior results can be made in the context of the updated reference intervals.       CBC  CBC RESULTS:   Recent Labs   Lab Test 24  1049   WBC 7.4   RBC 4.67   HGB 12.7   HCT 38.8   MCV 83   MCH 27.2   MCHC 32.7   RDW 13.5          LIPIDS  Recent Labs   Lab Test 23  2108   CHOL 115   HDL 61   LDL 40   TRIG 71       TSH  TSH   Date Value Ref Range Status   2024 1.84 0.30 - 4.20 uIU/mL Final       HBA1C  Lab Results   Component Value Date    A1C 5.4 2023         Cardiac Data:     Coronary angiography 2023: no CAD     CMR 8/10/2023: LVEF 64%, RVEF 59%, no valve disease, no fibrosis     EK/1/24: NSR  8/15/2023: sinus 80 bpm, rsR' V1/V2, normal QTc     Device interrogation 2024:  x3 ICD shocks on 1/15/24, ventricular rates 200-240 bpm. EGM suggested SVT    Device interrogation 2024:  No arrhythmia or treatments    Assessment/Plan:  Estela Fry is a 34 year old female with a pmhx sig for VF arrest on 2023 with rosc after three shocks, no apparent etiology. She had ICD shocks in 2024 for atrial flutter as well.  She is now presenting for follow-up.     #Cardiac arrest: etiology uncertain, icd in place: LVEF normal.  No cardiac symptoms.  - Pursuing behavioral Health  - Discussed [place] sudden cardiac arrest  group details ([email or web address]; [phone])  - S-ICD in place, following with EP     #Atrial flutter  #c/b ICD shock 1/15/24:  - s/p 3  ICD shocks. EP evaluated, felt to be atrial flutter. Started on sotalol 80 mg BID. No recurrences thus far. Patient declined ablation  - CHADsVASC 0  - EP mgmt of sotalol     Follow up 12 months    I personally spent 30 min today reviewing the medical record, meeting with the patient, and completing this note.    Thank you for allowing us to take part in the care of this very pleasant patient.  Please do not hesitate to call if any further questions or concerns arise.    Sid Liz MD, PhD  Interventional/Critical Care Cardiology  [phone]    September 9, 2024      Please do not hesitate to contact me if you have any questions/concerns.     Sincerely,     Sid Liz MD

## 2024-09-09 NOTE — NURSING NOTE
PHQ-9 score is 10.     Current patient location: [address] [place] [address]    Is the patient currently in the state of [place]? YES    Visit mode:VIDEO    If the visit is dropped, the patient can be reconnected by: VIDEO VISIT: Text to cell phone:   Telephone Information:   Mobile [phone]       Will anyone else be joining the visit? NO  (If patient encounters technical issues they should call [phone]    How would you like to obtain your AVS? MyChart    Are changes needed to the allergy or medication list? No    Are refills needed on medications prescribed by this physician? NO    Rooming Documentation:  Questionnaire(s) completed      Reason for visit: JOHNNIE Walker VVF    Depression Response    Patient completed the PHQ-9 assessment for depression and scored >9? Yes  Question 9 on the PHQ-9 was positive for suicidality? No  Does patient have current mental health provider? Yes    Is this a virtual visit? Yes   Does patient have suicidal ideation (positive question 9)? No - offer to place Mental Health Referral.  Patient declined referral/not needed    I personally notified the following: visit provider- PHQ-9 score placed in the message column for the provider.

## 2024-09-09 NOTE — PROGRESS NOTES
Virtual Visit Details    Type of service:  Video Visit   Video Start Time:  8:50AM  Video End Time:9:05 AM    Originating Location (pt. 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 Discharged on 8/16/2023.     The patient has had a prior work up for cardiac arrest/CM which was negative for CAD, drugs, CMR without LGE or fibrosis. Family history unrevealing. Last seen by Dr. Valadez 10/12/23. She indicated that the patient and the family have had difficulty coping since the cardiac arrest. She was referred to genetics, neuropsych, and behavioral health. She has been following with EP as well. She was hospitalized 1/15 with new atrial flutter at rates of 200-240 bpm resulting in x3 ICD shocks. Cardioverted at that time. EP started sotalol 80 mg BID 1/18/24. EP offered ablation but the patient declined.     Today she presents for her 1 year visit.  She is doing well.  Still with some anxiety related to her event and working through the health concerns.  She has occasional chest pressure/pain lasting seconds over her implanted ICD.  She notes that this occurs at rest and during exertion without pattern.  She also feels occasional dropped beats unrelated to exertion or body position.  She has had routine device evaluations revealing no concerning arrhythmias or treatment.  She routinely walks in the park with her  and is active with her children without symptoms.  She otherwise denies any SOB, edema, orthopnea, PND, syncope or near syncope.    Home medications:   Current Outpatient Medications   Medication Sig Dispense Refill    levonorgestrel (MIRENA) 52 MG (20 mcg/day) IUD 1 each by Intrauterine route once       "rivaroxaban ANTICOAGULANT (XARELTO) 20 MG TABS tablet Take 1 tablet (20 mg) by mouth daily (with dinner). 30 tablet 3    sotalol (BETAPACE) 80 MG tablet Take 1 tablet (80 mg) by mouth 2 times daily 180 tablet 1    ferrous sulfate (FE TABS) 325 (65 Fe) MG EC tablet Take 1 tablet (325 mg) by mouth Every Mon, Wed, Fri Morning (Patient not taking: Reported on 8/21/2024) 30 tablet 2    rivaroxaban ANTICOAGULANT (XARELTO) 20 MG TABS tablet Take 1 tablet (20 mg) by mouth daily (with dinner) 90 tablet 3       Allergies:  No Known Allergies    Past Medical History:  Past Medical History:   Diagnosis Date    Automatic implantable cardioverter-defibrillator in situ     Cardiac arrest (H)     History of atrial flutter     History of venous thromboembolism        Family History:  No family history on file.    Social History:  Estela  reports no history of alcohol use. and  reports that she has never smoked. She has never been exposed to tobacco smoke. She has never used smokeless tobacco..    ROS:  A complete 10-point ROS was negative except as above.    Physical Exam:  Ht 1.575 m (5' 2\")   BMI 24.56 kg/m    Body mass index is 24.56 kg/m .  Wt Readings from Last 2 Encounters:   08/21/24 60.9 kg (134 lb 4.8 oz)   07/23/24 59.4 kg (131 lb)     Gen: alert, interactive, NAD  HEENT: atraumatic, EOMI, MMM  Neck: supple, no JVP elevation appreciated.  CV: RRR, no murmurs, rubs.  Chest: CTAB, no wheezes or crackles  Abd: soft, NT, ND, +BS  Ext: no LE edema, 2+ peripheral pulses  Skin: warm and dry, no rashes on exposed surfaces  Neuro: alert and oriented, no focal deficits    Labs:   Labs and cardiac data reviewed personally in clinic.    CMP:  Sodium   Date Value Ref Range Status   10/18/2023 137 135 - 145 mmol/L Final     Comment:     Reference intervals for this test were updated on 09/26/2023 to more accurately reflect our healthy population. There may be differences in the flagging of prior results with similar values performed with " this method. Interpretation of those prior results can be made in the context of the updated reference intervals.      Potassium   Date Value Ref Range Status   10/18/2023 3.9 3.4 - 5.3 mmol/L Final     Potassium POCT   Date Value Ref Range Status   08/05/2023 3.4 (L) 3.5 - 5.0 mmol/L Final     Comment:     CRITICAL RESULTS NOTED BY CCL  and MD     Chloride   Date Value Ref Range Status   10/18/2023 100 98 - 107 mmol/L Final     Carbon Dioxide (CO2)   Date Value Ref Range Status   10/18/2023 30 (H) 22 - 29 mmol/L Final     Anion Gap   Date Value Ref Range Status   10/18/2023 7 7 - 15 mmol/L Final     Glucose   Date Value Ref Range Status   10/18/2023 87 70 - 99 mg/dL Final     GLUCOSE BY METER POCT   Date Value Ref Range Status   08/15/2023 91 70 - 99 mg/dL Final     Urea Nitrogen   Date Value Ref Range Status   10/18/2023 10.4 6.0 - 20.0 mg/dL Final     Creatinine   Date Value Ref Range Status   10/18/2023 0.72 0.51 - 0.95 mg/dL Final     GFR Estimate   Date Value Ref Range Status   10/18/2023 >90 >60 mL/min/1.73m2 Final     GFR, ESTIMATED POCT   Date Value Ref Range Status   08/05/2023 43 (L) >60 mL/min/1.73m2 Final     Calcium   Date Value Ref Range Status   10/18/2023 9.4 8.6 - 10.0 mg/dL Final     Bilirubin Total   Date Value Ref Range Status   10/18/2023 0.5 <=1.2 mg/dL Final     Alkaline Phosphatase   Date Value Ref Range Status   10/18/2023 83 35 - 104 U/L Final     ALT   Date Value Ref Range Status   10/18/2023 23 0 - 50 U/L Final     Comment:     Reference intervals for this test were updated on 6/12/2023 to more accurately reflect our healthy population. There may be differences in the flagging of prior results with similar values performed with this method. Interpretation of those prior results can be made in the context of the updated reference intervals.       AST   Date Value Ref Range Status   10/18/2023 19 0 - 45 U/L Final     Comment:     Reference intervals for this test were updated on 6/12/2023  to more accurately reflect our healthy population. There may be differences in the flagging of prior results with similar values performed with this method. Interpretation of those prior results can be made in the context of the updated reference intervals.       CBC  CBC RESULTS:   Recent Labs   Lab Test 24  1049   WBC 7.4   RBC 4.67   HGB 12.7   HCT 38.8   MCV 83   MCH 27.2   MCHC 32.7   RDW 13.5          LIPIDS  Recent Labs   Lab Test 23  2108   CHOL 115   HDL 61   LDL 40   TRIG 71       TSH  TSH   Date Value Ref Range Status   2024 1.84 0.30 - 4.20 uIU/mL Final       HBA1C  Lab Results   Component Value Date    A1C 5.4 2023         Cardiac Data:     Coronary angiography 2023: no CAD     CMR 8/10/2023: LVEF 64%, RVEF 59%, no valve disease, no fibrosis     EK/1/24: NSR  8/15/2023: sinus 80 bpm, rsR' V1/V2, normal QTc     Device interrogation 2024:  x3 ICD shocks on 1/15/24, ventricular rates 200-240 bpm. EGM suggested SVT    Device interrogation 2024:  No arrhythmia or treatments    Assessment/Plan:  Estela Fry is a 34 year old female with a pmhx sig for VF arrest on 2023 with rosc after three shocks, no apparent etiology. She had ICD shocks in 2024 for atrial flutter as well.  She is now presenting for follow-up.     #Cardiac arrest: etiology uncertain, icd in place: LVEF normal.  No cardiac symptoms.  - Pursuing behavioral Health  - Discussed [place] sudden cardiac arrest  group details ([email or web address]; [phone])  - S-ICD in place, following with EP     #Atrial flutter  #c/b ICD shock 1/15/24:  - s/p 3 ICD shocks. EP evaluated, felt to be atrial flutter. Started on sotalol 80 mg BID. No recurrences thus far. Patient declined ablation  - CHADsVASC 0  - EP mgmt of sotalol     Follow up 12 months    I personally spent 30 min today reviewing the medical record, meeting with the patient, and completing this note.    Thank you for allowing us to  take part in the care of this very pleasant patient.  Please do not hesitate to call if any further questions or concerns arise.    Sid Liz MD, PhD  Interventional/Critical Care Cardiology  [phone]    September 9, 2024

## 2024-09-17 ENCOUNTER — VIRTUAL VISIT (OUTPATIENT)
Dept: PSYCHOLOGY | Facility: CLINIC | Age: 34
End: 2024-09-17
Payer: COMMERCIAL

## 2024-09-17 ENCOUNTER — ANCILLARY PROCEDURE (OUTPATIENT)
Dept: ULTRASOUND IMAGING | Facility: CLINIC | Age: 34
End: 2024-09-17
Attending: OBSTETRICS & GYNECOLOGY
Payer: COMMERCIAL

## 2024-09-17 ENCOUNTER — OFFICE VISIT (OUTPATIENT)
Dept: OBGYN | Facility: CLINIC | Age: 34
End: 2024-09-17
Attending: OBSTETRICS & GYNECOLOGY
Payer: COMMERCIAL

## 2024-09-17 VITALS
DIASTOLIC BLOOD PRESSURE: 82 MMHG | WEIGHT: 137 LBS | BODY MASS INDEX: 25.06 KG/M2 | HEART RATE: 87 BPM | SYSTOLIC BLOOD PRESSURE: 121 MMHG

## 2024-09-17 DIAGNOSIS — N93.9 ABNORMAL UTERINE BLEEDING (AUB): Primary | ICD-10-CM

## 2024-09-17 DIAGNOSIS — Z95.810 ICD (IMPLANTABLE CARDIOVERTER-DEFIBRILLATOR) IN PLACE: ICD-10-CM

## 2024-09-17 DIAGNOSIS — N93.9 ABNORMAL UTERINE BLEEDING (AUB): ICD-10-CM

## 2024-09-17 DIAGNOSIS — F06.4 ANXIETY DISORDER DUE TO MEDICAL CONDITION: Primary | ICD-10-CM

## 2024-09-17 DIAGNOSIS — I25.2 HISTORY OF MI (MYOCARDIAL INFARCTION): ICD-10-CM

## 2024-09-17 DIAGNOSIS — Z97.5 IUD (INTRAUTERINE DEVICE) IN PLACE: ICD-10-CM

## 2024-09-17 PROCEDURE — 99213 OFFICE O/P EST LOW 20 MIN: CPT | Performed by: OBSTETRICS & GYNECOLOGY

## 2024-09-17 PROCEDURE — 76830 TRANSVAGINAL US NON-OB: CPT | Mod: 26 | Performed by: STUDENT IN AN ORGANIZED HEALTH CARE EDUCATION/TRAINING PROGRAM

## 2024-09-17 PROCEDURE — 76830 TRANSVAGINAL US NON-OB: CPT

## 2024-09-17 PROCEDURE — 76856 US EXAM PELVIC COMPLETE: CPT | Mod: 26 | Performed by: STUDENT IN AN ORGANIZED HEALTH CARE EDUCATION/TRAINING PROGRAM

## 2024-09-17 PROCEDURE — 76856 US EXAM PELVIC COMPLETE: CPT

## 2024-09-17 PROCEDURE — G0463 HOSPITAL OUTPT CLINIC VISIT: HCPCS | Performed by: OBSTETRICS & GYNECOLOGY

## 2024-09-17 PROCEDURE — 90832 PSYTX W PT 30 MINUTES: CPT | Mod: 95

## 2024-09-17 NOTE — PROGRESS NOTES
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 30 tablet 3    rivaroxaban ANTICOAGULANT (XARELTO) 20 MG TABS tablet Take 1 tablet (20 mg) by mouth daily (with dinner) 90 tablet 3    sotalol (BETAPACE) 80 MG tablet Take 1 tablet (80 mg) by mouth 2 times daily 180 tablet 1     No current facility-administered medications for this visit.     Subjective:  Engaged pt in psychotherapy (CBT) for anxiety following cardiac arrest and ongoing ICD use. Reviewed updates to physical and emotional health since previous session and progress with treatment plan goals. Patient reported that she has continued to experience moments of high anxiety related to ICD-related vibrations and sensations, mostly while trying to sleep at night. Pt reported that these moments occur every few nights and that anxiety lasts for \"a few moments,\" followed by lower ongoing anxiety the following day. Engaged pt in supportive listening and cognitive processing of anxiety. Additionally checked in regarding pt's utilization of anxiety coping strategies during moments of anxiety. Pt reported that she occasionally will attempt using deep breathing or cognitive defusion strategies with \"some success.\" We " "additionally discussed ways to incorporate new anxiety coping skills including body scan activity and re-introduced the leaves on a stream mindful meditation for lingering anxiety the day following ICD sensations.     Patient reported understanding of education and agreement with plan.     Objective   Appearance:   Appropriate   Eye Contact:   Good   Psychomotor Behavior:  Normal   Attitude:   Cooperative   Orientation:   All  Speech   Rate / Production: Normal    Volume:  Normal   Mood:    Euthymic  Affect:    Appropriate   Thought Content:   Clear  Thought Form:   Coherent  Logical     Insight:    Did not formally assess at this time.   Safety:    No safety concerns at this time.     Weight (per chart):  Wt Readings from Last 4 Encounters:   08/21/24 60.9 kg (134 lb 4.8 oz)   07/23/24 59.4 kg (131 lb)   07/13/24 59.6 kg (131 lb 8 oz)   04/17/24 56.7 kg (124 lb 14.4 oz)      BMI (per chart):  Estimated body mass index is 24.56 kg/m  as calculated from the following:    Height as of 9/9/24: 1.575 m (5' 2\").    Weight as of 8/21/24: 60.9 kg (134 lb 4.8 oz).     Depression Symptoms (Patient Health Questionnaire 9; PHQ-9)  The PHQ-9 is a measure of depressive symptoms.  Scores on this measure range from 0 to 27 with higher scores reflecting greater levels and frequency of depressive symptoms. Typical symptom ranges include: 0-4 minimal, 5-9 mild, 10-14 moderate, 15-19 moderately severe, 20-27 severe.       10/12/2023     1:58 PM 10/24/2023     9:04 AM 9/9/2024     8:04 AM   PHQ-9 SCORE   PHQ-9 Total Score MyChart  7 (Mild depression)    PHQ-9 Total Score 20 7 10     Anxiety Symptoms (Generalized Anxiety Disorder 7; FRACISCO-7)  The FRACISCO-7 is a measure of anxiety and panic symptoms.  Scores on this measure range from 0 to 21 with higher scores reflecting greater levels and frequency of anxiety symptoms. Typical symptom ranges include: 0-4 minimal, 5-9 mild, 10-14 moderate, 15-21 severe.       3/28/2024     9:24 AM 5/9/2024    "  1:53 PM 9/6/2024     8:30 AM   FRACISCO-7 SCORE   Total Score 8 (mild anxiety) 5 (mild anxiety) 10 (moderate anxiety)   Total Score 8 5 10     Alcohol and Drug Abuse (CAGE - Adapted to Include Drugs; CAGE-AID)  The CAGE-AID is a screening tool to assess for symptoms of alcohol or drug abuse or dependence. Scores on this measure range from 0 to 4, with higher scores reflecting experiences consistent with problem use.  CAGE-AID score  > 1 is a positive screen, suggesting further discussion is needed to determine if evaluation for alcohol or substance abuse is appropriate.  A score > 2 is considered clinically significant, suggesting further evaluation of alcohol or substance-related problems is indicated.      2/22/2024    10:39 AM   CAGE-AID Total Score   Total Score 0   Total Score MyChart 0 (A total score of 2 or greater is considered clinically significant)     Global Health (Patient-Reported Outcomes Measurement Information System; PROMIS-10)  The PROMIS-10 is a measure of global physical and mental health. Total scores on this measure range from 8 to 40, with higher scores reflecting greater levels of perceived health.       2/22/2024    10:38 AM 9/6/2024     8:33 AM   PROMIS-10 Scores Only   Global Mental Health Score 11 8   Global Physical Health Score 14 16   PROMIS TOTAL - SUBSCORES 25 24     Suicidality ([place] Suicide Severity Rating Scale Screener Past Month)      7/13/2024    11:22 PM   [place] Suicide Severity Rating (Short Version)   Q1 Wished to be Dead (Past Month) 0-->no   Q2 Suicidal Thoughts (Past Month) 0-->no   Q6 Suicide Behavior (Lifetime) 0-->no     Assessment:  Patient was engaged in treatment. Patient appears to be experiencing depression symptoms in the moderate range and anxiety symptoms in the moderate range. Currently, the patient appears to be coping with some success. Progress towards treatment goals: ongoing.     Plan:  Return for therapy sessions.     Session Length:  Start Time:  8:00am  End Time: 8:30am  Pt was only available for 30 min check in. Discussed extending next appointment to 1 hour.     Modality: Telemedicine Information:  Type of service:   Telemedicine psychotherapy  Patient location:   Patient home in [place]    Patient telephone number: [phone]  [place]  [facility] [place] [facility]  Mode of Communication:   Video Conference via [employer]   Patient consent to telemedicine services? Yes  It has been determined that telemedicine service is appropriate and effective for this patient.   The patient has been notified that: Video visits will be conducted via a call with their psychologist to provide the care they need with a video conversation. Video visits may be billed at different rates depending on insurance coverage.  Patients are advised to contact their insurance provider with any questions about their health insurance coverage. If during the course of a call the psychologist feels a video visit is not appropriate, patients will not be charged for this service.    Johnny Escobar, PhD  Clinical Health Psychology Fellow    *This case is being supervised by Iglesia Cole, Ph.D., A.B.P.P., L.P..  *This note was completed using Dragon voice recognition software. Although reviewed after completion, some word and grammatical errors may occur.    Last treatment plan completed: 3/14/24  Next treatment plan due: 3/14/25

## 2024-09-17 NOTE — PATIENT INSTRUCTIONS
Thank you for trusting us with your care!   Please be aware, if you are on Mychart, you may see your results prior to your providers review. If labs are abnormal, we will call or message you on [employer] with a follow up plan.    If you need to contact us for questions about:  Symptoms, Scheduling & Medical Questions; Non-urgent (2-3 day response) [employer] message, Urgent (needing response today) [phone] (if after 3:30pm next day response)   Prescriptions: Please call your Pharmacy   Billing: Cathy [phone] or [facility] [facility]:[phone]

## 2024-09-17 NOTE — LETTER
2024       RE: Estela Fry  [address] [place] [address]     Dear Colleague,    Thank you for referring your patient, Estela Fry, to the [facility] [place] at [facility]. Please see a copy of my visit note below.    [facility]  Complex Family Planning Followup Visit    SUBJECTIVE    Estela Fry is a 34 year old  who is here for pelvic US to confirm IUD placement. 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 Was experiencing significant bleeding with her periods and had an IUD in place which was spontaneously expelled in 2024 and subsequently replaced. Has noticed improvement in her bleeding since IUD was replaced. Has some spotting for several days but is not overtly bothersome. No longer interested in a tubal ligation.      Her LMP is: Patient's last menstrual period was 08/15/2024.    PAST MEDICAL HISTORY  Past Medical History:   Diagnosis Date     Automatic implantable cardioverter-defibrillator in situ      Cardiac arrest (H)      History of atrial flutter      History of venous thromboembolism        MEDICATIONS  Current Outpatient Medications   Medication Sig Dispense Refill     rivaroxaban ANTICOAGULANT (XARELTO) 20 MG TABS tablet Take 1 tablet (20 mg) by mouth daily (with dinner) 90 tablet 3     sotalol (BETAPACE) 80 MG tablet Take 1 tablet (80 mg) by mouth 2 times daily 180 tablet 1     levonorgestrel (MIRENA) 52 MG (20 mcg/day) IUD 1 each by Intrauterine route once       No current facility-administered medications for this visit.       ALLERGIES  No Known Allergies    OBJECTIVE  /82   Pulse 87   Wt 62.1 kg (137 lb)   LMP 08/15/2024   BMI 25.06 kg/m      General: Alert, without distress   Cardiovascular: regular rate and well perfused   Lungs: breathing comfortably on room air   Pelvic: deferred   Extremities: normal    Pelvic Ultrasonography  24:  Gynecological Ultrasonography:   Uterus: retroverted. Contour is smooth/regular.   Size: 6.6 x 5.0 x 4.5 cm   Endometrium: Thickness Total 3.6 mm   Findings: IUD in situ   Right Ovary: 2.9 x 2.0 x 1.5 cm. Wnl   Left Ovary: 2.9 x 2.0 x 1.3 cm. Wnl   Cul de Sac Free Fluid: No free fluid   Technique: Transvaginal Imaging performed and Transabdominal Imaging performed   Impression:   IUD in correct position. Normal pelvic ultrasound.    ASSESSMENT  Estela Fry is a 34 year old  who is here for a pelvic US to reassess IUD placement. IUD placed in 2024 for AUB secondary to xarelto use. Has noticed improvement in her periods since placement, no pain, or concerns. Images reviewed and IUD is appropriately placed.    PLAN  Encounter Diagnoses   Name Primary?     Abnormal uterine bleeding (AUB) Yes     ICD (implantable cardioverter-defibrillator) in place      History of MI (myocardial infarction)      IUD (intrauterine device) in place      Given history of expelled IUDs in past, okay for repeat pelvic US to reassess IUD placement in 1 year. Order placed. Patient will be contacted 6 months in advance to schedule. Reviewed signs/symptoms of IUD expulsion and when to return.     Patient to return to clinic in 1 year or sooner as needed.     The patient was seen and discussed with the attending physician, Dr. Tavera.     Narcisa Carbone, MS4    OBGYN Attending Addendum    I appreciate the note above by medical student, Narcisa Carbone.  I, Leyla Tavera, was present with the medical student, who participated in the service and in the documentation of the note. I have verified the history and personally performed the physical exam and medical decision making. I have edited accordingly and agree with the assessment and plan of care as documented in the note.    Leyla Tavera MD, MSCI    [facility]/OBGYN  24    Again, thank you for allowing me to participate in the care  of your patient.      Sincerely,    Leyla Tavera MD

## 2024-09-17 NOTE — PROGRESS NOTES
[facility]  Complex Family Planning Followup Visit    SUBJECTIVE    Estela Fry is a 34 year old  who is here for pelvic US to confirm IUD placement. 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 Was experiencing significant bleeding with her periods and had an IUD in place which was spontaneously expelled in 2024 and subsequently replaced. Has noticed improvement in her bleeding since IUD was replaced. Has some spotting for several days but is not overtly bothersome. No longer interested in a tubal ligation.      Her LMP is: Patient's last menstrual period was 08/15/2024.    PAST MEDICAL HISTORY  Past Medical History:   Diagnosis Date    Automatic implantable cardioverter-defibrillator in situ     Cardiac arrest (H)     History of atrial flutter     History of venous thromboembolism        MEDICATIONS  Current Outpatient Medications   Medication Sig Dispense Refill    rivaroxaban ANTICOAGULANT (XARELTO) 20 MG TABS tablet Take 1 tablet (20 mg) by mouth daily (with dinner) 90 tablet 3    sotalol (BETAPACE) 80 MG tablet Take 1 tablet (80 mg) by mouth 2 times daily 180 tablet 1    levonorgestrel (MIRENA) 52 MG (20 mcg/day) IUD 1 each by Intrauterine route once       No current facility-administered medications for this visit.       ALLERGIES  No Known Allergies    OBJECTIVE  /82   Pulse 87   Wt 62.1 kg (137 lb)   LMP 08/15/2024   BMI 25.06 kg/m      General: Alert, without distress   Cardiovascular: regular rate and well perfused   Lungs: breathing comfortably on room air   Pelvic: deferred   Extremities: normal    Pelvic Ultrasonography 24:  Gynecological Ultrasonography:   Uterus: retroverted. Contour is smooth/regular.   Size: 6.6 x 5.0 x 4.5 cm   Endometrium: Thickness Total 3.6 mm   Findings: IUD in situ   Right Ovary: 2.9 x 2.0 x 1.5 cm. Wnl   Left Ovary: 2.9 x 2.0 x 1.3 cm. Wnl   Cul de Sac Free Fluid: No free fluid   Technique: Transvaginal  Imaging performed and Transabdominal Imaging performed   Impression:   IUD in correct position. Normal pelvic ultrasound.    ASSESSMENT  Estela Fry is a 34 year old  who is here for a pelvic US to reassess IUD placement. IUD placed in 2024 for AUB secondary to xarelto use. Has noticed improvement in her periods since placement, no pain, or concerns. Images reviewed and IUD is appropriately placed.    PLAN  Encounter Diagnoses   Name Primary?    Abnormal uterine bleeding (AUB) Yes    ICD (implantable cardioverter-defibrillator) in place     History of MI (myocardial infarction)     IUD (intrauterine device) in place      Given history of expelled IUDs in past, okay for repeat pelvic US to reassess IUD placement in 1 year. Order placed. Patient will be contacted 6 months in advance to schedule. Reviewed signs/symptoms of IUD expulsion and when to return.     Patient to return to clinic in 1 year or sooner as needed.     The patient was seen and discussed with the attending physician, Dr. Tavera.     Narcisa Carbone, MS4    OBGYN Attending Addendum    I appreciate the note above by medical student, Narcisa Carbone.  I, Leyla Tavera, was present with the medical student, who participated in the service and in the documentation of the note. I have verified the history and personally performed the physical exam and medical decision making. I have edited accordingly and agree with the assessment and plan of care as documented in the note.    Leyla Tavera MD, MSCI    [facility]/OBGYN  24

## 2024-09-27 PROBLEM — I48.92 ATRIAL FLUTTER (H): Status: ACTIVE | Noted: 2024-09-27

## 2024-10-02 ENCOUNTER — MYC MEDICAL ADVICE (OUTPATIENT)
Dept: OBGYN | Facility: CLINIC | Age: 34
End: 2024-10-02
Payer: COMMERCIAL

## 2024-10-02 DIAGNOSIS — N39.3 STRESS INCONTINENCE OF URINE: Primary | ICD-10-CM

## 2024-10-04 ENCOUNTER — LAB (OUTPATIENT)
Dept: LAB | Facility: CLINIC | Age: 34
End: 2024-10-04
Payer: COMMERCIAL

## 2024-10-04 DIAGNOSIS — Z95.810 ICD (IMPLANTABLE CARDIOVERTER-DEFIBRILLATOR) IN PLACE: ICD-10-CM

## 2024-10-04 DIAGNOSIS — I50.21 ACUTE SYSTOLIC HEART FAILURE (H): ICD-10-CM

## 2024-10-04 DIAGNOSIS — D62 ANEMIA DUE TO BLOOD LOSS, ACUTE: ICD-10-CM

## 2024-10-04 DIAGNOSIS — I46.9 CARDIAC ARREST (H): ICD-10-CM

## 2024-10-04 DIAGNOSIS — O99.419 HEART DISEASE DURING PREGNANCY, ANTEPARTUM: ICD-10-CM

## 2024-10-04 DIAGNOSIS — Z79.01 CHRONIC ANTICOAGULATION: ICD-10-CM

## 2024-10-04 DIAGNOSIS — I51.9 HEART DISEASE DURING PREGNANCY, ANTEPARTUM: ICD-10-CM

## 2024-10-04 DIAGNOSIS — E61.1 IRON DEFICIENCY: ICD-10-CM

## 2024-10-04 LAB
BASOPHILS # BLD AUTO: 0 10E3/UL (ref 0–0.2)
BASOPHILS NFR BLD AUTO: 0 %
CHOLEST SERPL-MCNC: 134 MG/DL
EOSINOPHIL # BLD AUTO: 0.2 10E3/UL (ref 0–0.7)
EOSINOPHIL NFR BLD AUTO: 2 %
ERYTHROCYTE [DISTWIDTH] IN BLOOD BY AUTOMATED COUNT: 13.8 % (ref 10–15)
FASTING STATUS PATIENT QL REPORTED: NO
FERRITIN SERPL-MCNC: 122 NG/ML (ref 6–175)
HCT VFR BLD AUTO: 40.1 % (ref 35–47)
HDLC SERPL-MCNC: 60 MG/DL
HGB BLD-MCNC: 13.3 G/DL (ref 11.7–15.7)
IMM GRANULOCYTES # BLD: 0 10E3/UL
IMM GRANULOCYTES NFR BLD: 0 %
IRON BINDING CAPACITY (ROCHE): 280 UG/DL (ref 240–430)
IRON SATN MFR SERPL: 27 % (ref 15–46)
IRON SERPL-MCNC: 76 UG/DL (ref 37–145)
LDLC SERPL CALC-MCNC: 63 MG/DL
LYMPHOCYTES # BLD AUTO: 1.5 10E3/UL (ref 0.8–5.3)
LYMPHOCYTES NFR BLD AUTO: 20 %
MCH RBC QN AUTO: 28 PG (ref 26.5–33)
MCHC RBC AUTO-ENTMCNC: 33.2 G/DL (ref 31.5–36.5)
MCV RBC AUTO: 84 FL (ref 78–100)
MONOCYTES # BLD AUTO: 0.3 10E3/UL (ref 0–1.3)
MONOCYTES NFR BLD AUTO: 4 %
NEUTROPHILS # BLD AUTO: 5.5 10E3/UL (ref 1.6–8.3)
NEUTROPHILS NFR BLD AUTO: 74 %
NONHDLC SERPL-MCNC: 74 MG/DL
NRBC # BLD AUTO: 0 10E3/UL
NRBC BLD AUTO-RTO: 0 /100
PLATELET # BLD AUTO: 226 10E3/UL (ref 150–450)
RBC # BLD AUTO: 4.75 10E6/UL (ref 3.8–5.2)
RETICS # AUTO: 0.04 10E6/UL (ref 0.03–0.1)
RETICS/RBC NFR AUTO: 0.9 % (ref 0.5–2)
TRIGL SERPL-MCNC: 57 MG/DL
WBC # BLD AUTO: 7.5 10E3/UL (ref 4–11)

## 2024-10-04 PROCEDURE — 82728 ASSAY OF FERRITIN: CPT | Performed by: PATHOLOGY

## 2024-10-04 PROCEDURE — 83550 IRON BINDING TEST: CPT | Performed by: PATHOLOGY

## 2024-10-04 PROCEDURE — 80061 LIPID PANEL: CPT | Performed by: PATHOLOGY

## 2024-10-04 PROCEDURE — 83540 ASSAY OF IRON: CPT | Performed by: PATHOLOGY

## 2024-10-04 PROCEDURE — 36415 COLL VENOUS BLD VENIPUNCTURE: CPT | Performed by: PATHOLOGY

## 2024-10-04 PROCEDURE — 85025 COMPLETE CBC W/AUTO DIFF WBC: CPT | Performed by: PATHOLOGY

## 2024-10-04 PROCEDURE — 85045 AUTOMATED RETICULOCYTE COUNT: CPT | Performed by: PATHOLOGY

## 2024-10-10 ENCOUNTER — MYC REFILL (OUTPATIENT)
Dept: HEMATOLOGY | Facility: CLINIC | Age: 34
End: 2024-10-10
Payer: COMMERCIAL

## 2024-10-10 DIAGNOSIS — I82.C22: ICD-10-CM

## 2024-10-14 DIAGNOSIS — Z95.810 ICD (IMPLANTABLE CARDIOVERTER-DEFIBRILLATOR) IN PLACE: ICD-10-CM

## 2024-10-15 ENCOUNTER — VIRTUAL VISIT (OUTPATIENT)
Dept: PSYCHOLOGY | Facility: CLINIC | Age: 34
End: 2024-10-15
Payer: COMMERCIAL

## 2024-10-15 DIAGNOSIS — F06.4 ANXIETY DISORDER DUE TO MEDICAL CONDITION: Primary | ICD-10-CM

## 2024-10-15 PROCEDURE — 90832 PSYTX W PT 30 MINUTES: CPT | Mod: 95

## 2024-10-15 ASSESSMENT — ANXIETY QUESTIONNAIRES
GAD7 TOTAL SCORE: 11
8. IF YOU CHECKED OFF ANY PROBLEMS, HOW DIFFICULT HAVE THESE MADE IT FOR YOU TO DO YOUR WORK, TAKE CARE OF THINGS AT HOME, OR GET ALONG WITH OTHER PEOPLE?: SOMEWHAT DIFFICULT
1. FEELING NERVOUS, ANXIOUS, OR ON EDGE: NEARLY EVERY DAY
5. BEING SO RESTLESS THAT IT IS HARD TO SIT STILL: SEVERAL DAYS
7. FEELING AFRAID AS IF SOMETHING AWFUL MIGHT HAPPEN: SEVERAL DAYS
IF YOU CHECKED OFF ANY PROBLEMS ON THIS QUESTIONNAIRE, HOW DIFFICULT HAVE THESE PROBLEMS MADE IT FOR YOU TO DO YOUR WORK, TAKE CARE OF THINGS AT HOME, OR GET ALONG WITH OTHER PEOPLE: SOMEWHAT DIFFICULT
6. BECOMING EASILY ANNOYED OR IRRITABLE: SEVERAL DAYS
7. FEELING AFRAID AS IF SOMETHING AWFUL MIGHT HAPPEN: SEVERAL DAYS
GAD7 TOTAL SCORE: 11
2. NOT BEING ABLE TO STOP OR CONTROL WORRYING: SEVERAL DAYS
GAD7 TOTAL SCORE: 11
4. TROUBLE RELAXING: SEVERAL DAYS
3. WORRYING TOO MUCH ABOUT DIFFERENT THINGS: NEARLY EVERY DAY

## 2024-10-15 NOTE — TELEPHONE ENCOUNTER
Pt stated she will be out of medication tonight and has anxiety about running out and not having.  Please send refill ASAP.

## 2024-10-15 NOTE — PROGRESS NOTES
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       Subjective:  Engaged pt in psychotherapy (CBT) for coping with anxiety related to ICD and recent hx of cardiac arrest. Reviewed updates to physical and emotional health since previous session and progress with treatment plan goals. Patient reported that she went to the ER for chest pain ~3 weeks ago. Pt stated that clinical assessment did not indicate a medical emergency but led pt to feeling panicked about sxs. Engaged pt in supportive listening and cognitive processing of recent medical sxs and subsequent increases in anxiety and panic. Pt reported feeling \"okay\" at the current time but feeling generally fearful of body sensations including shortness of breath, chest tightness, and possible skipped heart beats. Pt reported that she has been practicing the leaves on a stream mindful meditation and reported finding this to be \"greatly helpful' with anxiety. Additionally engaged pt in continued psychoeducation about anxiety sensitivity and interoception and discussed additional coping skills for pt to practice including cognitive reframing and focusing on other areas of senses when she is feeling anxious " "including focusing in on the feelings and sensations associated with a stress ball she likes to use. Pt reported a goal of attempting skills at least 1x per day when her children are napping and to try skills when anxiety is high.     Objective   Appearance:   Appropriate   Eye Contact:   Good   Psychomotor Behavior:  Normal   Attitude:   Cooperative   Orientation:   All  Speech   Rate / Production: Normal    Volume:  Normal   Mood:    Anxious   Affect:    Appropriate   Thought Content:   Clear  Thought Form:   Coherent  Logical     Insight:    Did not formally assess at this time.   Safety:    No safety concerns at this time.     Weight (per chart):  Wt Readings from Last 4 Encounters:   09/17/24 62.1 kg (137 lb)   08/21/24 60.9 kg (134 lb 4.8 oz)   07/23/24 59.4 kg (131 lb)   07/13/24 59.6 kg (131 lb 8 oz)        BMI (per chart):  Estimated body mass index is 25.06 kg/m  as calculated from the following:    Height as of 9/9/24: 1.575 m (5' 2\").    Weight as of 9/17/24: 62.1 kg (137 lb).     Depression Symptoms (Patient Health Questionnaire 9; PHQ-9)  The PHQ-9 is a measure of depressive symptoms.  Scores on this measure range from 0 to 27 with higher scores reflecting greater levels and frequency of depressive symptoms. Typical symptom ranges include: 0-4 minimal, 5-9 mild, 10-14 moderate, 15-19 moderately severe, 20-27 severe.       10/12/2023     1:58 PM 10/24/2023     9:04 AM 9/9/2024     8:04 AM   PHQ-9 SCORE   PHQ-9 Total Score MyChart  7 (Mild depression)    PHQ-9 Total Score 20 7 10     Anxiety Symptoms (Generalized Anxiety Disorder 7; FRACISCO-7)  The FRACISCO-7 is a measure of anxiety and panic symptoms.  Scores on this measure range from 0 to 21 with higher scores reflecting greater levels and frequency of anxiety symptoms. Typical symptom ranges include: 0-4 minimal, 5-9 mild, 10-14 moderate, 15-21 severe.       5/9/2024     1:53 PM 9/6/2024     8:30 AM 10/15/2024     1:38 PM   FRACISCO-7 SCORE   Total Score 5 (mild " anxiety) 10 (moderate anxiety) 11 (moderate anxiety)   Total Score 5 10 11     Alcohol and Drug Abuse (CAGE - Adapted to Include Drugs; CAGE-AID)  The CAGE-AID is a screening tool to assess for symptoms of alcohol or drug abuse or dependence. Scores on this measure range from 0 to 4, with higher scores reflecting experiences consistent with problem use.  CAGE-AID score  > 1 is a positive screen, suggesting further discussion is needed to determine if evaluation for alcohol or substance abuse is appropriate.  A score > 2 is considered clinically significant, suggesting further evaluation of alcohol or substance-related problems is indicated.      2/22/2024    10:39 AM   CAGE-AID Total Score   Total Score 0   Total Score MyChart 0 (A total score of 2 or greater is considered clinically significant)     Global Health (Patient-Reported Outcomes Measurement Information System; PROMIS-10)  The PROMIS-10 is a measure of global physical and mental health. Total scores on this measure range from 8 to 40, with higher scores reflecting greater levels of perceived health.       2/22/2024    10:38 AM 9/6/2024     8:33 AM   PROMIS-10 Scores Only   Global Mental Health Score 11 8   Global Physical Health Score 14 16   PROMIS TOTAL - SUBSCORES 25 24     Suicidality ([place] Suicide Severity Rating Scale Screener Past Month)      7/13/2024    11:22 PM   [place] Suicide Severity Rating (Short Version)   Q1 Wished to be Dead (Past Month) 0-->no   Q2 Suicidal Thoughts (Past Month) 0-->no   Q6 Suicide Behavior (Lifetime) 0-->no   Level of Risk per Screen no risks indicated       Assessment:  Patient was engaged in treatment. Patient appears to be experiencing depression symptoms in the moderate range and anxiety symptoms in the moderate range. Currently, the patient appears to be coping with some success. Progress towards treatment goals: ongoing.     Plan:  Return for therapy sessions.     Session Length:  Start Time: 2:00pm  End  Time: 2:30pm  *Pt received call that her son needed to be picked up from school due to injury/illness at 30 minute ashley. Agreed to create practice plan and then end session early today.     Modality: Telemedicine Information:  Type of service:   Telemedicine psychotherapy  Patient location:   Patient home in [place]    Patient telephone number: [phone]  Provider location:  [facility] [place] [facility] 3 Health Psychology Office  Mode of Communication:   Video Conference via [employer]   Patient consent to telemedicine services? Yes  It has been determined that telemedicine service is appropriate and effective for this patient.   The patient has been notified that: Video visits will be conducted via a call with their psychologist to provide the care they need with a video conversation. Video visits may be billed at different rates depending on insurance coverage.  Patients are advised to contact their insurance provider with any questions about their health insurance coverage. If during the course of a call the psychologist feels a video visit is not appropriate, patients will not be charged for this service.    Johnny Escobar, PhD  Clinical Health Psychology Fellow    *This case is being supervised by Iglesia Cole, Ph.D., A.B.P.P., L.P..  *This note was completed using Dragon voice recognition software. Although reviewed after completion, some word and grammatical errors may occur.    Last treatment plan completed: 3/14/24  Next treatment plan due: 3/14/25

## 2024-10-15 NOTE — TELEPHONE ENCOUNTER
[facility] Call [facility]    Phone Message    May a detailed message be left on voicemail: yes     Reason for Call: Other: Pt called in checking on the status of this refill request.      Action Taken: Message routed to:  [facility] Refills    Travel Screening: Not Applicable     Date of Service:

## 2024-10-16 ENCOUNTER — TELEPHONE (OUTPATIENT)
Dept: CARDIOLOGY | Facility: CLINIC | Age: 34
End: 2024-10-16
Payer: COMMERCIAL

## 2024-10-16 DIAGNOSIS — Z95.810 ICD (IMPLANTABLE CARDIOVERTER-DEFIBRILLATOR) IN PLACE: ICD-10-CM

## 2024-10-16 RX ORDER — SOTALOL HYDROCHLORIDE 80 MG/1
80 TABLET ORAL 2 TIMES DAILY
Qty: 180 TABLET | Refills: 1 | OUTPATIENT
Start: 2024-10-16

## 2024-10-16 RX ORDER — SOTALOL HYDROCHLORIDE 80 MG/1
80 TABLET ORAL 2 TIMES DAILY
Qty: 180 TABLET | Refills: 0 | Status: SHIPPED | OUTPATIENT
Start: 2024-10-16

## 2024-10-16 NOTE — TELEPHONE ENCOUNTER
[facility]    Phone Message    May a detailed message be left on voicemail: yes     Reason for Call: Medication Refill Request    Has the patient contacted the pharmacy for the refill? Yes   Name of medication being requested: sotalol (BETAPACE) 80 MG tablet     [facility]      [id number] [place], [place] - [address] AT [facility] [address]    Date medication is needed: asap    {Pt is out of medication     Action Taken: Other: Cardiology     Travel Screening: Not Applicable     Thank you!  [facility]

## 2024-10-16 NOTE — TELEPHONE ENCOUNTER
Pt scheduled for EP follow-up in January. Refill provided.     Garcia Dent, RN   Cardiology Nurse Coordinator

## 2024-10-17 ENCOUNTER — MYC MEDICAL ADVICE (OUTPATIENT)
Dept: OBGYN | Facility: CLINIC | Age: 34
End: 2024-10-17
Payer: COMMERCIAL

## 2024-10-17 DIAGNOSIS — Z30.431 CHECKING OF INTRAUTERINE DEVICE: Primary | ICD-10-CM

## 2024-10-17 NOTE — TELEPHONE ENCOUNTER
Spoke with Estela. Had IUD inserted on 7/23/24 after going to the ER and having a displaced IUD removed (it was in her cervix). Starting in August, the patient started having pelvic pain when sitting, standing, and sometimes after intercourse; pelvic pain is continuing to present. Patient states she cannot feel her strings. RN urged patient to make appt today, as we have availability, she states she needs to get her car fixed per her . Scheduled on 10/29/24 for US and OV with Paulina Mack CNM.

## 2024-10-18 ENCOUNTER — OFFICE VISIT (OUTPATIENT)
Dept: FAMILY MEDICINE | Facility: CLINIC | Age: 34
End: 2024-10-18
Payer: COMMERCIAL

## 2024-10-18 ENCOUNTER — TELEPHONE (OUTPATIENT)
Dept: OBGYN | Facility: CLINIC | Age: 34
End: 2024-10-18
Payer: COMMERCIAL

## 2024-10-18 VITALS
OXYGEN SATURATION: 96 % | DIASTOLIC BLOOD PRESSURE: 64 MMHG | TEMPERATURE: 98.8 F | SYSTOLIC BLOOD PRESSURE: 95 MMHG | HEART RATE: 69 BPM | RESPIRATION RATE: 20 BRPM

## 2024-10-18 DIAGNOSIS — R35.0 URINARY FREQUENCY: ICD-10-CM

## 2024-10-18 DIAGNOSIS — B96.89 BACTERIAL VAGINOSIS: Primary | ICD-10-CM

## 2024-10-18 DIAGNOSIS — N76.0 BACTERIAL VAGINOSIS: Primary | ICD-10-CM

## 2024-10-18 LAB
ALBUMIN UR-MCNC: NEGATIVE MG/DL
APPEARANCE UR: CLEAR
BACTERIA #/AREA URNS HPF: ABNORMAL /HPF
BILIRUB UR QL STRIP: NEGATIVE
CLUE CELLS: PRESENT
COLOR UR AUTO: YELLOW
GLUCOSE UR STRIP-MCNC: NEGATIVE MG/DL
HGB UR QL STRIP: ABNORMAL
KETONES UR STRIP-MCNC: NEGATIVE MG/DL
LEUKOCYTE ESTERASE UR QL STRIP: NEGATIVE
NITRATE UR QL: NEGATIVE
PH UR STRIP: 6 [PH] (ref 5–8)
RBC #/AREA URNS AUTO: ABNORMAL /HPF
SP GR UR STRIP: 1.02 (ref 1–1.03)
SQUAMOUS #/AREA URNS AUTO: ABNORMAL /LPF
TRICHOMONAS, WET PREP: ABNORMAL
UROBILINOGEN UR STRIP-ACNC: 0.2 E.U./DL
WBC #/AREA URNS AUTO: ABNORMAL /HPF
WBC'S/HIGH POWER FIELD, WET PREP: ABNORMAL
YEAST, WET PREP: ABNORMAL

## 2024-10-18 PROCEDURE — 99204 OFFICE O/P NEW MOD 45 MIN: CPT | Performed by: FAMILY MEDICINE

## 2024-10-18 PROCEDURE — 81001 URINALYSIS AUTO W/SCOPE: CPT | Performed by: FAMILY MEDICINE

## 2024-10-18 PROCEDURE — 87210 SMEAR WET MOUNT SALINE/INK: CPT | Performed by: FAMILY MEDICINE

## 2024-10-18 RX ORDER — METRONIDAZOLE 7.5 MG/G
1 GEL VAGINAL DAILY
Qty: 25 G | Refills: 0 | Status: SHIPPED | OUTPATIENT
Start: 2024-10-18 | End: 2024-10-23

## 2024-10-18 NOTE — TELEPHONE ENCOUNTER
[facility] Call Center    Phone Message    May a detailed message be left on voicemail: yes     Reason for Call: Other: Patient called in regards to IUD. There are notes from yesterday. States she's having more frequent abdominal pain and can't urinate much today. Asked to speak with a nurse. Writer sent secure chat, no response. Please contact patient in regards to this message. Thank you     Action Taken: Other: Whs    Travel Screening: Not Applicable     Date of Service:

## 2024-10-18 NOTE — PROGRESS NOTES
Assessment:       Bacterial vaginosis  - metroNIDAZOLE (METROGEL) 0.75 % vaginal gel  Dispense: 25 g; Refill: 0    Urinary frequency  - UA Macroscopic with reflex to Microscopic and Culture - Clinic Collect  - UA Microscopic with Reflex to Culture  - Wet prep - Clinic Collect    Plan:     Symptoms consistent with bacterial vaginosis.  UA negative.  Will treat with MetroGel.  Follow-up if symptoms getting worse or not improving in the next 3 to 4 days.    35 minutes spent by me on the date of the encounter doing     Subjective:       34 year old female presents for evaluation of a 1 month history of increased urinary frequency and urgency.  She denies dysuria.  At times she feels like it is difficult to urinate.  She has vaginal irritation but denies an abnormal discharge.  She has had an IUD for the past month and feels like her symptoms had been going on since she got this inserted.  She has some occasional pelvic cramping.  Denies back pain.    Patient Active Problem List   Diagnosis    Cardiac arrest (H)    Heart disease during pregnancy, antepartum    Dysphonia    Iron deficiency    Chronic anticoagulation    History of DVT (deep vein thrombosis)    History of cardiac arrest    S/P ICD (internal cardiac defibrillator) procedure    Atrial flutter (H)       Past Medical History:   Diagnosis Date    Automatic implantable cardioverter-defibrillator in situ     Cardiac arrest (H)     History of atrial flutter     History of venous thromboembolism        Past Surgical History:   Procedure Laterality Date    CV CENTRAL VENOUS CATHETER PLACEMENT N/A 8/5/2023    Procedure: Central Venous Catheter Placement;  Surgeon: Sid Liz MD;  Location: [facility] CARDIAC CATH LAB    CV CORONARY ANGIOGRAM N/A 8/5/2023    Procedure: Coronary Angiogram;  Surgeon: Sid Liz MD;  Location: [facility] CARDIAC CATH LAB    DILATION AND CURETTAGE SUCTION N/A 2/16/2024    Procedure: DILATION AND CURETTAGE, UTERUS, USING SUCTION,;   Surgeon: Shreya Mcdonnell MD;  [facility]    EP SICD INSERT N/A 8/15/2023    Procedure: Subcutaneous Implantable Cardioverter Defibrillator Implant;  Surgeon: Eula Johnson MD;  [facility]    INSERT INTRAUTERINE DEVICE N/A 2/16/2024    Procedure: placement of Mirena intrauterine device;  Surgeon: Shreya Mcdonnell MD;  [facility] OR       Current Outpatient Medications   Medication Sig Dispense Refill    levonorgestrel (MIRENA) 52 MG (20 mcg/day) IUD 1 each by Intrauterine route once      rivaroxaban ANTICOAGULANT (XARELTO) 20 MG TABS tablet Take 1 tablet (20 mg) by mouth daily (with dinner) 90 tablet 3    sotalol (BETAPACE) 80 MG tablet Take 1 tablet (80 mg) by mouth 2 times daily. 180 tablet 0     No current facility-administered medications for this visit.       No Known Allergies    No family history on file.    Social History     Socioeconomic History    Marital status:      Spouse name: None    Number of children: None    Years of education: None    Highest education level: None   Tobacco Use    Smoking status: Never     Passive exposure: Never    Smokeless tobacco: Never   Substance and Sexual Activity    Alcohol use: Never    Drug use: Never    Sexual activity: Yes     Partners: Male     Birth control/protection: I.U.D.     Social Determinants of Health      [facility], [facility] & [facility]    Financial Resource Strain    [facility] & [facility]    Social Connections   Interpersonal Safety: Not At Risk (9/27/2024)    Received from [facility]    Humiliation, Afraid, Rape, and Kick questionnaire     Fear of Current or Ex-Partner: No     Emotionally Abused: No     Physically Abused: No     Sexually Abused: No         Review of Systems  Pertinent items are noted in HPI.      Objective:     BP 95/64   Pulse 69    Temp 98.8  F (37.1  C) (Oral)   Resp 20   LMP 08/15/2024   SpO2 96%      General appearance: alert, appears stated age, and cooperative  Back: No CVA tenderness  Abdomen: soft, non-tender; bowel sounds normal; no masses,  no organomegaly  Pelvic: deferred, self swab wet prep obtained.      Results for orders placed or performed in visit on 10/18/24   UA Macroscopic with reflex to Microscopic and Culture - Clinic Collect     Status: Abnormal    Specimen: Urine, Clean Catch   Result Value Ref Range    Color Urine Yellow Colorless, Straw, Light Yellow, Yellow    Appearance Urine Clear Clear    Glucose Urine Negative Negative mg/dL    Bilirubin Urine Negative Negative    Ketones Urine Negative Negative mg/dL    Specific Gravity Urine 1.020 1.005 - 1.030    Blood Urine Large (A) Negative    pH Urine 6.0 5.0 - 8.0    Protein Albumin Urine Negative Negative mg/dL    Urobilinogen Urine 0.2 0.2, 1.0 E.U./dL    Nitrite Urine Negative Negative    Leukocyte Esterase Urine Negative Negative   UA Microscopic with Reflex to Culture     Status: Abnormal   Result Value Ref Range    Bacteria Urine Few (A) None Seen /HPF    RBC Urine 2-5 (A) 0-2 /HPF /HPF    WBC Urine 0-5 0-5 /HPF /HPF    Squamous Epithelials Urine Few (A) None Seen /LPF    Narrative    Urine Culture not indicated   Wet prep - Clinic Collect     Status: Abnormal    Specimen: Vagina; Swab   Result Value Ref Range    Trichomonas Absent Absent    Yeast Absent Absent    Clue Cells Present (A) Absent    WBCs/high power field 1+ (A) None       This note has been dictated using voice recognition software. Any grammatical or context distortions are unintentional and inherent to the software

## 2024-10-18 NOTE — TELEPHONE ENCOUNTER
Called Estela, who is reporting an increase in her abdominal pain and new/worsening urinary symptoms, including urinary hesistancy, urinary leakage (though this is a chronic issue per pt). No fever/chills, back pain, dysuria. Due to pt's discomfort and the upcoming weekend, recommended she present to an urgent care to assess for urinary vs pelvic causes of the pain and prompt treatment. Pt amenable to this plan and will head there ASAP.

## 2024-10-23 ENCOUNTER — MYC MEDICAL ADVICE (OUTPATIENT)
Dept: OBGYN | Facility: CLINIC | Age: 34
End: 2024-10-23
Payer: COMMERCIAL

## 2024-10-29 ENCOUNTER — OFFICE VISIT (OUTPATIENT)
Dept: OBGYN | Facility: CLINIC | Age: 34
End: 2024-10-29
Attending: MIDWIFE
Payer: COMMERCIAL

## 2024-10-29 ENCOUNTER — ANCILLARY PROCEDURE (OUTPATIENT)
Dept: ULTRASOUND IMAGING | Facility: CLINIC | Age: 34
End: 2024-10-29
Attending: MIDWIFE
Payer: COMMERCIAL

## 2024-10-29 VITALS
BODY MASS INDEX: 25.06 KG/M2 | DIASTOLIC BLOOD PRESSURE: 71 MMHG | HEIGHT: 62 IN | HEART RATE: 60 BPM | SYSTOLIC BLOOD PRESSURE: 102 MMHG

## 2024-10-29 DIAGNOSIS — Z30.431 CHECKING OF INTRAUTERINE DEVICE: ICD-10-CM

## 2024-10-29 DIAGNOSIS — N92.0 SPOTTING: ICD-10-CM

## 2024-10-29 DIAGNOSIS — Z30.431 IUD CHECK UP: Primary | ICD-10-CM

## 2024-10-29 LAB
ALBUMIN UR-MCNC: NEGATIVE MG/DL
APPEARANCE UR: ABNORMAL
BACTERIAL VAGINOSIS VAG-IMP: POSITIVE
BILIRUB UR QL STRIP: NEGATIVE
CANDIDA DNA VAG QL NAA+PROBE: NOT DETECTED
CANDIDA GLABRATA / CANDIDA KRUSEI DNA: NOT DETECTED
COLOR UR AUTO: ABNORMAL
GLUCOSE UR STRIP-MCNC: NEGATIVE MG/DL
HGB UR QL STRIP: ABNORMAL
KETONES UR STRIP-MCNC: NEGATIVE MG/DL
LEUKOCYTE ESTERASE UR QL STRIP: NEGATIVE
NITRATE UR QL: NEGATIVE
PH UR STRIP: 6 [PH] (ref 5–7)
RBC URINE: 0 /HPF
SP GR UR STRIP: 1 (ref 1–1.03)
SQUAMOUS EPITHELIAL: 2 /HPF
T VAGINALIS DNA VAG QL NAA+PROBE: NOT DETECTED
UROBILINOGEN UR STRIP-MCNC: NORMAL MG/DL
WBC URINE: <1 /HPF

## 2024-10-29 PROCEDURE — 0352U MULTIPLEX VAGINAL PANEL BY PCR: CPT | Performed by: MIDWIFE

## 2024-10-29 PROCEDURE — 99213 OFFICE O/P EST LOW 20 MIN: CPT | Performed by: MIDWIFE

## 2024-10-29 PROCEDURE — 81001 URINALYSIS AUTO W/SCOPE: CPT | Performed by: MIDWIFE

## 2024-10-29 PROCEDURE — 76830 TRANSVAGINAL US NON-OB: CPT

## 2024-10-29 PROCEDURE — G0463 HOSPITAL OUTPT CLINIC VISIT: HCPCS | Performed by: MIDWIFE

## 2024-10-29 PROCEDURE — 76830 TRANSVAGINAL US NON-OB: CPT | Mod: 26 | Performed by: STUDENT IN AN ORGANIZED HEALTH CARE EDUCATION/TRAINING PROGRAM

## 2024-10-29 NOTE — PATIENT INSTRUCTIONS
Thank you for trusting us with your care!   Please be aware, if you are on Mychart, you may see your results prior to your providers review. If labs are abnormal, we will call or message you on [employer] with a follow up plan.    If you need to contact us for questions about:  Symptoms, Scheduling & Medical Questions; Non-urgent (2-3 day response) [facility] message, Urgent (needing response today) [phone] (if after 3:30pm next day response)   Prescriptions: Please call your Pharmacy   Billing: Cathy [phone] or [facility]:[phone]

## 2024-10-29 NOTE — PROGRESS NOTES
Gyn Clinic Visit Note    SUBJECTIVE:   34 year old  female complains intermittent pelvic/abdominal pain and possible spotting vs hematuria. Before IUD removal/replacement one month ago was having 10/10 pain, now this has decreased to about a 1-2/10    A few weeks ago pain returned and had urinary retention/pain, seen at  10/18 and had +BV, treated with Metrogel. Had some pinkish/yellow discharge on metrogel that improved after completed 5 day regimen. Since then, denies urinary frequency, dysuria, but does endorse some slight pink color on toilet paper when wiping, has a hard time figuring out if this is due to her cycle or urinary tract problem.     Denies, fever, chills, known exposure to STD, declines testing. Denies dyspareunia.    Patient's last menstrual period was 10/28/2024 (exact date). Reports this cycle feels lighter than prior to IUD insertion.     OBJECTIVE:   She appears well, afebrile.  Pelvic Exam: Deferred, TVUS showed IUD in place and follicular cyst on right ovary    Multiplex swab self-collected   Urinalysis collected     ASSESSMENT:      IUD check up  Spotting    PLAN:     (Z30.431) IUD check up  (primary encounter diagnosis)  TVUS done today showed IUD in place          (N92.0) Spotting  Will retest for vaginal infection and UTI after treatment with Metrogel and continued spotting vs hematuria. Discussed that intermittent spotting is not unexpected finding during first 6 months- one year on Mirena IUD.   Plan: Multiplex Vaginal Panel by PCR, Routine UA with        micro reflex to culture          Return to clinic if symptoms do not resolve or worsen.      I, Angella Lubin, completed the PFSH and ROS. I then acted as a scribe for CNM for the remainder of the visit. - Angella Lubin, RN, BSN, WHNP     I agree with the PFSH and ROS as completed by the SNM, except for changes made by me. The remainder of the encounter was performed by me and scribed by the SNM. The scribed note accurately reflects  my personal services and decisions made by me.  Paulina Mack CNM APRN

## 2024-10-29 NOTE — LETTER
10/29/2024       RE: Estela Fry  [address] [place] [address]     Dear Colleague,    Thank you for referring your patient, Estela Fry, to the [facility] [place] at [facility]. Please see a copy of my visit note below.    Gyn Clinic Visit Note    SUBJECTIVE:   34 year old  female complains intermittent pelvic/abdominal pain and possible spotting vs hematuria. Before IUD removal/replacement one month ago was having 10/10 pain, now this has decreased to about a 1-2/10    A few weeks ago pain returned and had urinary retention/pain, seen at  10/18 and had +BV, treated with Metrogel. Had some pinkish/yellow discharge on metrogel that improved after completed 5 day regimen. Since then, denies urinary frequency, dysuria, but does endorse some slight pink color on toilet paper when wiping, has a hard time figuring out if this is due to her cycle or urinary tract problem.     Denies, fever, chills, known exposure to STD, declines testing. Denies dyspareunia.    Patient's last menstrual period was 10/28/2024 (exact date). Reports this cycle feels lighter than prior to IUD insertion.     OBJECTIVE:   She appears well, afebrile.  Pelvic Exam: Deferred, TVUS showed IUD in place and follicular cyst on right ovary    Multiplex swab self-collected   Urinalysis collected     ASSESSMENT:      IUD check up  Spotting    PLAN:     (Z30.431) IUD check up  (primary encounter diagnosis)  TVUS done today showed IUD in place          (N92.0) Spotting  Will retest for vaginal infection and UTI after treatment with Metrogel and continued spotting vs hematuria. Discussed that intermittent spotting is not unexpected finding during first 6 months- one year on Mirena IUD.   Plan: Multiplex Vaginal Panel by PCR, Routine UA with        micro reflex to culture          Return to clinic if symptoms do not resolve or worsen.      IAngella, completed the PFS and  ROS. I then acted as a scribe for CNM for the remainder of the visit. - Angella Lubin, RN, BSN, WHNP     I agree with the PFSH and ROS as completed by the SNM, except for changes made by me. The remainder of the encounter was performed by me and scribed by the SNM. The scribed note accurately reflects my personal services and decisions made by me.  Paulina OLIVER            Again, thank you for allowing me to participate in the care of your patient.      Sincerely,    MELODY Boles CNM

## 2024-10-31 ENCOUNTER — TELEPHONE (OUTPATIENT)
Dept: OBGYN | Facility: CLINIC | Age: 34
End: 2024-10-31
Payer: COMMERCIAL

## 2024-10-31 DIAGNOSIS — B96.89 BACTERIAL VAGINOSIS: Primary | ICD-10-CM

## 2024-10-31 DIAGNOSIS — N76.0 BACTERIAL VAGINOSIS: Primary | ICD-10-CM

## 2024-10-31 RX ORDER — METRONIDAZOLE 500 MG/1
500 TABLET ORAL 2 TIMES DAILY
Qty: 14 TABLET | Refills: 0 | Status: SHIPPED | OUTPATIENT
Start: 2024-10-31 | End: 2024-11-07

## 2024-10-31 NOTE — TELEPHONE ENCOUNTER
Spoke with Patient. Went over BV results and blood in urine and recommendations from Yasmin High CNM. Patient verbalizes understanding about BV treatment- sent to [facility] on [address]. Patient states she was bleeding at the time of her UA/UC and that is why there was a lot of blood in it. Does she still need the Urology Referral. Will ask Isaiah   Muscle Hinge Flap Text: The defect edges were debeveled with a #15 scalpel blade.  Given the size, depth and location of the defect and the proximity to free margins a muscle hinge flap was deemed most appropriate.  Using a sterile surgical marker, an appropriate hinge flap was drawn incorporating the defect. The area thus outlined was incised with a #15 scalpel blade.  The skin margins were undermined to an appropriate distance in all directions utilizing iris scissors.

## 2024-11-05 ENCOUNTER — ANCILLARY PROCEDURE (OUTPATIENT)
Dept: CARDIOLOGY | Facility: CLINIC | Age: 34
End: 2024-11-05
Attending: EMERGENCY MEDICINE
Payer: COMMERCIAL

## 2024-11-05 ENCOUNTER — TELEPHONE (OUTPATIENT)
Dept: HEMATOLOGY | Facility: CLINIC | Age: 34
End: 2024-11-05
Payer: COMMERCIAL

## 2024-11-05 ENCOUNTER — HOSPITAL ENCOUNTER (EMERGENCY)
Facility: CLINIC | Age: 34
Discharge: HOME | End: 2024-11-05
Attending: EMERGENCY MEDICINE | Admitting: EMERGENCY MEDICINE
Payer: COMMERCIAL

## 2024-11-05 VITALS
TEMPERATURE: 98.1 F | HEART RATE: 89 BPM | RESPIRATION RATE: 15 BRPM | WEIGHT: 137 LBS | SYSTOLIC BLOOD PRESSURE: 104 MMHG | BODY MASS INDEX: 25.21 KG/M2 | OXYGEN SATURATION: 98 % | HEIGHT: 62 IN | DIASTOLIC BLOOD PRESSURE: 71 MMHG

## 2024-11-05 DIAGNOSIS — N39.0 UTI (URINARY TRACT INFECTION), UNCOMPLICATED: ICD-10-CM

## 2024-11-05 DIAGNOSIS — R00.2 PALPITATIONS: ICD-10-CM

## 2024-11-05 LAB
ALBUMIN UR-MCNC: 50 MG/DL
ANION GAP SERPL CALCULATED.3IONS-SCNC: 12 MMOL/L (ref 7–15)
APPEARANCE UR: ABNORMAL
BASOPHILS # BLD AUTO: 0 10E3/UL (ref 0–0.2)
BASOPHILS NFR BLD AUTO: 0 %
BILIRUB UR QL STRIP: NEGATIVE
BUN SERPL-MCNC: 10.4 MG/DL (ref 6–20)
CALCIUM SERPL-MCNC: 9.4 MG/DL (ref 8.8–10.4)
CHLORIDE SERPL-SCNC: 104 MMOL/L (ref 98–107)
COLOR UR AUTO: ABNORMAL
CREAT SERPL-MCNC: 0.69 MG/DL (ref 0.51–0.95)
EGFRCR SERPLBLD CKD-EPI 2021: >90 ML/MIN/1.73M2
EOSINOPHIL # BLD AUTO: 0.2 10E3/UL (ref 0–0.7)
EOSINOPHIL NFR BLD AUTO: 2 %
ERYTHROCYTE [DISTWIDTH] IN BLOOD BY AUTOMATED COUNT: 13.8 % (ref 10–15)
FERRITIN SERPL-MCNC: 92 NG/ML (ref 6–175)
GLUCOSE SERPL-MCNC: 89 MG/DL (ref 70–99)
GLUCOSE UR STRIP-MCNC: NEGATIVE MG/DL
HCG UR QL: NEGATIVE
HCO3 SERPL-SCNC: 25 MMOL/L (ref 22–29)
HCT VFR BLD AUTO: 43.5 % (ref 35–47)
HGB BLD-MCNC: 14.2 G/DL (ref 11.7–15.7)
HGB UR QL STRIP: ABNORMAL
IMM GRANULOCYTES # BLD: 0 10E3/UL
IMM GRANULOCYTES NFR BLD: 0 %
INR PPP: 1.14 (ref 0.85–1.15)
INTERNAL QC OK POCT: NORMAL
IRON BINDING CAPACITY (ROCHE): 308 UG/DL (ref 240–430)
IRON SATN MFR SERPL: 15 % (ref 15–46)
IRON SERPL-MCNC: 45 UG/DL (ref 37–145)
KETONES UR STRIP-MCNC: NEGATIVE MG/DL
LEUKOCYTE ESTERASE UR QL STRIP: ABNORMAL
LYMPHOCYTES # BLD AUTO: 1.3 10E3/UL (ref 0.8–5.3)
LYMPHOCYTES NFR BLD AUTO: 12 %
MCH RBC QN AUTO: 27.9 PG (ref 26.5–33)
MCHC RBC AUTO-ENTMCNC: 32.6 G/DL (ref 31.5–36.5)
MCV RBC AUTO: 86 FL (ref 78–100)
MDC_IDC_LEAD_CONNECTION_STATUS: NORMAL
MDC_IDC_LEAD_IMPLANT_DT: NORMAL
MDC_IDC_LEAD_LOCATION: NORMAL
MDC_IDC_LEAD_LOCATION_DETAIL_1: NORMAL
MDC_IDC_LEAD_MFG: NORMAL
MDC_IDC_LEAD_MODEL: NORMAL
MDC_IDC_LEAD_POLARITY_TYPE: NORMAL
MDC_IDC_LEAD_SERIAL: NORMAL
MDC_IDC_MSMT_BATTERY_DTM: NORMAL
MDC_IDC_MSMT_BATTERY_REMAINING_PERCENTAGE: 84 %
MDC_IDC_MSMT_BATTERY_STATUS: NORMAL
MDC_IDC_PG_IMPLANT_DTM: NORMAL
MDC_IDC_PG_MFG: NORMAL
MDC_IDC_PG_MODEL: NORMAL
MDC_IDC_PG_SERIAL: NORMAL
MDC_IDC_PG_TYPE: NORMAL
MDC_IDC_SESS_CLINIC_NAME: NORMAL
MDC_IDC_SESS_DTM: NORMAL
MDC_IDC_SESS_TYPE: NORMAL
MDC_IDC_SET_ZONE_DETECTION_INTERVAL: 250 MS
MDC_IDC_SET_ZONE_DETECTION_INTERVAL: 300 MS
MDC_IDC_SET_ZONE_STATUS: NORMAL
MDC_IDC_SET_ZONE_STATUS: NORMAL
MDC_IDC_SET_ZONE_TYPE: NORMAL
MDC_IDC_SET_ZONE_TYPE: NORMAL
MDC_IDC_SET_ZONE_VENDOR_TYPE: NORMAL
MDC_IDC_SET_ZONE_VENDOR_TYPE: NORMAL
MDC_IDC_STAT_EPISODE_RECENT_COUNT: 0
MDC_IDC_STAT_EPISODE_RECENT_COUNT_DTM_END: NORMAL
MDC_IDC_STAT_EPISODE_RECENT_COUNT_DTM_START: NORMAL
MDC_IDC_STAT_EPISODE_TOTAL_COUNT: 0
MDC_IDC_STAT_EPISODE_TOTAL_COUNT_DTM_END: NORMAL
MDC_IDC_STAT_EPISODE_TOTAL_COUNT_DTM_START: NORMAL
MDC_IDC_STAT_EPISODE_TYPE: NORMAL
MDC_IDC_STAT_EPISODE_TYPE: NORMAL
MDC_IDC_STAT_TACHYTHERAPY_RECENT_DTM_END: NORMAL
MDC_IDC_STAT_TACHYTHERAPY_RECENT_DTM_START: NORMAL
MDC_IDC_STAT_TACHYTHERAPY_SHOCKS_DELIVERED_RECENT: 0
MDC_IDC_STAT_TACHYTHERAPY_SHOCKS_DELIVERED_TOTAL: 3
MDC_IDC_STAT_TACHYTHERAPY_TOTAL_DTM_END: NORMAL
MDC_IDC_STAT_TACHYTHERAPY_TOTAL_DTM_START: NORMAL
MONOCYTES # BLD AUTO: 0.4 10E3/UL (ref 0–1.3)
MONOCYTES NFR BLD AUTO: 4 %
NEUTROPHILS # BLD AUTO: 8.8 10E3/UL (ref 1.6–8.3)
NEUTROPHILS NFR BLD AUTO: 82 %
NITRATE UR QL: NEGATIVE
NRBC # BLD AUTO: 0 10E3/UL
NRBC BLD AUTO-RTO: 0 /100
PH UR STRIP: 5.5 [PH] (ref 5–7)
PLATELET # BLD AUTO: 273 10E3/UL (ref 150–450)
POCT KIT EXPIRATION DATE: NORMAL
POCT KIT LOT NUMBER: NORMAL
POTASSIUM SERPL-SCNC: 3.8 MMOL/L (ref 3.4–5.3)
RBC # BLD AUTO: 5.09 10E6/UL (ref 3.8–5.2)
RBC URINE: >182 /HPF
RETICS # AUTO: 0.06 10E6/UL (ref 0.03–0.1)
RETICS/RBC NFR AUTO: 1.2 % (ref 0.5–2)
SODIUM SERPL-SCNC: 141 MMOL/L (ref 135–145)
SP GR UR STRIP: 1.02 (ref 1–1.03)
SQUAMOUS EPITHELIAL: 2 /HPF
TROPONIN T SERPL HS-MCNC: <6 NG/L
UROBILINOGEN UR STRIP-MCNC: NORMAL MG/DL
WBC # BLD AUTO: 10.8 10E3/UL (ref 4–11)
WBC URINE: 73 /HPF

## 2024-11-05 PROCEDURE — 80048 BASIC METABOLIC PNL TOTAL CA: CPT | Performed by: EMERGENCY MEDICINE

## 2024-11-05 PROCEDURE — 93261 INTERROGATE SUBQ DEFIB: CPT | Mod: 26 | Performed by: INTERNAL MEDICINE

## 2024-11-05 PROCEDURE — 82728 ASSAY OF FERRITIN: CPT | Performed by: EMERGENCY MEDICINE

## 2024-11-05 PROCEDURE — 99285 EMERGENCY DEPT VISIT HI MDM: CPT | Performed by: EMERGENCY MEDICINE

## 2024-11-05 PROCEDURE — 81025 URINE PREGNANCY TEST: CPT | Performed by: EMERGENCY MEDICINE

## 2024-11-05 PROCEDURE — 93282 PRGRMG EVAL IMPLANTABLE DFB: CPT

## 2024-11-05 PROCEDURE — 85045 AUTOMATED RETICULOCYTE COUNT: CPT | Performed by: EMERGENCY MEDICINE

## 2024-11-05 PROCEDURE — 36415 COLL VENOUS BLD VENIPUNCTURE: CPT | Performed by: EMERGENCY MEDICINE

## 2024-11-05 PROCEDURE — 85004 AUTOMATED DIFF WBC COUNT: CPT | Performed by: EMERGENCY MEDICINE

## 2024-11-05 PROCEDURE — 93010 ELECTROCARDIOGRAM REPORT: CPT | Performed by: EMERGENCY MEDICINE

## 2024-11-05 PROCEDURE — 99284 EMERGENCY DEPT VISIT MOD MDM: CPT | Performed by: EMERGENCY MEDICINE

## 2024-11-05 PROCEDURE — 93005 ELECTROCARDIOGRAM TRACING: CPT | Performed by: EMERGENCY MEDICINE

## 2024-11-05 PROCEDURE — 85610 PROTHROMBIN TIME: CPT | Performed by: EMERGENCY MEDICINE

## 2024-11-05 PROCEDURE — 83550 IRON BINDING TEST: CPT | Performed by: EMERGENCY MEDICINE

## 2024-11-05 PROCEDURE — 81003 URINALYSIS AUTO W/O SCOPE: CPT | Performed by: EMERGENCY MEDICINE

## 2024-11-05 PROCEDURE — 84484 ASSAY OF TROPONIN QUANT: CPT | Performed by: EMERGENCY MEDICINE

## 2024-11-05 PROCEDURE — 87086 URINE CULTURE/COLONY COUNT: CPT | Performed by: EMERGENCY MEDICINE

## 2024-11-05 RX ORDER — NITROFURANTOIN 25; 75 MG/1; MG/1
100 CAPSULE ORAL 2 TIMES DAILY
Qty: 14 CAPSULE | Refills: 0 | Status: SHIPPED | OUTPATIENT
Start: 2024-11-05 | End: 2024-11-06

## 2024-11-05 ASSESSMENT — ACTIVITIES OF DAILY LIVING (ADL)
ADLS_ACUITY_SCORE: 0

## 2024-11-05 ASSESSMENT — COLUMBIA-SUICIDE SEVERITY RATING SCALE - C-SSRS
1. IN THE PAST MONTH, HAVE YOU WISHED YOU WERE DEAD OR WISHED YOU COULD GO TO SLEEP AND NOT WAKE UP?: NO
2. HAVE YOU ACTUALLY HAD ANY THOUGHTS OF KILLING YOURSELF IN THE PAST MONTH?: NO
6. HAVE YOU EVER DONE ANYTHING, STARTED TO DO ANYTHING, OR PREPARED TO DO ANYTHING TO END YOUR LIFE?: NO

## 2024-11-05 NOTE — ED TRIAGE NOTES
"  Per pt \"it felt like I had an odd heart rhythm this morning\".        Triage Assessment (Adult)       Row Name 11/05/24 1048          Triage Assessment    Airway WDL WDL        Respiratory WDL    Respiratory WDL WDL        Skin Circulation/Temperature WDL    Skin Circulation/Temperature WDL WDL        Peripheral/Neurovascular WDL    Peripheral Neurovascular WDL WDL        Cognitive/Neuro/Behavioral WDL    Cognitive/Neuro/Behavioral WDL WDL                     "

## 2024-11-05 NOTE — TELEPHONE ENCOUNTER
[id number]  Estela Fry  34 year old female  CBCD Diagnosis: IVC thrombosis, chronic anticoagulation with Xarelto  CBCD Provider: Dr. Wilhelm    Call received from Estela with questions about clotting with her period.    Estela is currently in the ER with concerns over her irregular heart rate. She states that she has not told them about her bleeding/clotting concerns.    Estela is on chronic anticoagulation with Xarelto 20mg daily. She has the Mirena IUD in place. Her period started on 10/28 and usually lasts 1 week. She states that yesterday and today she passed a few dark clots that are about finger sized. She is wondering if she should hold her Xarelto. She denies any bright red bleeding or severe cramping. She states she's actively following with her OBGYN.    Staff encouraged Estela to share her concerns with the ER staff she is seeing now as they may want to provide guidance for her Xarelto. Plan also made to route to provider for input in this and future menstrual bleeding concerns as they arise.     Labs ordered by Dr. Wilhelm that were due for collection in October have not yet been collected. Will send secure chat to ER provider to ask that they run these labs as well.    Lisa HARMONN, RN, PHN   [facility]   Office: [phone]  Fax: [phone]

## 2024-11-05 NOTE — ED PROVIDER NOTES
"D Provider Note  [facility]      History     Chief Complaint   Patient presents with    Palpitations     Per pt \"it felt like I had an odd heart rhythm this morning\".     HPI  Estela Fry is a 34 year old female with a history of ventricular fibrillation arrest (8/5/2023) s/p ICD placement, DVT on Xarelto, and atrial flutter who presents to the emergency department with palpitations.  Patient reports that when she woke up this morning at 9 AM she began experiencing an irregular heart rhythm.  She states that she would have some normal beats and followed by a strong beat.  This lasted 20 minutes at 7:45 am and she had a couple episodes in the lobby.  Her ICD did not fire.  She has not had palpitations like this in the past. She follows with Dr. Valadez and Dr. Johnson with cardiology.  She notes that 2 days ago she felt dizzy as well.  She denies cough, fever, chance of pregnancy.  She is currently on her period.       Past Medical History  Past Medical History:   Diagnosis Date    Automatic implantable cardioverter-defibrillator in situ     Cardiac arrest (H)     History of atrial flutter     History of venous thromboembolism      Past Surgical History:   Procedure Laterality Date    CV CENTRAL VENOUS CATHETER PLACEMENT N/A 8/5/2023    Procedure: Central Venous Catheter Placement;  Surgeon: Sid Liz MD;  Location: [facility] CARDIAC CATH LAB    CV CORONARY ANGIOGRAM N/A 8/5/2023    Procedure: Coronary Angiogram;  Surgeon: Sid Liz MD;  Location: [facility] CARDIAC CATH LAB    DILATION AND CURETTAGE SUCTION N/A 2/16/2024    Procedure: DILATION AND CURETTAGE, UTERUS, USING SUCTION,;  Surgeon: Shreya Mcdonnell MD;  [facility]    EP SICD INSERT N/A 8/15/2023    Procedure: Subcutaneous Implantable Cardioverter Defibrillator Implant;  Surgeon: Eula Johnson MD;  Location: [facility] CARDIAC CATH LAB    INSERT INTRAUTERINE DEVICE N/A 2/16/2024    Procedure: placement of Mirena " "intrauterine device;  Surgeon: Shreya Mcdonnell MD;  [facility]     levonorgestrel (MIRENA) 52 MG (20 mcg/day) IUD  metroNIDAZOLE (FLAGYL) 500 MG tablet  rivaroxaban ANTICOAGULANT (XARELTO) 20 MG TABS tablet  sotalol (BETAPACE) 80 MG tablet      No Known Allergies  Family History  No family history on file.  Social History   Social History     Tobacco Use    Smoking status: Never     Passive exposure: Never    Smokeless tobacco: Never   Substance Use Topics    Alcohol use: Never    Drug use: Never      A medically appropriate review of systems was performed with pertinent positives and negatives noted in the HPI, and all other systems negative.    Physical Exam   BP: 98/64  Pulse: 66  Temp: 98.1  F (36.7  C)  Resp: 16  Height: 157.5 cm (5' 2\")  Weight: 62.1 kg (137 lb)  SpO2: 96 %  Physical Exam  Constitutional:       General: She is not in acute distress.     Appearance: She is well-developed. She is not ill-appearing, toxic-appearing or diaphoretic.   HENT:      Head: Normocephalic and atraumatic.   Cardiovascular:      Rate and Rhythm: Normal rate and regular rhythm.      Heart sounds: Normal heart sounds.   Pulmonary:      Effort: Pulmonary effort is normal. No respiratory distress.      Breath sounds: Normal breath sounds. No rhonchi.   Abdominal:      General: There is no distension.      Palpations: Abdomen is soft.      Tenderness: There is no abdominal tenderness. There is no rebound.   Musculoskeletal:         General: No tenderness.      Cervical back: Normal range of motion.   Skin:     General: Skin is warm and dry.   Neurological:      General: No focal deficit present.      Mental Status: She is alert and oriented to person, place, and time.   Psychiatric:         Behavior: Behavior normal.         Thought Content: Thought content normal.           ED Course, Procedures, & Data      Procedures            EKG Interpretation:      Interpreted by Becky Dempsey MD  Time reviewed: " 1057am  Symptoms at time of EKG: none   Rhythm: normal sinus   Rate: normal  Axis: normal  Ectopy: none  Conduction: normal  ST Segments/ T Waves: No ST-T wave changes  Q Waves: none  Comparison to prior: Unchanged    Clinical Impression: normal EKG            Results for orders placed or performed during the hospital encounter of 11/05/24   EKG 12 lead     Status: None (Preliminary result)   Result Value Ref Range    Systolic Blood Pressure  mmHg    Diastolic Blood Pressure  mmHg    Ventricular Rate 65 BPM    Atrial Rate 65 BPM    KS Interval 148 ms    QRS Duration 74 ms     ms    QTc 436 ms    P Axis 83 degrees    R AXIS 44 degrees    T Axis 49 degrees    Interpretation ECG       Sinus rhythm  Nonspecific T wave abnormality  Abnormal ECG       Medications - No data to display  Labs Ordered and Resulted from Time of ED Arrival to Time of ED Departure - No data to display  No orders to display          Critical care was not performed.     Medical Decision Making  The patient's presentation was of high complexity (an acute health issue posing potential threat to life or bodily function).    The patient's evaluation involved:  review of external note(s) from 3+ sources (see separate area of note for details)  review of 3+ test result(s) ordered prior to this encounter (see separate area of note for details)  ordering and/or review of 3+ test(s) in this encounter (see separate area of note for details)  discussion of management or test interpretation with another health professional (Dr. Amador, and Dr. Leon)    The patient's management necessitated moderate risk (labs, imaging,).    Assessment & Plan    Patient is a very nice 34-year-old female with a history of a cardiac arrest in August 2023 of unknown etiology presents to the ER due to some palpitations that she had earlier this morning.  Patient currently has having no pain no symptoms.  Her labs and EKG are stable.  There is no signs of any cardiac  abnormality.  Due to her significant history I did discuss case with the cardiologist on-call.  She reviewed her labs and previous history and wanted her to be seen by electrical physiology.  Patient was seen by the EP team.  They do not want to make any acute changes at this time but recommend she follow-up with a event monitor and outpatient follow-up.  Patient was found to have a urinary tract infection.  She will be discharged home with oral antibiotics.  No other acute issues.  Patient's previous workup including previous admission and discharge for cardiac arrest as well as her previous ER visits as well as her previous labs were all reviewed.    I have reviewed the nursing notes. I have reviewed the findings, diagnosis, plan and need for follow up with the patient.    New Prescriptions    No medications on file       Final diagnoses:   Palpitations   UTI (urinary tract infection), uncomplicated   I, Mckenzie Kidd, am serving as a trained medical scribe to document services personally performed by Becky Dempsey MD, based on the provider's statements to me.     IBecky MD, was physically present and have reviewed and verified the accuracy of this note documented by Mckenzie Kidd.     Becky Dempsey MD  [facility] EMERGENCY DEPARTMENT  11/5/2024        Becky Dempsey MD  11/05/24 1911

## 2024-11-05 NOTE — DISCHARGE INSTRUCTIONS
You heart evaluation in the ER was normal with no signs of any problems.     Wear the BioTel as recommended for further monitoring.     You also have a urinary tract infection. Take the antibiotics as prescribed.     Follow up with cardiology as scheduled.     Return to the ER if any other problems/concerns.     Please make an appointment to follow up with Cardiology Clinic (phone: [phone]) in 1-2 days,

## 2024-11-06 ENCOUNTER — TELEPHONE (OUTPATIENT)
Dept: NURSING | Facility: CLINIC | Age: 34
End: 2024-11-06
Payer: COMMERCIAL

## 2024-11-06 LAB
ATRIAL RATE - MUSE: 65 BPM
BACTERIA UR CULT: NORMAL
DIASTOLIC BLOOD PRESSURE - MUSE: NORMAL MMHG
INTERPRETATION ECG - MUSE: NORMAL
P AXIS - MUSE: 83 DEGREES
PR INTERVAL - MUSE: 148 MS
QRS DURATION - MUSE: 74 MS
QT - MUSE: 420 MS
QTC - MUSE: 436 MS
R AXIS - MUSE: 44 DEGREES
SYSTOLIC BLOOD PRESSURE - MUSE: NORMAL MMHG
T AXIS - MUSE: 49 DEGREES
VENTRICULAR RATE- MUSE: 65 BPM

## 2024-11-06 ASSESSMENT — ACTIVITIES OF DAILY LIVING (ADL)
ADLS_ACUITY_SCORE: 0

## 2024-11-06 NOTE — TELEPHONE ENCOUNTER
Per chart review patient was diagnosed with a UTI and started on antibiotics in the ER.  She was subsequently discharged home.  Estela also had CBC and iron studies completed in the emergency room.  Hemoglobin was at 14.2 and iron studies were within the normal limits.    I called Estela and left a voicemail outlining this information.  I advised that she continue on her Xarelto and to reach out to our clinic if her bleeding symptoms persist or worsen in the coming weeks and months.  She is scheduled to see Dr. Wilhelm again in February.    Felix MAYER RN  [facility]  Office: [phone]  Clinic: [phone]  Fax: [phone]

## 2024-11-06 NOTE — TELEPHONE ENCOUNTER
[facility] ([place])    Reason for call: Lab Result Notification     Lab Result (including Rx patient on, if applicable).  If culture, copy of lab report at bottom.  Lab Result: Urine Culture  11/5/24 Prescribed NitroFURantoin macrocrystal-monohydrate (MACROBID) 100 MG capsule Take 1 capsule (100 mg) by mouth 2 times daily. - Oral   Creatinine Level (mg/dl)   Creatinine   Date Value Ref Range Status   11/05/2024 0.69 0.51 - 0.95 mg/dL Final    Creatinine clearance (ml/min), if applicable    Serum creatinine: 0.69 mg/dL 11/05/24 1224  Estimated creatinine clearance: 100.5 mL/min     RN Recommendations/Instructions per [facility] lab result protocol:   [facility] lab result protocol utilized: Urine Culture  Advise to discontinue current antibiotic.     Patient's current Symptoms at time of telephone encounter :   Pt states she is having her menses, pt had blood in her urine before menses and not able to void.      Patient/care giver notified to contact your PCP clinic or return to the Emergency department if your:  Symptoms return.  Symptoms worsen or other concerning symptoms.       Shey Naik RN

## 2024-11-07 ENCOUNTER — ANCILLARY PROCEDURE (OUTPATIENT)
Dept: CARDIOLOGY | Facility: CLINIC | Age: 34
End: 2024-11-07
Attending: NURSE PRACTITIONER
Payer: COMMERCIAL

## 2024-11-07 PROCEDURE — 999N000096 CARDIAC MOBILE TELEMETRY MONITOR

## 2024-11-07 NOTE — PROGRESS NOTES
Estela Fry is a 34 year old year old female is being evaluated via a billable video visit.      If the video visit is dropped, the invitation should be resent by: Text to cell phone: [phone]    Will anyone else be joining your video visit? No    Video-Visit Details    Type of service:  Video Visit    Originating Location (pt. Location): [facility] [place]    Distant Location (provider location):  Off-site    Platform used for Video Visit: Zoom (Telehealth)    Neuropsychologist has received verbal consent for a Video Visit from the patient? Yes. Empirical studies have demonstrated that video/hybrid formats are appropriate and effective means for delivering neuropsychological services to the patient. Interview was [facility] ([facility] [place]) and formal testing was conducted by the psychometrist in person at the [facility] [place].    Video Start Time (time video started): 12:13 PM    Video End Time (time video stopped): 12:23 PM    RE: Estela Fry  MR#: [id number]  : 1990  DOS: 2024    NEUROPSYCHOLOGICAL CONSULTATION    REASON FOR REFERRAL:  Estela Fry is a 34 year old female with 12 years of formal education. The patient was referred for a neuropsychological evaluation by Dr. Sid Liz to assess her cognitive and emotional functioning secondary to memory difficulties. The evaluation was requested in order to document her current level of functioning, assist with the differential diagnosis and provide appropriate recommendations to the patient, family and treatment team. The patient was informed that the evaluation included multiple measures of performance and symptom validity, and she was encouraged to provide her best effort at all times. The nature of the neuropsychological evaluation was also discussed, including limits of confidentiality (for suspected child or elder abuse, potential homicide or suicide, and court orders). The patient was also informed that the report would be placed  on the electronic medical records system. The patient was also given an opportunity to ask questions. The patient indicated that she understood the information and consented to participate in the evaluation.     PROCEDURE: Review of records and clinical interview, Mental Status-Orientation, Word Reading subtest of Wide Range Achievement Test-5, Digit Span subtest of the Wechsler Adult Intelligence Scale-IV, Trail Making Test, Repeatable Battery for the Assessment of Neuropsychological Status (RBANS), Controlled Oral Word Association Test, Clock Drawing Test, Stroop, [place] Card Sort Test, BDI-II, FRACISCO-7    RECORDS REVIEW: Records indicate a primary medical history of ventricular fibrillation arrest (8/5/2023) s/p ICD placement, DVT, atrial flutter, and iron deficiency. CT of the head without contrast on 8/6/2023 noted:  No acute intracranial pathology. The previous loss of acute gray-white matter in the right frontal lobe is not visualized on this study.  CTA of the head and neck with contrast on 8/5/2023 described:  1. Head CTA demonstrates patent major intracranial arteries. No aneurysm or hemodynamically significant stenosis. 2. Neck CTA demonstrates patent major cervical arteries. No  hemodynamically significant stenosis. 3. The endotracheal tube terminates just above the tory. Consider slight retraction.  Records indicate that the patient is currently being treated with levonorgestrel, rivaroxaban anticoagulant, and sotalol.    CLINICAL INTERVIEW: The patient was interviewed via virtual platform at the [facility] ([facility] [place] for this neuropsychological evaluation. Postdoctoral fellow, Dr. Reynolds, also attended. Information in this section comes from the patient and her . On interview, the patient described  brain fog,  noting reduced attention/concentration since her cardiac arrest in 8/2023. She also reported daily headaches since that time, which further interfere with her  "attention/concentration. She otherwise denied cognitive concerns. Functionally, the patient manages her own personal cares. She no longer cooks due to fatigue and reported instances of leaving the stove/oven on unattended. Her  now provides support with financial management due to attention/concentration concerns. She independently manages medications, with no noted concerns. She no longer drives.     With respect to mental health, Ms. Fry described her current mood as \"up and down, it depends on the day.  She reported feeling depressed and anxious on a daily basis and reported experiencing a single panic attack last month. She is followed by Dr. Escobar in psychology. She denied history of engagement with psychiatry. She denied history of psychiatric hospitalizations. She endorsed intermittent and fleeting suicidal ideation since her hospitalization in 8/2023. She denied suicidal plan or intentions. She denied history of suicide attempts. She reported a remote history of self-harm behaviors as a teenager but denied self-harm behaviors since that time. She identified her children as a protective factor. She reported a single episode of a hypnogogic visual hallucination but denied additional visual or auditory hallucinations. She denied a history of trauma or abuse.     She reported sleeping approximately 10 hours per night. She reported occasional difficulties with sleep initiation due to anxious thoughts. She denied dream enactment behaviors or sleep apnea. She reported reduced appetite over the past 4-5 days, which she attributed to a medication. She denied significant changes in weight. The patient denied alcohol use since her hospitalization in 8/2023. She denied current or historical use of tobacco, cannabis, or illicit substances. She denied a history of chemical dependency or substance use treatment.     With regard to other medical background, she denied a history of stroke, seizure, head injury with " loss of consciousness, gait changes, tremor, rigidity, numbness/tingling, unilateral weakness, or falls. She reported experiencing daily headaches, which worsen when attempting to complete cognitively-demanding tasks. She denied changes in hearing, taste, or smell. She indicated worsening vision, with plans to follow up with optometry. She denied a known family history of neurological conditions.    By way of background, the patient was born and raised in [place], [place]. She denied known complications with her birth or development. Academically, the patient reported that she earned poor grades in school; however, she denied a history of learning disability, special education, or repeating a grade. She graduated from high school. She is a homemaker. She lives with her , their 7 children, her mother-in-law, and father-in-law (both of whom are reportedly disabled).     BEHAVIORAL OBSERVATIONS: The patient arrived early to her appointment, and she was accompanied by her  and daughter. On examination, the patient was appropriately dressed and groomed. Gait was unremarkable. No tremor or postural abnormalities were observed. Vision with correction and hearing were adequate for testing purposes. The patient was pleasant and cooperative during the evaluation. She was oriented to place, time, and personal information. She was able to name the current [place] president and 2/5 of the most recent past [place] presidents. Mood was mildly depressed and anxious with congruent affect. Speech was fluent and normal for rate, volume, and prosody. Comprehension appeared adequate. Thought processes were generally goal-directed and linear. During testing, the patient appeared alert and engaged. She appeared to put forth good effort on tasks.     RESULTS: Formal testing was conducted in person by a psychometrist. Two formal performance validity measures were below expected limits and indicated evidence that non-cognitive factors likely  impacted the results. Therefore, full interpretation of the results is not possible. Psychometric estimates of the patient's premorbid global cognitive functioning based on single word reading ability were in the below average range; however, this is likely an underrepresentation of her functioning based on her reported educational and occupational histories. In spite of the above noted concerns with non-cognitive factors impacting the results, several measures of cognitive functioning were within expected limits. Specifically, attention and working memory performances were low average. Confrontation naming was low average to average. Visual learning and memory were average. Retrieval of rote and contextual verbal material was low average. Phonemic verbal fluency was low average. Complex problem-solving was low average. There was no evidence for visuospatial or planning/organizational difficulties on complex figure drawing or copied clock drawing tasks.      Formal personality evaluation was completed utilizing the clinical interview and self-report measures of depression and anxiety and an objective measure of emotional functioning.  On the self-report measures, the patient endorsed moderate levels of depression and anxiety. She did not endorse suicidal thoughts on the self-report measures, which is inconsistent with her report during the interview (at which time she endorsed fleeting suicidal ideation without plan or intent). On the objective measure, the patient endorsed a higher number of infrequent responses and had a tendency to portray herself in a positive light. Thus, cautious interpretation is required, as scores may reflect an underestimate of her underlying psychological distress. However, the profile is still interpretable. Her responses were suggestive of prominent dysphoria. She also endorsed many fears and anxiety related to health concerns and traumatic experiences.     SUMMARY: In summary, the  neuropsychological assessment results indicated evidence that non-cognitive factors likely impacted the patient's performance. Nonetheless, several measures were within expected limits, including attention/working memory, confrontation naming, verbal fluency, visuospatial abilities, complex problem-solving, visual learning and memory, and retrieval of verbal material. Further, there was evidence for significant depressive and anxiety symptoms.    The pattern of results indicated that neurologically-based cognitive deficits were unlikely, although this could not be completely ruled out due to the non-cognitive factors impacting performance. More likely, multiple factors, particularly mood/anxiety, are contributing to the cognitive concerns. Additional factors including sleep disruption and ongoing headaches are also likely contributory.      RECOMMENDATIONS:  Given these results, continued psychotherapeutic intervention is recommended. A highly structured cognitive-behavioral intervention focused on coping and stress management would likely be the most helpful. Addressing anxiety-related sleep difficulties may also be useful focus of psychotherapy.  The patient may also benefit from consultation with the Psychiatry service (information below) for pharmaceutical interventions for mood management.  Engagement in conjunctive pharmaceutical and psychotherapeutic interventions leads to the greatest reductions in mood and anxiety symptoms.  Psychiatry  [phone]  [email or web address]  A suicide risk assessment was conducted with this patient, and it was determined that the patient likely was not an imminent danger to herself. Nonetheless, ongoing close monitoring of the patient's suicidal ideation by health care providers is recommended.  The patient may find the following attention and organizational strategies helpful:   Use cell phone reminders to help monitor upcoming appointments  and due dates as well as other important tasks (e.g., when to take medications). Set the reminder to go off several times prior to the event with advanced notice (e.g., one week before, two days before, the day before, and the morning of the event).   Attempt to reduce distractions at home. Having a clutter-free work environment and removing or at least making it harder to access potential distractions would help optimize attention.   Taking short breaks during the day may help optimize and maintain concentration.   Make to-do lists and prioritize tasks. Break down large tasks into smaller steps and check off each small step when completed.   During clinical interview, Ms. Fry indicated ongoing financial and housing concerns and noted interest in getting established with a  and/or . She is encouraged to discuss these needs with her primary care.   The results of the evaluation now constitute a partial baseline of the patient's cognitive and emotional functioning. If further cognitive difficulties are observed in the future, a referral for a neuropsychological re-evaluation at that time might be considered if the patient's significant depressive and anxiety symptoms can be effectively managed.     The results were discussed with the patient on 11/12/2024 via phone, and her questions were answered. During feedback, Ms. Fry reported that she does not currently have a designated PCP but is interested in getting established with a provider. She was encouraged to schedule with a PCP as soon as she is able, to help facilitate the recommendations noted in this report, especially those related to case management/social work. She expressed a preference to [facility] a [facility], so she was encouraged to contact scheduling to arrange an appointment.    Thank you for referring this pleasant individual. If you have any further questions, please feel free to contact me.        Erik  Rodolfo, Ph.D.   Neuropsychology Fellow       Tank Tyson, PhD, ABPP, LP   Professor and Licensed Psychologist [id number]    Board Certified Clinical Neuropsychologist     All billing noted here is for professional services provided by the psychologist and psychometrist.?     Time Spent:      Minutes Date Code Units   Total Time-Neuropsychologist Professional 239 11/11/24 45111 1     11/12/24 91609 3   Neuropsychologist Admin/scoring 0 11/12/24 58216 0     11/12/24 58844 0   Diagnostic Interview 10 11/11/24 59308 1   Psychometrician Time-Test Administration/Score 158 11/11/24 70850 1     11/11/24 80935 4   Diagnosis R41.3 I46.9 R52/F41.9 F32.0

## 2024-11-07 NOTE — PROGRESS NOTES
Per Joanna Zuñiga NP, patient to have 30 day MCOT monitor placed.  Diagnosis: atrial flutter  Monitor placed: Yes  Patient Instructed: Yes  Patient verbalized understanding: Yes  Holter # [id number]

## 2024-11-09 ENCOUNTER — MYC MEDICAL ADVICE (OUTPATIENT)
Dept: OBGYN | Facility: CLINIC | Age: 34
End: 2024-11-09
Payer: COMMERCIAL

## 2024-11-11 ENCOUNTER — OFFICE VISIT (OUTPATIENT)
Dept: NEUROPSYCHOLOGY | Facility: CLINIC | Age: 34
End: 2024-11-11
Payer: COMMERCIAL

## 2024-11-11 DIAGNOSIS — I46.9 CARDIAC ARREST (H): ICD-10-CM

## 2024-11-11 DIAGNOSIS — F32.0 CURRENT MILD EPISODE OF MAJOR DEPRESSIVE DISORDER, UNSPECIFIED WHETHER RECURRENT (H): ICD-10-CM

## 2024-11-11 DIAGNOSIS — F41.9 ANXIETY: ICD-10-CM

## 2024-11-11 DIAGNOSIS — R52 PAIN: ICD-10-CM

## 2024-11-11 DIAGNOSIS — R41.3 MEMORY LOSS: Primary | ICD-10-CM

## 2024-11-11 PROCEDURE — 96139 PSYCL/NRPSYC TST TECH EA: CPT | Performed by: PSYCHOLOGIST

## 2024-11-11 PROCEDURE — 90791 PSYCH DIAGNOSTIC EVALUATION: CPT | Performed by: PSYCHOLOGIST

## 2024-11-11 PROCEDURE — 96133 NRPSYC TST EVAL PHYS/QHP EA: CPT | Performed by: PSYCHOLOGIST

## 2024-11-11 PROCEDURE — 96132 NRPSYC TST EVAL PHYS/QHP 1ST: CPT | Performed by: PSYCHOLOGIST

## 2024-11-11 PROCEDURE — 96138 PSYCL/NRPSYC TECH 1ST: CPT | Performed by: PSYCHOLOGIST

## 2024-11-11 NOTE — PROGRESS NOTES
NAME  Estela Fry     MRN  [id number]      [birth year]     AGE  34     SEX  Female     HANDEDNESS Right     EDUCATION 12     ALFONSO  24     PROVIDER       CoNarrative  LR     STATION  OP            ORIENTATION      Time  0     Personal Info.     Place      Presidents  3 /6           TOMM       Trial 1  32     Trial 2  44     Trial 3  0            DCT       E-Score  15            WAIS-IV       Digit Span RDS 8            WRAT READING   5   SS  76     %ile  5     Grade Equivalence 5.3            WAIS-IV         Raw ScS    Digit Span  20 6           TRAIL MAKING TEST       Time Errors ScS T   A 37 0 8 35   B 108 5 7 29           RBANS   A     Raw ScS/%ile    List Learning 23 5    Story Memory 9 3    Figure Copy 17 4    Line Orientation 12 3-9    Picture Naming 9 17-25    Semantic Fluency 16 4    Digit Span  8 5    Coding  25 1    List Recall  5 10-16    List Recognition 18 3-9    Story Recall 7 6    Figure Recall 12 6             Index     Immediate Mem. 65     Visuospat./Constr. 69     Language  85     Attention  53     Delayed Mem. 56     Total Scale 56            COWAT FAS      Raw 30      ScS 8      T 38             CLOCK DRAWING      Command  IMP     Copy  NML            STROOP        Raw T     Word 57 23     Color 51 32     C/W 28 38     Intf. 2 52            BDI       Raw  26     Interpretation MODERATE           FRACISCO-7       Raw  11     Interpretation MODERATE           GAS        Raw Interpretation    Somatic 0 Minimal     Cognitive 0 Minimal     Affective 0 Minimal     Total 0 Minimal            REYES              [place] CARD SORTING TEST       Raw T %ile   # Categories 2  11-16   Total Correct 36     Total Errors 28 35 7   Perseverative Err. 20 37 9   % Concept Resp. 26 35 7   FTMS:  0     LTL  N/A  N/A

## 2024-11-11 NOTE — PROGRESS NOTES
Pt was seen for neuropsychological evaluation at the request of Dr. Tank Tyson  for the purposes of diagnostic clarification and treatment planning. 158 minutes of test administration and scoring were provided by this writer. Please see Dr. Tank Tyson's report for a full interpretation of the findings.    Ashley Robb  Psychometrist

## 2024-11-11 NOTE — LETTER
2024      RE: Estela Fry  [address] [place] [address]   Estela Fry is a 34 year old year old female is being evaluated via a billable video visit.      If the video visit is dropped, the invitation should be resent by: Text to cell phone: [phone]    Will anyone else be joining your video visit? No    Video-Visit Details    Type of service:  Video Visit    Originating Location (pt. Location): [facility]    Distant Location (provider location):  Off-site    Platform used for Video Visit: Zoom (Telehealth)    Neuropsychologist has received verbal consent for a Video Visit from the patient? Yes. Empirical studies have demonstrated that video/hybrid formats are appropriate and effective means for delivering neuropsychological services to the patient. Interview was [facility]) and formal testing was conducted by the psychometrist in person at the [facility].    Video Start Time (time video started): 12:13 PM    Video End Time (time video stopped): 12:23 PM    RE: Estela Fry  MR#: [id number]  : 1990  DOS: 2024    NEUROPSYCHOLOGICAL CONSULTATION    REASON FOR REFERRAL:  Estela Fry is a 34 year old female with 12 years of formal education. The patient was referred for a neuropsychological evaluation by Dr. Sid Liz to assess her cognitive and emotional functioning secondary to memory difficulties. The evaluation was requested in order to document her current level of functioning, assist with the differential diagnosis and provide appropriate recommendations to the patient, family and treatment team. The patient was informed that the evaluation included multiple measures of performance and symptom validity, and she was encouraged to provide her best effort at all times. The nature of the neuropsychological evaluation was also discussed, including limits of confidentiality (for suspected child or elder abuse, potential homicide or suicide, and  court orders). The patient was also informed that the report would be placed on the electronic medical records system. The patient was also given an opportunity to ask questions. The patient indicated that she understood the information and consented to participate in the evaluation.     PROCEDURE: Review of records and clinical interview, Mental Status-Orientation, Word Reading subtest of Wide Range Achievement Test-5, Digit Span subtest of the Wechsler Adult Intelligence Scale-IV, Trail Making Test, Repeatable Battery for the Assessment of Neuropsychological Status (RBANS), Controlled Oral Word Association Test, Clock Drawing Test, Stroop, [place] Card Sort Test, BDI-II, FRACISCO-7    RECORDS REVIEW: Records indicate a primary medical history of ventricular fibrillation arrest (8/5/2023) s/p ICD placement, DVT, atrial flutter, and iron deficiency. CT of the head without contrast on 8/6/2023 noted:  No acute intracranial pathology. The previous loss of acute gray-white matter in the right frontal lobe is not visualized on this study.  CTA of the head and neck with contrast on 8/5/2023 described:  1. Head CTA demonstrates patent major intracranial arteries. No aneurysm or hemodynamically significant stenosis. 2. Neck CTA demonstrates patent major cervical arteries. No  hemodynamically significant stenosis. 3. The endotracheal tube terminates just above the tory. Consider slight retraction.  Records indicate that the patient is currently being treated with levonorgestrel, rivaroxaban anticoagulant, and sotalol.    CLINICAL INTERVIEW: The patient was interviewed via virtual platform at the [facility] this neuropsychological evaluation. Postdoctoral fellow, Dr. Reynolds, also attended. Information in this section comes from the patient and her . On interview, the patient described  brain fog,  noting reduced attention/concentration since her cardiac arrest in 8/2023. She also reported  "daily headaches since that time, which further interfere with her attention/concentration. She otherwise denied cognitive concerns. Functionally, the patient manages her own personal cares. She no longer cooks due to fatigue and reported instances of leaving the stove/oven on unattended. Her  now provides support with financial management due to attention/concentration concerns. She independently manages medications, with no noted concerns. She no longer drives.     With respect to mental health, Ms. Fry described her current mood as \"up and down, it depends on the day.  She reported feeling depressed and anxious on a daily basis and reported experiencing a single panic attack last month. She is followed by Dr. Escobar in psychology. She denied history of engagement with psychiatry. She denied history of psychiatric hospitalizations. She endorsed intermittent and fleeting suicidal ideation since her hospitalization in 8/2023. She denied suicidal plan or intentions. She denied history of suicide attempts. She reported a remote history of self-harm behaviors as a teenager but denied self-harm behaviors since that time. She identified her children as a protective factor. She reported a single episode of a hypnogogic visual hallucination but denied additional visual or auditory hallucinations. She denied a history of trauma or abuse.     She reported sleeping approximately 10 hours per night. She reported occasional difficulties with sleep initiation due to anxious thoughts. She denied dream enactment behaviors or sleep apnea. She reported reduced appetite over the past 4-5 days, which she attributed to a medication. She denied significant changes in weight. The patient denied alcohol use since her hospitalization in 8/2023. She denied current or historical use of tobacco, cannabis, or illicit substances. She denied a history of chemical dependency or substance use treatment.     With regard to other medical " background, she denied a history of stroke, seizure, head injury with loss of consciousness, gait changes, tremor, rigidity, numbness/tingling, unilateral weakness, or falls. She reported experiencing daily headaches, which worsen when attempting to complete cognitively-demanding tasks. She denied changes in hearing, taste, or smell. She indicated worsening vision, with plans to follow up with optometry. She denied a known family history of neurological conditions.    By way of background, the patient was born and raised in [place], [place]. She denied known complications with her birth or development. Academically, the patient reported that she earned poor grades in school; however, she denied a history of learning disability, special education, or repeating a grade. She graduated from high school. She is a homemaker. She lives with her , their 7 children, her mother-in-law, and father-in-law (both of whom are reportedly disabled).     BEHAVIORAL OBSERVATIONS: The patient arrived early to her appointment, and she was accompanied by her  and daughter. On examination, the patient was appropriately dressed and groomed. Gait was unremarkable. No tremor or postural abnormalities were observed. Vision with correction and hearing were adequate for testing purposes. The patient was pleasant and cooperative during the evaluation. She was oriented to place, time, and personal information. She was able to name the current [place] president and 2/5 of the most recent past [place] presidents. Mood was mildly depressed and anxious with congruent affect. Speech was fluent and normal for rate, volume, and prosody. Comprehension appeared adequate. Thought processes were generally goal-directed and linear. During testing, the patient appeared alert and engaged. She appeared to put forth good effort on tasks.     RESULTS: Formal testing was conducted in person by a psychometrist. Two formal performance validity measures were below  expected limits and indicated evidence that non-cognitive factors likely impacted the results. Therefore, full interpretation of the results is not possible. Psychometric estimates of the patient's premorbid global cognitive functioning based on single word reading ability were in the below average range; however, this is likely an underrepresentation of her functioning based on her reported educational and occupational histories. In spite of the above noted concerns with non-cognitive factors impacting the results, several measures of cognitive functioning were within expected limits. Specifically, attention and working memory performances were low average. Confrontation naming was low average to average. Visual learning and memory were average. Retrieval of rote and contextual verbal material was low average. Phonemic verbal fluency was low average. Complex problem-solving was low average. There was no evidence for visuospatial or planning/organizational difficulties on complex figure drawing or copied clock drawing tasks.      Formal personality evaluation was completed utilizing the clinical interview and self-report measures of depression and anxiety and an objective measure of emotional functioning.  On the self-report measures, the patient endorsed moderate levels of depression and anxiety. She did not endorse suicidal thoughts on the self-report measures, which is inconsistent with her report during the interview (at which time she endorsed fleeting suicidal ideation without plan or intent). On the objective measure, the patient endorsed a higher number of infrequent responses and had a tendency to portray herself in a positive light. Thus, cautious interpretation is required, as scores may reflect an underestimate of her underlying psychological distress. However, the profile is still interpretable. Her responses were suggestive of prominent dysphoria. She also endorsed many fears and anxiety related to  health concerns and traumatic experiences.     SUMMARY: In summary, the neuropsychological assessment results indicated evidence that non-cognitive factors likely impacted the patient's performance. Nonetheless, several measures were within expected limits, including attention/working memory, confrontation naming, verbal fluency, visuospatial abilities, complex problem-solving, visual learning and memory, and retrieval of verbal material. Further, there was evidence for significant depressive and anxiety symptoms.    The pattern of results indicated that neurologically-based cognitive deficits were unlikely, although this could not be completely ruled out due to the non-cognitive factors impacting performance. More likely, multiple factors, particularly mood/anxiety, are contributing to the cognitive concerns. Additional factors including sleep disruption and ongoing headaches are also likely contributory.      RECOMMENDATIONS:  Given these results, continued psychotherapeutic intervention is recommended. A highly structured cognitive-behavioral intervention focused on coping and stress management would likely be the most helpful. Addressing anxiety-related sleep difficulties may also be useful focus of psychotherapy.  The patient may also benefit from consultation with the Psychiatry service (information below) for pharmaceutical interventions for mood management.  Engagement in conjunctive pharmaceutical and psychotherapeutic interventions leads to the greatest reductions in mood and anxiety symptoms.  Psychiatry  [phone]  [email or web address]  A suicide risk assessment was conducted with this patient, and it was determined that the patient likely was not an imminent danger to herself. Nonetheless, ongoing close monitoring of the patient's suicidal ideation by health care providers is recommended.  The patient may find the following attention and organizational strategies  helpful:   Use cell phone reminders to help monitor upcoming appointments and due dates as well as other important tasks (e.g., when to take medications). Set the reminder to go off several times prior to the event with advanced notice (e.g., one week before, two days before, the day before, and the morning of the event).   Attempt to reduce distractions at home. Having a clutter-free work environment and removing or at least making it harder to access potential distractions would help optimize attention.   Taking short breaks during the day may help optimize and maintain concentration.   Make to-do lists and prioritize tasks. Break down large tasks into smaller steps and check off each small step when completed.   During clinical interview, Ms. Fry indicated ongoing financial and housing concerns and noted interest in getting established with a  and/or . She is encouraged to discuss these needs with her primary care.   The results of the evaluation now constitute a partial baseline of the patient's cognitive and emotional functioning. If further cognitive difficulties are observed in the future, a referral for a neuropsychological re-evaluation at that time might be considered if the patient's significant depressive and anxiety symptoms can be effectively managed.     The results were discussed with the patient on 11/12/2024 via phone, and her questions were answered. During feedback, Ms. Fry reported that she does not currently have a designated PCP but is interested in getting established with a provider. She was encouraged to schedule with a PCP as soon as she is able, to help facilitate the recommendations noted in this report, especially those related to case management/social work. She expressed a preference to [facility], so she was encouraged to contact scheduling to arrange an appointment.    Thank you for referring this pleasant individual. If you  have any further questions, please feel free to contact me.        Erik Reynolds, Ph.D.   Neuropsychology Fellow       Tank Tyson, PhD, ABPP, LP   Professor and Licensed Psychologist [id number]    Board Certified Clinical Neuropsychologist     All billing noted here is for professional services provided by the psychologist and psychometrist.?     Time Spent:      Minutes Date Code Units   Total Time-Neuropsychologist Professional 239 11/11/24 78258 1     11/12/24 63684 3   Neuropsychologist Admin/scoring 0 11/12/24 86948 0     11/12/24 92648 0   Diagnostic Interview 10 11/11/24 77592 1   Psychometrician Time-Test Administration/Score 158 11/11/24 04401 1     11/11/24 91394 4   Diagnosis R41.3 I46.9 R52/F41.9 F32.0

## 2024-11-22 ENCOUNTER — ANCILLARY PROCEDURE (OUTPATIENT)
Dept: CARDIOLOGY | Facility: CLINIC | Age: 34
End: 2024-11-22
Attending: INTERNAL MEDICINE
Payer: COMMERCIAL

## 2024-11-22 ENCOUNTER — TELEPHONE (OUTPATIENT)
Dept: CARDIOLOGY | Facility: CLINIC | Age: 34
End: 2024-11-22

## 2024-11-22 DIAGNOSIS — I46.9 CARDIAC ARREST (H): ICD-10-CM

## 2024-11-22 PROCEDURE — 93295 DEV INTERROG REMOTE 1/2/MLT: CPT | Performed by: INTERNAL MEDICINE

## 2024-11-22 PROCEDURE — 93296 REM INTERROG EVL PM/IDS: CPT

## 2024-11-22 NOTE — TELEPHONE ENCOUNTER
Bills Jamir called with urgent results from pt's monitor. Autotriggered event today (11/22) at 2:34 PM. New onset atrial flutter, max  BPM. Pleae see attached photo. Full report is available on [employer] website.

## 2024-11-25 ENCOUNTER — THERAPY VISIT (OUTPATIENT)
Dept: PHYSICAL THERAPY | Facility: REHABILITATION | Age: 34
End: 2024-11-25
Payer: COMMERCIAL

## 2024-11-25 ENCOUNTER — VIRTUAL VISIT (OUTPATIENT)
Dept: PSYCHOLOGY | Facility: CLINIC | Age: 34
End: 2024-11-25
Payer: COMMERCIAL

## 2024-11-25 DIAGNOSIS — N39.3 FEMALE STRESS INCONTINENCE: Primary | ICD-10-CM

## 2024-11-25 DIAGNOSIS — F43.22 ADJUSTMENT DISORDER WITH ANXIETY: Primary | ICD-10-CM

## 2024-11-25 PROCEDURE — 97112 NEUROMUSCULAR REEDUCATION: CPT | Mod: GP

## 2024-11-25 PROCEDURE — 97161 PT EVAL LOW COMPLEX 20 MIN: CPT | Mod: GP

## 2024-11-25 PROCEDURE — 97110 THERAPEUTIC EXERCISES: CPT | Mod: GP

## 2024-11-25 ASSESSMENT — ANXIETY QUESTIONNAIRES
5. BEING SO RESTLESS THAT IT IS HARD TO SIT STILL: NOT AT ALL
8. IF YOU CHECKED OFF ANY PROBLEMS, HOW DIFFICULT HAVE THESE MADE IT FOR YOU TO DO YOUR WORK, TAKE CARE OF THINGS AT HOME, OR GET ALONG WITH OTHER PEOPLE?: SOMEWHAT DIFFICULT
IF YOU CHECKED OFF ANY PROBLEMS ON THIS QUESTIONNAIRE, HOW DIFFICULT HAVE THESE PROBLEMS MADE IT FOR YOU TO DO YOUR WORK, TAKE CARE OF THINGS AT HOME, OR GET ALONG WITH OTHER PEOPLE: SOMEWHAT DIFFICULT
7. FEELING AFRAID AS IF SOMETHING AWFUL MIGHT HAPPEN: SEVERAL DAYS
1. FEELING NERVOUS, ANXIOUS, OR ON EDGE: NEARLY EVERY DAY
7. FEELING AFRAID AS IF SOMETHING AWFUL MIGHT HAPPEN: SEVERAL DAYS
6. BECOMING EASILY ANNOYED OR IRRITABLE: SEVERAL DAYS
GAD7 TOTAL SCORE: 12
4. TROUBLE RELAXING: SEVERAL DAYS
2. NOT BEING ABLE TO STOP OR CONTROL WORRYING: NEARLY EVERY DAY
GAD7 TOTAL SCORE: 12
3. WORRYING TOO MUCH ABOUT DIFFERENT THINGS: NEARLY EVERY DAY
GAD7 TOTAL SCORE: 12

## 2024-11-25 NOTE — PROGRESS NOTES
Health Psychology Outpatient Follow Up  [facility]     Patient: Estela Fry   Date of Service: 11/25/24  Treatment Plan Due: 3/14/25    Diagnosis:  Adjustment disorder with anxious mood (ICD-10: F43.20)    Patient Demographics (per chart):   Age: 34 year old  Biological Sex: female  Race:   Ethnicity: Not  or     Patient Medications (per chart):  Current Outpatient Medications   Medication Sig Dispense Refill    levonorgestrel (MIRENA) 52 MG (20 mcg/day) IUD 1 each by Intrauterine route once      rivaroxaban ANTICOAGULANT (XARELTO) 20 MG TABS tablet Take 1 tablet (20 mg) by mouth daily (with dinner) 90 tablet 3    sotalol (BETAPACE) 80 MG tablet Take 1 tablet (80 mg) by mouth 2 times daily. 180 tablet 0     No current facility-administered medications for this visit.     Subjective:  Engaged pt in psychotherapy (CBT) for coping with anxiety following cardiac arrest and ICD placement. Reviewed updates to physical and emotional health since previous session and progress with treatment plan goals. Patient reported high daily anxiety since last encounter about fear of ICD shock. Engaged patient in supportive listening and cognitive processing of ongoing sxs and stressors. Pt reflected about changes in daily activities that have occurred in response to fear of ICD shock (e.g., limiting driving, going to family gatherings, or walking for long distances outside). Engaged pt in continued psychoeducation and skills training related to cognitive anxiety reduction strategies and basics of exposure therapy for ICD shock anxiety. Pt reported a desire to drive to her children's school down the street 2x in next week. She will employ the support of her  to help with accountability and cognitive processing of this goal.     Objective   Appearance:   Appropriate   Eye Contact:   Good   Psychomotor Behavior:  Normal   Attitude:   Cooperative  "  Orientation:   All  Speech   Rate / Production: Normal    Volume:  Normal   Mood:    Anxious   Affect:    Appropriate   Thought Content:   Clear  Thought Form:   Coherent  Logical     Insight:    Did not formally assess at this time.   Safety:    No safety concerns at this time.     Weight (per chart):  Wt Readings from Last 4 Encounters:   11/05/24 62.1 kg (137 lb)   09/17/24 62.1 kg (137 lb)   08/21/24 60.9 kg (134 lb 4.8 oz)   07/23/24 59.4 kg (131 lb)        BMI (per chart):  Estimated body mass index is 25.06 kg/m  as calculated from the following:    Height as of 11/5/24: 1.575 m (5' 2\").    Weight as of 11/5/24: 62.1 kg (137 lb).     Depression Symptoms (Patient Health Questionnaire 9; PHQ-9)  The PHQ-9 is a measure of depressive symptoms.  Scores on this measure range from 0 to 27 with higher scores reflecting greater levels and frequency of depressive symptoms. Typical symptom ranges include: 0-4 minimal, 5-9 mild, 10-14 moderate, 15-19 moderately severe, 20-27 severe.       10/12/2023     1:58 PM 10/24/2023     9:04 AM 9/9/2024     8:04 AM   PHQ-9 SCORE   PHQ-9 Total Score MyChart  7 (Mild depression)    PHQ-9 Total Score 20 7 10     Anxiety Symptoms (Generalized Anxiety Disorder 7; FRACISCO-7)  The FRACISCO-7 is a measure of anxiety and panic symptoms.  Scores on this measure range from 0 to 21 with higher scores reflecting greater levels and frequency of anxiety symptoms. Typical symptom ranges include: 0-4 minimal, 5-9 mild, 10-14 moderate, 15-21 severe.       9/6/2024     8:30 AM 10/15/2024     1:38 PM 11/25/2024    11:29 AM   FRACISCO-7 SCORE   Total Score 10 (moderate anxiety) 11 (moderate anxiety) 12 (moderate anxiety)   Total Score 10 11 12        Patient-reported     Alcohol and Drug Abuse (CAGE - Adapted to Include Drugs; CAGE-AID)  The CAGE-AID is a screening tool to assess for symptoms of alcohol or drug abuse or dependence. Scores on this measure range from 0 to 4, with higher scores reflecting experiences " consistent with problem use.  CAGE-AID score  > 1 is a positive screen, suggesting further discussion is needed to determine if evaluation for alcohol or substance abuse is appropriate.  A score > 2 is considered clinically significant, suggesting further evaluation of alcohol or substance-related problems is indicated.      2/22/2024    10:39 AM   CAGE-AID Total Score   Total Score 0   Total Score MyChart 0 (A total score of 2 or greater is considered clinically significant)     Global Health (Patient-Reported Outcomes Measurement Information System; PROMIS-10)  The PROMIS-10 is a measure of global physical and mental health. Total scores on this measure range from 8 to 40, with higher scores reflecting greater levels of perceived health.       2/22/2024    10:38 AM 9/6/2024     8:33 AM   PROMIS-10 Scores Only   Global Mental Health Score 11 8   Global Physical Health Score 14 16   PROMIS TOTAL - SUBSCORES 25 24     Suicidality ([place] Suicide Severity Rating Scale Screener Past Month)      11/5/2024    10:48 AM   [place] Suicide Severity Rating (Short Version)   Q1 Wished to be Dead (Past Month) 0-->no   Q2 Suicidal Thoughts (Past Month) 0-->no   Q6 Suicide Behavior (Lifetime) 0-->no   Level of Risk per Screen no risks indicated       Assessment:  Patient was engaged in treatment. Patient appears to be experiencing depression symptoms in the moderate range and anxiety symptoms in the moderate range. Currently, the patient appears to be coping with minimal success. Progress towards treatment goals: ongoing.     Plan:  Return for therapy sessions.     Session Length:  Start Time: 1:00pm  End Time: 1:42pm    Modality: Telemedicine Information:  Type of service:   Telemedicine psychotherapy  Patient location:   Patient home in [place]    Patient telephone number: [phone]  Provider location:  [facility] Psychology Office  Mode of Communication:   Video Conference via [employer]   Patient consent to  telemedicine services? Yes  It has been determined that telemedicine service is appropriate and effective for this patient.   The patient has been notified that: Video visits will be conducted via a call with their psychologist to provide the care they need with a video conversation. Video visits may be billed at different rates depending on insurance coverage.  Patients are advised to contact their insurance provider with any questions about their health insurance coverage. If during the course of a call the psychologist feels a video visit is not appropriate, patients will not be charged for this service.    Johnny Escobar, PhD, LP  Clinical Health Psychology Fellow    *This case is being supervised by Shivani Sanders, PhD, LP.  *This note was completed using Dragon voice recognition software. Although reviewed after completion, some word and grammatical errors may occur.

## 2024-11-25 NOTE — PROGRESS NOTES
"PHYSICAL THERAPY EVALUATION  Type of Visit: Evaluation    Subjective         Presenting condition or subjective complaint: (Patient-Rptd) bladder  Date of onset: 10/24/24    Relevant medical history: (Patient-Rptd) Implanted device   Dates & types of surgery: (Patient-Rptd) august 15 2023    Prior diagnostic imaging/testing results:     US 10/29/24: Impression: IUD in correct position, similar US to prior 9/17/24. Right ovary with dominant follicle and surrounding smaller follicles. No ultrasound findings to explain pelvic pain.     Prior therapy history for the same diagnosis, illness or injury: (Patient-Rptd) No      Living Environment  Social support: (Patient-Rptd) With a significant other or spouse   Type of home: (Patient-Rptd) House   Stairs to enter the home:         Ramp: (Patient-Rptd) No   Stairs inside the home: (Patient-Rptd) Yes (Patient-Rptd) 1 Is there a railing: (Patient-Rptd) Yes     Help at home: (Patient-Rptd) Self Cares (home health aide/personal care attendant, family, etc); Home management tasks (cooking, cleaning)  Equipment owned:       Employment:      Hobbies/Interests:      Patient goals for therapy:   per discussion - strengthen PF, lessen heaviness feeling     Pt with PMH of cardiac arrest, atrial flutter, and venous thromboembolism, with an automatic implantable cardioverter-defibrillator (ICD) in place. She recently underwent several surgeries, including a subcutaneous ICD insertion (8/2023) and IUD placement (2/2024), along with a D&C February 2024. Her current medications include rivaroxaban (anticoagulant) and sotalol for atrial flutter.       Current concerns include stress incontinence and pelvic pain with intercourse (worsened after the IUD insertion, pt reports she does not use lubricant). Pt reports \"I'm worried I have prolapse,\" and was told that she has bladder prolapse a while ago by one of her providers. She reports a heaviness in her vaginal area that feels worse with " PMS, and previously had this feeling back in 2021 as well. The leaking and heaviness associated with the UTI/BV in October has largely resolved after completing metroNIDAZOLE. Reports that the night before she had her UTI, intercourse was 10/10 pain. Hx of tearing with her first and second born, x9 deliveries with children between 4 and 13yo. Denies leaking, rushing to the toilet or use of pads in the past week.    Objective      PELVIC EVALUATION  ADDITIONAL HISTORY:  Sex assigned at birth: (Patient-Rptd) Female  Gender identity: (Patient-Rptd) Female    Pronouns: (Patient-Rptd) She/Her Hers      Bladder History:  Feels bladder filling: (Patient-Rptd) Yes  Triggers for feeling of inability to wait to go to the bathroom: (Patient-Rptd) No    How long can you wait to urinate: (Patient-Rptd) sometimes 3-4 hours  Gets up at night to urinate: (Patient-Rptd) Yes (Patient-Rptd) 1  Can stop the flow of urine when urinating: (Patient-Rptd) Yes  Volume of urine usually released: (Patient-Rptd) Medium   Other issues:    Number of bladder infections in last 12 months:    Fluid intake per day: (Patient-Rptd) 64 oz      Medications taken for bladder: (Patient-Rptd) No     Activities causing urine leak:      Amount of urine typically leaked:    Pads used to help with leaking: (Patient-Rptd) Yes I use this many pads per day: (Patient-Rptd) 2   I use this type/brand: (Patient-Rptd) caress      Bowel History:  Frequency of bowel movement: (Patient-Rptd) 1  Consistency of stool: (Patient-Rptd) Soft-formed    Ignores the urge to defecate: (Patient-Rptd) No  Other bowel issues:  denies straining   Length of time spent trying to have a bowel movement:      Sexual Function History:  Sexual orientation: (Patient-Rptd) Straight    Sexually active: (Patient-Rptd) No  Lubrication used: (Patient-Rptd) No (Patient-Rptd) No   Pelvic pain:    deeper penetration, sometimes with stomach or on the side   Pain or difficulty with  orgasms/erection/ejaculation: (Patient-Rptd) Yes (Patient-Rptd) some times during intercourse  State of menopause:    Hormone medications: (Patient-Rptd) No      Are you currently pregnant: (Patient-Rptd) No  Number of previous pregnancies: (Patient-Rptd) 13  Number of deliveries: (Patient-Rptd) 9  If you have delivered before, did you have any of these issues during delivery: (Patient-Rptd) Tearing; Vaginal delivery  Have you been diagnosed with pelvic prolapse or abdominal separation: (Patient-Rptd) No  Do you get regular exercise: (Patient-Rptd) No  Have you tried pelvic floor strengthening exercises for 4 weeks: (Patient-Rptd) Yes  Do you have any history of trauma that is relevant to your care that you d like to share: (Patient-Rptd) No    Discussed reason for referral regarding pelvic health needs and external/internal pelvic floor muscle examination with patient/guardian.  Opportunity provided to ask questions and verbal consent for assessment and intervention was given.    PAIN: with intercourse  POSTURE: anterior pelvic tilt   LUMBAR SCREEN: AROM WNL  HIP SCREEN:  Strength: WNL   ROM: mildly limited ER of hips R>L   Functional Strength Testing: mild ant translation of knees with squat     PELVIC/SI SCREEN: NT  BREATHING SYMMETRY: Decreased rib cage mobility, improved with cueing     PELVIC EXAM  External Visual Inspection:  At rest: elevated   With voluntary pelvic floor contraction: elevation   Relaxation of PFM: partial, mild improvement with cues   With intra-abdominal pressure: initial elevation followed by descent with bearing down, descent with cough     Integumentary:   Introitus: taut     External Digital Palpation per Perineum:   Tension noted through bilateral adductors     Scar:   Location/Type: perineal   Mobility: mild hypomobility - denies tenderness    Internal Digital Palpation:  Per Vagina:  Myofascial Resistance to Palpation: Firm, Taut   Digital Muscle Performance: P (Power): 4/5   E  (Endurance): needing to re-engage around 6 seconds   Compensations: mild breath holding  Relaxation Post-Contraction: Partial/delayed relaxation, Non-relaxation improved with cueing   Coordination with diaphragm: paradoxical; contraction with inhale coordination with TA: good coordination.  Able to hold kegel with fake cough: some difficulty, can hold ~70% of the contraction bridging: re-engaging at the top, losing with descent.     Denies pain with palpation though pt does provide appropriate feedback, noting areas of tension >layer 3, and through obturator internus R>L      Pelvic Organ Prolapse:   Not observed, maybe grade I apical     ABDOMINAL ASSESSMENT  Diastasis Rectus Abdominis (RONALDO):  RONALDO presence: No     Abdominal Activation/Strength: able to engage however breath holding with ASLR    Fascial Tension/Restriction: WNL     Biofeedback: NT      Assessment & Plan   CLINICAL IMPRESSIONS  Medical Diagnosis: Stress incontinence of urine (N39.3)  - Primary    Treatment Diagnosis: Stress incontinence of urine (N39.3)  - Primary   Impression/Assessment: Patient is a 34 year old - year old female with stress incontinence .  The following significant findings have been identified: Pain, Decreased ROM/flexibility, Decreased joint mobility, Decreased strength, Impaired balance, Decreased proprioception, Impaired sensation, Impaired gait, Impaired muscle performance, Decreased activity tolerance, and Impaired posture. These impairments interfere with their ability to perform self care tasks, recreational activities, household mobility, and community mobility as compared to previous level of function. Pt is appropriate for skilled PT intervention.       Clinical Decision Making (Complexity):  Clinical Presentation: Stable/Uncomplicated  Clinical Presentation Rationale: based on medical and personal factors listed in PT evaluation  Clinical Decision Making (Complexity): Low complexity    PLAN OF CARE  Treatment  Interventions:  Modalities: Biofeedback, Dry Needling, E-stim, Mechanical Traction, Ultrasound  Interventions: Gait Training, Manual Therapy, Neuromuscular Re-education, Therapeutic Activity, Therapeutic Exercise, Self-Care/Home Management      Long Term Goals     PT Goal 1  Goal Identifier: HEP  Goal Description: Pt will be independent with HEP for self-management of symptoms  Rationale: to maximize safety and independence within the home  Goal Progress: initial  Target Date: 12/23/24  PT Goal 2  Goal Identifier: biomechanics  Goal Description: Pt will understand the biomechanics of the pelvic floor and demonstrate appropriate PF and abdominal engagmement with functional activity in order to reduce stress on the pelvic floor.  Rationale: to maximize safety and independence with performance of ADLs and functional tasks;to maximize safety and independence within the community  Goal Progress: initial  Target Date: 01/13/25  PT Goal 3  Goal Identifier: leakage/urgency  Goal Description: Reduce urinary leakage episodes to 1-2 times per week max and improve pelvic floor muscle strength by 50% within 3 months through targeted exercises and bladder training strategies.  Rationale: to maximize safety and independence within the community;to maximize safety and independence with self cares  Goal Progress: initial  Target Date: 02/25/25      Frequency of Treatment: 1x/week  Duration of Treatment: 12 weeks    Education Assessment:   Learner/Method: Patient    Risks and benefits of evaluation/treatment have been explained.   Patient/Family/caregiver agrees with Plan of Care.     Evaluation Time:     RETIRED PT Eval, Low Complexity Minutes (35714): 15     Signing Clinician: Marcella Villalta PT        [facility]                                                                                   OUTPATIENT PHYSICAL THERAPY      PLAN OF TREATMENT FOR OUTPATIENT REHABILITATION   Patient's Last Name,  First Name, Estela Duffy    YOB: 1990   Provider's Name   [facility]   Medical Record No.  [id number]     Onset Date: 10/24/24  Start of Care Date: 11/25/24     Medical Diagnosis:  Stress incontinence of urine (N39.3)  - Primary      PT Treatment Diagnosis:  Stress incontinence of urine (N39.3)  - Primary Plan of Treatment  Frequency/Duration: 1x/week/ 12 weeks    Certification date from 11/25/24 to 02/25/25         See note for plan of treatment details and functional goals     Marcella Villalta, PT                         I CERTIFY THE NEED FOR THESE SERVICES FURNISHED UNDER        THIS PLAN OF TREATMENT AND WHILE UNDER MY CARE     (Physician attestation of this document indicates review and certification of the therapy plan).              Referring Provider:  Leyla Tavera    Initial Assessment  See Epic Evaluation- Start of Care Date: 11/25/24

## 2024-11-27 LAB
MDC_IDC_LEAD_CONNECTION_STATUS: NORMAL
MDC_IDC_LEAD_IMPLANT_DT: NORMAL
MDC_IDC_LEAD_LOCATION: NORMAL
MDC_IDC_LEAD_LOCATION_DETAIL_1: NORMAL
MDC_IDC_LEAD_MFG: NORMAL
MDC_IDC_LEAD_MODEL: NORMAL
MDC_IDC_LEAD_POLARITY_TYPE: NORMAL
MDC_IDC_LEAD_SERIAL: NORMAL
MDC_IDC_MSMT_BATTERY_DTM: NORMAL
MDC_IDC_MSMT_BATTERY_REMAINING_PERCENTAGE: 83 %
MDC_IDC_MSMT_BATTERY_STATUS: NORMAL
MDC_IDC_PG_IMPLANT_DTM: NORMAL
MDC_IDC_PG_MFG: NORMAL
MDC_IDC_PG_MODEL: NORMAL
MDC_IDC_PG_SERIAL: NORMAL
MDC_IDC_PG_TYPE: NORMAL
MDC_IDC_SESS_CLINIC_NAME: NORMAL
MDC_IDC_SESS_DTM: NORMAL
MDC_IDC_SESS_TYPE: NORMAL
MDC_IDC_SET_ZONE_DETECTION_INTERVAL: 250 MS
MDC_IDC_SET_ZONE_DETECTION_INTERVAL: 300 MS
MDC_IDC_SET_ZONE_STATUS: NORMAL
MDC_IDC_SET_ZONE_STATUS: NORMAL
MDC_IDC_SET_ZONE_TYPE: NORMAL
MDC_IDC_SET_ZONE_TYPE: NORMAL
MDC_IDC_SET_ZONE_VENDOR_TYPE: NORMAL
MDC_IDC_SET_ZONE_VENDOR_TYPE: NORMAL
MDC_IDC_STAT_AT_BURDEN_PERCENT: 0 %
MDC_IDC_STAT_EPISODE_RECENT_COUNT: 0
MDC_IDC_STAT_EPISODE_RECENT_COUNT_DTM_END: NORMAL
MDC_IDC_STAT_EPISODE_RECENT_COUNT_DTM_START: NORMAL
MDC_IDC_STAT_EPISODE_TOTAL_COUNT: 0
MDC_IDC_STAT_EPISODE_TOTAL_COUNT_DTM_END: NORMAL
MDC_IDC_STAT_EPISODE_TOTAL_COUNT_DTM_START: NORMAL
MDC_IDC_STAT_EPISODE_TYPE: NORMAL
MDC_IDC_STAT_EPISODE_TYPE: NORMAL
MDC_IDC_STAT_TACHYTHERAPY_RECENT_DTM_END: NORMAL
MDC_IDC_STAT_TACHYTHERAPY_RECENT_DTM_START: NORMAL
MDC_IDC_STAT_TACHYTHERAPY_SHOCKS_DELIVERED_RECENT: 0
MDC_IDC_STAT_TACHYTHERAPY_SHOCKS_DELIVERED_TOTAL: 3
MDC_IDC_STAT_TACHYTHERAPY_TOTAL_DTM_END: NORMAL
MDC_IDC_STAT_TACHYTHERAPY_TOTAL_DTM_START: NORMAL

## 2024-12-05 ENCOUNTER — VIRTUAL VISIT (OUTPATIENT)
Dept: CARDIOLOGY | Facility: CLINIC | Age: 34
End: 2024-12-05
Payer: COMMERCIAL

## 2024-12-05 VITALS — BODY MASS INDEX: 25.4 KG/M2 | WEIGHT: 138 LBS | HEIGHT: 62 IN

## 2024-12-05 DIAGNOSIS — Z95.810 ICD (IMPLANTABLE CARDIOVERTER-DEFIBRILLATOR) IN PLACE: Primary | ICD-10-CM

## 2024-12-05 DIAGNOSIS — I46.9 CARDIAC ARREST (H): ICD-10-CM

## 2024-12-05 ASSESSMENT — PAIN SCALES - GENERAL: PAINLEVEL_OUTOF10: NO PAIN (0)

## 2024-12-05 NOTE — PROGRESS NOTES
Virtual Visit Details    Type of service:  Video Visit   Video Start Time: 4:00 pm   Video End Time:4:30 PM  Originating Location (pt. Location): Home  Distant Location (provider location):  On-site  Platform used for Video Visit: [facility]        ELECTROPHYSIOLOGY VIDEO VISIT    Assessment/Recommendations   Assessment/Plan:    Estela Fry is a 34 year old female with past medical history significant for VF arrest (unknown etiology) s/p secondary prevention SICD implant 8/15/23, provoked right external iliac DVT (on xarelto).       Atrial flutter s/p ICD shocks  She had been doing well on sotalol. Has some brief recurrences, including about 30 seconds while wearing holter monitor as well as noting an increase in ectopy. We discussed we could increased her sotalol to 120 mg bid, we also discussed EPS/ablation. She has some interest in ablation today and would like to meet with the provider who would do the procedure. She would like to think about increasing her sotalol dose and let us know.     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 Continues to follow with kasi due to emotional stress following arrest.     DVT, on Xarelto   Seeing hematology.    Follow up with Dr Leon in 3-4 months.      History of Present Illness/Subjective    Ms. Estela Fry is a 34 year old female who comes in today for EP follow-up of AFL, SICD.    Patient is a 33 yo female with history of  idiopathic VF arrest, found down at home by her  8/5/2023, initial rhythm VF, shocked x 3 with ROSC. Normal EF, no CAD, drug screen negative.  No family history of SCD, not peripartum. She underwent SICD implant. SICD was chosen because she and her family have cultural issues with implantable devices in her body, and SICD was an acceptable alternative for them. She was discharged on 8/16/2023. She had her 1 week follow up in device clinic on 8/22/2023.   She was then seen by Dr PEG Johnson 10/12/2023 - she and her  were severely  traumatized by the event. She was not able to care for her kids due to concern that she would have another arrest. Her  had been reluctant to leave her and had lost his job. Post arrest clinic was able to see her soon after our the visit to set up social work assistance.     She then presented to [facility]/15/2023 after receiving multiple shocks from her SICD. EMS was called to her home and found an initial supraventricular rhythm at 170 bpm. In ED EKG showed atrial flutter and she was cardioverted in ED. Of note, she had paroxysmal episodes of atrial flutter during hospitalization in Aug 2023 post VF arrest. She was seen in follow up 1/18/2024 and sotalol 80 bid was started. She reports when she first started sotalol, she felt velazco and irritable but this improved after several doses.     She presented to the ER 11/5/24 for evaluation of an odd heart rhythm earlier that morning. Reported she would feel normal beats followed by a stronger beat lasting about 20 minutes. EKG and telemetry without findings, EP saw patient and recommended completion of heart monitor.   She presents today after having about 30 seconds of heart racing while wearing monitor and noticing some skipped beats. She otherwise denies peripheral edema, shortness of breath, lightheadedness, dizziness, pre-syncope, or syncope. Device interrogation shows stable lead trends, no arrhythmias.     I have reviewed and updated the patient's Past Medical History, Social History, Family History and Medication List.     Cardiographics (Personally Reviewed) :   Coronary Angiogram: 8/5/23   No significant coronary artery disease.      CMR: 8/10/23  1. The LV is normal in cavity size and wall thickness. The global systolic function is normal. The LVEF is  64%. There are no regional wall motion abnormalities.  2. The RV is normal in cavity size. The global systolic function is normal. The RVEF is 59%.   3. Both atria are normal in size.  4. There  "is no significant valvular disease.   5. Late gadolinium enhancement imaging shows no MI, fibrosis or infiltrative disease.   6. There is trivial pericardial effusion.  7. There is no intracardiac thrombus.  CONCLUSIONS: Normal cardiac function, LVEF of 64% and RVEF of 59%. There is no evidence of myocardial  fibrosis, given no fibrosis consider genetic evaluation for the etiology of her cardiac arrest.      EK/15/24       EK23          EK23         Physical Examination   Ht 1.575 m (5' 2\")   Wt 62.6 kg (138 lb)   LMP 10/28/2024 (Exact Date)   BMI 25.24 kg/m    Wt Readings from Last 3 Encounters:   24 62.6 kg (138 lb)   24 62.1 kg (137 lb)   24 62.1 kg (137 lb)       General Appearance:   Alert, well-appearing and in no acute distress.   HEENT: Atraumatic, normocephalic.    Chest/Lungs:   Respirations unlabored.     Cardiovascular:   No edema of visualized extremities.     Psych: Mood and affect are appropriate.    Skin: No rashes visualized on face/neck/upper extremities   Neurologic:  Alert and oriented to person, place, time, and situation.          Medications  Allergies   Xarelto 20 mg daily   Sotalol 80 mg bid    No Known Allergies      Lab Results (Personally Reviewed)    Chemistry/lipid CBC Cardiac Enzymes/BNP/TSH/INR   Lab Results   Component Value Date    BUN 10.4 2024     2024    CO2 25 2024     Creatinine   Date Value Ref Range Status   2024 0.69 0.51 - 0.95 mg/dL Final       Lab Results   Component Value Date    CHOL 134 10/04/2024    HDL 60 10/04/2024    LDL 63 10/04/2024      Lab Results   Component Value Date    WBC 10.8 2024    HGB 14.2 2024    HCT 43.5 2024    MCV 86 2024     2024    Lab Results   Component Value Date    TSH 1.84 2024    INR 1.14 2024        I have reviewed the note as documented above.  This accurately captures the substance of my virtual visit with the patient. " The patient states understanding and is agreeable with the plan.     Michelle Kimble PA-C  [facility]  Electrophysiology Consult Service  Pager: [phone]    Total time spent on patient visit, reviewing notes, imaging, labs, orders, and completing necessary documentation: 45 minutes.

## 2024-12-05 NOTE — PATIENT INSTRUCTIONS
You were seen in the Electrophysiology Clinic today by: Michelle Kimble    Plan:   Medication Changes:   Let  us know if you would like to increase your sotalol dose to 120 mg twice daily     Follow up Visit:  Follow up with Dr Leon in 3-4 months       If you have further questions, please utilize [employer] to contact us.     Your Care Team:    EP Cardiology   Telephone Number     Nurse Line  Garcia Dent RN   [phone]     For scheduling appointments:   Ashvin   [phone]   For procedure scheduling:    Paulina Nayak     [phone]   For the Device Clinic (Pacemakers, ICDs, Loop Recorders)    During business hours: [phone]  After business hours:   [phone]- select option 4 and ask for job code [phone].       On-call cardiologist for after hours or on weekends:   [phone], option #4, and ask to speak to the on-call cardiologist.     Cardiovascular Clinic:   [address] [place] [address]. [place] [address]      As always, Thank you for trusting us with your health care needs!

## 2024-12-05 NOTE — NURSING NOTE
Current patient location: [address] [place] [address]    Is the patient currently in the state of [place]? YES    Visit mode:VIDEO    If the visit is dropped, the patient can be reconnected by:VIDEO VISIT: Text to cell phone:   Telephone Information:   Mobile [phone]    and VIDEO VISIT: Send to e-mail at: [email or web address]    Will anyone else be joining the visit? NO  (If patient encounters technical issues they should call [phone])    Are changes needed to the allergy or medication list? No    Are refills needed on medications prescribed by this physician? NO    Rooming Documentation:  Questionnaire(s) completed    No other vitals to report today    Reason for visit: JOHNNIE Steinberg MA VVF

## 2024-12-05 NOTE — LETTER
12/5/2024      RE: Estela Fry  [address] [place] [address]       Dear Colleague,    Thank you for the opportunity to participate in the care of your patient, Estela Fry, at the [facility] [place] [facility]. Please see a copy of my visit note below.    Virtual Visit Details    Type of service:  Video Visit   Video Start Time: 4:00 pm   Video End Time:4:30 PM  Originating Location (pt. Location): Home  Distant Location (provider location):  On-site  Platform used for Video Visit: [employer] (Formerly [employer])        ELECTROPHYSIOLOGY VIDEO VISIT    Assessment/Recommendations   Assessment/Plan:    Estela Fry is a 34 year old female with past medical history significant for VF arrest (unknown etiology) s/p secondary prevention SICD implant 8/15/23, provoked right external iliac DVT (on xarelto).       Atrial flutter s/p ICD shocks  She had been doing well on sotalol. Has some brief recurrences, including about 30 seconds while wearing holter monitor as well as noting an increase in ectopy. We discussed we could increased her sotalol to 120 mg bid, we also discussed EPS/ablation. She has some interest in ablation today and would like to meet with the provider who would do the procedure. She would like to think about increasing her sotalol dose and let us know.     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 Continues to follow with kasi due to emotional stress following arrest.     DVT, on Xarelto   Seeing hematology.    Follow up with Dr Leon in 3-4 months.      History of Present Illness/Subjective    MsHowie Fry is a 34 year old female who comes in today for EP follow-up of AFL, SICD.    Patient is a 33 yo female with history of  idiopathic VF arrest, found down at home by her  8/5/2023, initial rhythm VF, shocked x 3 with ROSC. Normal EF, no CAD, drug screen negative.  No family history of SCD, not peripartum. She underwent SICD  implant. SICD was chosen because she and her family have cultural issues with implantable devices in her body, and SICD was an acceptable alternative for them. She was discharged on 8/16/2023. She had her 1 week follow up in device clinic on 8/22/2023.   She was then seen by Dr PEG Johnson 10/12/2023 - she and her  were severely traumatized by the event. She was not able to care for her kids due to concern that she would have another arrest. Her  had been reluctant to leave her and had lost his job. Post arrest clinic was able to see her soon after our the visit to set up social work assistance.     She then presented to [facility] 1/15/2023 after receiving multiple shocks from her SICD. EMS was called to her home and found an initial supraventricular rhythm at 170 bpm. In ED EKG showed atrial flutter and she was cardioverted in ED. Of note, she had paroxysmal episodes of atrial flutter during hospitalization in Aug 2023 post VF arrest. She was seen in follow up 1/18/2024 and sotalol 80 bid was started. She reports when she first started sotalol, she felt velazco and irritable but this improved after several doses.     She presented to the ER 11/5/24 for evaluation of an odd heart rhythm earlier that morning. Reported she would feel normal beats followed by a stronger beat lasting about 20 minutes. EKG and telemetry without findings, EP saw patient and recommended completion of heart monitor.   She presents today after having about 30 seconds of heart racing while wearing monitor and noticing some skipped beats. She otherwise denies peripheral edema, shortness of breath, lightheadedness, dizziness, pre-syncope, or syncope. Device interrogation shows stable lead trends, no arrhythmias.     I have reviewed and updated the patient's Past Medical History, Social History, Family History and Medication List.     Cardiographics (Personally Reviewed) :   Coronary Angiogram: 8/5/23   No significant coronary  "artery disease.      CMR: 8/10/23  1. The LV is normal in cavity size and wall thickness. The global systolic function is normal. The LVEF is  64%. There are no regional wall motion abnormalities.  2. The RV is normal in cavity size. The global systolic function is normal. The RVEF is 59%.   3. Both atria are normal in size.  4. There is no significant valvular disease.   5. Late gadolinium enhancement imaging shows no MI, fibrosis or infiltrative disease.   6. There is trivial pericardial effusion.  7. There is no intracardiac thrombus.  CONCLUSIONS: Normal cardiac function, LVEF of 64% and RVEF of 59%. There is no evidence of myocardial  fibrosis, given no fibrosis consider genetic evaluation for the etiology of her cardiac arrest.      EK/15/24       EK23          EK23         Physical Examination   Ht 1.575 m (5' 2\")   Wt 62.6 kg (138 lb)   LMP 10/28/2024 (Exact Date)   BMI 25.24 kg/m    Wt Readings from Last 3 Encounters:   24 62.6 kg (138 lb)   24 62.1 kg (137 lb)   24 62.1 kg (137 lb)       General Appearance:   Alert, well-appearing and in no acute distress.   HEENT: Atraumatic, normocephalic.    Chest/Lungs:   Respirations unlabored.     Cardiovascular:   No edema of visualized extremities.     Psych: Mood and affect are appropriate.    Skin: No rashes visualized on face/neck/upper extremities   Neurologic:  Alert and oriented to person, place, time, and situation.          Medications  Allergies   Xarelto 20 mg daily   Sotalol 80 mg bid    No Known Allergies      Lab Results (Personally Reviewed)    Chemistry/lipid CBC Cardiac Enzymes/BNP/TSH/INR   Lab Results   Component Value Date    BUN 10.4 2024     2024    CO2 25 2024     Creatinine   Date Value Ref Range Status   2024 0.69 0.51 - 0.95 mg/dL Final       Lab Results   Component Value Date    CHOL 134 10/04/2024    HDL 60 10/04/2024    LDL 63 10/04/2024      Lab Results   Component " Value Date    WBC 10.8 11/05/2024    HGB 14.2 11/05/2024    HCT 43.5 11/05/2024    MCV 86 11/05/2024     11/05/2024    Lab Results   Component Value Date    TSH 1.84 07/23/2024    INR 1.14 11/05/2024        I have reviewed the note as documented above.  This accurately captures the substance of my virtual visit with the patient. The patient states understanding and is agreeable with the plan.     Michelle Kimble PA-C  [facility]  Electrophysiology Consult Service  Pager: [phone]    Total time spent on patient visit, reviewing notes, imaging, labs, orders, and completing necessary documentation: 45 minutes.                       Please do not hesitate to contact me if you have any questions/concerns.     Sincerely,     Michelle Kimble PA-C

## 2024-12-10 ENCOUNTER — VIRTUAL VISIT (OUTPATIENT)
Dept: PSYCHOLOGY | Facility: CLINIC | Age: 34
End: 2024-12-10
Payer: COMMERCIAL

## 2024-12-10 DIAGNOSIS — F43.22 ADJUSTMENT DISORDER WITH ANXIETY: Primary | ICD-10-CM

## 2024-12-10 NOTE — PROGRESS NOTES
"Health Psychology Outpatient Follow Up  [facility]     Patient: Estela Fry   Date of Service: 12/10/24  Treatment Plan Due: 3/14/25     Diagnosis:  Adjustment disorder with anxious mood (ICD-10: F43.20)    Patient Demographics (per chart):   Age: 34 year old  Biological Sex: female  Race:   Ethnicity: Not  or     Patient Medications (per chart):  Current Outpatient Medications   Medication Sig Dispense Refill    levonorgestrel (MIRENA) 52 MG (20 mcg/day) IUD 1 each by Intrauterine route once      rivaroxaban ANTICOAGULANT (XARELTO) 20 MG TABS tablet Take 1 tablet (20 mg) by mouth daily (with dinner) 90 tablet 3    sotalol (BETAPACE) 80 MG tablet Take 1 tablet (80 mg) by mouth 2 times daily. 180 tablet 0     No current facility-administered medications for this visit.     Subjective:  Appointment started as video call but was quickly switched to telephone appointment due to pt having poor internet connection. Engaged pt in psychotherapy (CBT) for coping with anxiety following cardiac arrest and ICD placement. Reviewed updates to physical and emotional health since previous session and progress with treatment plan goals. Patient reported that she was successful in completed assigned in vivo exposure goal last week of driving to her son's school twice. She generally reported that anxiety sxs have been \"around the same\" since last appointment indicating anxiety that is still moderate to high. Engaged patient in supportive listening and cognitive processing of ongoing sxs, stressors, and exposure exercise. Regarding the exposure, pt reported finding the activity challenging but doable and rated her anxiety at ~4-5/10 for each. She reflected that she was able to find cognitive strategies that helped lessen anxiety during the exposure including cognitive reappraisal. Engaged pt in further psychoeducation about exposure for anxiety and panic and engaged pt in " "continued motivational interviewing, goal setting, planning, and problem solving for further in vivo exposure. Pt reported a desire to increase amount of times driving to school and to increase amount of safe at home exercise to 1x a day. She rated both at SUDS 4-5/10.       Objective   Appearance:   Appropriate   Eye Contact:   Good   Psychomotor Behavior:  Normal   Attitude:   Cooperative   Orientation:   All  Speech   Rate / Production: Normal    Volume:  Normal   Mood:    Anxious   Affect:    Appropriate   Thought Content:   Clear  Thought Form:   Coherent  Logical     Insight:    Did not formally assess at this time.   Safety:    No safety concerns at this time.     Weight (per chart):  Wt Readings from Last 4 Encounters:   12/05/24 62.6 kg (138 lb)   11/05/24 62.1 kg (137 lb)   09/17/24 62.1 kg (137 lb)   08/21/24 60.9 kg (134 lb 4.8 oz)        BMI (per chart):  Estimated body mass index is 25.24 kg/m  as calculated from the following:    Height as of 12/5/24: 1.575 m (5' 2\").    Weight as of 12/5/24: 62.6 kg (138 lb).     Depression Symptoms (Patient Health Questionnaire 9; PHQ-9)  The PHQ-9 is a measure of depressive symptoms.  Scores on this measure range from 0 to 27 with higher scores reflecting greater levels and frequency of depressive symptoms. Typical symptom ranges include: 0-4 minimal, 5-9 mild, 10-14 moderate, 15-19 moderately severe, 20-27 severe.       10/12/2023     1:58 PM 10/24/2023     9:04 AM 9/9/2024     8:04 AM   PHQ-9 SCORE   PHQ-9 Total Score MyChart  7 (Mild depression)    PHQ-9 Total Score 20 7 10     Anxiety Symptoms (Generalized Anxiety Disorder 7; FRACISCO-7)  The FRACISCO-7 is a measure of anxiety and panic symptoms.  Scores on this measure range from 0 to 21 with higher scores reflecting greater levels and frequency of anxiety symptoms. Typical symptom ranges include: 0-4 minimal, 5-9 mild, 10-14 moderate, 15-21 severe.       9/6/2024     8:30 AM 10/15/2024     1:38 PM 11/25/2024    11:29 " AM   FRACISCO-7 SCORE   Total Score 10 (moderate anxiety) 11 (moderate anxiety) 12 (moderate anxiety)   Total Score 10 11 12        Patient-reported     Alcohol and Drug Abuse (CAGE - Adapted to Include Drugs; CAGE-AID)  The CAGE-AID is a screening tool to assess for symptoms of alcohol or drug abuse or dependence. Scores on this measure range from 0 to 4, with higher scores reflecting experiences consistent with problem use.  CAGE-AID score  > 1 is a positive screen, suggesting further discussion is needed to determine if evaluation for alcohol or substance abuse is appropriate.  A score > 2 is considered clinically significant, suggesting further evaluation of alcohol or substance-related problems is indicated.      2/22/2024    10:39 AM   CAGE-AID Total Score   Total Score 0   Total Score MyChart 0 (A total score of 2 or greater is considered clinically significant)     Global Health (Patient-Reported Outcomes Measurement Information System; PROMIS-10)  The PROMIS-10 is a measure of global physical and mental health. Total scores on this measure range from 8 to 40, with higher scores reflecting greater levels of perceived health.       2/22/2024    10:38 AM 9/6/2024     8:33 AM   PROMIS-10 Scores Only   Global Mental Health Score 11 8   Global Physical Health Score 14 16   PROMIS TOTAL - SUBSCORES 25 24     Suicidality ([place] Suicide Severity Rating Scale Screener Past Month)      11/5/2024    10:48 AM   [place] Suicide Severity Rating (Short Version)   Q1 Wished to be Dead (Past Month) 0-->no   Q2 Suicidal Thoughts (Past Month) 0-->no   Q6 Suicide Behavior (Lifetime) 0-->no     Assessment:  Patient was engaged in treatment. Patient appears to be experiencing depression symptoms in the moderate range and anxiety symptoms in the moderate range. Currently, the patient appears to be coping with little success. Progress towards treatment goals: ongoing.     Plan:  Return for therapy sessions.     Session  Length:  Start Time: 1:00pm  End Time: 1:41pm    Modality: Telemedicine Information:  Type of service:   Telemedicine psychotherapy  Patient location:   Patient home in [place]    Patient telephone number: [phone]  Provider location:  At home office in [place]  Mode of Communication:   Video Conference via [employer]   Patient consent to telemedicine services? Yes  It has been determined that telemedicine service is appropriate and effective for this patient.   The patient has been notified that: Video visits will be conducted via a call with their psychologist to provide the care they need with a video conversation. Video visits may be billed at different rates depending on insurance coverage.  Patients are advised to contact their insurance provider with any questions about their health insurance coverage. If during the course of a call the psychologist feels a video visit is not appropriate, patients will not be charged for this service.    Johnny Escobar, PhD,   Clinical Health Psychology Fellow    *This case is being supervised by Iglesia Cole, Ph.D., A.B.P.P., L.P..  *This note was completed using Dragon voice recognition software. Although reviewed after completion, some word and grammatical errors may occur.

## 2025-01-03 ENCOUNTER — OFFICE VISIT (OUTPATIENT)
Dept: PSYCHOLOGY | Facility: CLINIC | Age: 35
End: 2025-01-03
Payer: COMMERCIAL

## 2025-01-03 DIAGNOSIS — F43.22 ADJUSTMENT DISORDER WITH ANXIETY: Primary | ICD-10-CM

## 2025-01-03 NOTE — PROGRESS NOTES
"Health Psychology Outpatient Follow Up  [facility]     Patient: Estela Fry   Date of Service: 1/3/25  Treatment Plan Due: 3/14/25     Diagnosis:  Adjustment disorder with anxious mood (ICD-10: F43.20)    Patient Demographics (per chart):   Age: 34 year old  Biological Sex: female  Race:   Ethnicity: Not  or     Patient Medications (per chart):  Current Outpatient Medications   Medication Sig Dispense Refill    levonorgestrel (MIRENA) 52 MG (20 mcg/day) IUD 1 each by Intrauterine route once      rivaroxaban ANTICOAGULANT (XARELTO) 20 MG TABS tablet Take 1 tablet (20 mg) by mouth daily (with dinner) 90 tablet 3    sotalol (BETAPACE) 80 MG tablet Take 1 tablet (80 mg) by mouth 2 times daily. 180 tablet 0     No current facility-administered medications for this visit.     Subjective:  Engaged pt in psychotherapy (CBT) for coping with anxiety following cardiac arrest and ICD placement. Reviewed updates to physical and emotional health since previous session and progress with treatment plan goals. Patient reported that she was successful in completed assigned in vivo exposure goal last week of driving to her son's school twice. Overall, pt reported anxiety as \"high\" since last appointment, but indicated some decrease in amount of time each bout of anxiety lasts. Engaged pt in supportive listening and cognitive processing of ongoing sxs and stressors. Reiterated rationale for exposure based intervention. Engaged pt in goal setting, planning, and problem solving for upcoming in-vivo exposures, including driving 3x a week to her kids school (this has been getting more anxiety provoking now that the weather is more winter-like). Additionally discussed the utility of continuing to engage in activity when physical sxs of anxiety begin to manifest.     Objective   Appearance:   Appropriate   Eye Contact:   Good   Psychomotor Behavior:  Normal " "  Attitude:   Cooperative   Orientation:   All  Speech   Rate / Production: Normal    Volume:  Normal   Mood:    Anxious   Affect:    Appropriate   Thought Content:   Clear  Thought Form:   Coherent  Logical     Insight:    Did not formally assess at this time.   Safety:    No safety concerns at this time.     Weight (per chart):  Wt Readings from Last 4 Encounters:   12/05/24 62.6 kg (138 lb)   11/05/24 62.1 kg (137 lb)   09/17/24 62.1 kg (137 lb)   08/21/24 60.9 kg (134 lb 4.8 oz)        BMI (per chart):  Estimated body mass index is 25.24 kg/m  as calculated from the following:    Height as of 12/5/24: 1.575 m (5' 2\").    Weight as of 12/5/24: 62.6 kg (138 lb).     Depression Symptoms (Patient Health Questionnaire 9; PHQ-9)  The PHQ-9 is a measure of depressive symptoms.  Scores on this measure range from 0 to 27 with higher scores reflecting greater levels and frequency of depressive symptoms. Typical symptom ranges include: 0-4 minimal, 5-9 mild, 10-14 moderate, 15-19 moderately severe, 20-27 severe.       10/12/2023     1:58 PM 10/24/2023     9:04 AM 9/9/2024     8:04 AM   PHQ-9 SCORE   PHQ-9 Total Score MyChart  7 (Mild depression)    PHQ-9 Total Score 20 7 10     Anxiety Symptoms (Generalized Anxiety Disorder 7; FRACISCO-7)  The FRACISCO-7 is a measure of anxiety and panic symptoms.  Scores on this measure range from 0 to 21 with higher scores reflecting greater levels and frequency of anxiety symptoms. Typical symptom ranges include: 0-4 minimal, 5-9 mild, 10-14 moderate, 15-21 severe.       9/6/2024     8:30 AM 10/15/2024     1:38 PM 11/25/2024    11:29 AM   FRACISCO-7 SCORE   Total Score 10 (moderate anxiety) 11 (moderate anxiety) 12 (moderate anxiety)   Total Score 10 11 12        Patient-reported     Alcohol and Drug Abuse (CAGE - Adapted to Include Drugs; CAGE-AID)  The CAGE-AID is a screening tool to assess for symptoms of alcohol or drug abuse or dependence. Scores on this measure range from 0 to 4, with higher " scores reflecting experiences consistent with problem use.  CAGE-AID score  > 1 is a positive screen, suggesting further discussion is needed to determine if evaluation for alcohol or substance abuse is appropriate.  A score > 2 is considered clinically significant, suggesting further evaluation of alcohol or substance-related problems is indicated.      2/22/2024    10:39 AM   CAGE-AID Total Score   Total Score 0   Total Score MyChart 0 (A total score of 2 or greater is considered clinically significant)     Global Health (Patient-Reported Outcomes Measurement Information System; PROMIS-10)  The PROMIS-10 is a measure of global physical and mental health. Total scores on this measure range from 8 to 40, with higher scores reflecting greater levels of perceived health.       2/22/2024    10:38 AM 9/6/2024     8:33 AM   PROMIS-10 Scores Only   Global Mental Health Score 11 8   Global Physical Health Score 14 16   PROMIS TOTAL - SUBSCORES 25 24     Suicidality ([place] Suicide Severity Rating Scale Screener Past Month)      11/5/2024    10:48 AM   [place] Suicide Severity Rating (Short Version)   Q1 Wished to be Dead (Past Month) 0-->no   Q2 Suicidal Thoughts (Past Month) 0-->no   Q6 Suicide Behavior (Lifetime) 0-->no     Assessment:  Patient was engaged in treatment. Patient appears to be experiencing depression symptoms in the moderate range and anxiety symptoms in the moderate range. Currently, the patient appears to be coping with little success. Progress towards treatment goals: ongoing.     Plan:  Return for therapy sessions.     Session Length:  Start Time: 1:00pm  End Time: 1:48pm    Modality: In person appointment     Johnny Escobar, PhD,   Clinical Health Psychology Fellow    *This case is being supervised by Iglesia Cole, Ph.D., L.P..  *This note was completed using Dragon voice recognition software. Although reviewed after completion, some word and grammatical errors may occur.

## 2025-01-14 DIAGNOSIS — Z95.810 ICD (IMPLANTABLE CARDIOVERTER-DEFIBRILLATOR) IN PLACE: ICD-10-CM

## 2025-01-15 ENCOUNTER — ANCILLARY PROCEDURE (OUTPATIENT)
Dept: CARDIOLOGY | Facility: CLINIC | Age: 35
End: 2025-01-15
Attending: INTERNAL MEDICINE
Payer: COMMERCIAL

## 2025-01-15 DIAGNOSIS — I46.9 CARDIAC ARREST (H): ICD-10-CM

## 2025-01-15 PROCEDURE — 99207 CARDIAC DEVICE CHECK - REMOTE: CPT | Performed by: INTERNAL MEDICINE

## 2025-01-16 ENCOUNTER — TELEPHONE (OUTPATIENT)
Dept: PHYSICAL THERAPY | Facility: REHABILITATION | Age: 35
End: 2025-01-16

## 2025-01-16 NOTE — TELEPHONE ENCOUNTER
Pt NS x2, spoke with pt over the phone and will hold on PF PT until things settle down with her other medical appts.

## 2025-01-18 RX ORDER — SOTALOL HYDROCHLORIDE 80 MG/1
80 TABLET ORAL 2 TIMES DAILY
Qty: 180 TABLET | OUTPATIENT
Start: 2025-01-18

## 2025-01-19 LAB
MDC_IDC_LEAD_CONNECTION_STATUS: NORMAL
MDC_IDC_LEAD_IMPLANT_DT: NORMAL
MDC_IDC_LEAD_LOCATION: NORMAL
MDC_IDC_LEAD_LOCATION_DETAIL_1: NORMAL
MDC_IDC_LEAD_MFG: NORMAL
MDC_IDC_LEAD_MODEL: NORMAL
MDC_IDC_LEAD_POLARITY_TYPE: NORMAL
MDC_IDC_LEAD_SERIAL: NORMAL
MDC_IDC_MSMT_BATTERY_DTM: NORMAL
MDC_IDC_MSMT_BATTERY_REMAINING_PERCENTAGE: 81 %
MDC_IDC_MSMT_BATTERY_STATUS: NORMAL
MDC_IDC_PG_IMPLANT_DTM: NORMAL
MDC_IDC_PG_MFG: NORMAL
MDC_IDC_PG_MODEL: NORMAL
MDC_IDC_PG_SERIAL: NORMAL
MDC_IDC_PG_TYPE: NORMAL
MDC_IDC_SESS_CLINIC_NAME: NORMAL
MDC_IDC_SESS_DTM: NORMAL
MDC_IDC_SESS_TYPE: NORMAL
MDC_IDC_SET_ZONE_DETECTION_INTERVAL: 250 MS
MDC_IDC_SET_ZONE_DETECTION_INTERVAL: 300 MS
MDC_IDC_SET_ZONE_STATUS: NORMAL
MDC_IDC_SET_ZONE_STATUS: NORMAL
MDC_IDC_SET_ZONE_TYPE: NORMAL
MDC_IDC_SET_ZONE_TYPE: NORMAL
MDC_IDC_SET_ZONE_VENDOR_TYPE: NORMAL
MDC_IDC_SET_ZONE_VENDOR_TYPE: NORMAL
MDC_IDC_STAT_AT_BURDEN_PERCENT: 0 %
MDC_IDC_STAT_EPISODE_RECENT_COUNT: 0
MDC_IDC_STAT_EPISODE_RECENT_COUNT_DTM_END: NORMAL
MDC_IDC_STAT_EPISODE_RECENT_COUNT_DTM_START: NORMAL
MDC_IDC_STAT_EPISODE_TOTAL_COUNT: 0
MDC_IDC_STAT_EPISODE_TOTAL_COUNT_DTM_END: NORMAL
MDC_IDC_STAT_EPISODE_TOTAL_COUNT_DTM_START: NORMAL
MDC_IDC_STAT_EPISODE_TYPE: NORMAL
MDC_IDC_STAT_EPISODE_TYPE: NORMAL
MDC_IDC_STAT_TACHYTHERAPY_RECENT_DTM_END: NORMAL
MDC_IDC_STAT_TACHYTHERAPY_RECENT_DTM_START: NORMAL
MDC_IDC_STAT_TACHYTHERAPY_SHOCKS_DELIVERED_RECENT: 0
MDC_IDC_STAT_TACHYTHERAPY_SHOCKS_DELIVERED_TOTAL: 3
MDC_IDC_STAT_TACHYTHERAPY_TOTAL_DTM_END: NORMAL
MDC_IDC_STAT_TACHYTHERAPY_TOTAL_DTM_START: NORMAL

## 2025-01-21 ENCOUNTER — LAB (OUTPATIENT)
Dept: LAB | Facility: CLINIC | Age: 35
End: 2025-01-21
Payer: COMMERCIAL

## 2025-01-21 ENCOUNTER — OFFICE VISIT (OUTPATIENT)
Dept: INTERNAL MEDICINE | Facility: CLINIC | Age: 35
End: 2025-01-21
Payer: COMMERCIAL

## 2025-01-21 VITALS
WEIGHT: 142 LBS | DIASTOLIC BLOOD PRESSURE: 69 MMHG | OXYGEN SATURATION: 96 % | TEMPERATURE: 98 F | BODY MASS INDEX: 26.13 KG/M2 | HEART RATE: 64 BPM | SYSTOLIC BLOOD PRESSURE: 101 MMHG | HEIGHT: 62 IN | RESPIRATION RATE: 15 BRPM

## 2025-01-21 DIAGNOSIS — D62 ANEMIA DUE TO BLOOD LOSS, ACUTE: ICD-10-CM

## 2025-01-21 DIAGNOSIS — Z95.810 ICD (IMPLANTABLE CARDIOVERTER-DEFIBRILLATOR) IN PLACE: ICD-10-CM

## 2025-01-21 DIAGNOSIS — Z11.59 NEED FOR HEPATITIS B SCREENING TEST: ICD-10-CM

## 2025-01-21 DIAGNOSIS — O99.419 HEART DISEASE DURING PREGNANCY, ANTEPARTUM: ICD-10-CM

## 2025-01-21 DIAGNOSIS — I51.9 HEART DISEASE DURING PREGNANCY, ANTEPARTUM: ICD-10-CM

## 2025-01-21 DIAGNOSIS — Z11.59 ENCOUNTER FOR HCV SCREENING TEST FOR LOW RISK PATIENT: ICD-10-CM

## 2025-01-21 DIAGNOSIS — Z23 NEED FOR TDAP VACCINATION: Primary | ICD-10-CM

## 2025-01-21 DIAGNOSIS — I50.21 ACUTE SYSTOLIC HEART FAILURE (H): ICD-10-CM

## 2025-01-21 DIAGNOSIS — Z00.00 VISIT FOR PREVENTIVE HEALTH EXAMINATION: ICD-10-CM

## 2025-01-21 DIAGNOSIS — Z79.01 CHRONIC ANTICOAGULATION: ICD-10-CM

## 2025-01-21 DIAGNOSIS — I46.9 CARDIAC ARREST (H): ICD-10-CM

## 2025-01-21 LAB
ANION GAP SERPL CALCULATED.3IONS-SCNC: 9 MMOL/L (ref 7–15)
BASOPHILS # BLD AUTO: 0 10E3/UL (ref 0–0.2)
BASOPHILS NFR BLD AUTO: 0 %
BUN SERPL-MCNC: 10.4 MG/DL (ref 6–20)
CALCIUM SERPL-MCNC: 9.6 MG/DL (ref 8.8–10.4)
CHLORIDE SERPL-SCNC: 104 MMOL/L (ref 98–107)
CREAT SERPL-MCNC: 0.75 MG/DL (ref 0.51–0.95)
EGFRCR SERPLBLD CKD-EPI 2021: >90 ML/MIN/1.73M2
EOSINOPHIL # BLD AUTO: 0.2 10E3/UL (ref 0–0.7)
EOSINOPHIL NFR BLD AUTO: 3 %
ERYTHROCYTE [DISTWIDTH] IN BLOOD BY AUTOMATED COUNT: 12.5 % (ref 10–15)
FERRITIN SERPL-MCNC: 23 NG/ML (ref 6–175)
GLUCOSE SERPL-MCNC: 94 MG/DL (ref 70–99)
HBV SURFACE AB SERPL IA-ACNC: >1000 M[IU]/ML
HBV SURFACE AB SERPL IA-ACNC: REACTIVE M[IU]/ML
HBV SURFACE AG SERPL QL IA: NONREACTIVE
HCO3 SERPL-SCNC: 28 MMOL/L (ref 22–29)
HCT VFR BLD AUTO: 41.4 % (ref 35–47)
HCV AB SERPL QL IA: NONREACTIVE
HGB BLD-MCNC: 13.4 G/DL (ref 11.7–15.7)
IMM GRANULOCYTES # BLD: 0 10E3/UL
IMM GRANULOCYTES NFR BLD: 0 %
IRON BINDING CAPACITY (ROCHE): 334 UG/DL (ref 240–430)
IRON SATN MFR SERPL: 9 % (ref 15–46)
IRON SERPL-MCNC: 29 UG/DL (ref 37–145)
LYMPHOCYTES # BLD AUTO: 1.1 10E3/UL (ref 0.8–5.3)
LYMPHOCYTES NFR BLD AUTO: 17 %
MCH RBC QN AUTO: 27.1 PG (ref 26.5–33)
MCHC RBC AUTO-ENTMCNC: 32.4 G/DL (ref 31.5–36.5)
MCV RBC AUTO: 84 FL (ref 78–100)
MONOCYTES # BLD AUTO: 0.3 10E3/UL (ref 0–1.3)
MONOCYTES NFR BLD AUTO: 5 %
NEUTROPHILS # BLD AUTO: 4.8 10E3/UL (ref 1.6–8.3)
NEUTROPHILS NFR BLD AUTO: 74 %
NRBC # BLD AUTO: 0 10E3/UL
NRBC BLD AUTO-RTO: 0 /100
PLATELET # BLD AUTO: 236 10E3/UL (ref 150–450)
POTASSIUM SERPL-SCNC: 4 MMOL/L (ref 3.4–5.3)
RBC # BLD AUTO: 4.95 10E6/UL (ref 3.8–5.2)
RETICS # AUTO: 0.06 10E6/UL (ref 0.03–0.1)
RETICS/RBC NFR AUTO: 1.3 % (ref 0.5–2)
SODIUM SERPL-SCNC: 141 MMOL/L (ref 135–145)
WBC # BLD AUTO: 6.5 10E3/UL (ref 4–11)

## 2025-01-21 PROCEDURE — 83540 ASSAY OF IRON: CPT | Performed by: PATHOLOGY

## 2025-01-21 PROCEDURE — 86803 HEPATITIS C AB TEST: CPT | Performed by: NURSE PRACTITIONER

## 2025-01-21 PROCEDURE — 85045 AUTOMATED RETICULOCYTE COUNT: CPT | Performed by: PATHOLOGY

## 2025-01-21 PROCEDURE — 87340 HEPATITIS B SURFACE AG IA: CPT | Performed by: NURSE PRACTITIONER

## 2025-01-21 PROCEDURE — 83550 IRON BINDING TEST: CPT | Performed by: PATHOLOGY

## 2025-01-21 PROCEDURE — 86706 HEP B SURFACE ANTIBODY: CPT | Performed by: NURSE PRACTITIONER

## 2025-01-21 PROCEDURE — 36415 COLL VENOUS BLD VENIPUNCTURE: CPT | Performed by: PATHOLOGY

## 2025-01-21 PROCEDURE — 85025 COMPLETE CBC W/AUTO DIFF WBC: CPT | Performed by: PATHOLOGY

## 2025-01-21 PROCEDURE — 82728 ASSAY OF FERRITIN: CPT | Performed by: PATHOLOGY

## 2025-01-21 PROCEDURE — 99000 SPECIMEN HANDLING OFFICE-LAB: CPT | Performed by: PATHOLOGY

## 2025-01-21 PROCEDURE — 80048 BASIC METABOLIC PNL TOTAL CA: CPT | Performed by: PATHOLOGY

## 2025-01-21 ASSESSMENT — PATIENT HEALTH QUESTIONNAIRE - PHQ9
10. IF YOU CHECKED OFF ANY PROBLEMS, HOW DIFFICULT HAVE THESE PROBLEMS MADE IT FOR YOU TO DO YOUR WORK, TAKE CARE OF THINGS AT HOME, OR GET ALONG WITH OTHER PEOPLE: NOT DIFFICULT AT ALL
SUM OF ALL RESPONSES TO PHQ QUESTIONS 1-9: 4
SUM OF ALL RESPONSES TO PHQ QUESTIONS 1-9: 4

## 2025-01-21 NOTE — PROGRESS NOTES
Assessment & Plan     Need for Tdap vaccination  - TDAP 7+ (ADACEL,BOOSTRIX)    Need for hepatitis B screening test  - Hepatitis B surface antigen; Future  - Hepatitis B Surface Antibody; Future    Encounter for HCV screening test for low risk patient  - HCV Antibody w/Reflex to HCV RNA; Future    Visit for preventive health examination  - REVIEW OF HEALTH MAINTENANCE PROTOCOL ORDERS    The longitudinal plan of care for the diagnosis(es)/condition(s) as documented were addressed during this visit. Due to the added complexity in care, I will continue to support Estela in the subsequent management and with ongoing continuity of care.    I spent a total of  44  minutes in the care of this pt during today's office visit. This time includes reviewing the patient's chart and prior history, obtaining a history, performing an examination and evaluation and counseling the patient. This time also includes ordering medications or tests necessary in addition to communication to other member's of the patient's health care team. Time spent in documentation and care coordination is included.     Argelia OLIVER, CNP      Subjective   Estela is a 34 year old, presenting for the following health issues:  Establish Care      1/21/2025     9:01 AM   Additional Questions   Roomed by      Estela Fry is a 34 year old female who presents to [facility].  She was previously seen at [facility] but wants to consolidate her care in one place.  She has cardiology services here. She was last seen by Family Medicine at [facility] 11/5/24.     Last weeks she had palpitations for 20 minutes and went by ambulance to [facility]. Her heart rate normalized from 150 just before her EKG. In the ambulance her rhythm normalized. Her implantable defibrillator was checked and was ok. She has a hx of cardiac arrest 8/15/23 and implantable cardiac defib placed 8/15/23.    Last pap and breast exam was 7/23/24.  She has an IUD.      History of Present Illness  "      Vascular Disease:  She presents for follow up of vascular disease.     She never takes nitroglycerin. She is not taking daily aspirin.    She eats 4 or more servings of fruits and vegetables daily.She consumes 0 sweetened beverage(s) daily.She exercises with enough effort to increase her heart rate 9 or less minutes per day.  She exercises with enough effort to increase her heart rate 3 or less days per week.   She is taking medications regularly.     Patient Active Problem List   Diagnosis    Cardiac arrest (H)    Heart disease during pregnancy, antepartum    Dysphonia    Iron deficiency    Chronic anticoagulation    History of DVT (deep vein thrombosis)    History of cardiac arrest    S/P ICD (internal cardiac defibrillator) procedure    Atrial flutter (H)    Female stress incontinence           Objective    /69 (BP Location: Right arm, Patient Position: Sitting, Cuff Size: Adult Regular)   Pulse 64   Temp 98  F (36.7  C) (Oral)   Resp 15   Ht 1.575 m (5' 2.01\")   Wt 64.4 kg (142 lb)   SpO2 96%   BMI 25.97 kg/m    Body mass index is 25.97 kg/m .  Physical Exam   GENERAL: alert and no distress  EYES: Eyes grossly normal to inspection, PERRL and conjunctivae and sclerae normal  HENT: ear canals and TM's normal, nose and mouth without ulcers or lesions  NECK: no adenopathy, no asymmetry, masses, or scars  RESP: lungs clear to auscultation - no rales, rhonchi or wheezes  CV: regular rate and rhythm, normal S1 S2, no S3 or S4, no murmur, click or rub, no peripheral edema  ABDOMEN: soft, nontender, no hepatosplenomegaly, no masses and bowel sounds normal  MS: no gross musculoskeletal defects noted, no edema  SKIN: no suspicious lesions or rashes  NEURO: Normal strength and tone, mentation intact and speech normal  PSYCH: mentation appears normal, affect normal/bright  LYMPH: no cervical, supraclavicular, axillary, or inguinal adenopathy            Signed Electronically by: MELODY Santiago " CNP

## 2025-02-05 ENCOUNTER — OFFICE VISIT (OUTPATIENT)
Dept: HEMATOLOGY | Facility: CLINIC | Age: 35
End: 2025-02-05
Attending: INTERNAL MEDICINE
Payer: COMMERCIAL

## 2025-02-05 VITALS
HEART RATE: 70 BPM | HEIGHT: 62 IN | BODY MASS INDEX: 26.17 KG/M2 | DIASTOLIC BLOOD PRESSURE: 60 MMHG | TEMPERATURE: 97.4 F | SYSTOLIC BLOOD PRESSURE: 88 MMHG | OXYGEN SATURATION: 100 % | WEIGHT: 142.2 LBS

## 2025-02-05 DIAGNOSIS — I82.220 IVC THROMBOSIS (H): ICD-10-CM

## 2025-02-05 DIAGNOSIS — Z79.01 CHRONIC ANTICOAGULATION: Primary | ICD-10-CM

## 2025-02-05 DIAGNOSIS — I82.C22: ICD-10-CM

## 2025-02-05 PROCEDURE — G0463 HOSPITAL OUTPT CLINIC VISIT: HCPCS | Performed by: INTERNAL MEDICINE

## 2025-02-05 PROCEDURE — 99213 OFFICE O/P EST LOW 20 MIN: CPT | Performed by: INTERNAL MEDICINE

## 2025-02-05 PROCEDURE — G2211 COMPLEX E/M VISIT ADD ON: HCPCS | Performed by: INTERNAL MEDICINE

## 2025-02-05 RX ORDER — IRON POLYSACCHARIDE COMPLEX 150 MG
1 CAPSULE ORAL DAILY
Qty: 90 CAPSULE | Refills: 1 | Status: SHIPPED | OUTPATIENT
Start: 2025-02-05 | End: 2025-08-04

## 2025-02-05 NOTE — LETTER
2025       RE: Estela Fry  [address] [place] [address]     Dear Colleague,    Thank you for referring your patient, Estela Fry, to the [facility] at [facility]. Please see a copy of my visit note below.        [facility] Hematology Consultation    Outpatient Visit Note:    Patient: Estela Fry  MRN: [id number]  : 1990  EUN: 25      Reason for Consultation:  Thrombosis after cardiac arrest and resuscitation    Assessment:  In summary, Estela Fry is a 35 year old woman with no significant past medical history who first presented to attention of hematology after experiencing an unwitnessed VT/VF cardiac arrest with ROSC after 3 shocks in 2023. Hematology was consulted due to finding of nonocclusive right external iliac vein VTE.     Provoked R external Iliac Vein thrombosis. 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 However the etiology of her IVC thrombosis and the CT finding of potential chronic left jugular vein occlusion is less clear. Given the severity of her cardiac arrest- would be prudent to continue anticoagulation. Past workup has not found strong thrombophilia, but a D-dimer level on anticoagulation and found it to be 0.56--- This places her risk for developing recurrent clot at ~7.4 % per year- which would favor continuing anticoagulation as long as tolerated (William MA, et al. 2017 BMJ).    Currently has IUD to prevent pregnancy and reduce heavy menstrual bleeding. Had iron deficiency and received InFed on 24 without complications.     Repeat labs today without anemia. Has ferritin of 23 and iron sat 9% with iron 29. Discussed that trial of ORAL iron may help  prevent anemia.         Plan:  1. Majority of today's visit was spent counseling the patient regarding anticoagulation.  2.   No orders of the defined types were placed in this encounter.  3. Continue xarelto 20 mg  4.start polysaccharide iron complex 150 mg daily  5. Repeat labs in 6 months  6. Follow up in 1 year       The patient is given our center's contact information and is instructed to call if she should have any further questions or concerns.  Otherwise, we will plan on labs in 6 months and follow up with provider in 12 months .      The longitudinal plan of care for the diagnosis(es)/condition(s) as documented were addressed during this visit. Due to the added complexity in care, I will continue to support Estela in the subsequent management and with ongoing continuity of care.    Marcia Wilhelm MD/PhD   of Medicine  [facility] [employer]   ----------------------------------------------------------------------------------------------------------------------    History of Present Illness:  Estela Fry is a 35 year old woman with no significant past medical history who first presented to attention of hematology after experiencing an unwitnessed VT/VF cardiac arrest with ROSC after 3 shocks in August 2023. Hematology was consulted due to finding of nonocclusive right external iliac vein VTE.     Estela reports she is doing well. Felt like she gained weight after receiving her iron infusion. Has some palpitations that prompted her to present to [facility]. Has been trying to increase exercise- but may stop due to back and leg pain. No issues with swelling in hands or feet. Is having menstrual cycle- denies being heavy.     Past Medical History:  Past Medical History:   Diagnosis Date     Automatic implantable cardioverter-defibrillator in situ      Cardiac arrest (H)      History of atrial flutter      History of venous thromboembolism        Past Surgical History:  Past  "Surgical History:   Procedure Laterality Date     CV CENTRAL VENOUS CATHETER PLACEMENT N/A 8/5/2023    Procedure: Central Venous Catheter Placement;  Surgeon: Sid Liz MD;  Location:  HEART CARDIAC CATH LAB     CV CORONARY ANGIOGRAM N/A 8/5/2023    Procedure: Coronary Angiogram;  Surgeon: Sid Liz MD;  Location: [facility] CARDIAC CATH LAB     DILATION AND CURETTAGE SUCTION N/A 2/16/2024    Procedure: DILATION AND CURETTAGE, UTERUS, USING SUCTION,;  Surgeon: Shreya Mcdonnell MD;  Location: [facility] OR     EP SICD INSERT N/A 8/15/2023    Procedure: Subcutaneous Implantable Cardioverter Defibrillator Implant;  Surgeon: Eula Johnson MD;  Location:  HEART CARDIAC CATH LAB     INSERT INTRAUTERINE DEVICE N/A 2/16/2024    Procedure: placement of Mirena intrauterine device;  Surgeon: Shreya Mcdonnell MD;  Location: [facility] OR   History of at least two miscarriages    Medications:  Current Outpatient Medications   Medication Sig Dispense Refill     levonorgestrel (MIRENA) 52 MG (20 mcg/day) IUD 1 each by Intrauterine route once       rivaroxaban ANTICOAGULANT (XARELTO) 20 MG TABS tablet Take 1 tablet (20 mg) by mouth daily (with dinner) 90 tablet 3     sotalol (BETAPACE) 80 MG tablet Take 1 tablet (80 mg) by mouth 2 times daily. 60 tablet 0        Allergies:  No Known Allergies    ROS:  A 10 point ROS is negative except as stated in the HPI    Social History:  Denies any tobacco use. No significant alcohol use. Denies any illicit drug use. Patient work . Has lived in [place], [place], [place], [place] - family travel    Family History:  6 sisters and 1 brother. Each sister has 3-5 children. Unclear on miscarriages      Objective:  Vitals: BP (!) 88/60 (BP Location: Right arm, Patient Position: Sitting)   Pulse 70   Temp 97.4  F (36.3  C) (Tympanic)   Ht 1.575 m (5' 2.01\")   Wt 64.5 kg (142 lb 3.2 oz)   SpO2 100%   BMI 26.00 kg/m      Exam:   Constitutional: Appears well, no " distress  HEENT: Pupils equal and reactive to light. No scleral icterus or hemorrhage. Nares without evidence of telangiectasia. Mucous membranes moist with no wet purpura. Dentition overall ok with no signs of decay. No pharyngeal exudates. No lymphadenopathy, no thyromeagaly  CV: regular rate and rhythm, no murmurs  Respiratory: clear  GI: abdomen soft, nontender, without guarding or rebound. No hepatomeagaly. No splenomegaly. Rectal exam deferred.   Mus/Skele: no edema  Skin: no petechiae, no ecchymosis.  Neuro: CN II-XII intact. Normal gait. AOx3  Heme/Lymph: no supraclavicular, axillary or umbilical adenopathy.     Labs: personally reviewed with relevant trends annotated below  No results found for any visits on 02/05/25.  Ferritin   Date Value Ref Range Status   01/21/2025 23 6 - 175 ng/mL Final   11/05/2024 92 6 - 175 ng/mL Final   10/04/2024 122 6 - 175 ng/mL Final     Iron   Date Value Ref Range Status   01/21/2025 29 (L) 37 - 145 ug/dL Final   11/05/2024 45 37 - 145 ug/dL Final   10/04/2024 76 37 - 145 ug/dL Final     Iron Sat Index   Date Value Ref Range Status   01/21/2025 9 (L) 15 - 46 % Final   11/05/2024 15 15 - 46 % Final   10/04/2024 27 15 - 46 % Final     % Reticulocyte   Date Value Ref Range Status   01/21/2025 1.3 0.5 - 2.0 % Final   11/05/2024 1.2 0.5 - 2.0 % Final   10/04/2024 0.9 0.5 - 2.0 % Final     WBC Count   Date Value Ref Range Status   01/21/2025 6.5 4.0 - 11.0 10e3/uL Final   11/05/2024 10.8 4.0 - 11.0 10e3/uL Final   10/04/2024 7.5 4.0 - 11.0 10e3/uL Final     Hemoglobin   Date Value Ref Range Status   01/21/2025 13.4 11.7 - 15.7 g/dL Final   11/05/2024 14.2 11.7 - 15.7 g/dL Final   10/04/2024 13.3 11.7 - 15.7 g/dL Final     Hematocrit   Date Value Ref Range Status   01/21/2025 41.4 35.0 - 47.0 % Final   11/05/2024 43.5 35.0 - 47.0 % Final   10/04/2024 40.1 35.0 - 47.0 % Final     Platelet Count   Date Value Ref Range Status   01/21/2025 236 150 - 450 10e3/uL Final   11/05/2024 273  150 - 450 10e3/uL Final   10/04/2024 226 150 - 450 10e3/uL Final     Again, thank you for allowing me to participate in the care of your patient.      Sincerely,    Marcia Wilhelm MD

## 2025-02-05 NOTE — PATIENT INSTRUCTIONS
[facility]  Center for Bleeding and Clotting Disorders  [facility] [address], [place] [address]  Main: [phone], Fax: [phone]    It was a pleasure seeing you today.  Thank you for allowing us to be involved in your care. Please let us know if there is anything else we can do for you, so that we can be sure you are leaving completely satisfied with your care experience.    Labs in six months- can be done closer to home.  Our lab is located within our clinic space.  We will communicate these to you by [employer]. With your permission we may leave a detailed voicemail, please see below for our contact information should you have any questions about your results. Also, please note that some lab results may take a week or so to get back. Feel free to call or message if you have not heard back from us within 7-10 days.     For your upcoming procedure/surgery, please hold Xarelto for 48-72 hours prior to your procedure and restart it 12-24 hours after with surgeon approval.    Please stay on your blood thinner:  Xarelto  Please call us with any bleeding issues that you may have.    Patient Resources:   For additional information, please see the following web link:  [email or web address]    Call the Center for Bleeding and Clotting Disorders  at [phone].     -If surgeries or procedures are planned (for holding instructions).     -If off anticoagulation, please call during high risk times (long-distance travel, broken bones or trauma, immobilization, surgery, pregnancy, or taking estrogen).     -Any new symptoms of DVT (deep vein thrombosis) or PE (pulmonary embolism)    -pain     -swelling     -redness    -warmth    -shortness of breath    -chest pain    -coughing up blood    We would like a provider on our team to see you at least annually for optimal care and to allow us to continue to prescribe for you.  Israel Santos PA-C, Vanessa Baker, MPH, PARubyC  and Alice Hagen PA-C are our  physician assistants that are specialized in bleeding and clotting disorders that you may be able to see more readily.    Return to clinic in  12 months.  Please schedule return appointment with Dr. Wlihelm.    Your nurse clinician is Felix Phan RN, MORENO [phone].   If she is unavailable and you have immediate concerns, please call [phone] and ask for a nurse.

## 2025-02-05 NOTE — PROGRESS NOTES
[facility] Hematology Consultation    Outpatient Visit Note:    Patient: Estela Fry  MRN: [id number]  : 1990  EUN: 25      Reason for Consultation:  Thrombosis after cardiac arrest and resuscitation    Assessment:  In summary, Estela Fry is a 35 year old woman with no significant past medical history who first presented to attention of hematology after experiencing an unwitnessed VT/VF cardiac arrest with ROSC after 3 shocks in 2023. Hematology was consulted due to finding of nonocclusive right external iliac vein VTE.     Provoked R external Iliac Vein thrombosis. 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 However the etiology of her IVC thrombosis and the CT finding of potential chronic left jugular vein occlusion is less clear. Given the severity of her cardiac arrest- would be prudent to continue anticoagulation. Past workup has not found strong thrombophilia, but a D-dimer level on anticoagulation and found it to be 0.56--- This places her risk for developing recurrent clot at ~7.4 % per year- which would favor continuing anticoagulation as long as tolerated (William MA, et al. 2017 BMJ).    Currently has IUD to prevent pregnancy and reduce heavy menstrual bleeding. Had iron deficiency and received InFed on 24 without complications.     Repeat labs today without anemia. Has ferritin of 23 and iron sat 9% with iron 29. Discussed that trial of ORAL iron may help prevent anemia.         Plan:  1. Majority of today's visit was spent counseling the patient regarding anticoagulation.  2.   No orders of the defined types were placed in this encounter.  3. Continue xarelto 20 mg  4.start polysaccharide iron complex 150 mg daily  5. Repeat labs in 6 months  6. Follow up in 1  year       The patient is given our center's contact information and is instructed to call if she should have any further questions or concerns.  Otherwise, we will plan on labs in 6 months and follow up with provider in 12 months .      The longitudinal plan of care for the diagnosis(es)/condition(s) as documented were addressed during this visit. Due to the added complexity in care, I will continue to support Estela in the subsequent management and with ongoing continuity of care.    Marcia Wilhelm MD/PhD   of Medicine  [facility] [employer]   ----------------------------------------------------------------------------------------------------------------------    History of Present Illness:  Estela Fry is a 35 year old woman with no significant past medical history who first presented to attention of hematology after experiencing an unwitnessed VT/VF cardiac arrest with ROSC after 3 shocks in August 2023. Hematology was consulted due to finding of nonocclusive right external iliac vein VTE.     Estela reports she is doing well. Felt like she gained weight after receiving her iron infusion. Has some palpitations that prompted her to present to [facility]. Has been trying to increase exercise- but may stop due to back and leg pain. No issues with swelling in hands or feet. Is having menstrual cycle- denies being heavy.     Past Medical History:  Past Medical History:   Diagnosis Date    Automatic implantable cardioverter-defibrillator in situ     Cardiac arrest (H)     History of atrial flutter     History of venous thromboembolism        Past Surgical History:  Past Surgical History:   Procedure Laterality Date    CV CENTRAL VENOUS CATHETER PLACEMENT N/A 8/5/2023    Procedure: Central Venous Catheter Placement;  Surgeon: Sid Liz MD;  Location: [facility] CARDIAC CATH LAB    CV CORONARY ANGIOGRAM N/A 8/5/2023    Procedure: Coronary Angiogram;  Surgeon: Sid Liz MD;   "Location:  HEART CARDIAC CATH LAB    DILATION AND CURETTAGE SUCTION N/A 2/16/2024    Procedure: DILATION AND CURETTAGE, UTERUS, USING SUCTION,;  Surgeon: Shreya Mcdonnell MD;  Location: [facility] OR    EP SICD INSERT N/A 8/15/2023    Procedure: Subcutaneous Implantable Cardioverter Defibrillator Implant;  Surgeon: Eula Johnson MD;  Location:  HEART CARDIAC CATH LAB    INSERT INTRAUTERINE DEVICE N/A 2/16/2024    Procedure: placement of Mirena intrauterine device;  Surgeon: Shreya Mcdonnell MD;  Location: [facility] OR   History of at least two miscarriages    Medications:  Current Outpatient Medications   Medication Sig Dispense Refill    levonorgestrel (MIRENA) 52 MG (20 mcg/day) IUD 1 each by Intrauterine route once      rivaroxaban ANTICOAGULANT (XARELTO) 20 MG TABS tablet Take 1 tablet (20 mg) by mouth daily (with dinner) 90 tablet 3    sotalol (BETAPACE) 80 MG tablet Take 1 tablet (80 mg) by mouth 2 times daily. 60 tablet 0        Allergies:  No Known Allergies    ROS:  A 10 point ROS is negative except as stated in the HPI    Social History:  Denies any tobacco use. No significant alcohol use. Denies any illicit drug use. Patient work . Has lived in [place], [place], [place], [place] - family travel    Family History:  6 sisters and 1 brother. Each sister has 3-5 children. Unclear on miscarriages      Objective:  Vitals: BP (!) 88/60 (BP Location: Right arm, Patient Position: Sitting)   Pulse 70   Temp 97.4  F (36.3  C) (Tympanic)   Ht 1.575 m (5' 2.01\")   Wt 64.5 kg (142 lb 3.2 oz)   SpO2 100%   BMI 26.00 kg/m      Exam:   Constitutional: Appears well, no distress  HEENT: Pupils equal and reactive to light. No scleral icterus or hemorrhage. Nares without evidence of telangiectasia. Mucous membranes moist with no wet purpura. Dentition overall ok with no signs of decay. No pharyngeal exudates. No lymphadenopathy, no thyromeagaly  CV: regular rate and rhythm, no murmurs  Respiratory: " clear  GI: abdomen soft, nontender, without guarding or rebound. No hepatomeagaly. No splenomegaly. Rectal exam deferred.   Mus/Skele: no edema  Skin: no petechiae, no ecchymosis.  Neuro: CN II-XII intact. Normal gait. AOx3  Heme/Lymph: no supraclavicular, axillary or umbilical adenopathy.     Labs: personally reviewed with relevant trends annotated below  No results found for any visits on 02/05/25.  Ferritin   Date Value Ref Range Status   01/21/2025 23 6 - 175 ng/mL Final   11/05/2024 92 6 - 175 ng/mL Final   10/04/2024 122 6 - 175 ng/mL Final     Iron   Date Value Ref Range Status   01/21/2025 29 (L) 37 - 145 ug/dL Final   11/05/2024 45 37 - 145 ug/dL Final   10/04/2024 76 37 - 145 ug/dL Final     Iron Sat Index   Date Value Ref Range Status   01/21/2025 9 (L) 15 - 46 % Final   11/05/2024 15 15 - 46 % Final   10/04/2024 27 15 - 46 % Final     % Reticulocyte   Date Value Ref Range Status   01/21/2025 1.3 0.5 - 2.0 % Final   11/05/2024 1.2 0.5 - 2.0 % Final   10/04/2024 0.9 0.5 - 2.0 % Final     WBC Count   Date Value Ref Range Status   01/21/2025 6.5 4.0 - 11.0 10e3/uL Final   11/05/2024 10.8 4.0 - 11.0 10e3/uL Final   10/04/2024 7.5 4.0 - 11.0 10e3/uL Final     Hemoglobin   Date Value Ref Range Status   01/21/2025 13.4 11.7 - 15.7 g/dL Final   11/05/2024 14.2 11.7 - 15.7 g/dL Final   10/04/2024 13.3 11.7 - 15.7 g/dL Final     Hematocrit   Date Value Ref Range Status   01/21/2025 41.4 35.0 - 47.0 % Final   11/05/2024 43.5 35.0 - 47.0 % Final   10/04/2024 40.1 35.0 - 47.0 % Final     Platelet Count   Date Value Ref Range Status   01/21/2025 236 150 - 450 10e3/uL Final   11/05/2024 273 150 - 450 10e3/uL Final   10/04/2024 226 150 - 450 10e3/uL Final

## 2025-02-06 ENCOUNTER — OFFICE VISIT (OUTPATIENT)
Dept: CARDIOLOGY | Facility: CLINIC | Age: 35
End: 2025-02-06
Attending: NURSE PRACTITIONER
Payer: COMMERCIAL

## 2025-02-06 ENCOUNTER — ANCILLARY PROCEDURE (OUTPATIENT)
Dept: CARDIOLOGY | Facility: CLINIC | Age: 35
End: 2025-02-06
Attending: INTERNAL MEDICINE
Payer: COMMERCIAL

## 2025-02-06 VITALS
HEART RATE: 56 BPM | WEIGHT: 143 LBS | OXYGEN SATURATION: 98 % | SYSTOLIC BLOOD PRESSURE: 107 MMHG | DIASTOLIC BLOOD PRESSURE: 74 MMHG | BODY MASS INDEX: 26.15 KG/M2

## 2025-02-06 DIAGNOSIS — I46.9 CARDIAC ARREST (H): ICD-10-CM

## 2025-02-06 DIAGNOSIS — Z95.810 ICD (IMPLANTABLE CARDIOVERTER-DEFIBRILLATOR) IN PLACE: ICD-10-CM

## 2025-02-06 LAB
MDC_IDC_LEAD_CONNECTION_STATUS: NORMAL
MDC_IDC_LEAD_IMPLANT_DT: NORMAL
MDC_IDC_LEAD_LOCATION: NORMAL
MDC_IDC_LEAD_LOCATION_DETAIL_1: NORMAL
MDC_IDC_LEAD_MFG: NORMAL
MDC_IDC_LEAD_MODEL: NORMAL
MDC_IDC_LEAD_POLARITY_TYPE: NORMAL
MDC_IDC_LEAD_SERIAL: NORMAL
MDC_IDC_MSMT_BATTERY_DTM: NORMAL
MDC_IDC_MSMT_BATTERY_REMAINING_PERCENTAGE: 81 %
MDC_IDC_MSMT_BATTERY_STATUS: NORMAL
MDC_IDC_PG_IMPLANT_DTM: NORMAL
MDC_IDC_PG_MFG: NORMAL
MDC_IDC_PG_MODEL: NORMAL
MDC_IDC_PG_SERIAL: NORMAL
MDC_IDC_PG_TYPE: NORMAL
MDC_IDC_SESS_CLINIC_NAME: NORMAL
MDC_IDC_SESS_DTM: NORMAL
MDC_IDC_SESS_TYPE: NORMAL
MDC_IDC_SET_ZONE_DETECTION_INTERVAL: 250 MS
MDC_IDC_SET_ZONE_DETECTION_INTERVAL: 300 MS
MDC_IDC_SET_ZONE_STATUS: NORMAL
MDC_IDC_SET_ZONE_STATUS: NORMAL
MDC_IDC_SET_ZONE_TYPE: NORMAL
MDC_IDC_SET_ZONE_TYPE: NORMAL
MDC_IDC_SET_ZONE_VENDOR_TYPE: NORMAL
MDC_IDC_SET_ZONE_VENDOR_TYPE: NORMAL
MDC_IDC_STAT_AT_BURDEN_PERCENT: 0 %
MDC_IDC_STAT_EPISODE_RECENT_COUNT: 0
MDC_IDC_STAT_EPISODE_RECENT_COUNT_DTM_END: NORMAL
MDC_IDC_STAT_EPISODE_RECENT_COUNT_DTM_START: NORMAL
MDC_IDC_STAT_EPISODE_TOTAL_COUNT: 0
MDC_IDC_STAT_EPISODE_TOTAL_COUNT_DTM_END: NORMAL
MDC_IDC_STAT_EPISODE_TOTAL_COUNT_DTM_START: NORMAL
MDC_IDC_STAT_EPISODE_TYPE: NORMAL
MDC_IDC_STAT_EPISODE_VENDOR_TYPE: NORMAL
MDC_IDC_STAT_TACHYTHERAPY_RECENT_DTM_END: NORMAL
MDC_IDC_STAT_TACHYTHERAPY_RECENT_DTM_START: NORMAL
MDC_IDC_STAT_TACHYTHERAPY_SHOCKS_DELIVERED_RECENT: 0
MDC_IDC_STAT_TACHYTHERAPY_SHOCKS_DELIVERED_TOTAL: 3
MDC_IDC_STAT_TACHYTHERAPY_TOTAL_DTM_END: NORMAL
MDC_IDC_STAT_TACHYTHERAPY_TOTAL_DTM_START: NORMAL

## 2025-02-06 PROCEDURE — G0463 HOSPITAL OUTPT CLINIC VISIT: HCPCS | Performed by: NURSE PRACTITIONER

## 2025-02-06 PROCEDURE — 93261 INTERROGATE SUBQ DEFIB: CPT | Performed by: INTERNAL MEDICINE

## 2025-02-06 RX ORDER — SOTALOL HYDROCHLORIDE 80 MG/1
80 TABLET ORAL 2 TIMES DAILY
Qty: 180 TABLET | Refills: 3 | Status: SHIPPED | OUTPATIENT
Start: 2025-02-06

## 2025-02-06 ASSESSMENT — PAIN SCALES - GENERAL: PAINLEVEL_OUTOF10: NO PAIN (0)

## 2025-02-06 NOTE — PROGRESS NOTES
ELECTROPHYSIOLOGY CLINIC VISIT    Assessment/Recommendations   Assessment/Plan:    Estela Fry is a 35 year old female with past medical history significant for VF arrest (unknown etiology) s/p secondary prevention SICD implant 8/15/23, provoked right external iliac DVT (on xarelto).       Atrial flutter s/p ICD shocks  She had been doing well on sotalol. Has some brief recurrences, including about 30 seconds while wearing holter monitor as well as noting an increase in ectopy. We discussed we could increased her sotalol to 120 mg bid, we also discussed EPS/ablation. Given no shock in over 1 year, she would like to continue sotalol at current dose. Should she have increased palpitations and or e/o arrhythmia and or shock, she is open to ablation. Refills for sotalol sent to her pharmacy.     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 Continues to follow with kasi due to emotional stress following arrest.     DVT, on Xarelto   Seeing hematology.    Follow up with EP DAX in 6 months      History of Present Illness/Subjective    Ms. Estela Fry is a 35 year old female who comes in today for EP follow-up of AFL, SICD.    Patient is a 34 yo female with history of  idiopathic VF arrest, found down at home by her  8/5/2023, initial rhythm VF, shocked x 3 with ROSC. Normal EF, no CAD, drug screen negative.  No family history of SCD, not peripartum. She underwent SICD implant. SICD was chosen because she and her family have cultural issues with implantable devices in her body, and SICD was an acceptable alternative for them. She was discharged on 8/16/2023. She had her 1 week follow up in device clinic on 8/22/2023.     She was then seen by Dr PEG Johnson 10/12/2023 - she and her  were severely traumatized by the event. She was not able to care for her kids due to concern that she would have another arrest. Her  had been reluctant to leave her and had lost his job. Post arrest clinic  was able to see her soon after our the visit to set up social work assistance.     She then presented to [facility] 1/15/2023 after receiving multiple shocks from her SICD. EMS was called to her home and found an initial supraventricular rhythm at 170 bpm. In ED EKG showed atrial flutter and she was cardioverted in ED. Of note, she had paroxysmal episodes of atrial flutter during hospitalization in Aug 2023 post VF arrest. She was seen in follow up 1/18/2024 and sotalol 80 bid was started. She reports when she first started sotalol, she felt velazco and irritable but this improved after several doses.     She presented to the ER 11/5/24 for evaluation of an odd heart rhythm earlier that morning. Reported she would feel normal beats followed by a stronger beat lasting about 20 minutes. EKG and telemetry without findings, EP saw patient and recommended completion of heart monitor.    Seen in EP Clinic 12/5/24. She had about  30 seconds of heart racing while wearing monitor and noticing some skipped beats. At this visit offered to increased sotalol to 120 mg BID, but patient declined dose increase. Discussed EPS/ablation, and wanted to consider.     Seen in [facility] ED 1/15/25 for palpitations. Palpitations started at home while she was sitting down. She then stood up and felt her heart beating very quickly. Called EMS. On their arrival patient with heart rate in the 150s. Just before they obtained an EKG, heart rate dropped back down to 80s to 90s. She did not feel a shock. ED workup unremarkable.     Today, Ms. Fry reports feeling okay. She is still anxious about getting another shock but denies any prolonged episodes of palpitations since episode in January. She notes occasional skipped beats-feels these why lying down. Breathing feels well. Denies SOB, ALFONSO, orthopnea, PND. Denies chest discomfort. No lightheadedness, dizziness, syncope, or near syncope.  Device interrogation shows stable lead trends, no  arrhythmias.     Cardiac Medications: Xarelto 20 mg, sotalol 80 mg BID.    I have reviewed and updated the patient's Past Medical History, Social History, Family History and Medication List.     Cardiographics (Personally Reviewed) :   Echo 2024:  Interpretation Summary  Global and regional left ventricular function is normal with an EF of 55-60%.  Global right ventricular function is normal.  No significant valvular abnormalities present.  Estimated mean right atrial pressure is normal.  No pericardial effusion is present.    Coronary Angiogram: 23   No significant coronary artery disease.      CMR: 8/10/23  1. The LV is normal in cavity size and wall thickness. The global systolic function is normal. The LVEF is  64%. There are no regional wall motion abnormalities.  2. The RV is normal in cavity size. The global systolic function is normal. The RVEF is 59%.   3. Both atria are normal in size.  4. There is no significant valvular disease.   5. Late gadolinium enhancement imaging shows no MI, fibrosis or infiltrative disease.   6. There is trivial pericardial effusion.  7. There is no intracardiac thrombus.  CONCLUSIONS: Normal cardiac function, LVEF of 64% and RVEF of 59%. There is no evidence of myocardial  fibrosis, given no fibrosis consider genetic evaluation for the etiology of her cardiac arrest.      EK/15/24       EK23          EK23         Physical Examination   There were no vitals taken for this visit.  Wt Readings from Last 3 Encounters:   25 64.5 kg (142 lb 3.2 oz)   25 64.4 kg (142 lb)   24 62.6 kg (138 lb)     /74 (BP Location: Right arm, Patient Position: Chair, Cuff Size: Adult Regular)   Pulse 56   Wt 64.9 kg (143 lb)   SpO2 98%   BMI 26.15 kg/m     General Appearance:   Alert, well-appearing and in no acute distress.   HEENT: Atraumatic, normocephalic.    Chest/Lungs:   Respirations unlabored.     Cardiovascular:   No edema of visualized  extremities.     Psych: Mood and affect are appropriate.    Skin: No rashes visualized on face/neck/upper extremities   Neurologic:  Alert and oriented to person, place, time, and situation.          Medications  Allergies   Xarelto 20 mg daily   Sotalol 80 mg bid    No Known Allergies      Lab Results (Personally Reviewed)    Chemistry/lipid CBC Cardiac Enzymes/BNP/TSH/INR   Lab Results   Component Value Date    BUN 10.4 01/21/2025     01/21/2025    CO2 28 01/21/2025     Creatinine   Date Value Ref Range Status   01/21/2025 0.75 0.51 - 0.95 mg/dL Final       Lab Results   Component Value Date    CHOL 134 10/04/2024    HDL 60 10/04/2024    LDL 63 10/04/2024      Lab Results   Component Value Date    WBC 6.5 01/21/2025    HGB 13.4 01/21/2025    HCT 41.4 01/21/2025    MCV 84 01/21/2025     01/21/2025    Lab Results   Component Value Date    TSH 1.84 07/23/2024    INR 1.14 11/05/2024        The patient states understanding and is agreeable with the plan.     Carol Sosa DNP, NP-C  Cardiac Electrophysiology   2/6/2025    Total time spent on patient visit, reviewing notes, imaging, labs, orders, and completing necessary documentation: 30 minutes.

## 2025-02-06 NOTE — LETTER
2/6/2025      RE: Estela Fry  [address] [place] [address]       Dear Colleague,    Thank you for the opportunity to participate in the care of your patient, Estela Fry, at the [facility] [place] at [facility]. Please see a copy of my visit note below.          ELECTROPHYSIOLOGY CLINIC VISIT    Assessment/Recommendations   Assessment/Plan:    Estela Fry is a 35 year old female with past medical history significant for VF arrest (unknown etiology) s/p secondary prevention SICD implant 8/15/23, provoked right external iliac DVT (on xarelto).       Atrial flutter s/p ICD shocks  She had been doing well on sotalol. Has some brief recurrences, including about 30 seconds while wearing holter monitor as well as noting an increase in ectopy. We discussed we could increased her sotalol to 120 mg bid, we also discussed EPS/ablation. Given no shock in over 1 year, she would like to continue sotalol at current dose. Should she have increased palpitations and or e/o arrhythmia and or shock, she is open to ablation. Refills for sotalol sent to her pharmacy.     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 Continues to follow with kasi due to emotional stress following arrest.     DVT, on Xarelto   Seeing hematology.    Follow up with EP DAX in 6 months      History of Present Illness/Subjective    Ms. Estela Fry is a 35 year old female who comes in today for EP follow-up of AFL, SICD.    Patient is a 34 yo female with history of  idiopathic VF arrest, found down at home by her  8/5/2023, initial rhythm VF, shocked x 3 with ROSC. Normal EF, no CAD, drug screen negative.  No family history of SCD, not peripartum. She underwent SICD implant. SICD was chosen because she and her family have cultural issues with implantable devices in her body, and SICD was an acceptable alternative for them. She was discharged on 8/16/2023. She had her 1  week follow up in device clinic on 8/22/2023.     She was then seen by Dr PEG Johnson 10/12/2023 - she and her  were severely traumatized by the event. She was not able to care for her kids due to concern that she would have another arrest. Her  had been reluctant to leave her and had lost his job. Post arrest clinic was able to see her soon after our the visit to set up social work assistance.     She then presented to [facility] 1/15/2023 after receiving multiple shocks from her SICD. EMS was called to her home and found an initial supraventricular rhythm at 170 bpm. In ED EKG showed atrial flutter and she was cardioverted in ED. Of note, she had paroxysmal episodes of atrial flutter during hospitalization in Aug 2023 post VF arrest. She was seen in follow up 1/18/2024 and sotalol 80 bid was started. She reports when she first started sotalol, she felt velazco and irritable but this improved after several doses.     She presented to the ER 11/5/24 for evaluation of an odd heart rhythm earlier that morning. Reported she would feel normal beats followed by a stronger beat lasting about 20 minutes. EKG and telemetry without findings, EP saw patient and recommended completion of heart monitor.    Seen in EP Clinic 12/5/24. She had about  30 seconds of heart racing while wearing monitor and noticing some skipped beats. At this visit offered to increased sotalol to 120 mg BID, but patient declined dose increase. Discussed EPS/ablation, and wanted to consider.     Seen in [facility] ED 1/15/25 for palpitations. Palpitations started at home while she was sitting down. She then stood up and felt her heart beating very quickly. Called EMS. On their arrival patient with heart rate in the 150s. Just before they obtained an EKG, heart rate dropped back down to 80s to 90s. She did not feel a shock. ED workup unremarkable.     Today, Ms. Fry reports feeling okay. She is still anxious about getting another shock but  denies any prolonged episodes of palpitations since episode in January. She notes occasional skipped beats-feels these why lying down. Breathing feels well. Denies SOB, ALFONSO, orthopnea, PND. Denies chest discomfort. No lightheadedness, dizziness, syncope, or near syncope.  Device interrogation shows stable lead trends, no arrhythmias.     Cardiac Medications: Xarelto 20 mg, sotalol 80 mg BID.    I have reviewed and updated the patient's Past Medical History, Social History, Family History and Medication List.     Cardiographics (Personally Reviewed) :   Echo 2024:  Interpretation Summary  Global and regional left ventricular function is normal with an EF of 55-60%.  Global right ventricular function is normal.  No significant valvular abnormalities present.  Estimated mean right atrial pressure is normal.  No pericardial effusion is present.    Coronary Angiogram: 23   No significant coronary artery disease.      CMR: 8/10/23  1. The LV is normal in cavity size and wall thickness. The global systolic function is normal. The LVEF is  64%. There are no regional wall motion abnormalities.  2. The RV is normal in cavity size. The global systolic function is normal. The RVEF is 59%.   3. Both atria are normal in size.  4. There is no significant valvular disease.   5. Late gadolinium enhancement imaging shows no MI, fibrosis or infiltrative disease.   6. There is trivial pericardial effusion.  7. There is no intracardiac thrombus.  CONCLUSIONS: Normal cardiac function, LVEF of 64% and RVEF of 59%. There is no evidence of myocardial  fibrosis, given no fibrosis consider genetic evaluation for the etiology of her cardiac arrest.      EK/15/24       EK23          EK23         Physical Examination   There were no vitals taken for this visit.  Wt Readings from Last 3 Encounters:   25 64.5 kg (142 lb 3.2 oz)   25 64.4 kg (142 lb)   24 62.6 kg (138 lb)     /74 (BP Location: Right  arm, Patient Position: Chair, Cuff Size: Adult Regular)   Pulse 56   Wt 64.9 kg (143 lb)   SpO2 98%   BMI 26.15 kg/m     General Appearance:   Alert, well-appearing and in no acute distress.   HEENT: Atraumatic, normocephalic.    Chest/Lungs:   Respirations unlabored.     Cardiovascular:   No edema of visualized extremities.     Psych: Mood and affect are appropriate.    Skin: No rashes visualized on face/neck/upper extremities   Neurologic:  Alert and oriented to person, place, time, and situation.          Medications  Allergies   Xarelto 20 mg daily   Sotalol 80 mg bid    No Known Allergies      Lab Results (Personally Reviewed)    Chemistry/lipid CBC Cardiac Enzymes/BNP/TSH/INR   Lab Results   Component Value Date    BUN 10.4 01/21/2025     01/21/2025    CO2 28 01/21/2025     Creatinine   Date Value Ref Range Status   01/21/2025 0.75 0.51 - 0.95 mg/dL Final       Lab Results   Component Value Date    CHOL 134 10/04/2024    HDL 60 10/04/2024    LDL 63 10/04/2024      Lab Results   Component Value Date    WBC 6.5 01/21/2025    HGB 13.4 01/21/2025    HCT 41.4 01/21/2025    MCV 84 01/21/2025     01/21/2025    Lab Results   Component Value Date    TSH 1.84 07/23/2024    INR 1.14 11/05/2024        The patient states understanding and is agreeable with the plan.     Carol Sosa DNP, NP-C  Cardiac Electrophysiology   2/6/2025    Total time spent on patient visit, reviewing notes, imaging, labs, orders, and completing necessary documentation: 30 minutes.           Please do not hesitate to contact me if you have any questions/concerns.     Sincerely,     MELODY Shah CNP

## 2025-02-06 NOTE — PATIENT INSTRUCTIONS
You were seen in the Electrophysiology Clinic today by: Carol Sosa NP    Plan:   Medication Changes:    No changes. Refills sent to your pharmacy for sotalol    Follow up Visit: EP DAX visit in 6 months, sooner if needed    Device Follow up: every 3 months, sooner if needed        If you have further questions, please utilize [employer] to contact us.     Your Care Team:    EP Cardiology   Telephone Number     Nurse Line  Izabel Neff RN   Latha Crouch, RN  Garcia Dent RN   [phone]     For scheduling appointments:   Ashvin   [phone]   For procedure scheduling:    Paulina Nayak     [phone]   For the Device Clinic (Pacemakers, ICDs, Loop Recorders)    During business hours: [phone]  After business hours:   [phone]- select option 4 and ask for job code [id number].       On-call cardiologist for after hours or on weekends:   [phone], option #4, and ask to speak to the on-call cardiologist.     Cardiovascular Clinic:   [address] [place] [address]. [place] [address]      As always, Thank you for trusting us with your health care needs!

## 2025-02-07 ENCOUNTER — MYC REFILL (OUTPATIENT)
Dept: HEMATOLOGY | Facility: CLINIC | Age: 35
End: 2025-02-07
Payer: COMMERCIAL

## 2025-02-07 DIAGNOSIS — Z79.01 CHRONIC ANTICOAGULATION: ICD-10-CM

## 2025-02-07 DIAGNOSIS — I82.220 IVC THROMBOSIS (H): ICD-10-CM

## 2025-02-10 RX ORDER — IRON POLYSACCHARIDE COMPLEX 150 MG
1 CAPSULE ORAL DAILY
Qty: 90 CAPSULE | Refills: 1 | Status: SHIPPED | OUTPATIENT
Start: 2025-02-10

## 2025-03-06 ENCOUNTER — VIRTUAL VISIT (OUTPATIENT)
Dept: PSYCHOLOGY | Facility: CLINIC | Age: 35
End: 2025-03-06
Payer: COMMERCIAL

## 2025-03-06 DIAGNOSIS — F43.22 ADJUSTMENT DISORDER WITH ANXIETY: Primary | ICD-10-CM

## 2025-03-06 PROCEDURE — 90832 PSYTX W PT 30 MINUTES: CPT | Mod: 95 | Performed by: COUNSELOR

## 2025-03-06 ASSESSMENT — ANXIETY QUESTIONNAIRES
6. BECOMING EASILY ANNOYED OR IRRITABLE: SEVERAL DAYS
GAD7 TOTAL SCORE: 5
4. TROUBLE RELAXING: NOT AT ALL
IF YOU CHECKED OFF ANY PROBLEMS ON THIS QUESTIONNAIRE, HOW DIFFICULT HAVE THESE PROBLEMS MADE IT FOR YOU TO DO YOUR WORK, TAKE CARE OF THINGS AT HOME, OR GET ALONG WITH OTHER PEOPLE: SOMEWHAT DIFFICULT
5. BEING SO RESTLESS THAT IT IS HARD TO SIT STILL: NOT AT ALL
1. FEELING NERVOUS, ANXIOUS, OR ON EDGE: SEVERAL DAYS
3. WORRYING TOO MUCH ABOUT DIFFERENT THINGS: SEVERAL DAYS
8. IF YOU CHECKED OFF ANY PROBLEMS, HOW DIFFICULT HAVE THESE MADE IT FOR YOU TO DO YOUR WORK, TAKE CARE OF THINGS AT HOME, OR GET ALONG WITH OTHER PEOPLE?: SOMEWHAT DIFFICULT
7. FEELING AFRAID AS IF SOMETHING AWFUL MIGHT HAPPEN: SEVERAL DAYS
7. FEELING AFRAID AS IF SOMETHING AWFUL MIGHT HAPPEN: SEVERAL DAYS
2. NOT BEING ABLE TO STOP OR CONTROL WORRYING: SEVERAL DAYS

## 2025-03-09 NOTE — PROGRESS NOTES
Health Psychology Outpatient Follow Up  [facility]     Patient: Estela Fry   Date of Service: 3/6/25  Treatment Plan Due: 3/14/25     Diagnosis:  Adjustment disorder with anxious mood (ICD-10: F43.20)    Patient Demographics (per chart):   Age: 34 year old  Biological Sex: female  Race:   Ethnicity: Not  or     Patient Medications (per chart):  Current Outpatient Medications   Medication Sig Dispense Refill    levonorgestrel (MIRENA) 52 MG (20 mcg/day) IUD 1 each by Intrauterine route once      Polysaccharide-Iron Complex 150 MG CAPS Take 1 tablet by mouth daily. 90 capsule 1    rivaroxaban ANTICOAGULANT (XARELTO) 20 MG TABS tablet Take 1 tablet (20 mg) by mouth daily (with dinner). 90 tablet 3    sotalol (BETAPACE) 80 MG tablet Take 1 tablet (80 mg) by mouth 2 times daily. 180 tablet 3     No current facility-administered medications for this visit.     Subjective:  Engaged pt in psychotherapy (CBT) for coping with anxiety following cardiac arrest and ICD placement. Reviewed updates to physical and emotional health since previous session and progress with treatment plan goals. Patient reported that her mood has been much improved since last appointment citing major decreases in anxiety sxs. Engaged pt in supportive listening and cognitive processing of ongoing sxs, stressors, and progress with mood sxs. Pt reflected on time since last appointments and cited ongoing utilization of stress/anxiety management skills in aiding in decreasing mood sxs. She stated that she tends to now only experience anxiety sxs when she is feeling stress at home, mostly related to parenting. Discussed strategies to help manage this stress. Discussed extending time until next appointment due to minimal sxs. Pt and I agreed to set appointment for 3 month check in.     Objective   Appearance:   Appropriate   Eye Contact:   Good   Psychomotor Behavior:  Normal  "  Attitude:   Cooperative   Orientation:   All  Speech   Rate / Production: Normal    Volume:  Normal   Mood:    Anxious   Affect:    Appropriate   Thought Content:   Clear  Thought Form:   Coherent  Logical     Insight:    Did not formally assess at this time.   Safety:    No safety concerns at this time.     Weight (per chart):  Wt Readings from Last 4 Encounters:   02/06/25 64.9 kg (143 lb)   02/05/25 64.5 kg (142 lb 3.2 oz)   01/21/25 64.4 kg (142 lb)   12/05/24 62.6 kg (138 lb)        BMI (per chart):  Estimated body mass index is 26.15 kg/m  as calculated from the following:    Height as of 2/5/25: 1.575 m (5' 2.01\").    Weight as of 2/6/25: 64.9 kg (143 lb).     Depression Symptoms (Patient Health Questionnaire 9; PHQ-9)  The PHQ-9 is a measure of depressive symptoms.  Scores on this measure range from 0 to 27 with higher scores reflecting greater levels and frequency of depressive symptoms. Typical symptom ranges include: 0-4 minimal, 5-9 mild, 10-14 moderate, 15-19 moderately severe, 20-27 severe.       10/24/2023     9:04 AM 9/9/2024     8:04 AM 1/21/2025     8:39 AM   PHQ-9 SCORE   PHQ-9 Total Score MyChart 7 (Mild depression)  4 (Minimal depression)   PHQ-9 Total Score 7 10 4        Patient-reported     Anxiety Symptoms (Generalized Anxiety Disorder 7; FRACISCO-7)  The FRACISCO-7 is a measure of anxiety and panic symptoms.  Scores on this measure range from 0 to 21 with higher scores reflecting greater levels and frequency of anxiety symptoms. Typical symptom ranges include: 0-4 minimal, 5-9 mild, 10-14 moderate, 15-21 severe.       10/15/2024     1:38 PM 11/25/2024    11:29 AM 3/6/2025    10:28 AM   FRACISCO-7 SCORE   Total Score 11 (moderate anxiety) 12 (moderate anxiety) 5 (mild anxiety)   Total Score 11 12  5        Patient-reported     Alcohol and Drug Abuse (CAGE - Adapted to Include Drugs; CAGE-AID)  The CAGE-AID is a screening tool to assess for symptoms of alcohol or drug abuse or dependence. Scores on this " measure range from 0 to 4, with higher scores reflecting experiences consistent with problem use.  CAGE-AID score  > 1 is a positive screen, suggesting further discussion is needed to determine if evaluation for alcohol or substance abuse is appropriate.  A score > 2 is considered clinically significant, suggesting further evaluation of alcohol or substance-related problems is indicated.      2/22/2024    10:39 AM   CAGE-AID Total Score   Total Score 0   Total Score MyChart 0 (A total score of 2 or greater is considered clinically significant)     Global Health (Patient-Reported Outcomes Measurement Information System; PROMIS-10)  The PROMIS-10 is a measure of global physical and mental health. Total scores on this measure range from 8 to 40, with higher scores reflecting greater levels of perceived health.       2/22/2024    10:38 AM 9/6/2024     8:33 AM 3/6/2025    10:29 AM   PROMIS-10 Scores Only   Global Mental Health Score 11 8 11    Global Physical Health Score 14 16 17    PROMIS TOTAL - SUBSCORES 25 24 28        Patient-reported     Suicidality (Watson Suicide Severity Rating Scale Screener Past Month)      11/5/2024    10:48 AM   Watson Suicide Severity Rating (Short Version)   Q1 Wished to be Dead (Past Month) 0-->no   Q2 Suicidal Thoughts (Past Month) 0-->no   Q6 Suicide Behavior (Lifetime) 0-->no     Assessment:  Patient was engaged in treatment. Patient appears to be experiencing depression symptoms in the moderate range and anxiety symptoms in the moderate range. Currently, the patient appears to be coping with little success. Progress towards treatment goals: ongoing.     Plan:  Return for therapy sessions.     Session Length:  Start Time: 2:00pm  End Time: 2:31pm    Telemedicine Information:  Type of service:   Telemedicine psychotherapy  Patient location:   Patient home in [place]    Patient telephone number: [phone]  Provider location:  At home office in [place]  Mode of Communication:    Video Conference via [facility]   Patient consent to telemedicine services? Yes  It has been determined that telemedicine service is appropriate and effective for this patient.   The patient has been notified that: Video visits will be conducted via a call with their psychologist to provide the care they need with a video conversation. Video visits may be billed at different rates depending on insurance coverage.  Patients are advised to contact their insurance provider with any questions about their health insurance coverage. If during the course of a call the psychologist feels a video visit is not appropriate, patients will not be charged for this service.     Johnny Escobar, PhD,   Clinical Health Psychologist    *This note was completed using Dragon voice recognition software. Although reviewed after completion, some word and grammatical errors may occur.

## 2025-03-31 ENCOUNTER — MYC MEDICAL ADVICE (OUTPATIENT)
Dept: OBGYN | Facility: CLINIC | Age: 35
End: 2025-03-31
Payer: COMMERCIAL

## 2025-03-31 ENCOUNTER — TELEPHONE (OUTPATIENT)
Dept: OBGYN | Facility: CLINIC | Age: 35
End: 2025-03-31
Payer: COMMERCIAL

## 2025-03-31 ENCOUNTER — TELEPHONE (OUTPATIENT)
Dept: HEMATOLOGY | Facility: CLINIC | Age: 35
End: 2025-03-31
Payer: COMMERCIAL

## 2025-03-31 DIAGNOSIS — I82.220 IVC THROMBOSIS (H): Primary | ICD-10-CM

## 2025-03-31 RX ORDER — ENOXAPARIN SODIUM 100 MG/ML
40 INJECTION SUBCUTANEOUS EVERY 12 HOURS
Qty: 24 ML | Refills: 0 | Status: SHIPPED | OUTPATIENT
Start: 2025-03-31

## 2025-03-31 RX ORDER — ENOXAPARIN SODIUM 100 MG/ML
60 INJECTION SUBCUTANEOUS EVERY 12 HOURS
Qty: 36 ML | Refills: 0 | Status: SHIPPED | OUTPATIENT
Start: 2025-03-31 | End: 2025-03-31 | Stop reason: DRUGHIGH

## 2025-03-31 NOTE — TELEPHONE ENCOUNTER
This RN called Estela to collect more information. Information received- she had a mirena placed in August of 2023. She started feeling nausea out or nowhere and that has happened a few days now. She took 2 positive pregnancy tests today. This RN rescheduled for 4/2 on [facility] time. Estela was agreeable to the appointment change.

## 2025-03-31 NOTE — TELEPHONE ENCOUNTER
[id number]  Estela Fry  35 year old female  CBCD Diagnosis: Provoked external iliac vein thrombosis, IVC thrombosis, chronic L jugular vein occlusion  CBCD Provider: Choco    Incoming call from Estela who states she may be pregnant. She has been having nausea since yesterday and now has a positive pregnancy test. She is surprised and concerned because she has an IUD in place. Estela is wondering what to do with her anticoagulation. She is currently on 20mg Xarelto daily. I let Estela know that I would huddle with a CBCD provider to see if she should switch to Lovenox now, or wait until she sees OB for confirmation of pregnancy (trying to schedule for later this week).    Felix MAYER RN  [facility]  Office: [phone]  Clinic: [phone]  Fax: [phone]

## 2025-03-31 NOTE — TELEPHONE ENCOUNTER
[facility]    Phone Message    May a detailed message be left on voicemail: yes     Reason for Call: Other: Pt currently scheduled for soonest available follow up. Pt would like to discuss her symptoms while using IUD. Pt states possibility of pregnancy and looking to get in sooner to address if possible. Writer also placed pt on wait list. Please review and contact when able.      Action Taken: Other: UMP WHS     Travel Screening: Not Applicable     Date of Service:

## 2025-03-31 NOTE — TELEPHONE ENCOUNTER
Called back Estela and shared provider recommendations with her.  She states she is used to Lovneox in the past and feels comfortable with self administration.  I sent prescription for the Lovenox to her preferred pharmacy.  She usually takes Xarelto at 8 PM and will therefore begin Lovenox tonight at 8 PM.  She will take it every 12 hours as directed.  I have her scheduled for a low molecular weight antigen [facility] Alfonzovalencia will give us an update following her OB appt.    Felix MAYER RN  [facility]  Office: [phone]  Clinic: [phone]  Fax: [phone]

## 2025-04-02 ENCOUNTER — OFFICE VISIT (OUTPATIENT)
Dept: OBGYN | Facility: CLINIC | Age: 35
End: 2025-04-02
Attending: STUDENT IN AN ORGANIZED HEALTH CARE EDUCATION/TRAINING PROGRAM
Payer: COMMERCIAL

## 2025-04-02 ENCOUNTER — ANCILLARY PROCEDURE (OUTPATIENT)
Dept: ULTRASOUND IMAGING | Facility: CLINIC | Age: 35
End: 2025-04-02
Attending: STUDENT IN AN ORGANIZED HEALTH CARE EDUCATION/TRAINING PROGRAM
Payer: COMMERCIAL

## 2025-04-02 ENCOUNTER — TELEPHONE (OUTPATIENT)
Dept: OBGYN | Facility: CLINIC | Age: 35
End: 2025-04-02
Payer: COMMERCIAL

## 2025-04-02 ENCOUNTER — ANCILLARY PROCEDURE (OUTPATIENT)
Dept: GENERAL RADIOLOGY | Facility: CLINIC | Age: 35
End: 2025-04-02
Attending: OBSTETRICS & GYNECOLOGY
Payer: COMMERCIAL

## 2025-04-02 VITALS
HEART RATE: 63 BPM | BODY MASS INDEX: 25.42 KG/M2 | SYSTOLIC BLOOD PRESSURE: 97 MMHG | DIASTOLIC BLOOD PRESSURE: 67 MMHG | WEIGHT: 139 LBS

## 2025-04-02 DIAGNOSIS — T83.32XA INTRAUTERINE DEVICE (IUD) MIGRATION, INITIAL ENCOUNTER: ICD-10-CM

## 2025-04-02 DIAGNOSIS — Z32.01 PREGNANCY TEST POSITIVE: ICD-10-CM

## 2025-04-02 DIAGNOSIS — Z97.5 IUD (INTRAUTERINE DEVICE) IN PLACE: Primary | ICD-10-CM

## 2025-04-02 DIAGNOSIS — Z30.8 ENCOUNTER FOR OTHER CONTRACEPTIVE MANAGEMENT: ICD-10-CM

## 2025-04-02 DIAGNOSIS — T83.32XA INTRAUTERINE DEVICE (IUD) MIGRATION, INITIAL ENCOUNTER: Primary | ICD-10-CM

## 2025-04-02 DIAGNOSIS — Z95.810 ICD (IMPLANTABLE CARDIOVERTER-DEFIBRILLATOR) IN PLACE: ICD-10-CM

## 2025-04-02 DIAGNOSIS — Z33.2 TERMINATION OF PREGNANCY (FETUS): ICD-10-CM

## 2025-04-02 DIAGNOSIS — Z97.5 IUD (INTRAUTERINE DEVICE) IN PLACE: ICD-10-CM

## 2025-04-02 PROCEDURE — 76801 OB US < 14 WKS SINGLE FETUS: CPT | Mod: 26 | Performed by: OBSTETRICS & GYNECOLOGY

## 2025-04-02 PROCEDURE — 76817 TRANSVAGINAL US OBSTETRIC: CPT | Mod: 26 | Performed by: OBSTETRICS & GYNECOLOGY

## 2025-04-02 PROCEDURE — 74018 RADEX ABDOMEN 1 VIEW: CPT | Performed by: STUDENT IN AN ORGANIZED HEALTH CARE EDUCATION/TRAINING PROGRAM

## 2025-04-02 PROCEDURE — 76801 OB US < 14 WKS SINGLE FETUS: CPT

## 2025-04-02 RX ORDER — ACETAMINOPHEN 325 MG/1
975 TABLET ORAL ONCE
Status: CANCELLED | OUTPATIENT
Start: 2025-04-02 | End: 2025-04-02

## 2025-04-02 NOTE — PROGRESS NOTES
"[facility] Clinic Visit and Preop Exam    34 yo  at 5+6 weeks by today's ultrasound in the setting of: Mirena IUD placed 24 for menorrhagia and contraception due to idiopathic cardiac arrest s/p ICD (2023) and h/o IVC thrombosis and is on rivaroxaban and lovenox.      Had D&C for menorrhagia on 2024 that was uncomplicated, IUD was placed at that time. Had one \"normal\" period after that procedure prior to this recent episode of bleeding. She presented to the ED on 24 with heavier than normal menstrual bleeding for multiple days, passing large clots, and pelvic discomfort she related to her IUD as well as noticing strings coming out of vaginal canal. ED workup with pelvic US notable for IUD positioned in lower cervix, was removed at that time. IUD replaced 24.     Today the Mirena IUD is not in the uterus. IUP at 5+6 with CA consistent with LMP.     Patient Active Problem List   Diagnosis    Cardiac arrest (H)    Heart disease during pregnancy, antepartum    Dysphonia    Iron deficiency    Chronic anticoagulation    History of DVT (deep vein thrombosis)    History of cardiac arrest    S/P ICD (internal cardiac defibrillator) procedure    Atrial flutter (H)    Female stress incontinence     Past Medical History:   Diagnosis Date    Automatic implantable cardioverter-defibrillator in situ     Cardiac arrest (H)     History of atrial flutter     History of venous thromboembolism      OB History    Para Term  AB Living   12 8 3 5 4 7   SAB IAB Ectopic Multiple Live Births   0 4 0 0 8      # Outcome Date GA Lbr Gulshan/2nd Weight Sex Type Anes PTL Lv   12 IAB 24 6w6d    IAB      11 IAB 21           10 IAB 20           9  20 32w6d 01:45 / 00:02 2.05 kg (4 lb 8.3 oz) F Vag-Spont None  MELISSA      Name: LUKAS,GIRL KRISUA      Apgar1: 8  Apgar5: 8   8  19 32w0d   M Vag-Spont   MELISSA   7 Term 18 38w0d  3.175 kg (7 lb) F Vag-Spont   MELISSA   6  " 17 36w0d  2.722 kg (6 lb) F Vag-Spont None  MELISSA   5 IAB 09/15/14 12w0d          4 Term 13 39w0d  2.438 kg (5 lb 6 oz) F Vag-Spont   MELISSA   3  13 22w0d   M Vag-Spont   DEC      Birth Comments: System Generated. Please review and update pregnancy details.   2  12 36w0d  3.6 kg (7 lb 15 oz) F Vag-Spont  Y MELISSA      Name: noemí   1 Term 01/29/10 40w0d  3.317 kg (7 lb 5 oz) M Vag-Spont None N MELISSA      Name: Salomón     Past Surgical History:   Procedure Laterality Date    CV CENTRAL VENOUS CATHETER PLACEMENT N/A 2023    Procedure: Central Venous Catheter Placement;  Surgeon: Sid Liz MD;  Location: [facility] CARDIAC CATH LAB    CV CORONARY ANGIOGRAM N/A 2023    Procedure: Coronary Angiogram;  Surgeon: Sid Liz MD;  Location: [facility] CARDIAC CATH LAB    DILATION AND CURETTAGE SUCTION N/A 2024    Procedure: DILATION AND CURETTAGE, UTERUS, USING SUCTION,;  Surgeon: Shreya Mcdonnell MD;  [facility]    EP SICD INSERT N/A 8/15/2023    Procedure: Subcutaneous Implantable Cardioverter Defibrillator Implant;  Surgeon: Eula Johnson MD;  Location: [facility] CARDIAC CATH LAB    INSERT INTRAUTERINE DEVICE N/A 2024    Procedure: placement of Mirena intrauterine device;  Surgeon: Shreya Mcdonnell MD;  [facility] OR         Objective  BP 97/67   Pulse 63   Wt 63 kg (139 lb)   BMI 25.42 kg/m      Gen: NAD  CV: RRR, no murmurs/rubs/gallops  Pulm: CTAB    Imaging:  Pelvic US with intrauterine pregnancy at 5w6d by CRL  Abdominal x-ray order placed    Assessment/Plan    Estela Fry is a 35 year old  with past medical history significant for VF arrest (unknown etiology) s/p secondary prevention SICD implant 8/15/23, provoked right external iliac DVT (on xarelto), now found to be 5w6d pregnant after expulsion of her Mirena IUD.     # Pregnancy  - 5w6d by CRL on TVUS today  - Patient desires termination of pregnancy. Given her medical history and  concurrent medications we advise suction curettage over medical .  - Missing IUD: suspect expulsion but will confirm no intra-abdominal presence  - Desires permanent contraception: declines hysterectomy  - message sent to cardiology and hematology about planning for surgery(ies)      Patient seen and discussed with Dr. Mcdonnell.    Alcon Zacarias MD, MSc  [facility], PGY-1  2025 2:01 PM    I have seen and examined the patient with the resident. I have reviewed, edited, and agree with the note.   I personally performed physical exam.  Shreya Mcdonnell MD      ADDENDUM:  PER CARDS/HEME OK TO DO BILATERAL SALPINGECTOMY SAME DAY as termination  Ok for laparoscopic surgery  Hold anticoagulation for 48 hours-- no bridge  Orders placed  Patient states signed papers 10/29/24 these are being located and scanned in  AXR shows no abdominal IUD    It is imperative this termination and salpingectomy be completed asap. This patient should NOT be pregnant. She has had 3 failed Mirenas. She declines hysterectomy.   Shreya Mcdonnell MD

## 2025-04-02 NOTE — LETTER
"2025       RE: Estela Fry  [address] [place] [address]     Dear Colleague,    Thank you for referring your patient, Estela Fry, to the [facility] [place] at [facility]. Please see a copy of my visit note below.    [facility] Clinic Visit and Preop Exam    36 yo  at 5+6 weeks by today's ultrasound in the setting of: Mirena IUD placed 24 for menorrhagia and contraception due to idiopathic cardiac arrest s/p ICD (2023) and h/o IVC thrombosis and is on rivaroxaban and lovenox.      Had D&C for menorrhagia on 2024 that was uncomplicated, IUD was placed at that time. Had one \"normal\" period after that procedure prior to this recent episode of bleeding. She presented to the ED on 24 with heavier than normal menstrual bleeding for multiple days, passing large clots, and pelvic discomfort she related to her IUD as well as noticing strings coming out of vaginal canal. ED workup with pelvic US notable for IUD positioned in lower cervix, was removed at that time. IUD replaced 24.     Today the Mirena IUD is not in the uterus. IUP at 5+6 with CA consistent with LMP.     Patient Active Problem List   Diagnosis     Cardiac arrest (H)     Heart disease during pregnancy, antepartum     Dysphonia     Iron deficiency     Chronic anticoagulation     History of DVT (deep vein thrombosis)     History of cardiac arrest     S/P ICD (internal cardiac defibrillator) procedure     Atrial flutter (H)     Female stress incontinence     Past Medical History:   Diagnosis Date     Automatic implantable cardioverter-defibrillator in situ      Cardiac arrest (H)      History of atrial flutter      History of venous thromboembolism      OB History    Para Term  AB Living   12 8 3 5 4 7   SAB IAB Ectopic Multiple Live Births   0 4 0 0 8      # Outcome Date GA Lbr Gulshan/2nd Weight Sex Type Anes PTL Lv   12 IAB 24 6w6d    IAB  "     11 IAB 21           10 IAB 20           9  20 32w6d 01:45 / 00:02 2.05 kg (4 lb 8.3 oz) F Vag-Spont None  MELISSA      Name: LUKAS,GIRL FATEMEH      Apgar1: 8  Apgar5: 8   8  19 32w0d   M Vag-Spont   MELISSA   7 Term 18 38w0d  3.175 kg (7 lb) F Vag-Spont   MELISSA   6  17 36w0d  2.722 kg (6 lb) F Vag-Spont None  MELISSA   5 IAB 09/15/14 12w0d          4 Term 13 39w0d  2.438 kg (5 lb 6 oz) F Vag-Spont   MELISSA   3  13 22w0d   M Vag-Spont   DEC      Birth Comments: System Generated. Please review and update pregnancy details.   2  12 36w0d  3.6 kg (7 lb 15 oz) F Vag-Spont  Y MELISSA      Name: noemí   1 Term 01/29/10 40w0d  3.317 kg (7 lb 5 oz) M Vag-Spont None N MELISSA      Name: Salomón     Past Surgical History:   Procedure Laterality Date     CV CENTRAL VENOUS CATHETER PLACEMENT N/A 2023    Procedure: Central Venous Catheter Placement;  Surgeon: Sid Liz MD;  Location: [facility] CARDIAC CATH LAB     CV CORONARY ANGIOGRAM N/A 2023    Procedure: Coronary Angiogram;  Surgeon: Sid Liz MD;  Location: [facility] CARDIAC CATH LAB     DILATION AND CURETTAGE SUCTION N/A 2024    Procedure: DILATION AND CURETTAGE, UTERUS, USING SUCTION,;  Surgeon: Shreya Mcdonnell MD;  [facility]     EP SICD INSERT N/A 8/15/2023    Procedure: Subcutaneous Implantable Cardioverter Defibrillator Implant;  Surgeon: Eula Johnson MD;  Location: [facility] CARDIAC CATH LAB     INSERT INTRAUTERINE DEVICE N/A 2024    Procedure: placement of Mirena intrauterine device;  Surgeon: Shreya Mcdonnell MD;  [facility]         Objective  BP 97/67   Pulse 63   Wt 63 kg (139 lb)   BMI 25.42 kg/m      Gen: NAD  CV: RRR, no murmurs/rubs/gallops  Pulm: CTAB    Imaging:  Pelvic US with intrauterine pregnancy at 5w6d by CRL  Abdominal x-ray order placed    Assessment/Plan    Estela Fry is a 35 year old  with past medical history significant for  VF arrest (unknown etiology) s/p secondary prevention SICD implant 8/15/23, provoked right external iliac DVT (on xarelto), now found to be 5w6d pregnant after expulsion of her Mirena IUD.     # Pregnancy  - 5w6d by CRL on TVUS today  - Patient desires termination of pregnancy. Given her medical history and concurrent medications we advise suction curettage over medical .  - Missing IUD: suspect expulsion but will confirm no intra-abdominal presence  - Desires permanent contraception: declines hysterectomy  - message sent to cardiology and hematology about planning for surgery(ies)      Patient seen and discussed with Dr. Mcdonnell.    Alcon Zacarias MD, MSc  [facility], PGY-1  2025 2:01 PM    I have seen and examined the patient with the resident. I have reviewed, edited, and agree with the note.   I personally performed physical exam.  Shreya Mcdonnell MD        Again, thank you for allowing me to participate in the care of your patient.      Sincerely,    Shreya Mcdonnell MD

## 2025-04-02 NOTE — PATIENT INSTRUCTIONS
Thank you for trusting us with your care!   Please be aware, if you are on Mychart, you may see your results prior to your providers review. If labs are abnormal, we will call or message you on [employer] with a follow up plan.    If you need to contact us for questions about:  Symptoms, Scheduling & Medical Questions; Non-urgent (2-3 day response) [employer] message, Urgent (needing response today) [phone] (if after 3:30pm next day response)   Prescriptions: Please call your Pharmacy   Billing: Cathy [phone] or [facility]:[phone]

## 2025-04-02 NOTE — TELEPHONE ENCOUNTER
Called Estela to schedule her for 1:20pm ultrasound appointment today for positive UPT with iud in place.  Per Dr. Mcdonnell, need US.  Dr. Mcdonnell will see pt. In US room.

## 2025-04-03 ENCOUNTER — TELEPHONE (OUTPATIENT)
Dept: OBGYN | Facility: CLINIC | Age: 35
End: 2025-04-03
Payer: COMMERCIAL

## 2025-04-03 ENCOUNTER — NURSE TRIAGE (OUTPATIENT)
Dept: OBGYN | Facility: CLINIC | Age: 35
End: 2025-04-03
Payer: COMMERCIAL

## 2025-04-03 ENCOUNTER — MYC MEDICAL ADVICE (OUTPATIENT)
Dept: OBGYN | Facility: CLINIC | Age: 35
End: 2025-04-03
Payer: COMMERCIAL

## 2025-04-03 RX ORDER — ONDANSETRON 2 MG/ML
4 INJECTION INTRAMUSCULAR; INTRAVENOUS ONCE
OUTPATIENT
Start: 2025-04-03

## 2025-04-03 RX ORDER — ACETAMINOPHEN 325 MG/1
975 TABLET ORAL ONCE
OUTPATIENT
Start: 2025-04-03 | End: 2025-04-03

## 2025-04-03 RX ORDER — ONDANSETRON 4 MG/1
4 TABLET, ORALLY DISINTEGRATING ORAL ONCE
OUTPATIENT
Start: 2025-04-03 | End: 2025-04-03

## 2025-04-03 NOTE — TELEPHONE ENCOUNTER
[facility]  This patient has [employer] for coverage which does not cover abortions. [place] Medicaid will cover as long as a letter of medical necessity is signed prior to surgery. Once surgery is scheduled [place] Medicaid will be added with the effective date as the date or surgery to one day after  The Bilateral Salpingectomy doesn't require a PA.  Thank you,  Erica

## 2025-04-03 NOTE — TELEPHONE ENCOUNTER
Referral received from Dr. Mcdonnell for D&E.  She is being referred to [facility] because elective  Gestational age 6w0d  DEBORA 11/27/25  Medical records have been received    A message has been sent to [facility] to determine insurance coverage for the procedure.  If MA, the Medical Necessity form is complete and has been priority scanned to the patient's chart.

## 2025-04-03 NOTE — TELEPHONE ENCOUNTER
Patient reports brown spotting in underwear and while wiping when using the toilet. States this started just now.     Denies pain, cramping.    Reviewed causes for spotting. Recommended to use pads and monitor bleeding. Agreeable to call back if bleeding progresses (saturating 1 maxi pad every hour for at least 2 hours), passes clots or tissue, has any abnormal vaginal discharge/odor, severe abdominal pain or fever.     Reason for Disposition   SPOTTING (single or brief episode)    Additional Information   Negative: Shock suspected (e.g., cold/pale/clammy skin, too weak to stand, low BP, rapid pulse)   Negative: Difficult to awaken or acting confused (e.g., disoriented, slurred speech)   Negative: Passed out (e.g., fainted, lost consciousness, blacked out and was not responding)   Negative: Sounds like a life-threatening emergency to the triager   Negative: Vaginal bleeding and pregnant 20 or more weeks   Negative: Not pregnant or pregnancy status unknown   Negative: SEVERE vaginal bleeding (e.g., soaking 2 pads or tampons per hour and present 2 or more hours; 1 menstrual cup every 2 hours)   Negative: SEVERE abdominal pain (e.g., excruciating)   Negative: SEVERE dizziness (e.g., unable to stand, requires support to walk, feels like passing out)   Negative: Passed tissue (e.g., gray-white)   Negative: Shoulder pain   Negative: Constant abdominal pain lasting > 2 hours   Negative: Fever 100.4 F (38.0 C) or higher   Negative: Pale skin (pallor) of new-onset or getting worse   Negative: Patient sounds very sick or weak to the triager   Negative: MODERATE vaginal bleeding (e.g., soaking 1 pad or tampon per hour and present > 6 hours; 1 menstrual cup every 6 hours)   Negative: MILD vaginal bleeding (i.e., less than 1 pad / hour; less than patient's usual menstrual bleeding; not just spotting) and pregnant > 12 weeks   Negative: Intermittent lower abdominal pain (e.g., cramping) lasting > 24 hours   Negative: Pain or  burning with passing urine (urination)   Negative: Using heparin (e.g., Lovenox) or other strong blood thinner, or known bleeding disorder (e.g., thrombocytopenia)   Negative: Patient wants to be seen    Protocols used: Pregnancy - Vaginal Bleeding Less Than 20 Weeks EGA-A-OH

## 2025-04-03 NOTE — TELEPHONE ENCOUNTER
Consent for Sterilization sent to patient's email per Dr. Mcdonnell's request for bilateral salpingectomy.

## 2025-04-03 NOTE — TELEPHONE ENCOUNTER
Spoke with Dr. Giron about vaginal bleeding patient is having. Patient to go to ED if bleeding precautions met. Otherwise will try and schedule surgery when Mary is back per Katy and Dr. Mcdonnell as no providers available.

## 2025-04-04 ENCOUNTER — ALLIED HEALTH/NURSE VISIT (OUTPATIENT)
Dept: HEMATOLOGY | Facility: CLINIC | Age: 35
End: 2025-04-04
Payer: COMMERCIAL

## 2025-04-04 DIAGNOSIS — I82.220 IVC THROMBOSIS (H): ICD-10-CM

## 2025-04-04 LAB — LMWH PPP CHRO-ACNC: 0.56 IU/ML

## 2025-04-04 PROCEDURE — 36415 COLL VENOUS BLD VENIPUNCTURE: CPT

## 2025-04-04 PROCEDURE — 999N000103 HC STATISTIC NO CHARGE FACILITY FEE

## 2025-04-04 PROCEDURE — 85520 HEPARIN ASSAY: CPT

## 2025-04-07 ENCOUNTER — MYC MEDICAL ADVICE (OUTPATIENT)
Dept: HEMATOLOGY | Facility: CLINIC | Age: 35
End: 2025-04-07
Payer: COMMERCIAL

## 2025-04-08 ENCOUNTER — MYC MEDICAL ADVICE (OUTPATIENT)
Dept: OBGYN | Facility: CLINIC | Age: 35
End: 2025-04-08
Payer: COMMERCIAL

## 2025-04-13 NOTE — PROGRESS NOTES
ELECTROPHYSIOLOGY CLINIC VISIT    Assessment/Recommendations   Assessment/Plan:    Estela Fry is a 33 year old female with past medical history significant for VF arrest (unknown etiology) s/p secondary prevention SICD implant 8/15/23, provoked right external iliac DVT.      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 SICD implanted given no reversible cause identified.    - Device check today with normal device function, stable impedance, 3 ICD shocks delivered for AFL at a rate of 200-240 bpm    - She has had significant fear about receiving more shocks in the future. She has been having a hard time sleeping at night due to fear of receiving another shock. She saw psychotherapist in Oct, they recommended 1 week follow up. It does not appear she followed up with them or has met with social work, I encouraged follow up with both of these resources.    - She is scheduled for genetic testing in February      Atrial Flutter, new  Patient with new atrial flutter 1/15 at rate of 200-240 bpm resulting in ICD shockx3. Presented to [facility] ER, she was given adenosine x3 without success. Underwent successful cardioversion. Of note, she did have similar atrial arrhythmia 8/5 and 8/6 during admission. Today, discussed initiation of AAD medication to avoid further recurrence of atrial arrhythmias and subsequent shocks. She is agreeable to trying a medication, will start sotalol 80 mg bid. Chadsvasc 0. Prior ECGs with sinus rates 70-80s, baseline Qtc 415-460 ms. CrCl 85ml/min. Will arrange follow up in 2 weeks for ECG and reassessment.     Follow up in 2 weeks.      History of Present Illness/Subjective    MsHowie Fry is a 33 year old female who comes in today for EP follow-up of SICD, new atrial flutter.    The patient is a 33 year old female who was found down on 8/5/2023 by family, EMS found her to be  in VF, shocked x 3 with ROSC. Normal LVEF, no CAD, drug screen negative. No family history of SCD, not peripartum. She underwent SICD implant. SICD was chosen because she and her family have cultural issues with implantable devices in her body, and SICD was an acceptable alternative for them. She was discharged on 8/16/2023. She had her 1 week follow up in device clinic on 8/22/2023.      She was seen by Dr PEG Johnson 10/12/23 with her . They had been in emotional distress regarding her arrest and adjusting to things at home. Her  had lost his job d/t being worried about staying home with her. They were looking for support regarding the above mentioned issues.      I met her in clinic 11/21/23, she had been seen by the critical care team and social work which had been helpful. She was also seen by hematology, they would like her to continue xarelto as long as it has been tolerated. 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 Her and her family have moved in with her husbands parents. Her  is thinking about looking for a job now, since his parents will be home. She has continued anxiety regarding any physical activity, she walks around the house but has not tried more exertion then walking. She was going to start cardiac rehab, but reports her insurance said this cost would be out of pocket. She still has some occasional chest and incisional discomfort. She denies palpitations, peripheral edema, shortness of breath, pre-syncope, or syncope. Device interrogation shows normal device function, stable impedance (70 today, 50 at implant) and no arrhythmias.     She presented to [facility] ER on 1/15/24 with palpitations, reported her ICD had fired about 4 times. When EMS arrived HR reportedly in 170s, attempted vagal maneuvers but were unsuccessful. ECG on arrival reportedly consistent with SVT, adenosine x3 (6mg, 12mg and 18mg) tried without successful. She then underwent cardioversion with successful  conversion to sinus rhythm.   She presents today for follow up after receiving shocks. She tells me she was helping her kids take a bath when her heart started to race, she told her , went to have a drink of water, and then received the first shock and fell to the ground. Her  then called EMS. Since discharge from ER she has not had any further palpitations or symptoms. She has had significant fear about receiving more shocks in the future. She has been having a hard time sleeping at night due to fear of receiving another shock. She feels as if she would rather accept her risk of SCD then have the ICD and fear of getting further shocks. She feels she is unable to care for her kids due to risk of receiving another shock while holding them.     I have reviewed and updated the patient's Past Medical History, Social History, Family History and Medication List.     Cardiographics (Personally Reviewed) :   Coronary Angiogram: 23   No significant coronary artery disease.     CMR: 8/10/23  1. The LV is normal in cavity size and wall thickness. The global systolic function is normal. The LVEF is  64%. There are no regional wall motion abnormalities.  2. The RV is normal in cavity size. The global systolic function is normal. The RVEF is 59%.   3. Both atria are normal in size.  4. There is no significant valvular disease.   5. Late gadolinium enhancement imaging shows no MI, fibrosis or infiltrative disease.   6. There is trivial pericardial effusion.  7. There is no intracardiac thrombus.  CONCLUSIONS: Normal cardiac function, LVEF of 64% and RVEF of 59%. There is no evidence of myocardial  fibrosis, given no fibrosis consider genetic evaluation for the etiology of her cardiac arrest.     EK/15/24      EK23        EK23       Physical Examination   /74 (BP Location: Right arm, Patient Position: Sitting, Cuff Size: Adult Regular)   Pulse 94   Wt 55.9 kg (123 lb 4.8 oz)   SpO2 97%    BMI 22.55 kg/m    Wt Readings from Last 3 Encounters:   01/18/24 55.9 kg (123 lb 4.8 oz)   11/21/23 54.7 kg (120 lb 8 oz)   10/18/23 53 kg (116 lb 12.8 oz)     General Appearance:   Alert, well-appearing and in no acute distress.   HEENT: Atraumatic, normocephalic. MMM.   Chest/Lungs:   Respirations unlabored.  Lungs are clear to auscultation.   Cardiovascular:   Regular rate and rhythm.  S1/S2. No murmur.    Abdomen:  Soft, nontender, nondistended.   Extremities: No cyanosis or clubbing. No edema.    Musculoskeletal: Moves all extremities.     Skin: Warm, dry, intact.    Neurologic: Mood and affect are appropriate.  Alert and oriented to person, place, time, and situation.          Medications  Allergies   Xarelto 20 mg daily      No Known Allergies      Lab Results (Personally Reviewed)    Chemistry/lipid CBC Cardiac Enzymes/BNP/TSH/INR   Lab Results   Component Value Date    BUN 10.4 10/18/2023     10/18/2023    CO2 30 (H) 10/18/2023     Creatinine   Date Value Ref Range Status   10/18/2023 0.72 0.51 - 0.95 mg/dL Final       Lab Results   Component Value Date    CHOL 115 08/05/2023    HDL 61 08/05/2023    LDL 40 08/05/2023      Lab Results   Component Value Date    WBC 6.5 10/18/2023    HGB 12.6 10/18/2023    HCT 40.1 10/18/2023    MCV 79 10/18/2023     10/18/2023    Lab Results   Component Value Date    TSH 3.24 08/05/2023    INR 1.37 (H) 08/11/2023        The patient states understanding and is agreeable with the plan.   Discussed plan of care with Dr PEG Johnson.     Michelle Kimble PA-C  [facility]  Electrophysiology Consult Service  Pager: [phone]    I spent a total of 20 minutes face to face with Estela Fry during today's office visit. I have spent an additional 20 minutes today on chart review and documentation.       Yes

## 2025-04-14 NOTE — NURSING NOTE
Chief Complaint   Patient presents with    Follow Up     Carol Sosa NP: Return EP d/t AFIB, ICD       Vitals were taken, medications reconciled.     Erik Rosado, Clinic Assistant    2:37 PM     Regular diet   Thin fluids

## 2025-04-21 ENCOUNTER — ANESTHESIA EVENT (OUTPATIENT)
Dept: SURGERY | Facility: CLINIC | Age: 35
End: 2025-04-21
Payer: COMMERCIAL

## 2025-04-21 ASSESSMENT — ENCOUNTER SYMPTOMS: SEIZURES: 0

## 2025-04-21 ASSESSMENT — LIFESTYLE VARIABLES: TOBACCO_USE: 1

## 2025-04-21 NOTE — ANESTHESIA PREPROCEDURE EVALUATION
Anesthesia Pre-Procedure Evaluation    Patient: Estela Fry   MRN: [id number] : 1990        Procedure : Procedure(s):  DILATION AND CURETTAGE, UTERUS, USING SUCTION, TERMINATION OF PREGNANCY  LAPAROSCOPY WITH BILATERAL SALPINGECTOMY          Past Medical History:   Diagnosis Date    Automatic implantable cardioverter-defibrillator in situ     Cardiac arrest (H)     History of atrial flutter     History of venous thromboembolism       Past Surgical History:   Procedure Laterality Date    CV CENTRAL VENOUS CATHETER PLACEMENT N/A 2023    Procedure: Central Venous Catheter Placement;  Surgeon: Sid Liz MD;  Location: [facility] CARDIAC CATH LAB    CV CORONARY ANGIOGRAM N/A 2023    Procedure: Coronary Angiogram;  Surgeon: Sid Liz MD;  Location: [facility] CARDIAC CATH LAB    DILATION AND CURETTAGE SUCTION N/A 2024    Procedure: DILATION AND CURETTAGE, UTERUS, USING SUCTION,;  Surgeon: Shreya Mcdonnell MD;  Location: [facility] OR    EP SICD INSERT N/A 8/15/2023    Procedure: Subcutaneous Implantable Cardioverter Defibrillator Implant;  Surgeon: Eula Johnson MD;  Location: [facility] CARDIAC CATH LAB    INSERT INTRAUTERINE DEVICE N/A 2024    Procedure: placement of Mirena intrauterine device;  Surgeon: Shreya Mcdonnell MD;  Location: [facility] OR      No Known Allergies   Social History     Tobacco Use    Smoking status: Never     Passive exposure: Never    Smokeless tobacco: Never   Substance Use Topics    Alcohol use: Never      Wt Readings from Last 1 Encounters:   25 63 kg (139 lb)        Anesthesia Evaluation   Pt has had prior anesthetic. Type: MAC.    No history of anesthetic complications       ROS/MED HX  ENT/Pulmonary:     (+)                tobacco use (Very remote history.), Past use,                    (-) asthma, sleep apnea and LUIS risk factors   Neurologic:  - neg neurologic ROS   (+)    no peripheral neuropathy                         (-) no seizures, no CVA, no TIA and  migraines   Cardiovascular: Comment: Denies cardiac symptoms including SOB, palpitations, syncope, ALFONSO, orthopnea, or PND.  Intermittent chest pain that does not follow any specific pattern.  Resolves quickly.       (+)  - -   -  - -   Taking blood thinners                ICD Reason placed:VT with cardiac arrest..  type;Definicare            Previous cardiac testing (2/14/24)   Echo: Date: 2/14/24 Results:  Global and regional left ventricular function is normal with an EF of 60-65%.  Mild right ventricular dilation is present.Global right ventricular function  is normal.  IVC diameter <2.1 cm collapsing >50% with sniff suggests a normal RA pressure  of 3 mmHg.  No pericardial effusion is present.    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    Hematologic:     (+) History of blood clots,    pt is anticoagulated, anemia,       (-) history of blood transfusion   Musculoskeletal:  - neg musculoskeletal ROS     GI/Hepatic:  - neg GI/hepatic ROS     Renal/Genitourinary:  - neg Renal ROS     Endo:  - neg endo ROS     Psychiatric/Substance Use:     (+) psychiatric history (Follows with psychology) depression    (-) alcohol abuse history and chronic opioid use history   Infectious Disease:     (+)   MRSA,      (-) HIV and TB   Malignancy:  - neg malignancy ROS     Other:      (+)  LMP: 12/30/23, ,            OUTSIDE LABS:  CBC:   Lab Results   Component Value Date    WBC 6.5 01/21/2025    WBC 10.8 11/05/2024    HGB 13.4 01/21/2025    HGB 14.2 11/05/2024    HCT 41.4 01/21/2025    HCT 43.5 11/05/2024     01/21/2025     11/05/2024     BMP:   Lab Results   Component Value Date     01/21/2025     11/05/2024    POTASSIUM 4.0 01/21/2025    POTASSIUM 3.8 11/05/2024    CHLORIDE 104 01/21/2025    CHLORIDE 104 11/05/2024    CO2 28  01/21/2025    CO2 25 11/05/2024    BUN 10.4 01/21/2025    BUN 10.4 11/05/2024    CR 0.75 01/21/2025    CR 0.69 11/05/2024    GLC 94 01/21/2025    GLC 89 11/05/2024     COAGS:   Lab Results   Component Value Date    INR 1.14 11/05/2024    FIBR 173 08/05/2023     POC:   Lab Results   Component Value Date    HCG Negative 11/05/2024    HCGS Positive (A) 02/05/2024     HEPATIC:   Lab Results   Component Value Date    ALBUMIN 4.9 10/18/2023    PROTTOTAL 8.2 10/18/2023    ALT 23 10/18/2023    AST 19 10/18/2023    ALKPHOS 83 10/18/2023    BILITOTAL 0.5 10/18/2023     OTHER:   Lab Results   Component Value Date    PH 7.46 (H) 08/11/2023    LACT 1.5 08/11/2023    A1C 5.4 08/05/2023    CUATE 9.6 01/21/2025    PHOS 3.4 08/06/2023    MAG 2.4 (H) 08/13/2023    TSH 1.84 07/23/2024       Anesthesia Plan    ASA Status:  3       Anesthesia Type: General.     - Airway: ETT   Induction: Intravenous.   Maintenance: Balanced.   Techniques and Equipment:     - Lines/Monitors: BIS     Consents    Anesthesia Plan(s) and associated risks, benefits, and realistic alternatives discussed. Questions answered and patient/representative(s) expressed understanding.     - Discussed: Risks, Benefits and Alternatives for BOTH SEDATION and the PROCEDURE were discussed     - Discussed with:  Patient      - Extended Intubation/Ventilatory Support Discussed: Yes.      Use of blood products discussed: Yes.     - Discussed with: Patient.     Postoperative Care    Pain management: IV analgesics, Oral pain medications, Multi-modal analgesia.   PONV prophylaxis: Ondansetron (or other 5HT-3), Dexamethasone or Solumedrol, Background Propofol Infusion     Comments:               Shalonda Bingham,     Clinically Significant Risk Factors Present on Admission                                  # ICD device present

## 2025-04-22 ENCOUNTER — ANESTHESIA (OUTPATIENT)
Dept: SURGERY | Facility: CLINIC | Age: 35
End: 2025-04-22
Payer: COMMERCIAL

## 2025-04-22 ENCOUNTER — HOSPITAL ENCOUNTER (OUTPATIENT)
Facility: CLINIC | Age: 35
Discharge: HOME | End: 2025-04-23
Attending: STUDENT IN AN ORGANIZED HEALTH CARE EDUCATION/TRAINING PROGRAM | Admitting: STUDENT IN AN ORGANIZED HEALTH CARE EDUCATION/TRAINING PROGRAM
Payer: COMMERCIAL

## 2025-04-22 DIAGNOSIS — Z95.810 ICD (IMPLANTABLE CARDIOVERTER-DEFIBRILLATOR) IN PLACE: ICD-10-CM

## 2025-04-22 DIAGNOSIS — Z33.2 TERMINATION OF PREGNANCY (FETUS): ICD-10-CM

## 2025-04-22 DIAGNOSIS — Z30.8 ENCOUNTER FOR OTHER CONTRACEPTIVE MANAGEMENT: ICD-10-CM

## 2025-04-22 LAB
ABO + RH BLD: NORMAL
BASOPHILS # BLD AUTO: 0 10E3/UL (ref 0–0.2)
BASOPHILS NFR BLD AUTO: 0 %
BLD GP AB SCN SERPL QL: NEGATIVE
EOSINOPHIL # BLD AUTO: 0.1 10E3/UL (ref 0–0.7)
EOSINOPHIL NFR BLD AUTO: 1 %
ERYTHROCYTE [DISTWIDTH] IN BLOOD BY AUTOMATED COUNT: 13 % (ref 10–15)
HCT VFR BLD AUTO: 38 % (ref 35–47)
HGB BLD-MCNC: 13 G/DL (ref 11.7–15.7)
IMM GRANULOCYTES # BLD: 0 10E3/UL
IMM GRANULOCYTES NFR BLD: 0 %
LYMPHOCYTES # BLD AUTO: 1.3 10E3/UL (ref 0.8–5.3)
LYMPHOCYTES NFR BLD AUTO: 14 %
MCH RBC QN AUTO: 28.1 PG (ref 26.5–33)
MCHC RBC AUTO-ENTMCNC: 34.2 G/DL (ref 31.5–36.5)
MCV RBC AUTO: 82 FL (ref 78–100)
MONOCYTES # BLD AUTO: 0.5 10E3/UL (ref 0–1.3)
MONOCYTES NFR BLD AUTO: 6 %
NEUTROPHILS # BLD AUTO: 7.5 10E3/UL (ref 1.6–8.3)
NEUTROPHILS NFR BLD AUTO: 79 %
NRBC # BLD AUTO: 0 10E3/UL
NRBC BLD AUTO-RTO: 0 /100
PLATELET # BLD AUTO: 214 10E3/UL (ref 150–450)
RBC # BLD AUTO: 4.63 10E6/UL (ref 3.8–5.2)
SPECIMEN EXP DATE BLD: NORMAL
TROPONIN T SERPL HS-MCNC: 8 NG/L
TROPONIN T SERPL HS-MCNC: <6 NG/L
WBC # BLD AUTO: 9.5 10E3/UL (ref 4–11)

## 2025-04-22 PROCEDURE — 710N000012 HC RECOVERY PHASE 2, PER MINUTE: Performed by: STUDENT IN AN ORGANIZED HEALTH CARE EDUCATION/TRAINING PROGRAM

## 2025-04-22 PROCEDURE — 250N000013 HC RX MED GY IP 250 OP 250 PS 637: Performed by: STUDENT IN AN ORGANIZED HEALTH CARE EDUCATION/TRAINING PROGRAM

## 2025-04-22 PROCEDURE — 84484 ASSAY OF TROPONIN QUANT: CPT | Performed by: STUDENT IN AN ORGANIZED HEALTH CARE EDUCATION/TRAINING PROGRAM

## 2025-04-22 PROCEDURE — 36415 COLL VENOUS BLD VENIPUNCTURE: CPT | Performed by: OBSTETRICS & GYNECOLOGY

## 2025-04-22 PROCEDURE — 36415 COLL VENOUS BLD VENIPUNCTURE: CPT | Performed by: STUDENT IN AN ORGANIZED HEALTH CARE EDUCATION/TRAINING PROGRAM

## 2025-04-22 PROCEDURE — 258N000003 HC RX IP 258 OP 636: Performed by: OBSTETRICS & GYNECOLOGY

## 2025-04-22 PROCEDURE — 58661 LAPAROSCOPY REMOVE ADNEXA: CPT | Mod: GC | Performed by: STUDENT IN AN ORGANIZED HEALTH CARE EDUCATION/TRAINING PROGRAM

## 2025-04-22 PROCEDURE — 250N000009 HC RX 250: Performed by: STUDENT IN AN ORGANIZED HEALTH CARE EDUCATION/TRAINING PROGRAM

## 2025-04-22 PROCEDURE — 59840 INDUCED ABORTION D&C: CPT | Mod: GC | Performed by: STUDENT IN AN ORGANIZED HEALTH CARE EDUCATION/TRAINING PROGRAM

## 2025-04-22 PROCEDURE — 250N000025 HC SEVOFLURANE, PER MIN: Performed by: STUDENT IN AN ORGANIZED HEALTH CARE EDUCATION/TRAINING PROGRAM

## 2025-04-22 PROCEDURE — 93005 ELECTROCARDIOGRAM TRACING: CPT

## 2025-04-22 PROCEDURE — 88302 TISSUE EXAM BY PATHOLOGIST: CPT | Mod: 26 | Performed by: PATHOLOGY

## 2025-04-22 PROCEDURE — 250N000011 HC RX IP 250 OP 636

## 2025-04-22 PROCEDURE — 250N000011 HC RX IP 250 OP 636: Performed by: STUDENT IN AN ORGANIZED HEALTH CARE EDUCATION/TRAINING PROGRAM

## 2025-04-22 PROCEDURE — 250N000009 HC RX 250

## 2025-04-22 PROCEDURE — 250N000011 HC RX IP 250 OP 636: Performed by: OBSTETRICS & GYNECOLOGY

## 2025-04-22 PROCEDURE — 250N000013 HC RX MED GY IP 250 OP 250 PS 637: Performed by: OBSTETRICS & GYNECOLOGY

## 2025-04-22 PROCEDURE — 360N000077 HC SURGERY LEVEL 4, PER MIN: Performed by: STUDENT IN AN ORGANIZED HEALTH CARE EDUCATION/TRAINING PROGRAM

## 2025-04-22 PROCEDURE — 88305 TISSUE EXAM BY PATHOLOGIST: CPT | Mod: 26 | Performed by: PATHOLOGY

## 2025-04-22 PROCEDURE — 370N000017 HC ANESTHESIA TECHNICAL FEE, PER MIN: Performed by: STUDENT IN AN ORGANIZED HEALTH CARE EDUCATION/TRAINING PROGRAM

## 2025-04-22 PROCEDURE — 85014 HEMATOCRIT: CPT | Performed by: OBSTETRICS & GYNECOLOGY

## 2025-04-22 PROCEDURE — 88302 TISSUE EXAM BY PATHOLOGIST: CPT | Mod: TC | Performed by: STUDENT IN AN ORGANIZED HEALTH CARE EDUCATION/TRAINING PROGRAM

## 2025-04-22 PROCEDURE — 250N000013 HC RX MED GY IP 250 OP 250 PS 637

## 2025-04-22 PROCEDURE — 258N000003 HC RX IP 258 OP 636: Performed by: STUDENT IN AN ORGANIZED HEALTH CARE EDUCATION/TRAINING PROGRAM

## 2025-04-22 PROCEDURE — 93010 ELECTROCARDIOGRAM REPORT: CPT | Performed by: INTERNAL MEDICINE

## 2025-04-22 PROCEDURE — 258N000003 HC RX IP 258 OP 636

## 2025-04-22 PROCEDURE — 710N000011 HC RECOVERY PHASE 1, LEVEL 3, PER MIN: Performed by: STUDENT IN AN ORGANIZED HEALTH CARE EDUCATION/TRAINING PROGRAM

## 2025-04-22 PROCEDURE — 999N000141 HC STATISTIC PRE-PROCEDURE NURSING ASSESSMENT: Performed by: STUDENT IN AN ORGANIZED HEALTH CARE EDUCATION/TRAINING PROGRAM

## 2025-04-22 PROCEDURE — 86850 RBC ANTIBODY SCREEN: CPT | Performed by: OBSTETRICS & GYNECOLOGY

## 2025-04-22 PROCEDURE — 272N000001 HC OR GENERAL SUPPLY STERILE: Performed by: STUDENT IN AN ORGANIZED HEALTH CARE EDUCATION/TRAINING PROGRAM

## 2025-04-22 RX ORDER — AMOXICILLIN 250 MG
1-2 CAPSULE ORAL 2 TIMES DAILY PRN
Qty: 30 TABLET | Refills: 0 | Status: SHIPPED | OUTPATIENT
Start: 2025-04-22

## 2025-04-22 RX ORDER — LIDOCAINE HYDROCHLORIDE 10 MG/ML
INJECTION, SOLUTION INFILTRATION; PERINEURAL PRN
Status: DISCONTINUED | OUTPATIENT
Start: 2025-04-22 | End: 2025-04-22 | Stop reason: HOSPADM

## 2025-04-22 RX ORDER — ONDANSETRON 4 MG/1
4 TABLET, ORALLY DISINTEGRATING ORAL ONCE
Status: COMPLETED | OUTPATIENT
Start: 2025-04-22 | End: 2025-04-22

## 2025-04-22 RX ORDER — NALOXONE HYDROCHLORIDE 0.4 MG/ML
0.1 INJECTION, SOLUTION INTRAMUSCULAR; INTRAVENOUS; SUBCUTANEOUS
Status: DISCONTINUED | OUTPATIENT
Start: 2025-04-22 | End: 2025-04-22 | Stop reason: HOSPADM

## 2025-04-22 RX ORDER — PROPOFOL 10 MG/ML
INJECTION, EMULSION INTRAVENOUS CONTINUOUS PRN
Status: DISCONTINUED | OUTPATIENT
Start: 2025-04-22 | End: 2025-04-22

## 2025-04-22 RX ORDER — ACETAMINOPHEN 325 MG/1
975 TABLET ORAL ONCE
Status: COMPLETED | OUTPATIENT
Start: 2025-04-22 | End: 2025-04-22

## 2025-04-22 RX ORDER — FENTANYL CITRATE 50 UG/ML
50 INJECTION, SOLUTION INTRAMUSCULAR; INTRAVENOUS EVERY 5 MIN PRN
Status: DISCONTINUED | OUTPATIENT
Start: 2025-04-22 | End: 2025-04-22 | Stop reason: HOSPADM

## 2025-04-22 RX ORDER — PROPOFOL 10 MG/ML
INJECTION, EMULSION INTRAVENOUS PRN
Status: DISCONTINUED | OUTPATIENT
Start: 2025-04-22 | End: 2025-04-22

## 2025-04-22 RX ORDER — ACETAMINOPHEN 325 MG/1
975 TABLET ORAL EVERY 6 HOURS PRN
Qty: 50 TABLET | Refills: 0 | Status: SHIPPED | OUTPATIENT
Start: 2025-04-22

## 2025-04-22 RX ORDER — LIDOCAINE 40 MG/G
CREAM TOPICAL
Status: DISCONTINUED | OUTPATIENT
Start: 2025-04-22 | End: 2025-04-23 | Stop reason: HOSPADM

## 2025-04-22 RX ORDER — OXYCODONE HYDROCHLORIDE 5 MG/1
5 TABLET ORAL EVERY 4 HOURS PRN
Status: DISCONTINUED | OUTPATIENT
Start: 2025-04-22 | End: 2025-04-23 | Stop reason: HOSPADM

## 2025-04-22 RX ORDER — ONDANSETRON 4 MG/1
4 TABLET, ORALLY DISINTEGRATING ORAL EVERY 30 MIN PRN
Status: DISCONTINUED | OUTPATIENT
Start: 2025-04-22 | End: 2025-04-22 | Stop reason: HOSPADM

## 2025-04-22 RX ORDER — NALOXONE HYDROCHLORIDE 0.4 MG/ML
0.2 INJECTION, SOLUTION INTRAMUSCULAR; INTRAVENOUS; SUBCUTANEOUS
Status: DISCONTINUED | OUTPATIENT
Start: 2025-04-22 | End: 2025-04-23 | Stop reason: HOSPADM

## 2025-04-22 RX ORDER — ACETAMINOPHEN 325 MG/1
975 TABLET ORAL EVERY 6 HOURS PRN
Qty: 50 TABLET | Refills: 0 | Status: SHIPPED | OUTPATIENT
Start: 2025-04-22 | End: 2025-04-22

## 2025-04-22 RX ORDER — IBUPROFEN 800 MG/1
800 TABLET, FILM COATED ORAL EVERY 6 HOURS PRN
Qty: 30 TABLET | Refills: 0 | Status: SHIPPED | OUTPATIENT
Start: 2025-04-22 | End: 2025-04-22

## 2025-04-22 RX ORDER — IBUPROFEN 800 MG/1
800 TABLET, FILM COATED ORAL EVERY 6 HOURS PRN
Qty: 30 TABLET | Refills: 0 | Status: SHIPPED | OUTPATIENT
Start: 2025-04-22

## 2025-04-22 RX ORDER — SODIUM CHLORIDE, SODIUM LACTATE, POTASSIUM CHLORIDE, CALCIUM CHLORIDE 600; 310; 30; 20 MG/100ML; MG/100ML; MG/100ML; MG/100ML
INJECTION, SOLUTION INTRAVENOUS CONTINUOUS PRN
Status: DISCONTINUED | OUTPATIENT
Start: 2025-04-22 | End: 2025-04-22

## 2025-04-22 RX ORDER — ONDANSETRON 2 MG/ML
4 INJECTION INTRAMUSCULAR; INTRAVENOUS ONCE
Status: COMPLETED | OUTPATIENT
Start: 2025-04-22 | End: 2025-04-22

## 2025-04-22 RX ORDER — ACETAMINOPHEN 325 MG/1
650 TABLET ORAL EVERY 6 HOURS PRN
Status: DISCONTINUED | OUTPATIENT
Start: 2025-04-22 | End: 2025-04-23 | Stop reason: HOSPADM

## 2025-04-22 RX ORDER — LIDOCAINE HYDROCHLORIDE 20 MG/ML
INJECTION, SOLUTION INFILTRATION; PERINEURAL PRN
Status: DISCONTINUED | OUTPATIENT
Start: 2025-04-22 | End: 2025-04-22

## 2025-04-22 RX ORDER — NALOXONE HYDROCHLORIDE 0.4 MG/ML
0.4 INJECTION, SOLUTION INTRAMUSCULAR; INTRAVENOUS; SUBCUTANEOUS
Status: DISCONTINUED | OUTPATIENT
Start: 2025-04-22 | End: 2025-04-23 | Stop reason: HOSPADM

## 2025-04-22 RX ORDER — DEXAMETHASONE SODIUM PHOSPHATE 4 MG/ML
INJECTION, SOLUTION INTRA-ARTICULAR; INTRALESIONAL; INTRAMUSCULAR; INTRAVENOUS; SOFT TISSUE PRN
Status: DISCONTINUED | OUTPATIENT
Start: 2025-04-22 | End: 2025-04-22

## 2025-04-22 RX ORDER — DOXYCYCLINE 100 MG/10ML
INJECTION, POWDER, LYOPHILIZED, FOR SOLUTION INTRAVENOUS PRN
Status: DISCONTINUED | OUTPATIENT
Start: 2025-04-22 | End: 2025-04-22

## 2025-04-22 RX ORDER — AMOXICILLIN 250 MG
1-2 CAPSULE ORAL 2 TIMES DAILY PRN
Qty: 30 TABLET | Refills: 0 | Status: SHIPPED | OUTPATIENT
Start: 2025-04-22 | End: 2025-04-22

## 2025-04-22 RX ORDER — ONDANSETRON 4 MG/1
4 TABLET, ORALLY DISINTEGRATING ORAL EVERY 8 HOURS PRN
Qty: 4 TABLET | Refills: 0 | Status: SHIPPED | OUTPATIENT
Start: 2025-04-22

## 2025-04-22 RX ORDER — OXYCODONE HYDROCHLORIDE 5 MG/1
10 TABLET ORAL
Status: DISCONTINUED | OUTPATIENT
Start: 2025-04-22 | End: 2025-04-22 | Stop reason: HOSPADM

## 2025-04-22 RX ORDER — SOTALOL HYDROCHLORIDE 80 MG/1
80 TABLET ORAL 2 TIMES DAILY
Status: DISCONTINUED | OUTPATIENT
Start: 2025-04-22 | End: 2025-04-23 | Stop reason: HOSPADM

## 2025-04-22 RX ORDER — OXYCODONE HYDROCHLORIDE 5 MG/1
5 TABLET ORAL EVERY 6 HOURS PRN
Qty: 6 TABLET | Refills: 0 | Status: SHIPPED | OUTPATIENT
Start: 2025-04-22 | End: 2025-04-22

## 2025-04-22 RX ORDER — OXYCODONE HYDROCHLORIDE 5 MG/1
5 TABLET ORAL
Status: DISCONTINUED | OUTPATIENT
Start: 2025-04-22 | End: 2025-04-22 | Stop reason: HOSPADM

## 2025-04-22 RX ORDER — DEXAMETHASONE SODIUM PHOSPHATE 4 MG/ML
4 INJECTION, SOLUTION INTRA-ARTICULAR; INTRALESIONAL; INTRAMUSCULAR; INTRAVENOUS; SOFT TISSUE
Status: DISCONTINUED | OUTPATIENT
Start: 2025-04-22 | End: 2025-04-22 | Stop reason: HOSPADM

## 2025-04-22 RX ORDER — SODIUM CHLORIDE, SODIUM LACTATE, POTASSIUM CHLORIDE, CALCIUM CHLORIDE 600; 310; 30; 20 MG/100ML; MG/100ML; MG/100ML; MG/100ML
INJECTION, SOLUTION INTRAVENOUS CONTINUOUS
Status: DISCONTINUED | OUTPATIENT
Start: 2025-04-22 | End: 2025-04-22 | Stop reason: HOSPADM

## 2025-04-22 RX ORDER — BUPIVACAINE HYDROCHLORIDE 2.5 MG/ML
INJECTION, SOLUTION INFILTRATION; PERINEURAL PRN
Status: DISCONTINUED | OUTPATIENT
Start: 2025-04-22 | End: 2025-04-22 | Stop reason: HOSPADM

## 2025-04-22 RX ORDER — FENTANYL CITRATE 50 UG/ML
25 INJECTION, SOLUTION INTRAMUSCULAR; INTRAVENOUS EVERY 5 MIN PRN
Status: DISCONTINUED | OUTPATIENT
Start: 2025-04-22 | End: 2025-04-22 | Stop reason: HOSPADM

## 2025-04-22 RX ORDER — HYDROMORPHONE HYDROCHLORIDE 1 MG/ML
0.4 INJECTION, SOLUTION INTRAMUSCULAR; INTRAVENOUS; SUBCUTANEOUS EVERY 5 MIN PRN
Status: DISCONTINUED | OUTPATIENT
Start: 2025-04-22 | End: 2025-04-22 | Stop reason: HOSPADM

## 2025-04-22 RX ORDER — ONDANSETRON 2 MG/ML
4 INJECTION INTRAMUSCULAR; INTRAVENOUS EVERY 30 MIN PRN
Status: DISCONTINUED | OUTPATIENT
Start: 2025-04-22 | End: 2025-04-22 | Stop reason: HOSPADM

## 2025-04-22 RX ORDER — OXYCODONE HYDROCHLORIDE 5 MG/1
5 TABLET ORAL EVERY 6 HOURS PRN
Qty: 6 TABLET | Refills: 0 | Status: SHIPPED | OUTPATIENT
Start: 2025-04-22

## 2025-04-22 RX ORDER — LABETALOL HYDROCHLORIDE 5 MG/ML
10 INJECTION, SOLUTION INTRAVENOUS
Status: DISCONTINUED | OUTPATIENT
Start: 2025-04-22 | End: 2025-04-22 | Stop reason: HOSPADM

## 2025-04-22 RX ORDER — OXYCODONE HYDROCHLORIDE 5 MG/1
10 TABLET ORAL EVERY 4 HOURS PRN
Status: DISCONTINUED | OUTPATIENT
Start: 2025-04-22 | End: 2025-04-23 | Stop reason: HOSPADM

## 2025-04-22 RX ORDER — ACETAMINOPHEN 325 MG/1
975 TABLET ORAL ONCE
Status: DISCONTINUED | OUTPATIENT
Start: 2025-04-22 | End: 2025-04-22 | Stop reason: HOSPADM

## 2025-04-22 RX ORDER — ONDANSETRON 2 MG/ML
INJECTION INTRAMUSCULAR; INTRAVENOUS PRN
Status: DISCONTINUED | OUTPATIENT
Start: 2025-04-22 | End: 2025-04-22

## 2025-04-22 RX ORDER — ONDANSETRON 4 MG/1
4 TABLET, ORALLY DISINTEGRATING ORAL EVERY 8 HOURS PRN
Qty: 4 TABLET | Refills: 0 | Status: SHIPPED | OUTPATIENT
Start: 2025-04-22 | End: 2025-04-22

## 2025-04-22 RX ORDER — OXYCODONE HYDROCHLORIDE 5 MG/1
5 TABLET ORAL
Status: COMPLETED | OUTPATIENT
Start: 2025-04-22 | End: 2025-04-22

## 2025-04-22 RX ORDER — HYDROMORPHONE HYDROCHLORIDE 1 MG/ML
0.2 INJECTION, SOLUTION INTRAMUSCULAR; INTRAVENOUS; SUBCUTANEOUS EVERY 5 MIN PRN
Status: DISCONTINUED | OUTPATIENT
Start: 2025-04-22 | End: 2025-04-22 | Stop reason: HOSPADM

## 2025-04-22 RX ORDER — FENTANYL CITRATE 50 UG/ML
INJECTION, SOLUTION INTRAMUSCULAR; INTRAVENOUS PRN
Status: DISCONTINUED | OUTPATIENT
Start: 2025-04-22 | End: 2025-04-22

## 2025-04-22 RX ORDER — LIDOCAINE 40 MG/G
CREAM TOPICAL
Status: DISCONTINUED | OUTPATIENT
Start: 2025-04-22 | End: 2025-04-22 | Stop reason: HOSPADM

## 2025-04-22 RX ADMIN — PROPOFOL 20 MCG/KG/MIN: 10 INJECTION, EMULSION INTRAVENOUS at 10:48

## 2025-04-22 RX ADMIN — SODIUM CHLORIDE, SODIUM LACTATE, POTASSIUM CHLORIDE, CALCIUM CHLORIDE: 600; 310; 30; 20 INJECTION, SOLUTION INTRAVENOUS at 10:35

## 2025-04-22 RX ADMIN — ONDANSETRON 4 MG: 2 INJECTION INTRAMUSCULAR; INTRAVENOUS at 11:45

## 2025-04-22 RX ADMIN — MIDAZOLAM 1 MG: 1 INJECTION INTRAMUSCULAR; INTRAVENOUS at 10:25

## 2025-04-22 RX ADMIN — DEXMEDETOMIDINE HYDROCHLORIDE 8 MCG: 100 INJECTION, SOLUTION INTRAVENOUS at 11:24

## 2025-04-22 RX ADMIN — ONDANSETRON 4 MG: 4 TABLET, ORALLY DISINTEGRATING ORAL at 08:18

## 2025-04-22 RX ADMIN — ACETAMINOPHEN 650 MG: 325 TABLET ORAL at 19:15

## 2025-04-22 RX ADMIN — PHENYLEPHRINE HYDROCHLORIDE 100 MCG: 10 INJECTION INTRAVENOUS at 11:08

## 2025-04-22 RX ADMIN — LIDOCAINE HYDROCHLORIDE 60 MG: 20 INJECTION, SOLUTION INFILTRATION; PERINEURAL at 10:25

## 2025-04-22 RX ADMIN — DOXYCYCLINE 200 MG: 100 INJECTION, POWDER, LYOPHILIZED, FOR SOLUTION INTRAVENOUS at 10:21

## 2025-04-22 RX ADMIN — HYDROMORPHONE HYDROCHLORIDE 0.5 MG: 1 INJECTION, SOLUTION INTRAMUSCULAR; INTRAVENOUS; SUBCUTANEOUS at 11:49

## 2025-04-22 RX ADMIN — DEXAMETHASONE SODIUM PHOSPHATE 4 MG: 4 INJECTION, SOLUTION INTRAMUSCULAR; INTRAVENOUS at 10:35

## 2025-04-22 RX ADMIN — SODIUM CHLORIDE, SODIUM LACTATE, POTASSIUM CHLORIDE, AND CALCIUM CHLORIDE: .6; .31; .03; .02 INJECTION, SOLUTION INTRAVENOUS at 08:37

## 2025-04-22 RX ADMIN — PROPOFOL 150 MG: 10 INJECTION, EMULSION INTRAVENOUS at 10:26

## 2025-04-22 RX ADMIN — FENTANYL CITRATE 50 MCG: 50 INJECTION INTRAMUSCULAR; INTRAVENOUS at 10:25

## 2025-04-22 RX ADMIN — FENTANYL CITRATE 25 MCG: 50 INJECTION INTRAMUSCULAR; INTRAVENOUS at 11:23

## 2025-04-22 RX ADMIN — PROCHLORPERAZINE EDISYLATE 5 MG: 5 INJECTION INTRAMUSCULAR; INTRAVENOUS at 14:11

## 2025-04-22 RX ADMIN — DOXYCYCLINE 200 MG: 100 INJECTION, POWDER, LYOPHILIZED, FOR SOLUTION INTRAVENOUS at 08:40

## 2025-04-22 RX ADMIN — Medication 20 MG: at 11:23

## 2025-04-22 RX ADMIN — PHENYLEPHRINE HYDROCHLORIDE 100 MCG: 10 INJECTION INTRAVENOUS at 10:38

## 2025-04-22 RX ADMIN — SODIUM CHLORIDE, SODIUM LACTATE, POTASSIUM CHLORIDE, AND CALCIUM CHLORIDE: .6; .31; .03; .02 INJECTION, SOLUTION INTRAVENOUS at 10:23

## 2025-04-22 RX ADMIN — SUGAMMADEX 200 MG: 100 INJECTION, SOLUTION INTRAVENOUS at 11:50

## 2025-04-22 RX ADMIN — ACETAMINOPHEN 975 MG: 325 TABLET ORAL at 08:13

## 2025-04-22 RX ADMIN — IBUPROFEN 800 MG: 400 TABLET, FILM COATED ORAL at 13:51

## 2025-04-22 RX ADMIN — PROPOFOL 10 MG: 10 INJECTION, EMULSION INTRAVENOUS at 11:53

## 2025-04-22 RX ADMIN — PHENYLEPHRINE HYDROCHLORIDE 0.5 MCG/KG/MIN: 10 INJECTION INTRAVENOUS at 11:14

## 2025-04-22 RX ADMIN — FENTANYL CITRATE 25 MCG: 50 INJECTION INTRAMUSCULAR; INTRAVENOUS at 11:01

## 2025-04-22 RX ADMIN — OXYCODONE 5 MG: 5 TABLET ORAL at 13:51

## 2025-04-22 RX ADMIN — SOTALOL HYDROCHLORIDE 80 MG: 80 TABLET ORAL at 22:15

## 2025-04-22 ASSESSMENT — ACTIVITIES OF DAILY LIVING (ADL)
ADLS_ACUITY_SCORE: 56
ADLS_ACUITY_SCORE: 54
ADLS_ACUITY_SCORE: 56
ADLS_ACUITY_SCORE: 61
ADLS_ACUITY_SCORE: 56
ADLS_ACUITY_SCORE: 54
ADLS_ACUITY_SCORE: 54
ADLS_ACUITY_SCORE: 56
ADLS_ACUITY_SCORE: 54
ADLS_ACUITY_SCORE: 56
ADLS_ACUITY_SCORE: 56
ADLS_ACUITY_SCORE: 54

## 2025-04-22 NOTE — DISCHARGE SUMMARY
Gynecology Discharge Summary    Estela Fry    : 1990  MRN: [id number]            Date of Admission:  2025  Date of Discharge:  25  Admitting Physician:  Caprice Giron MD  Discharge Physician:  Dorota Weston MD  Discharging Service:  Gynecology     Primary Provider: Argelia Gonzalez          Admission Diagnoses:   Satisfied parity, termination of pregnancy  Chest pressure  H/o cardiac arrest s/p ICD  Atrial flutter  H/o DVT          Discharge Diagnosis:   Same, s/p listed procedure             Procedures:   Suction dilation and curettage  Laparoscopic bilateral salpingectomy          Medications Prior to Admission:     Medications Prior to Admission   Medication Sig Dispense Refill Last Dose/Taking    chlorhexidine (HIBICLENS) 4 % solution Apply topically daily as needed for wound care. 118 mL 0 2025 Morning    enoxaparin ANTICOAGULANT (LOVENOX) 40 MG/0.4ML syringe Inject 0.4 mLs (40 mg) subcutaneously every 12 hours. 24 mL 0 Past Week    Polysaccharide-Iron Complex 150 MG CAPS Take 1 tablet by mouth daily. 90 capsule 1 2025 Evening    rivaroxaban ANTICOAGULANT (XARELTO) 20 MG TABS tablet Take 1 tablet (20 mg) by mouth daily (with dinner). 90 tablet 3 More than a month    sotalol (BETAPACE) 80 MG tablet Take 1 tablet (80 mg) by mouth 2 times daily. 180 tablet 3 2025 at  8:30 AM    [DISCONTINUED] levonorgestrel (MIRENA) 52 MG (20 mcg/day) IUD 1 each by Intrauterine route once   Unknown    [DISCONTINUED] misoprostol (CYTOTEC) 200 MCG tablet Take 2 hours before procedure. Place 1 tab between cheek and gum on the right, and 2nd tab on the left. Dissolve for 30 min, then swallow. 2 tablet 0 2025 at  8:18 AM             Discharge Medications:     Current Discharge Medication List        START taking these medications    Details   acetaminophen (TYLENOL) 325 MG tablet Take 3 tablets (975 mg) by mouth every 6 hours as needed for mild pain.  Qty: 50 tablet, Refills: 0     Associated Diagnoses: Termination of pregnancy (fetus)      ibuprofen (ADVIL/MOTRIN) 800 MG tablet Take 1 tablet (800 mg) by mouth every 6 hours as needed for other (mild and/or inflammatory pain).  Qty: 30 tablet, Refills: 0    Associated Diagnoses: Termination of pregnancy (fetus)      ondansetron (ZOFRAN ODT) 4 MG ODT tab Take 1 tablet (4 mg) by mouth every 8 hours as needed for nausea.  Qty: 4 tablet, Refills: 0    Associated Diagnoses: Termination of pregnancy (fetus)      oxyCODONE (ROXICODONE) 5 MG tablet Take 1 tablet (5 mg) by mouth every 6 hours as needed for moderate to severe pain.  Qty: 6 tablet, Refills: 0    Associated Diagnoses: Termination of pregnancy (fetus)      senna-docusate (SENOKOT-S/PERICOLACE) 8.6-50 MG tablet Take 1-2 tablets by mouth 2 times daily as needed for constipation (While on oral opioids.).  Qty: 30 tablet, Refills: 0    Associated Diagnoses: Termination of pregnancy (fetus)           CONTINUE these medications which have NOT CHANGED    Details   chlorhexidine (HIBICLENS) 4 % solution Apply topically daily as needed for wound care.  Qty: 118 mL, Refills: 0    Associated Diagnoses: Elective surgery      enoxaparin ANTICOAGULANT (LOVENOX) 40 MG/0.4ML syringe Inject 0.4 mLs (40 mg) subcutaneously every 12 hours.  Qty: 24 mL, Refills: 0    Associated Diagnoses: IVC thrombosis (H)      Polysaccharide-Iron Complex 150 MG CAPS Take 1 tablet by mouth daily.  Qty: 90 capsule, Refills: 1    Associated Diagnoses: Chronic anticoagulation; IVC thrombosis (H)      rivaroxaban ANTICOAGULANT (XARELTO) 20 MG TABS tablet Take 1 tablet (20 mg) by mouth daily (with dinner).  Qty: 90 tablet, Refills: 3    Associated Diagnoses: Internal jugular (IJ) vein thromboembolism, chronic, left (H)      sotalol (BETAPACE) 80 MG tablet Take 1 tablet (80 mg) by mouth 2 times daily.  Qty: 180 tablet, Refills: 3    Associated Diagnoses: ICD (implantable cardioverter-defibrillator) in place           STOP taking  these medications       levonorgestrel (MIRENA) 52 MG (20 mcg/day) IUD Comments:   Reason for Stopping:         misoprostol (CYTOTEC) 200 MCG tablet Comments:   Reason for Stopping:                     Consultations:   none            Brief History of Illness:   Estela Fry is a 35 year old  at 8w5d with past medical history notable for  idiopathic cardiac arrest s/p ICD (2023) and h/o IVC thrombosis. She had previously been using a mirena IUD for contraception but this was expelled. She was strongly recommended to terminate this pregnancy due to her high risk co-morbidities and recommendation was made for surgical management with suction dilation and curettage. In addition, recommendation was made for permanent sterilization with laparoscopic bilateral salpingectomy given that she has expelled 3 IUDs.           Hospital Course:     Her intraoperative course was uncomplicated. EBL 10. Please see operative note for details.     Findings: Exam under anesthesia revealed normal external female genitalia. Uterus ~8w sized, mobile, retroverted.Normal appearing multiparous cervix. No adnexal masses noted on bimanual exam. Dilated to 29 Latvian with sequential dilators. Products of conception returned on multiple passes. Uterine wall gritty on all aspects. Tenaculum sites hemostatic at end of case.      On laparoscopy,  no trauma noted upon entry of the abdomen. Normal appearing bilateral fallopian tubes, ovaries, uterus. Normal appearing liver edge, gallbladder, stomach, bowels. Hemostatic surgical sites bilaterally. No intraabdominal adhesions noted.    Her postoperative course was notable for a sensation of chest pressure. EKG was obtained and was normal per anesthesiologist review; troponin was trended and was overall stable. Her symptoms improved overnight. Her Xarelto was resumed POD#1. She was deemed appropriate for discharge on POD#1. She was afebrile, her pain was well controlled on PO meds, she was  tolerating regular diet with no nausea or vomiting, ambulating and voiding without difficulty, passing flatus. Her vitals were within normal limits. Please see progress note on the day of discharge for further details of exam and findings. She will restart her xarelto at time of discharge.             Discharge Instructions and Follow-Up:     Discharge diet: Regular   Discharge activity: No heavy lifting, pushing, pulling for 6 weeks; nothing in the vagina for 2 weeks.   Discharge follow-up: Follow up for a postoperative visit in about 6 weeks and with Cardiology on 5/6/25 as scheduled.       Alice Krishnan MD   Obstetrics, Gynecology & Women's Health   Resident, PGY-1  04/23/2025 7:06 AM    [facility] staff:  Appreciate note by Dr. Krishnan.  I have seen and examined the patient without the resident. I have reviewed, edited, and agree with the note.        Dorota Weston MD, FACOG  4/23/2025  9:34 AM

## 2025-04-22 NOTE — PROVIDER NOTIFICATION
"Dr. Giron and team paged \"are you ok with pt restarting Xarelto today?\" Thx.  Per Dr. Krishnan \"pt ok to resume Xarelto today if bleeding is not significant.\"  "

## 2025-04-22 NOTE — DISCHARGE INSTRUCTIONS
To contact a doctor, call Dr CHARY Giron's office at [phone] or:     [phone] and ask for the Resident On Call for:          Gynecology (answered 24 hours a day)   Emergency Departments:  [facility] Department: [phone]     [facility] Department: [phone]

## 2025-04-22 NOTE — ANESTHESIA PROCEDURE NOTES
Airway       Patient location during procedure: OR       Procedure Start/Stop Times: 4/22/2025 10:32 AM  Staff -        Anesthesiologist:  Shalonda Bingham DO       Resident/Fellow: Estefania Richards MD       Performed By: with residents, resident and anesthesiologist       Procedure performed by resident/fellow/CRNA in presence of a teaching physician.    Consent for Airway        Urgency: elective  Indications and Patient Condition       Indications for airway management: darryl-procedural       Induction type:intravenous       Mask difficulty assessment: 1 - vent by mask    Final Airway Details       Final airway type: endotracheal airway       Successful airway: ETT - single and Oral  Endotracheal Airway Details        ETT size (mm): 7.0       Cuffed: yes       Successful intubation technique: video laryngoscopy       VL Blade Size: MAC 3       Grade View of Cords: 1       Adjucts: stylet       Position: Right       Measured from: lips       Secured at (cm): 21       Bite block used: None    Post intubation assessment        Placement verified by: capnometry, equal breath sounds and chest rise        Number of attempts at approach: 1       Number of other approaches attempted: 0       Secured with: pink tape       Ease of procedure: easy       Dentition: Intact and Unchanged    Medication(s) Administered   Medication Administration Time: 4/22/2025 10:32 AM    Additional Comments       VL used for MS3 learner-easy mask, easy airway, Grade 1 view

## 2025-04-22 NOTE — ANESTHESIA POSTPROCEDURE EVALUATION
"Patient: Estela Fry    Procedure: Procedure(s):  DILATION AND CURETTAGE, UTERUS, USING SUCTION, TERMINATION OF PREGNANCY  LAPAROSCOPY WITH BILATERAL SALPINGECTOMY       Anesthesia Type:  General    Note:  Disposition: Outpatient   Postop Pain Control: Uneventful            Sign Out: Well controlled pain   PONV: No   Neuro/Psych: Uneventful            Sign Out: Acceptable/Baseline neuro status   Airway/Respiratory: Uneventful            Sign Out: Acceptable/Baseline resp. status   CV/Hemodynamics: Uneventful            Sign Out: Acceptable CV status; No obvious hypovolemia; No obvious fluid overload   Other NRE: NONE   DID A NON-ROUTINE EVENT OCCUR? YES    Event details/Postop Comments:    Complained of chest \"pressure\" while in Phase 2, reported that she felt that sensation since waking up from anesthesia, but did not report it before.   I found patient seated up, hemodynamically stable, maintaining good saturation on room air. She reports a sensation of pressure on the  upper part of her chest, bilaterally, that gets worse at the end of a deep inspiration. There is no radiation of this sensation. Denies davey pain. Describes dull sensation of pressure. Physical exam with bilateral clear lungs sounds, and SpO2 ~96% on room air. Ordered EKG, Troponin STAT, and notified primary team.   If results are normal, plan will be to discharge patient home with incentive spirometry and recommendations to seek medical attention if symptoms worsen.            Last vitals:  Vitals Value Taken Time   BP 90/66 04/22/25 1230   Temp 36.5  C (97.7  F) 04/22/25 1215   Pulse 64 04/22/25 1245   Resp 15 04/22/25 1245   SpO2 100 % 04/22/25 1245   Vitals shown include unfiled device data.    Electronically Signed By: Shalonda Bingham DO  April 22, 2025  12:45 PM  "

## 2025-04-22 NOTE — OR NURSING
"Pt states that she \"is it normal to have chest pressure? I have had it ever since I came out of the operating room.\" Pt denies chest pain, but states that it is pressure. RN notified Dr Luke Lora right away and placed pt back on monitors as RN was about to discharge pt to home. Stat EKG ordered. Pts vital signs otherwise stable and pt denies any other symptoms at this time. Pt is alert and oriented and pt is dressed sitting upright in chair.     1715 EKG performed and Dr Lora is at bedside to assess. EKG appears normal per Dr Lora, Dr Lora states to educate pt on incentive spirometer and to seek medical care if chest pressure persists or worsens, but that pt is ok to be discharged to home. Dr Lora talks to pt about laparoscopic procedures and sensations to expect after having gas in abdomen. Pt has no additional questions at this time.     1725 Obgyn notified and ordered received for additional lab draws before pt may be discharged.     1755 Pt's  at bedside.     1800 Report and care transferred to Tammie Dickerson RN.       "

## 2025-04-22 NOTE — OR NURSING
Pt awake and doing well. Pt tolerating sips of water without nausea. Lap sites are all c/d/I and vaginal bleeding is WNL. Will look to transfer to phase 2 shortly.

## 2025-04-22 NOTE — OP NOTE
[facility]  Operative Note    Date of service: 2025    Name: Estela Fry   MRN: [id number]   : 1990     Surgeon:  - Caprice Giron MD    Assistant:  - Madison Manning MD, PGY-4  - Alice Krishnan MD, PGY-2  - Lita Posada, MS3    Pre-operative diagnosis:  - IUP at 8w5d, desires termination d/t complex medical history  - Satisfied parity, FTP 10/29/2024    Post-operative diagnosis:  - Same    Procedure:  - Suction dilation and curettage  - Laparoscopic bilateral salpingectomy    Anesthesia: GETA  Fluids: 700 mL  EBL: 10 mL  UOP: 30 mL clear urine  Antibiotics: 200 mg IV Doxycycline  Complications: None    Specimen:  Products of conception  Bilateral salpinges (right tube tagged)    Findings:  Exam under anesthesia revealed normal external female genitalia. Uterus ~8w sized, mobile, retroverted.Normal appearing multiparous cervix. No adnexal masses noted on bimanual exam. Dilated to 29 Estonian with sequential dilators. Products of conception returned on multiple passes. Uterine wall gritty on all aspects. Tenaculum sites hemostatic at end of case.     On laparoscopy,  no trauma noted upon entry of the abdomen. Normal appearing bilateral fallopian tubes, ovaries, uterus. Normal appearing liver edge, gallbladder, stomach, bowels. Hemostatic surgical sites bilaterally. No intraabdominal adhesions noted.    Indication:   Estela Fry is a 35 year old  at 8w5d with past medical history notable for  idiopathic cardiac arrest s/p ICD (2023) and h/o IVC thrombosis. She had previously been using a mirena IUD for contraception but this was expelled. She was strongly recommended to terminate this pregnancy due to her high risk co-morbidities and recommendation was made for surgical management with suction dilation and curettage. In addition, recommendation was made for permanent sterilization with laparoscopic bilateral salpingectomy given that she has expelled 3 IUDs. The risks, benefits, and  alternatives of procedure were discussed. Informed consent was signed prior to the procedure, including consent for blood transfusion if indicated.    Procedure:  Patient was taken to the OR where she underwent MAC anesthesia without difficulty. She was then positioned in the dorsal lithotomy position using yellow-fin stirrups. She was prepped and draped in the normal sterile fashion. A surgical time out was performed. A sterile speculum was placed in the vagina and the cervix was visualized. A single toothed tenaculum was applied. The 4 and 8 o'clock positions of the cervical vaginal junction were then injected with the same 1% lidocaine on each side for a total of 10 mL of 1% plain lidocaine. The cervix was serially dilated with Alvarez dilators to 29 Bangladeshi. A size 10mm rigid suction tip was then placed into the cervical canal without difficulty. Multiple passes were made with good return of tissue noted. Suction was continued until minimal return of tissue was noted and all aspects of endometrium were noted to be gritty. The suction currette was removed. Tenaculum was removed and tenaculum sites were hemostatic with pressure. All instruments were removed from the vagina. The bladder was drained with a straight catheter.     Attention was then turned to the abdomen. An 11-blade scalpel was used to make a stab incision in the umbilicus. The Veress needle was placed in the umbilicus and intraabdominal placement was confirmed with the saline drop test. Abdomen was insufflated with CO2 gas, starting pressure < 6mmHg. Abdomen was insufflated to a maximum pressure of 15mm Hg. A 5 mm port was placed using 5mm scope guidance. Survey of the abdomen did not reveal any trauma. See above listed findngs. Attention was turned to the LLQ of the abdomen where a site 2cm superior and medial to the anterior superior iliac crest was noted to be free of major blood vessels and a 5 mm horizontal skin incision made after infiltration  with 0.25% marcaine. A 5 mm port was placed under visualization within the abdomen. A 5 mm port was placed in the RLQ of the abdomen with similar technique under visualization. The left fimbria was grasped with an atraumatic grasper and lifted into view. The Ligasure was used to sequentially grasp, cauterize, and cut through the mesosalpinx up to the left cornua, and the entire tube was then transected and excised. The right fallopian tube was excised in a similar fashion. Both tubes were passed through the right port and handed off for pathology. Mesosalpinx was examined bilaterally and noted to be hemostatic A final visual sweep of the pelvis showed no further pathology. The ports were removed and the pneumoperitoneum was expelled. The skin incisions was closed using 4-0 Monocryl and dermabond.      Instrument, sponge, and needle counts were correct times 2. I was present and scrubbed for the entire procedure. The patient was extubated in the operating room and transferred to the PACU in stable condition.    Caprice Giron MD  [facility], Ob/Gyn  04/22/2025 12:00 PM

## 2025-04-22 NOTE — PROVIDER NOTIFICATION
"-1735 Dr. Giron and team paged \"Patient has been in phase 2 recovery since about 2 o'clock. Patient is much better for pain and nausea! Patient just voiced had been feeling heavy chest pressure on both sides of a chest over the past few hours, but never had voiced this to the staff. Dr Luke Lora with anesthesia came by to assessed patient with a 12 lead EKG, everything looks normal. We educated the patient on incentive spirometer and we encourage the patient to return to the emergency room if chest pain persists or worsens.\"; Per Dr. Giron, would like a trop drawn and if normal pt can discharge.    -1823 I have drawn two separate labs of the troponins for accuracy. They are being evaluated in lab currently I will text you with the results. Dr. Luke Lora explained to me that typical protocol if we are dryiing troponins a second one should be drawn within two hours to assess trends because it could not show up on the first lab drawthat being said she recommends that the patient stay overnight observation just for monitoring and go home in the morning. I can contact the resident on call for orders if you'd like me to.    -1833 Dr. Elder bedside to discuss with pt and conversation with Dr. Luke Lora's recommendation.      -1839 Dr. Elder admitting to observation; pt can discharge tomorrow  "

## 2025-04-22 NOTE — ANESTHESIA CARE TRANSFER NOTE
Patient: Estela Fry    Procedure: Procedure(s):  DILATION AND CURETTAGE, UTERUS, USING SUCTION, TERMINATION OF PREGNANCY  LAPAROSCOPY WITH BILATERAL SALPINGECTOMY       Diagnosis: Termination of pregnancy (fetus) [Z33.2]  ICD (implantable cardioverter-defibrillator) in place [Z95.810]  Diagnosis Additional Information: No value filed.    Anesthesia Type:   General     Note:    Oropharynx: oral airway in place and spontaneously breathing  Level of Consciousness: drowsy  Oxygen Supplementation: face mask  Level of Supplemental Oxygen (L/min / FiO2): 6  Independent Airway: airway patency satisfactory and stable  Dentition: dentition unchanged  Vital Signs Stable: post-procedure vital signs reviewed and stable  Report to RN Given: handoff report given  Patient transferred to: PACU    Handoff Report: Identifed the Patient, Identified the Reponsible Provider, Reviewed the pertinent medical history, Discussed the surgical course, Reviewed Intra-OP anesthesia mangement and issues during anesthesia, Set expectations for post-procedure period and Allowed opportunity for questions and acknowledgement of understanding      Vitals:  Vitals Value Taken Time   BP 92/56 04/22/25 1215   Temp 36.5  C (97.7  F) 04/22/25 1215   Pulse 63 04/22/25 1228   Resp 15 04/22/25 1228   SpO2 100 % 04/22/25 1228   Vitals shown include unfiled device data.    Electronically Signed By: Estefania Richards MD  April 22, 2025  12:28 PM

## 2025-04-23 ENCOUNTER — TELEPHONE (OUTPATIENT)
Dept: HEMATOLOGY | Facility: CLINIC | Age: 35
End: 2025-04-23
Payer: COMMERCIAL

## 2025-04-23 VITALS
HEART RATE: 88 BPM | OXYGEN SATURATION: 98 % | RESPIRATION RATE: 16 BRPM | SYSTOLIC BLOOD PRESSURE: 93 MMHG | TEMPERATURE: 98.8 F | WEIGHT: 132.28 LBS | HEIGHT: 62 IN | DIASTOLIC BLOOD PRESSURE: 63 MMHG | BODY MASS INDEX: 24.34 KG/M2

## 2025-04-23 LAB
ATRIAL RATE - MUSE: 66 BPM
DIASTOLIC BLOOD PRESSURE - MUSE: NORMAL MMHG
INTERPRETATION ECG - MUSE: NORMAL
P AXIS - MUSE: 65 DEGREES
PR INTERVAL - MUSE: 150 MS
QRS DURATION - MUSE: 80 MS
QT - MUSE: 406 MS
QTC - MUSE: 425 MS
R AXIS - MUSE: 30 DEGREES
SYSTOLIC BLOOD PRESSURE - MUSE: NORMAL MMHG
T AXIS - MUSE: -21 DEGREES
VENTRICULAR RATE- MUSE: 66 BPM

## 2025-04-23 PROCEDURE — 250N000013 HC RX MED GY IP 250 OP 250 PS 637: Performed by: STUDENT IN AN ORGANIZED HEALTH CARE EDUCATION/TRAINING PROGRAM

## 2025-04-23 RX ADMIN — SOTALOL HYDROCHLORIDE 80 MG: 80 TABLET ORAL at 07:55

## 2025-04-23 ASSESSMENT — ACTIVITIES OF DAILY LIVING (ADL)
ADLS_ACUITY_SCORE: 61
ADLS_ACUITY_SCORE: 44
ADLS_ACUITY_SCORE: 61
ADLS_ACUITY_SCORE: 61
ADLS_ACUITY_SCORE: 44
ADLS_ACUITY_SCORE: 61

## 2025-04-23 NOTE — PLAN OF CARE
Goal Outcome Evaluation:      Patient alert and oreitned x4  Room air  VSS    Pt. discharged at *** via patient transport to home.  Pt. was accompanied by , and left with personal belongings.  Prior to discharge, PIV was removed.  Pt. received complete discharge paperwork and  medications as filled by discharge pharmacy.  Pt. was given times of last dose for all discharge medications in writing on discharge medication sheets.  Discharge teaching included  medication, pain management, activity restrictions, dressing changes, and signs and symptoms of infection.  Pt. to follow up with adult psychotherapy. Pt. had no further questions at the time of discharge and no unmet needs were identified.

## 2025-04-23 NOTE — TELEPHONE ENCOUNTER
Incoming call from Estela who is wondering if she can resume her Xarelto, and if she should continue on iron supplementation. Per chart review, Estela underwent elective  and laparoscopy with bilateral salpingectomy yesterday. Patient was on Xarelto prior to becoming pregnant, then transitioned to Lovenox during pregnancy. I advised she can go back to once daily Xarelto at this time and can continue on iron supplements given her most recent iron levels were low.    Felix MAYER RN  [facility]  Office: [phone]  Clinic: [phone]  Fax: [phone]

## 2025-04-23 NOTE — PROGRESS NOTES
"Gynecology Postoperative Progress Note  4/23/2025    S: Patient reports she is doing well postoperatively. Pain is well controlled with oral medications. Ambulating without difficulty. Voiding spontaneously. Tolerating PO without nausea or vomiting. Passing flatus. Denies chest pain or pressure, shortness of breath, dizziness, or other concerns at this time. Her symptoms from yesterday resolved.     O:  Vitals:    04/22/25 1915 04/22/25 2006 04/22/25 2100 04/22/25 2342   BP: 100/61   98/59   BP Location:    Right arm   Pulse:  71  71   Resp: 16 16  16   Temp:  98  F (36.7  C)  98.2  F (36.8  C)   TempSrc:  Oral  Oral   SpO2: 99%   98%   Weight:   60 kg (132 lb 4.4 oz)    Height:   1.575 m (5' 2.01\")      Gen: NAD  Cardio: Well perfused  Resp: non-labored breathing  Abdomen: Soft, non tender, laparoscopic incision sites c/d/i  Extremities: Non-tender, trace edema      Labs/Imaging:   Latest Reference Range & Units 04/22/25 18:02 04/22/25 18:15 04/22/25 20:56 04/22/25 22:52   Troponin T, High Sensitivity <=14 ng/L <6 <6 8 <6     EKG Preliminary Read- Reviewed by Anesthesiologist in PACU    Sinus rhythm  Nonspecific T wave abnormality  Abnormal ECG  When compared with ECG of 05-Nov-2024 10:57,  T wave inversion now evident in Inferior leads  T wave inversion more evident in Anterior leads     A: 35 year old POD#1 s/p sD&C and lap tubal with past medical history notable for 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 Her postoperative course was notable for a sensation of chest pressure, therefore decision was made to admit for observation. EKG was obtained and normal, troponin was trended and overall stable.     FEN: Advance as tolerated  Pain: Controlled with oral pain medications  Heme: EBL 20, no s/sx of ABLA  CV: EKG normal, troponin trended and normal. No s/sx of cardiac dysfunction, sx resolved  Resp: No concerns  GI: No concerns, tolerating oral intake  : Voiding spontaneously  MSK: No " concerns  Neuro/Psych: No concerns  Endocrine: No concerns  PPx: PTA Xarelto    Dispo: Home today     Alice Krishnna MD   Obstetrics, Gynecology & Women's Health   Resident, PGY-1  04/23/2025 7:03 AM    Gyn Resident Pager   Weekday (6AM-6PM): [phone]  Weeknight (6PM-6AM) and Weekend: [phone]    [facility] staff:  Appreciate note by Dr. Krishnan.  I have seen and examined the patient without the resident. I have reviewed, edited, and agree with the note.    My findings are: pt sitting in bed eating breakfast. Feeling back to normal, aside from incisional pain.  Ready for discharge. F/up with Dr. Giron 2-3 wks    Dorota Weston MD, FACOG  4/23/2025  9:30 AM

## 2025-04-23 NOTE — PLAN OF CARE
"Goal Outcome Evaluation:    2000-0700      Admission Note    Reason for admission: DILATION AND CURETTAGE, UTERUS, USING SUCTION, TERMINATION OF PREGNANCY  LAPAROSCOPY WITH BILATERAL SALPINGECTOMY  Primary team notified of pt arrival.  Admitted from: PACU  Via: Wheelchair  Accompanied by: RN  Belongings: Placed in closet; valuables sent home with family  Admission Required Doc Completed: Yes  Mobility Devices Utilized by Patient Provided (i.e. walker, wheelchair, etc.): Yes  Teaching: Orientation to unit and call light- call light within reach, use of console, meal times, when to call for the RN, and enforced importance of safety.  IV Access: PIV left arm  Telemetry: No  Ht./Wt.: Yes  Code Status verified on armband: Yes  2 RN Skin Assessment Completed with: Zoë Womack/Ambu bag/Flowmeter at bedside:     Pt status:     Temp:  [97.5  F (36.4  C)-98.4  F (36.9  C)] 98  F (36.7  C)  Pulse:  [61-81] 71  Resp:  [12-18] 16  BP: ()/(55-77) 100/61  SpO2:  [93 %-100 %] 99 %      Aox4. Denies shortness of breath or chest pain. Denies nausea. With ICD(08/2023). PIV on the left arm-SL, patent and intact. Voids to the bathroom independently with a walker. Rates pain 1-2/10, writer offered pain meds, patient stated \"I'm okay\", declined pain meds. Call light within reach. Handoff given to next RN.     "

## 2025-04-24 ENCOUNTER — TELEPHONE (OUTPATIENT)
Dept: OBGYN | Facility: CLINIC | Age: 35
End: 2025-04-24
Payer: COMMERCIAL

## 2025-04-29 ENCOUNTER — TELEPHONE (OUTPATIENT)
Dept: OBGYN | Facility: CLINIC | Age: 35
End: 2025-04-29
Payer: COMMERCIAL

## 2025-04-29 NOTE — TELEPHONE ENCOUNTER
"Spoke to patient regarding UTI concerns.     She was seen 4/23 for suction D&C and bilateral lap salpingectomy. Has hx of cardiac arrest s/p ICD, atrial flutter and DVT.     Patient denies frequency but states she pees \"here and there.\" Does not feel like she is emptying fully as she has to return to the bathroom in 20 minutes at times. Denies fever, urinary burning, flank back pain. Still having vaginal spotting.    Patient inquired when she should be seen next- reviewed provider notes and relayed recommended 6 week follow up within clinic and cardiology. Already scheduled 5/6 with cardiology. Assisted patient in scheduling this on 6/5 with Dr. Giron.     Patient also inquired if visit for incision check is needed. States she stopped taking tylenol and ibuprofen yesterday as the pain has subsided. Rates pain 1-2/10 currently. Denies incisional concerns, including fever, redness, drainage, gaping. Reviewed signs/symptoms of infection that would warrant a call to the clinic or visit to urgent care/ED. Encouraged to call or contact clinic with any concerns or questions, as an incision check can be completed within the clinic if needed. Patient voiced understanding and denied further questions/concerns at this time.   "

## 2025-05-06 ENCOUNTER — ANCILLARY PROCEDURE (OUTPATIENT)
Dept: CARDIOLOGY | Facility: CLINIC | Age: 35
End: 2025-05-06
Attending: INTERNAL MEDICINE
Payer: COMMERCIAL

## 2025-05-06 DIAGNOSIS — I46.9 CARDIAC ARREST (H): ICD-10-CM

## 2025-05-06 PROCEDURE — 93296 REM INTERROG EVL PM/IDS: CPT

## 2025-05-07 LAB
MDC_IDC_LEAD_CONNECTION_STATUS: NORMAL
MDC_IDC_LEAD_IMPLANT_DT: NORMAL
MDC_IDC_LEAD_LOCATION: NORMAL
MDC_IDC_LEAD_LOCATION_DETAIL_1: NORMAL
MDC_IDC_LEAD_MFG: NORMAL
MDC_IDC_LEAD_MODEL: NORMAL
MDC_IDC_LEAD_POLARITY_TYPE: NORMAL
MDC_IDC_LEAD_SERIAL: NORMAL
MDC_IDC_MSMT_BATTERY_DTM: NORMAL
MDC_IDC_MSMT_BATTERY_REMAINING_PERCENTAGE: 79 %
MDC_IDC_MSMT_BATTERY_STATUS: NORMAL
MDC_IDC_PG_IMPLANT_DTM: NORMAL
MDC_IDC_PG_MFG: NORMAL
MDC_IDC_PG_MODEL: NORMAL
MDC_IDC_PG_SERIAL: NORMAL
MDC_IDC_PG_TYPE: NORMAL
MDC_IDC_SESS_CLINIC_NAME: NORMAL
MDC_IDC_SESS_DTM: NORMAL
MDC_IDC_SESS_TYPE: NORMAL
MDC_IDC_SET_ZONE_DETECTION_INTERVAL: 250 MS
MDC_IDC_SET_ZONE_DETECTION_INTERVAL: 300 MS
MDC_IDC_SET_ZONE_STATUS: NORMAL
MDC_IDC_SET_ZONE_STATUS: NORMAL
MDC_IDC_SET_ZONE_TYPE: NORMAL
MDC_IDC_SET_ZONE_TYPE: NORMAL
MDC_IDC_SET_ZONE_VENDOR_TYPE: NORMAL
MDC_IDC_SET_ZONE_VENDOR_TYPE: NORMAL
MDC_IDC_STAT_EPISODE_RECENT_COUNT: 0
MDC_IDC_STAT_EPISODE_RECENT_COUNT_DTM_END: NORMAL
MDC_IDC_STAT_EPISODE_RECENT_COUNT_DTM_START: NORMAL
MDC_IDC_STAT_EPISODE_TOTAL_COUNT: 0
MDC_IDC_STAT_EPISODE_TOTAL_COUNT_DTM_END: NORMAL
MDC_IDC_STAT_EPISODE_TOTAL_COUNT_DTM_START: NORMAL
MDC_IDC_STAT_EPISODE_TYPE: NORMAL
MDC_IDC_STAT_EPISODE_VENDOR_TYPE: NORMAL
MDC_IDC_STAT_TACHYTHERAPY_RECENT_DTM_END: NORMAL
MDC_IDC_STAT_TACHYTHERAPY_RECENT_DTM_START: NORMAL
MDC_IDC_STAT_TACHYTHERAPY_SHOCKS_DELIVERED_RECENT: 0
MDC_IDC_STAT_TACHYTHERAPY_SHOCKS_DELIVERED_TOTAL: 3
MDC_IDC_STAT_TACHYTHERAPY_TOTAL_DTM_END: NORMAL
MDC_IDC_STAT_TACHYTHERAPY_TOTAL_DTM_START: NORMAL

## 2025-05-14 ENCOUNTER — TELEPHONE (OUTPATIENT)
Dept: CARDIOLOGY | Facility: CLINIC | Age: 35
End: 2025-05-14
Payer: COMMERCIAL

## 2025-05-14 NOTE — TELEPHONE ENCOUNTER
Patient Contacted for the patient to call back and schedule the following:    Appointment type: [facility] CRIT CARE  Provider: [facility]  Return date: 9/15/2025  Specialty phone number: [phone] OPT 1  Additional appointment(s) needed: DEVICE CHECK PRIOR  Additonal Notes: ANNUAL FOLLOW-UP

## 2025-05-20 ENCOUNTER — MYC MEDICAL ADVICE (OUTPATIENT)
Dept: HEMATOLOGY | Facility: CLINIC | Age: 35
End: 2025-05-20
Payer: COMMERCIAL

## 2025-06-03 ENCOUNTER — TELEPHONE (OUTPATIENT)
Dept: OBGYN | Facility: CLINIC | Age: 35
End: 2025-06-03
Payer: COMMERCIAL

## 2025-06-03 ENCOUNTER — MYC MEDICAL ADVICE (OUTPATIENT)
Dept: HEMATOLOGY | Facility: CLINIC | Age: 35
End: 2025-06-03
Payer: COMMERCIAL

## 2025-06-03 ENCOUNTER — MYC MEDICAL ADVICE (OUTPATIENT)
Dept: OBGYN | Facility: CLINIC | Age: 35
End: 2025-06-03
Payer: COMMERCIAL

## 2025-06-03 DIAGNOSIS — N93.9 ABNORMAL UTERINE BLEEDING (AUB): Primary | ICD-10-CM

## 2025-06-04 NOTE — TELEPHONE ENCOUNTER
"This RN called and spoke to Estela. This RN asked about her bleeding. She reports that she still bleeding. \"Comes and goes, flows for a couple hours then it stops for a few hours then comes again.       This RN called to offer an appointment for follow up but Estela has one set up already for tomorrow with Dr. Giron. This RN voiced understanding. Asked to call with my concerns between now and then, she voiced understanding.   "

## 2025-06-04 NOTE — TELEPHONE ENCOUNTER
Patient called back to talk with care coordinator about her symptoms.  She is scheduled for appointment tomorrow with Dr Giron.  Scheduled for US at 840 with approval from [place].    Patient advised and will present at that time.    Reviewed ED precautions..

## 2025-06-04 NOTE — TELEPHONE ENCOUNTER
Patient states she spoke with Maribell and Mary today about bleeding and is no longer concerned. Will follow-up as planned.

## 2025-06-04 NOTE — TELEPHONE ENCOUNTER
Received confirmation from Dr. Wilhelm that Estela should hold her Xarelto tonight. Patient is aware.    Will follow up with patient tomorrow after OB visit.      06/05/25 ADDENDUM:    Pt admitted and underwent D&C.    Felix MAYER RN  [facility]  Office: [phone]  Clinic: [phone]  Fax: [phone]

## 2025-06-04 NOTE — TELEPHONE ENCOUNTER
Estela is 35 year old  who underwent suction D&C and laparoscopic tubal ligation on 4/22 for unplanned pregnancy due to IUD expulsion. Medical history complicated by history of cardiac arrest s/p ICD, h/o IVC thromobosis and is anticoagulated on xarelto. Had menses 2 weeks ago which was normal. Started bleeding again yesterday, was light, then again today had bleeding at 1pm which stopped, bleeding again around 10pm. Watery blood, now has stopped again. No symptoms of blood loss. Recommend ED evaluation tonight if bleeding resumes and is heavy, otherwise follow up tomorrow with plan for progesterone therapy to manage AUB in setting of anticoagulation. Plan for pelvic US as well to evaluate for other causes of bleeding. All questions answered, will present to [facility] ED if needed tonight.     Tatyana Saucedo MD

## 2025-06-05 ENCOUNTER — APPOINTMENT (OUTPATIENT)
Dept: ULTRASOUND IMAGING | Facility: CLINIC | Age: 35
End: 2025-06-05
Attending: EMERGENCY MEDICINE
Payer: COMMERCIAL

## 2025-06-05 ENCOUNTER — ANESTHESIA (OUTPATIENT)
Dept: SURGERY | Facility: CLINIC | Age: 35
End: 2025-06-05
Payer: COMMERCIAL

## 2025-06-05 ENCOUNTER — HOSPITAL ENCOUNTER (EMERGENCY)
Facility: CLINIC | Age: 35
Discharge: HOME | End: 2025-06-05
Attending: EMERGENCY MEDICINE
Payer: COMMERCIAL

## 2025-06-05 ENCOUNTER — ANESTHESIA EVENT (OUTPATIENT)
Dept: SURGERY | Facility: CLINIC | Age: 35
End: 2025-06-05
Payer: COMMERCIAL

## 2025-06-05 VITALS
BODY MASS INDEX: 23.11 KG/M2 | HEIGHT: 62 IN | OXYGEN SATURATION: 100 % | DIASTOLIC BLOOD PRESSURE: 67 MMHG | SYSTOLIC BLOOD PRESSURE: 98 MMHG | RESPIRATION RATE: 16 BRPM | TEMPERATURE: 97.7 F | WEIGHT: 125.6 LBS | HEART RATE: 68 BPM

## 2025-06-05 DIAGNOSIS — O03.4 RETAINED PRODUCTS OF CONCEPTION FOLLOWING ABORTION: ICD-10-CM

## 2025-06-05 DIAGNOSIS — Z79.01 CHRONIC ANTICOAGULATION: Primary | ICD-10-CM

## 2025-06-05 LAB
ABO + RH BLD: NORMAL
ALBUMIN UR-MCNC: NEGATIVE MG/DL
ANION GAP SERPL CALCULATED.3IONS-SCNC: 8 MMOL/L (ref 7–15)
APPEARANCE UR: CLEAR
APTT PPP: 44 SECONDS (ref 22–38)
BASOPHILS # BLD AUTO: 0 10E3/UL (ref 0–0.2)
BASOPHILS NFR BLD AUTO: 0 %
BILIRUB UR QL STRIP: NEGATIVE
BLD GP AB SCN SERPL QL: NEGATIVE
BUN SERPL-MCNC: 13.7 MG/DL (ref 6–20)
CALCIUM SERPL-MCNC: 9.1 MG/DL (ref 8.8–10.4)
CHLORIDE SERPL-SCNC: 101 MMOL/L (ref 98–107)
COLOR UR AUTO: ABNORMAL
CREAT SERPL-MCNC: 0.89 MG/DL (ref 0.51–0.95)
EGFRCR SERPLBLD CKD-EPI 2021: 87 ML/MIN/1.73M2
EOSINOPHIL # BLD AUTO: 0.2 10E3/UL (ref 0–0.7)
EOSINOPHIL NFR BLD AUTO: 2 %
ERYTHROCYTE [DISTWIDTH] IN BLOOD BY AUTOMATED COUNT: 12.6 % (ref 10–15)
GLUCOSE SERPL-MCNC: 100 MG/DL (ref 70–99)
GLUCOSE UR STRIP-MCNC: NEGATIVE MG/DL
HCG INTACT+B SERPL-ACNC: 24 MIU/ML
HCO3 SERPL-SCNC: 27 MMOL/L (ref 22–29)
HCT VFR BLD AUTO: 38 % (ref 35–47)
HGB BLD-MCNC: 11.3 G/DL (ref 11.7–15.7)
HGB BLD-MCNC: 12.6 G/DL (ref 11.7–15.7)
HGB UR QL STRIP: ABNORMAL
HOLD SPECIMEN: NORMAL
IMM GRANULOCYTES # BLD: 0 10E3/UL
IMM GRANULOCYTES NFR BLD: 0 %
INR PPP: 1.47 (ref 0.85–1.15)
KETONES UR STRIP-MCNC: NEGATIVE MG/DL
LEUKOCYTE ESTERASE UR QL STRIP: ABNORMAL
LYMPHOCYTES # BLD AUTO: 2 10E3/UL (ref 0.8–5.3)
LYMPHOCYTES NFR BLD AUTO: 20 %
MCH RBC QN AUTO: 27.6 PG (ref 26.5–33)
MCHC RBC AUTO-ENTMCNC: 33.2 G/DL (ref 31.5–36.5)
MCV RBC AUTO: 83 FL (ref 78–100)
MCV RBC AUTO: 84 FL (ref 78–100)
MONOCYTES # BLD AUTO: 0.5 10E3/UL (ref 0–1.3)
MONOCYTES NFR BLD AUTO: 5 %
MUCOUS THREADS #/AREA URNS LPF: PRESENT /LPF
NEUTROPHILS # BLD AUTO: 7.2 10E3/UL (ref 1.6–8.3)
NEUTROPHILS NFR BLD AUTO: 72 %
NITRATE UR QL: NEGATIVE
NRBC # BLD AUTO: 0 10E3/UL
NRBC BLD AUTO-RTO: 0 /100
PH UR STRIP: 6.5 [PH] (ref 5–7)
PLATELET # BLD AUTO: 250 10E3/UL (ref 150–450)
POTASSIUM SERPL-SCNC: 3.6 MMOL/L (ref 3.4–5.3)
PROTHROMBIN TIME: 18.3 SECONDS (ref 11.8–14.8)
RBC # BLD AUTO: 4.57 10E6/UL (ref 3.8–5.2)
RBC URINE: >182 /HPF
SODIUM SERPL-SCNC: 136 MMOL/L (ref 135–145)
SP GR UR STRIP: 1.01 (ref 1–1.03)
SPECIMEN EXP DATE BLD: NORMAL
SQUAMOUS EPITHELIAL: 1 /HPF
UROBILINOGEN UR STRIP-MCNC: NORMAL MG/DL
WBC # BLD AUTO: 9.9 10E3/UL (ref 4–11)
WBC URINE: 3 /HPF

## 2025-06-05 PROCEDURE — 999N000141 HC STATISTIC PRE-PROCEDURE NURSING ASSESSMENT: Performed by: OBSTETRICS & GYNECOLOGY

## 2025-06-05 PROCEDURE — 258N000003 HC RX IP 258 OP 636: Performed by: NURSE ANESTHETIST, CERTIFIED REGISTERED

## 2025-06-05 PROCEDURE — 250N000011 HC RX IP 250 OP 636: Performed by: NURSE ANESTHETIST, CERTIFIED REGISTERED

## 2025-06-05 PROCEDURE — 59160 D & C AFTER DELIVERY: CPT | Mod: 78 | Performed by: OBSTETRICS & GYNECOLOGY

## 2025-06-05 PROCEDURE — 360N000075 HC SURGERY LEVEL 2, PER MIN: Performed by: OBSTETRICS & GYNECOLOGY

## 2025-06-05 PROCEDURE — 258N000003 HC RX IP 258 OP 636

## 2025-06-05 PROCEDURE — 58300 INSERT INTRAUTERINE DEVICE: CPT | Mod: GC | Performed by: OBSTETRICS & GYNECOLOGY

## 2025-06-05 PROCEDURE — 85004 AUTOMATED DIFF WBC COUNT: CPT | Performed by: EMERGENCY MEDICINE

## 2025-06-05 PROCEDURE — 85048 AUTOMATED LEUKOCYTE COUNT: CPT | Performed by: EMERGENCY MEDICINE

## 2025-06-05 PROCEDURE — 85730 THROMBOPLASTIN TIME PARTIAL: CPT | Performed by: EMERGENCY MEDICINE

## 2025-06-05 PROCEDURE — 250N000009 HC RX 250: Performed by: NURSE ANESTHETIST, CERTIFIED REGISTERED

## 2025-06-05 PROCEDURE — 86850 RBC ANTIBODY SCREEN: CPT | Performed by: EMERGENCY MEDICINE

## 2025-06-05 PROCEDURE — 250N000011 HC RX IP 250 OP 636

## 2025-06-05 PROCEDURE — 36415 COLL VENOUS BLD VENIPUNCTURE: CPT | Performed by: STUDENT IN AN ORGANIZED HEALTH CARE EDUCATION/TRAINING PROGRAM

## 2025-06-05 PROCEDURE — 99285 EMERGENCY DEPT VISIT HI MDM: CPT | Mod: 25 | Performed by: EMERGENCY MEDICINE

## 2025-06-05 PROCEDURE — 272N000001 HC OR GENERAL SUPPLY STERILE: Performed by: OBSTETRICS & GYNECOLOGY

## 2025-06-05 PROCEDURE — 88305 TISSUE EXAM BY PATHOLOGIST: CPT | Mod: 26 | Performed by: PATHOLOGY

## 2025-06-05 PROCEDURE — 85610 PROTHROMBIN TIME: CPT | Performed by: EMERGENCY MEDICINE

## 2025-06-05 PROCEDURE — 250N000013 HC RX MED GY IP 250 OP 250 PS 637

## 2025-06-05 PROCEDURE — 250N000011 HC RX IP 250 OP 636: Mod: JZ | Performed by: NURSE ANESTHETIST, CERTIFIED REGISTERED

## 2025-06-05 PROCEDURE — 80048 BASIC METABOLIC PNL TOTAL CA: CPT | Performed by: EMERGENCY MEDICINE

## 2025-06-05 PROCEDURE — 370N000017 HC ANESTHESIA TECHNICAL FEE, PER MIN: Performed by: OBSTETRICS & GYNECOLOGY

## 2025-06-05 PROCEDURE — 81003 URINALYSIS AUTO W/O SCOPE: CPT | Performed by: EMERGENCY MEDICINE

## 2025-06-05 PROCEDURE — 36415 COLL VENOUS BLD VENIPUNCTURE: CPT | Performed by: EMERGENCY MEDICINE

## 2025-06-05 PROCEDURE — 99285 EMERGENCY DEPT VISIT HI MDM: CPT | Performed by: EMERGENCY MEDICINE

## 2025-06-05 PROCEDURE — 250N000009 HC RX 250: Performed by: OBSTETRICS & GYNECOLOGY

## 2025-06-05 PROCEDURE — 84702 CHORIONIC GONADOTROPIN TEST: CPT

## 2025-06-05 PROCEDURE — 76856 US EXAM PELVIC COMPLETE: CPT

## 2025-06-05 PROCEDURE — 88305 TISSUE EXAM BY PATHOLOGIST: CPT | Mod: TC | Performed by: OBSTETRICS & GYNECOLOGY

## 2025-06-05 PROCEDURE — 710N000012 HC RECOVERY PHASE 2, PER MINUTE: Performed by: OBSTETRICS & GYNECOLOGY

## 2025-06-05 PROCEDURE — 86901 BLOOD TYPING SEROLOGIC RH(D): CPT | Performed by: EMERGENCY MEDICINE

## 2025-06-05 PROCEDURE — 85018 HEMOGLOBIN: CPT | Performed by: STUDENT IN AN ORGANIZED HEALTH CARE EDUCATION/TRAINING PROGRAM

## 2025-06-05 DEVICE — IUD CONTRACEPTIVE DEVICE MIRENA 50419-4230-01: Type: IMPLANTABLE DEVICE | Site: UTERUS | Status: FUNCTIONAL

## 2025-06-05 RX ORDER — SODIUM CHLORIDE, SODIUM LACTATE, POTASSIUM CHLORIDE, CALCIUM CHLORIDE 600; 310; 30; 20 MG/100ML; MG/100ML; MG/100ML; MG/100ML
INJECTION, SOLUTION INTRAVENOUS CONTINUOUS PRN
Status: DISCONTINUED | OUTPATIENT
Start: 2025-06-05 | End: 2025-06-05

## 2025-06-05 RX ORDER — FENTANYL CITRATE 50 UG/ML
25 INJECTION, SOLUTION INTRAMUSCULAR; INTRAVENOUS EVERY 5 MIN PRN
Status: DISCONTINUED | OUTPATIENT
Start: 2025-06-05 | End: 2025-06-05 | Stop reason: HOSPADM

## 2025-06-05 RX ORDER — ACETAMINOPHEN 325 MG/1
975 TABLET ORAL EVERY 6 HOURS PRN
Qty: 50 TABLET | Refills: 0 | Status: SHIPPED | OUTPATIENT
Start: 2025-06-05

## 2025-06-05 RX ORDER — ONDANSETRON 2 MG/ML
4 INJECTION INTRAMUSCULAR; INTRAVENOUS ONCE
Status: CANCELLED | OUTPATIENT
Start: 2025-06-05

## 2025-06-05 RX ORDER — ONDANSETRON 2 MG/ML
4 INJECTION INTRAMUSCULAR; INTRAVENOUS EVERY 30 MIN PRN
Status: DISCONTINUED | OUTPATIENT
Start: 2025-06-05 | End: 2025-06-05 | Stop reason: HOSPADM

## 2025-06-05 RX ORDER — HYDROMORPHONE HYDROCHLORIDE 1 MG/ML
0.2 INJECTION, SOLUTION INTRAMUSCULAR; INTRAVENOUS; SUBCUTANEOUS EVERY 5 MIN PRN
Status: DISCONTINUED | OUTPATIENT
Start: 2025-06-05 | End: 2025-06-05 | Stop reason: HOSPADM

## 2025-06-05 RX ORDER — HYDROMORPHONE HYDROCHLORIDE 1 MG/ML
0.4 INJECTION, SOLUTION INTRAMUSCULAR; INTRAVENOUS; SUBCUTANEOUS EVERY 5 MIN PRN
Status: DISCONTINUED | OUTPATIENT
Start: 2025-06-05 | End: 2025-06-05 | Stop reason: HOSPADM

## 2025-06-05 RX ORDER — ONDANSETRON 4 MG/1
4 TABLET, ORALLY DISINTEGRATING ORAL ONCE
Status: CANCELLED | OUTPATIENT
Start: 2025-06-05 | End: 2025-06-05

## 2025-06-05 RX ORDER — ONDANSETRON 4 MG/1
4 TABLET, ORALLY DISINTEGRATING ORAL EVERY 30 MIN PRN
Status: DISCONTINUED | OUTPATIENT
Start: 2025-06-05 | End: 2025-06-05 | Stop reason: HOSPADM

## 2025-06-05 RX ORDER — FENTANYL CITRATE 50 UG/ML
INJECTION, SOLUTION INTRAMUSCULAR; INTRAVENOUS PRN
Status: DISCONTINUED | OUTPATIENT
Start: 2025-06-05 | End: 2025-06-05

## 2025-06-05 RX ORDER — LIDOCAINE HYDROCHLORIDE 10 MG/ML
INJECTION, SOLUTION INFILTRATION; PERINEURAL PRN
Status: DISCONTINUED | OUTPATIENT
Start: 2025-06-05 | End: 2025-06-05 | Stop reason: HOSPADM

## 2025-06-05 RX ORDER — NALOXONE HYDROCHLORIDE 0.4 MG/ML
0.1 INJECTION, SOLUTION INTRAMUSCULAR; INTRAVENOUS; SUBCUTANEOUS
Status: DISCONTINUED | OUTPATIENT
Start: 2025-06-05 | End: 2025-06-05 | Stop reason: HOSPADM

## 2025-06-05 RX ORDER — FENTANYL CITRATE 50 UG/ML
50 INJECTION, SOLUTION INTRAMUSCULAR; INTRAVENOUS EVERY 5 MIN PRN
Status: DISCONTINUED | OUTPATIENT
Start: 2025-06-05 | End: 2025-06-05 | Stop reason: HOSPADM

## 2025-06-05 RX ORDER — ACETAMINOPHEN 325 MG/1
975 TABLET ORAL ONCE
Status: COMPLETED | OUTPATIENT
Start: 2025-06-05 | End: 2025-06-05

## 2025-06-05 RX ORDER — LIDOCAINE HYDROCHLORIDE 20 MG/ML
INJECTION, SOLUTION INFILTRATION; PERINEURAL PRN
Status: DISCONTINUED | OUTPATIENT
Start: 2025-06-05 | End: 2025-06-05

## 2025-06-05 RX ORDER — DEXAMETHASONE SODIUM PHOSPHATE 4 MG/ML
INJECTION, SOLUTION INTRA-ARTICULAR; INTRALESIONAL; INTRAMUSCULAR; INTRAVENOUS; SOFT TISSUE PRN
Status: DISCONTINUED | OUTPATIENT
Start: 2025-06-05 | End: 2025-06-05

## 2025-06-05 RX ORDER — DEXAMETHASONE SODIUM PHOSPHATE 4 MG/ML
4 INJECTION, SOLUTION INTRA-ARTICULAR; INTRALESIONAL; INTRAMUSCULAR; INTRAVENOUS; SOFT TISSUE
Status: DISCONTINUED | OUTPATIENT
Start: 2025-06-05 | End: 2025-06-05 | Stop reason: HOSPADM

## 2025-06-05 RX ORDER — SODIUM CHLORIDE, SODIUM LACTATE, POTASSIUM CHLORIDE, CALCIUM CHLORIDE 600; 310; 30; 20 MG/100ML; MG/100ML; MG/100ML; MG/100ML
INJECTION, SOLUTION INTRAVENOUS CONTINUOUS
Status: DISCONTINUED | OUTPATIENT
Start: 2025-06-05 | End: 2025-06-05 | Stop reason: HOSPADM

## 2025-06-05 RX ORDER — OXYCODONE HYDROCHLORIDE 5 MG/1
5 TABLET ORAL
Refills: 0 | Status: CANCELLED | OUTPATIENT
Start: 2025-06-05

## 2025-06-05 RX ORDER — DOXYCYCLINE 100 MG/1
200 CAPSULE ORAL ONCE
Status: CANCELLED | OUTPATIENT
Start: 2025-06-05 | End: 2025-06-05

## 2025-06-05 RX ORDER — ACETAMINOPHEN 325 MG/1
975 TABLET ORAL ONCE
Status: CANCELLED | OUTPATIENT
Start: 2025-06-05 | End: 2025-06-05

## 2025-06-05 RX ORDER — ONDANSETRON 2 MG/ML
INJECTION INTRAMUSCULAR; INTRAVENOUS PRN
Status: DISCONTINUED | OUTPATIENT
Start: 2025-06-05 | End: 2025-06-05

## 2025-06-05 RX ORDER — PROPOFOL 10 MG/ML
INJECTION, EMULSION INTRAVENOUS PRN
Status: DISCONTINUED | OUTPATIENT
Start: 2025-06-05 | End: 2025-06-05

## 2025-06-05 RX ORDER — KETOROLAC TROMETHAMINE 30 MG/ML
INJECTION, SOLUTION INTRAMUSCULAR; INTRAVENOUS PRN
Status: DISCONTINUED | OUTPATIENT
Start: 2025-06-05 | End: 2025-06-05

## 2025-06-05 RX ORDER — PROPOFOL 10 MG/ML
INJECTION, EMULSION INTRAVENOUS CONTINUOUS PRN
Status: DISCONTINUED | OUTPATIENT
Start: 2025-06-05 | End: 2025-06-05

## 2025-06-05 RX ADMIN — DOXYCYCLINE 200 MG: 100 INJECTION, POWDER, LYOPHILIZED, FOR SOLUTION INTRAVENOUS at 13:26

## 2025-06-05 RX ADMIN — KETOROLAC TROMETHAMINE 15 MG: 30 INJECTION, SOLUTION INTRAMUSCULAR at 14:20

## 2025-06-05 RX ADMIN — ACETAMINOPHEN 975 MG: 325 TABLET ORAL at 13:18

## 2025-06-05 RX ADMIN — PROPOFOL 20 MG: 10 INJECTION, EMULSION INTRAVENOUS at 13:57

## 2025-06-05 RX ADMIN — PROPOFOL 125 MCG/KG/MIN: 10 INJECTION, EMULSION INTRAVENOUS at 13:51

## 2025-06-05 RX ADMIN — PHENYLEPHRINE HYDROCHLORIDE 100 MCG: 10 INJECTION INTRAVENOUS at 14:13

## 2025-06-05 RX ADMIN — LIDOCAINE HYDROCHLORIDE 50 MG: 20 INJECTION, SOLUTION INFILTRATION; PERINEURAL at 13:51

## 2025-06-05 RX ADMIN — DEXAMETHASONE SODIUM PHOSPHATE 6 MG: 4 INJECTION, SOLUTION INTRAMUSCULAR; INTRAVENOUS at 14:15

## 2025-06-05 RX ADMIN — PHENYLEPHRINE HYDROCHLORIDE 200 MCG: 10 INJECTION INTRAVENOUS at 13:57

## 2025-06-05 RX ADMIN — SODIUM CHLORIDE, SODIUM LACTATE, POTASSIUM CHLORIDE, AND CALCIUM CHLORIDE: .6; .31; .03; .02 INJECTION, SOLUTION INTRAVENOUS at 13:42

## 2025-06-05 RX ADMIN — PROPOFOL 20 MG: 10 INJECTION, EMULSION INTRAVENOUS at 13:54

## 2025-06-05 RX ADMIN — MIDAZOLAM 2 MG: 1 INJECTION INTRAMUSCULAR; INTRAVENOUS at 13:42

## 2025-06-05 RX ADMIN — ONDANSETRON 4 MG: 2 INJECTION INTRAMUSCULAR; INTRAVENOUS at 14:15

## 2025-06-05 RX ADMIN — PHENYLEPHRINE HYDROCHLORIDE 100 MCG: 10 INJECTION INTRAVENOUS at 14:23

## 2025-06-05 RX ADMIN — PROPOFOL 30 MG: 10 INJECTION, EMULSION INTRAVENOUS at 13:51

## 2025-06-05 RX ADMIN — FENTANYL CITRATE 50 MCG: 50 INJECTION INTRAMUSCULAR; INTRAVENOUS at 13:57

## 2025-06-05 ASSESSMENT — COLUMBIA-SUICIDE SEVERITY RATING SCALE - C-SSRS
2. HAVE YOU ACTUALLY HAD ANY THOUGHTS OF KILLING YOURSELF IN THE PAST MONTH?: NO
6. HAVE YOU EVER DONE ANYTHING, STARTED TO DO ANYTHING, OR PREPARED TO DO ANYTHING TO END YOUR LIFE?: NO
1. IN THE PAST MONTH, HAVE YOU WISHED YOU WERE DEAD OR WISHED YOU COULD GO TO SLEEP AND NOT WAKE UP?: NO

## 2025-06-05 ASSESSMENT — ACTIVITIES OF DAILY LIVING (ADL)
ADLS_ACUITY_SCORE: 65

## 2025-06-05 ASSESSMENT — LIFESTYLE VARIABLES: TOBACCO_USE: 1

## 2025-06-05 ASSESSMENT — ENCOUNTER SYMPTOMS: SEIZURES: 0

## 2025-06-05 NOTE — ANESTHESIA CARE TRANSFER NOTE
Patient: Estela Fry    Procedure: Procedure(s):  DILATION AND CURETTAGE, UTERUS, USING SUCTION  Insert intrauterine device       Diagnosis: Retained products of conception with hemorrhage [O72.2]  Diagnosis Additional Information: No value filed.    Anesthesia Type:   General     Note:    Oropharynx: oropharynx clear of all foreign objects and spontaneously breathing  Level of Consciousness: drowsy  Oxygen Supplementation: face mask  Level of Supplemental Oxygen (L/min / FiO2): 6  Independent Airway: airway patency satisfactory and stable  Dentition: dentition unchanged  Vital Signs Stable: post-procedure vital signs reviewed and stable  Report to RN Given: handoff report given  Patient transferred to: Phase II    Handoff Report: Identifed the Patient, Identified the Reponsible Provider, Reviewed the pertinent medical history, Discussed the surgical course, Reviewed Intra-OP anesthesia mangement and issues during anesthesia, Set expectations for post-procedure period and Allowed opportunity for questions and acknowledgement of understanding      Vitals:  Vitals Value Taken Time   BP     Temp 36.5  C (97.7  F) 06/05/25 14:33   Pulse     Resp 16 06/05/25 14:33   SpO2 100 % 06/05/25 14:38   Vitals shown include unfiled device data.    Electronically Signed By: MELODY Ivy CRNA  June 5, 2025  2:39 PM

## 2025-06-05 NOTE — ED TRIAGE NOTES
Pt reports having tubal ligation and D&C surgery on may 22nd and recently had bleeding that started 06/03/2025 that have gotten worse today.    Triage Assessment (Adult)       Row Name 06/05/25 0317          Triage Assessment    Airway WDL WDL        Respiratory WDL    Respiratory WDL WDL        Skin Circulation/Temperature WDL    Skin Circulation/Temperature WDL WDL        Cardiac WDL    Cardiac WDL WDL        Peripheral/Neurovascular WDL    Peripheral Neurovascular WDL WDL        Cognitive/Neuro/Behavioral WDL    Cognitive/Neuro/Behavioral WDL WDL

## 2025-06-05 NOTE — DISCHARGE INSTRUCTIONS
To contact a doctor, call Dr. Cabezas, OB/GYN clinic at [phone]  or:     [phone] and ask for the Resident On Call for:          Gynecology (answered 24 hours a day)   Emergency Departments:  [facility] Emergency Department: [phone]         Medications Received:     Tylenol 975 mg at 1:18 PM. Next dose available at 7:15 PM.     Toradol (in the Ibuprofen family) 15 mg at 2:20 PM. Ibuprofen available at 8:20 PM.

## 2025-06-05 NOTE — PROGRESS NOTES
Brief Progress Note     Introduced self to patient, confirmed procedure with patient and discussed option to add IUD insertion to procedure plan. She is amenable to this. Added to procedure with both our initials, mirena IUD ordered.     Discussed additionally plan for follow up bHCG and appointment to trend bHCG to zero. Placed future order, will message clinic.     Lynn Ko MD MPH  OB/Gyn Resident PGY-4  6/5/2025  12:59 PM

## 2025-06-05 NOTE — OP NOTE
[facility] - Operative Note    Patient: Estela Fry  MRN: [id number]  : 1990    Date of Service:  2025    Preoperative Diagnosis:  - Retained products of conception   - vaginal bleeding  - Hx cardiac arrest s/p ICD  - Hx IVC thrombosis on chronic anticoagulation    Postoperative Diagnosis:  - Same as above    Procedure: Exam under anesthesia, dilation and curettage under ultrasound guidance, Mirena IUD insertion    Surgeon: Pattie Cabezas MD    Assistants:  Lynn Ko MD PGY4    Anesthesia: MAC with local    EBL:  mL    IVF: 100 mL crystalloid  Intraoperative medications: doxycyline   UOP: NA    Drains:  none    Specimens:  Products of conception    Implants: Mirena IUD    Complications: None apparent    Indications:  Estela Fry is a 35 year old  with a complicated history of cardiac arrest status post ICD, IVC thrombosis anticoagulated on Xarelto, who presents with vaginal bleeding. Patient underwent suction D&C and laparoscopic tubal ligation on  for an unplanned pregnancy secondary to IUD expulsion. She had a normal menstrual cycle in May but then had heavy vaginal bleeding starting in .  She eventually presented to the emergency department.  Repeat ultrasound demonstrated mildly thick heterogenous endometrial stripe with evidence of internal flow, beta-hCG elevated at 24.  Recommendation was made for repeat dilation and curettage for suspected retained products of conception.  The patient was also counseled on her options for repeat placement of Mirena IUD to help control with AUB in the setting of chronic anticoagulation.  She desired IUD.  The patient was counseled on risks, benefits and alternatives to the above listed procedure. Informed consent was signed prior to the procedure.    Findings: EUA revealed mobile, anteverted uterus. External genitalia, vaginal vault and cervix appeared normal.  Small amount of dark blood in the vaginal vault.  Products of conception noted grossly on clear suction tubing.  Thin EMS and fundus firm at end of case.    Procedure:  The patient was taken to the operating room and monitored anesthesia was administered.  She was then placed in the dorsal lithotomy position. The patient was then prepped and draped in the usual sterile fashion and a safety timeout performed.    An open-sided speculum was placed into the vagina and the anterior lip of the cervix grasped with a tenaculum.  Cervical anesthesia was injected using a total of 20 cc of 1% lidocaine.  The cervix was dilated with Hegar dilators to 8 mm. A 8-curved suction cannula was inserted and suction aspiration was performed until a gritty texture noted throughout the cavity. A thin endometrial stripe was noted on ultrasound. The uterus was sounded to 8 cm and the mirena IUD was inserted via the standard  protocol. The strings were cut to 2 cm from the external os.  The tenaculum was removed from the cervix.     All instruments were removed from the vagina.  Sponge and needle counts were correct at the close of the case x 2.  After anesthesia reversal, the patient was transferred to the recovery room in stable condition. Dr. Cabezas was present and scrubbed for the entire procedure.    Lynn Ko MD MPH  OB/Gyn Resident PGY-4  6/5/2025  1:23 PM    I was scrubbed and present for the entire procedure.  I have reviewed and edited the above note.     Pattie Cabezas MD, FACOG

## 2025-06-05 NOTE — ANESTHESIA PREPROCEDURE EVALUATION
Anesthesia Pre-Procedure Evaluation    Patient: Estela Fry   MRN: [id number] : 1990          Procedure : Procedure(s):  DILATION AND CURETTAGE, UTERUS, USING SUCTION         Past Medical History:   Diagnosis Date    Automatic implantable cardioverter-defibrillator in situ     Cardiac arrest (H)     History of atrial flutter     History of venous thromboembolism       Past Surgical History:   Procedure Laterality Date    CV CENTRAL VENOUS CATHETER PLACEMENT N/A 2023    Procedure: Central Venous Catheter Placement;  Surgeon: Sid Liz MD;  Location: [facility] CARDIAC CATH LAB    CV CORONARY ANGIOGRAM N/A 2023    Procedure: Coronary Angiogram;  Surgeon: Sid Liz MD;  Location: [facility] CARDIAC CATH LAB    DILATION AND CURETTAGE SUCTION N/A 2024    Procedure: DILATION AND CURETTAGE, UTERUS, USING SUCTION,;  Surgeon: Shreya Mcdonnell MD;  Location: [facility] OR    DILATION AND CURETTAGE SUCTION Bilateral 2025    Procedure: DILATION AND CURETTAGE, UTERUS, USING SUCTION,;  Surgeon: Caprice Giron MD;  Location: [facility] OR    EP SICD INSERT N/A 8/15/2023    Procedure: Subcutaneous Implantable Cardioverter Defibrillator Implant;  Surgeon: Eula Johnson MD;  [facility]    INSERT INTRAUTERINE DEVICE N/A 2024    Procedure: placement of Mirena intrauterine device;  Surgeon: Shreya Mcdonnell MD;  Location: [facility] OR    SALPINGECTOMY, LAPAROSCOPIC Bilateral 2025    Procedure: LAPAROSCOPY WITH BILATERAL SALPINGECTOMY;  Surgeon: Caprice Giron MD;  Location: [facility] OR      No Known Allergies   Social History     Tobacco Use    Smoking status: Never     Passive exposure: Never    Smokeless tobacco: Never   Substance Use Topics    Alcohol use: Never      Wt Readings from Last 1 Encounters:   25 57 kg (125 lb 9.6 oz)        Anesthesia Evaluation    Type: MAC.        ROS/MED HX  ENT/Pulmonary:     (+)                tobacco use (Very remote history.), Past use,                     (-) asthma, sleep apnea and LUIS risk factors   Neurologic:  - neg neurologic ROS   (+)    no peripheral neuropathy                         (-) no seizures, no CVA, no TIA and migraines   Cardiovascular: Comment: Denies cardiac symptoms including SOB, palpitations, syncope, ALFONSO, orthopnea, or PND.  Intermittent chest pain that does not follow any specific pattern.  Resolves quickly.       (+)  - -   -  - -   Taking blood thinners (last Xarelto yesterday 6/4/2025) Pt has not received instructions:               ICD Reason placed:VT with cardiac arrest..  type;[employer]            Previous cardiac testing (2/14/24)   Echo: Date: 2/14/24 Results:  Global and regional left ventricular function is normal with an EF of 60-65%.  Mild right ventricular dilation is present.Global right ventricular function  is normal.  IVC diameter <2.1 cm collapsing >50% with sniff suggests a normal RA pressure  of 3 mmHg.  No pericardial effusion is present.    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    Hematologic:     (+) History of blood clots,    pt is anticoagulated, anemia,       (-) history of blood transfusion   Musculoskeletal:  - neg musculoskeletal ROS     GI/Hepatic:  - neg GI/hepatic ROS     Renal/Genitourinary:  - neg Renal ROS     Endo:  - neg endo ROS     Psychiatric/Substance Use:     (+) psychiatric history (Follows with psychology) depression    (-) alcohol abuse history and chronic opioid use history   Infectious Disease:     (+)   MRSA,      (-) HIV and TB   Malignancy:  - neg malignancy ROS     Other: Comment: Estela has an ICD and it does not pace. Can use a magnet. The magnet will inhibit shocks. When the magnet is removed, the device will return to normal settings.              Physical Exam  Airway  Mallampati: II  TM  distance: >3 FB  Neck ROM: full  Upper bite lip test: unable to assess  Mouth opening: >= 4 cm    Cardiovascular - normal exam Comments: Denies cardiac symptoms including SOB, palpitations, syncope, ALFONSO, orthopnea, or PND.  Intermittent chest pain that does not follow any specific pattern.  Resolves quickly.     Dental   (+) Minor Abnormalities - some fillings, tiny chips      Pulmonary - normal exam      Neurological - normal exam  She appears awake, alert and oriented x3.    Other Findings       OUTSIDE LABS:  CBC:   Lab Results   Component Value Date    WBC 9.9 06/05/2025    WBC 9.5 04/22/2025    HGB 12.6 06/05/2025    HGB 13.0 04/22/2025    HCT 38.0 06/05/2025    HCT 38.0 04/22/2025     06/05/2025     04/22/2025     BMP:   Lab Results   Component Value Date     06/05/2025     01/21/2025    POTASSIUM 3.6 06/05/2025    POTASSIUM 4.0 01/21/2025    CHLORIDE 101 06/05/2025    CHLORIDE 104 01/21/2025    CO2 27 06/05/2025    CO2 28 01/21/2025    BUN 13.7 06/05/2025    BUN 10.4 01/21/2025    CR 0.89 06/05/2025    CR 0.75 01/21/2025     (H) 06/05/2025    GLC 94 01/21/2025     COAGS:   Lab Results   Component Value Date    PTT 44 (H) 06/05/2025    INR 1.47 (H) 06/05/2025    FIBR 173 08/05/2023     POC:   Lab Results   Component Value Date    HCG Negative 11/05/2024    HCGS Positive (A) 02/05/2024     HEPATIC:   Lab Results   Component Value Date    ALBUMIN 4.9 10/18/2023    PROTTOTAL 8.2 10/18/2023    ALT 23 10/18/2023    AST 19 10/18/2023    ALKPHOS 83 10/18/2023    BILITOTAL 0.5 10/18/2023     OTHER:   Lab Results   Component Value Date    PH 7.46 (H) 08/11/2023    LACT 1.5 08/11/2023    A1C 5.4 08/05/2023    CUATE 9.1 06/05/2025    PHOS 3.4 08/06/2023    MAG 2.4 (H) 08/13/2023    TSH 1.84 07/23/2024       Anesthesia Plan    ASA Status:  3      NPO Status: NPO Appropriate   Anesthesia Type: MAC.  Induction: intravenous.  Maintenance: TIVA.   Techniques and Equipment:       - Monitoring  Plan: standard ASA monitoring     Consents    Anesthesia Plan(s) and associated risks, benefits, and realistic alternatives discussed. Questions answered and patient/representative(s) expressed understanding.     - Discussed:     - Discussed with:  Patient        - Pt is DNR/DNI Status: no DNR          Postoperative Care         Comments:    Other Comments: Surgeons won`t use electrocautery during the procedure. No need for magnet for ICD               Danae Herring MD    I have reviewed the pertinent notes and labs in the chart from the past 30 days and (re)examined the patient.  Any updates or changes from those notes are reflected in this note.    Clinically Significant Risk Factors Present on Admission                # Drug Induced Coagulation Defect: home medication list includes an anticoagulant medication                   # ICD device present

## 2025-06-05 NOTE — H&P
"Gynecology History and Physical Note    Referring Physician: Dr. 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 Patient underwent suction D&C and laparoscopic tubal ligation on  for an unplanned pregnancy secondary to IUD expulsion. She had a normal menstrual cycle in May. She describes intermittent vaginal bleeding that started . Heavy vaginal bleeding began on  and worsened last night so that she was easily soaking one pad an hour. She presented to the ED for further evaluation.     Notes associated lightheadedness and dizziness as well as \"yellow\" vision when she moves from sitting to standing. These symptoms started 6/3/25. She denies chest pain, difficulty breathing.     OBHx:   OB History    Para Term  AB Living   13 8 3 5 4 7   SAB IAB Ectopic Multiple Live Births   0 4 0 0 8      # Outcome Date GA Lbr Gulshan/2nd Weight Sex Type Anes PTL Lv   13             12 IAB 24 6w6d    IAB      11 IAB 21           10 IAB 20           9  20 32w6d 01:45 / 00:02 2.05 kg (4 lb 8.3 oz) F Vag-Spont None  MELISSA      Name: LUKAS,GIRL MOMYOUA      Apgar1: 8  Apgar5: 8   8  19 32w0d   M Vag-Spont   MELISSA   7 Term 18 38w0d  3.175 kg (7 lb) F Vag-Spont   MELISSA   6  17 36w0d  2.722 kg (6 lb) F Vag-Spont None  MELISSA   5 IAB 09/15/14 12w0d          4 Term 13 39w0d  2.438 kg (5 lb 6 oz) F Vag-Spont   MELISSA   3  13 22w0d   M Vag-Spont   DEC      Birth Comments: System Generated. Please review and update pregnancy details.   2  12 36w0d  3.6 kg (7 lb 15 oz) F Vag-Spont  Y MELISSA      Name: noemí   1 Term 01/29/10 40w0d  3.317 kg (7 lb 5 oz) M Vag-Spont None N MELISSA      Name: Salomón     Past medical history:   Past Medical " History:   Diagnosis Date    Automatic implantable cardioverter-defibrillator in situ     Cardiac arrest (H)     History of atrial flutter     History of venous thromboembolism        Past surgical history:   Past Surgical History:   Procedure Laterality Date    CV CENTRAL VENOUS CATHETER PLACEMENT N/A 8/5/2023    Procedure: Central Venous Catheter Placement;  Surgeon: Sid Liz MD;  Location: [facility] CARDIAC CATH LAB    CV CORONARY ANGIOGRAM N/A 8/5/2023    Procedure: Coronary Angiogram;  Surgeon: Sid Liz MD;  Location: [facility] CARDIAC CATH LAB    DILATION AND CURETTAGE SUCTION N/A 2/16/2024    Procedure: DILATION AND CURETTAGE, UTERUS, USING SUCTION,;  Surgeon: Shreya Mcdonnell MD;  Location: [facility] OR    DILATION AND CURETTAGE SUCTION Bilateral 4/22/2025    Procedure: DILATION AND CURETTAGE, UTERUS, USING SUCTION,;  Surgeon: Caprice Giron MD;  Location: [facility] OR    EP SICD INSERT N/A 8/15/2023    Procedure: Subcutaneous Implantable Cardioverter Defibrillator Implant;  Surgeon: Eula Johnson MD;  Location: [facility] CARDIAC CATH LAB    INSERT INTRAUTERINE DEVICE N/A 2/16/2024    Procedure: placement of Mirena intrauterine device;  Surgeon: Shreya Mcdonnell MD;  Location: [facility] OR    SALPINGECTOMY, LAPAROSCOPIC Bilateral 4/22/2025    Procedure: LAPAROSCOPY WITH BILATERAL SALPINGECTOMY;  Surgeon: Caprice Giron MD;  Location: [facility] OR       Medications:  Current Facility-Administered Medications   Medication Dose Route Frequency Provider Last Rate Last Admin    doxycycline (VIBRAMYCIN) 200 mg in sodium chloride 0.9 % 250 mL intermittent infusion  200 mg Intravenous Pre-Op/Pre-procedure x 1 dose Lynn Johnson MD         Current Outpatient Medications   Medication Sig Dispense Refill    acetaminophen (TYLENOL) 325 MG tablet Take 3 tablets (975 mg) by mouth every 6 hours as needed for mild pain. 50 tablet 0    chlorhexidine (HIBICLENS) 4 % solution Apply topically daily as needed for wound care.  "118 mL 0    enoxaparin ANTICOAGULANT (LOVENOX) 40 MG/0.4ML syringe Inject 0.4 mLs (40 mg) subcutaneously every 12 hours. 24 mL 0    ibuprofen (ADVIL/MOTRIN) 800 MG tablet Take 1 tablet (800 mg) by mouth every 6 hours as needed for other (mild and/or inflammatory pain). 30 tablet 0    ondansetron (ZOFRAN ODT) 4 MG ODT tab Take 1 tablet (4 mg) by mouth every 8 hours as needed for nausea. 4 tablet 0    oxyCODONE (ROXICODONE) 5 MG tablet Take 1 tablet (5 mg) by mouth every 6 hours as needed for moderate to severe pain. 6 tablet 0    Polysaccharide-Iron Complex 150 MG CAPS Take 1 tablet by mouth daily. 90 capsule 1    rivaroxaban ANTICOAGULANT (XARELTO) 20 MG TABS tablet Take 1 tablet (20 mg) by mouth daily (with dinner). 90 tablet 3    senna-docusate (SENOKOT-S/PERICOLACE) 8.6-50 MG tablet Take 1-2 tablets by mouth 2 times daily as needed for constipation (While on oral opioids.). 30 tablet 0    sotalol (BETAPACE) 80 MG tablet Take 1 tablet (80 mg) by mouth 2 times daily. 180 tablet 3       Allergies:  No Known Allergies    Social Hx:   Social History     Tobacco Use    Smoking status: Never     Passive exposure: Never    Smokeless tobacco: Never   Vaping Use    Vaping status: Never Used   Substance Use Topics    Alcohol use: Never    Drug use: Never        Family History:   family history is not on file.  No family history of breast, ovarian or uterine cancer. No history of DVT or migranes.    ROS:   Negative except per HPI    Objective:   BP 96/69   Pulse 58   Temp 97.9  F (36.6  C) (Oral)   Resp 16   Ht 1.575 m (5' 2\")   Wt 57 kg (125 lb 9.6 oz)   LMP 10/14/2024 (Exact Date)   SpO2 97%   Breastfeeding Unknown   BMI 22.97 kg/m    Constitutional: Healthy, but uncomfortable appearing female in mild distress  Cardio: Regular rate  Pulm: Breathing comfortably on room air.   HEENT: Normal appearance.  Gastrointestinal: Abdomen soft, non-tender to palpation.  Psychiatric: mentation appears normal and affect in " appropriate proportion to her pain    # Pain Assessment:      6/5/2025     3:45 AM   Current Pain Score   Patient currently in pain? yes   - Estela is experiencing pain related to heavy vaginal bleeding. Pain management was discussed and the plan was created in a collaborative fashion. Estela's response to the current recommendations: engaged  - Please see the plan for pain management as documented in the assessment and plan.      Labs/Imaging:  Results for orders placed or performed during the hospital encounter of 06/05/25   US Pelvic Complete w Transvaginal     Status: None    Narrative    EXAM: US PELVIC TRANSABDOMINAL AND TRANSVAGINAL  LOCATION: [facility]  DATE: 6/5/2025    INDICATION: Vaginal bleeding.  Recent d c and tubal ligation  COMPARISON: CT abdomen pelvis on 10/3/2023 and pelvic ultrasound on 10/24/2024.  TECHNIQUE: Transabdominal scans were performed. Endovaginal ultrasound was performed to better visualize the adnexa.    FINDINGS:    UTERUS: Measures 7.4 x 5.6 x 6.0 cm. Normal in size and position with no masses.    ENDOMETRIUM: Mildly thickened heterogenous endometrial stripe measuring 1.6 cm in thickness with evidence of internal flow.    RIGHT OVARY: Measures 2.9 x 1.6 x 1.8 cm. Normal.    LEFT OVARY: Measures 1.7 x 1.3 x 1.3 cm. Normal.    No significant free fluid.      Impression    IMPRESSION:  1.  Mildly thickened heterogenous endometrial stripe measuring 1.6 cm in thickness with evidence of internal flow, findings can be seen with retained products of conception.             [place] Draw     Status: None    Narrative    The following orders were created for panel order [place] Draw.  Procedure                               Abnormality         Status                     ---------                               -----------         ------                     Extra Blue Top Tube[[id number]]                             Final result               Extra Red  Top Tube[[id number]]                              Final result               Extra Green Top (Lithiu...[[id number]]                      Final result               Extra Purple Top Tube[[id number]]                           Final result                 Please view results for these tests on the individual orders.   Extra Blue Top Tube     Status: None   Result Value Ref Range    Hold Specimen JIC    Extra Red Top Tube     Status: None   Result Value Ref Range    Hold Specimen JIC    Extra Green Top (Lithium Heparin) Tube     Status: None   Result Value Ref Range    Hold Specimen JIC    Extra Purple Top Tube     Status: None   Result Value Ref Range    Hold Specimen JIC    UA with Microscopic reflex to Culture     Status: Abnormal    Specimen: Urine, Midstream   Result Value Ref Range    Color Urine Light Brown (A) Colorless, Straw, Light Yellow, Yellow    Appearance Urine Clear Clear    Glucose Urine Negative Negative mg/dL    Bilirubin Urine Negative Negative    Ketones Urine Negative Negative mg/dL    Specific Gravity Urine 1.008 1.003 - 1.035    Blood Urine Large (A) Negative    pH Urine 6.5 5.0 - 7.0    Protein Albumin Urine Negative Negative mg/dL    Urobilinogen Urine Normal Normal mg/dL    Nitrite Urine Negative Negative    Leukocyte Esterase Urine Small (A) Negative    Mucus Urine Present (A) None Seen /LPF    RBC Urine >182 (H) <=2 /HPF    WBC Urine 3 <=5 /HPF    Squamous Epithelials Urine 1 <=1 /HPF    Narrative    Urine Culture not indicated   Basic metabolic panel     Status: Abnormal   Result Value Ref Range    Sodium 136 135 - 145 mmol/L    Potassium 3.6 3.4 - 5.3 mmol/L    Chloride 101 98 - 107 mmol/L    Carbon Dioxide (CO2) 27 22 - 29 mmol/L    Anion Gap 8 7 - 15 mmol/L    Urea Nitrogen 13.7 6.0 - 20.0 mg/dL    Creatinine 0.89 0.51 - 0.95 mg/dL    GFR Estimate 87 >60 mL/min/1.73m2    Calcium 9.1 8.8 - 10.4 mg/dL    Glucose 100 (H) 70 - 99 mg/dL   INR     Status: Abnormal   Result Value Ref Range     INR 1.47 (H) 0.85 - 1.15    PT 18.3 (H) 11.8 - 14.8 Seconds   Partial thromboplastin time     Status: Abnormal   Result Value Ref Range    aPTT 44 (H) 22 - 38 Seconds   CBC with platelets and differential     Status: None   Result Value Ref Range    WBC Count 9.9 4.0 - 11.0 10e3/uL    RBC Count 4.57 3.80 - 5.20 10e6/uL    Hemoglobin 12.6 11.7 - 15.7 g/dL    Hematocrit 38.0 35.0 - 47.0 %    MCV 83 78 - 100 fL    MCH 27.6 26.5 - 33.0 pg    MCHC 33.2 31.5 - 36.5 g/dL    RDW 12.6 10.0 - 15.0 %    Platelet Count 250 150 - 450 10e3/uL    % Neutrophils 72 %    % Lymphocytes 20 %    % Monocytes 5 %    % Eosinophils 2 %    % Basophils 0 %    % Immature Granulocytes 0 %    NRBCs per 100 WBC 0 <1 /100    Absolute Neutrophils 7.2 1.6 - 8.3 10e3/uL    Absolute Lymphocytes 2.0 0.8 - 5.3 10e3/uL    Absolute Monocytes 0.5 0.0 - 1.3 10e3/uL    Absolute Eosinophils 0.2 0.0 - 0.7 10e3/uL    Absolute Basophils 0.0 0.0 - 0.2 10e3/uL    Absolute Immature Granulocytes 0.0 <=0.4 10e3/uL    Absolute NRBCs 0.0 10e3/uL   HCG quantitative pregnancy     Status: Abnormal   Result Value Ref Range    hCG Quantitative 24 (H) <5 mIU/mL   Adult Type and Screen     Status: None   Result Value Ref Range    ABO/RH(D) O POS     Antibody Screen Negative Negative    SPECIMEN EXPIRATION DATE 6/8/2025 11:59:00 PM CDT    CBC with platelets differential     Status: None    Narrative    The following orders were created for panel order CBC with platelets differential.  Procedure                               Abnormality         Status                     ---------                               -----------         ------                     CBC with platelets and ...[[id number]]                      Final result                 Please view results for these tests on the individual orders.   ABO/Rh type and screen *Canceled*     Status: None ()    Narrative    The following orders were created for panel order ABO/Rh type and screen.  Procedure                                Abnormality         Status                     ---------                               -----------         ------                       Please view results for these tests on the individual orders.   ABO/Rh type and screen *Canceled*     Status: None ()    Narrative    The following orders were created for panel order ABO/Rh type and screen.  Procedure                               Abnormality         Status                     ---------                               -----------         ------                       Please view results for these tests on the individual orders.   ABO/Rh type and screen     Status: None    Narrative    The following orders were created for panel order ABO/Rh type and screen.  Procedure                               Abnormality         Status                     ---------                               -----------         ------                     Adult Type and Screen[[id number]]                           Final result                 Please view results for these tests on the individual orders.        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 She is admitted for repeat suction D&C secondary to retained products of conception.     # Vaginal bleeding 2/2 retained products of conception   - bHCG 24  - US EMS 1.6cm with internal flow. Given elevated bHCG, US finding suggestive of retained products of conception.   - Recommended definitive surgical management with suction D&C under ultrasound guidance. Discussed risks of infection, bleeding, and uterine perforation with possible damage to surrouding structures. All questions answered. Patient consented to proceed with suction D&C.  - Type and screen ordered  - Case request placed. Plan to head back to OR in next 4  hours.   - Patient kept NPO. Last meal at 2200.   - One time dose of Doxycycline 200mg prior to procedure.   - Discuss LNG IUD for management of AUB-I given chronic anticoagulation-she would like to have one placed   - Clear for same day discharge. Precautions reviewed - nothing in vagina for 2 weeks. Instructions to call if soaking through one pad an hour for 2 consecutive hours or if develop any signs of infection post-op including fever, chills, or foul smelling vaginal discharge.     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   - PTA Sotalol. Has not yet taken AM dose.   - Discussed with Anesthesiologist attending.       Discussed with Dr. Raulito Winters DO, MPH  Obstetrics and Gynecology, PGY-1  06/05/25, 8:29 AM    The patient was seen and evaluated by me separately from the team.  I have reviewed and edited the above note.     Pattie Cabezas MD, FACOG          Imaging: MRI of the left knee performed at [facility] on 2021 showed the followin. Near complete radial tear of the posterior horn of the medial meniscus.  2. Broad-based full-thickness chondral loss involving the majority of the central weightbearing portion of the medial femoral condyle medial tibial plateau.  3. Subchondral fracture with associated marrow edema within the outer aspect of the medial femoral condyle.  4. Mild to moderate medial patellofemoral arthrosis.    65 year old female with a pmhx of asthma last exacerbation <1 year ago, last used rescue inhaler in months, hypothyrodism, OA, endometrial cancer s/p hysterectomy     presents to PST today with complaints of left knee pain since Spring, pain is getting progressively worse, describes the pain as sharp, 9/10 in severity cannot walk, worse with sitting from standing, reports stiffness, cannot go up and down stairs, holding on to furniture to walk around her house, has tried cortisone injections without relief, diclofenac oral   Patient is scheduled for left knee total joint replacement with Dr De La Vega on 21.    Imaging: MRI of the left knee performed at [facility] on 2021 showed the followin. Near complete radial tear of the posterior horn of the medial meniscus.  2. Broad-based full-thickness chondral loss involving the majority of the central weightbearing portion of the medial femoral condyle medial tibial plateau.  3. Subchondral fracture with associated marrow edema within the outer aspect of the medial femoral condyle.  4. Mild to moderate medial patellofemoral arthrosis.    65 year old female with a pmhx of asthma last exacerbation 1 year ago, last used rescue inhaler months ago, hypothyrodism, OA, endometrial cancer s/p hysterectomy, recently had pituitary adenoma removed by Dr Kumar at [facility] 9/21. Patient presents to PST today with complaints of left knee pain since Spring, pain is getting progressively worse, describes the pain as sharp, 9/10 in severity cannot walk, worse with sitting from standing, reports stiffness, cannot go up and down stairs, holding on to furniture to walk around her house, has tried cortisone injections without relief, taking diclofenac PO. MRI of the knee revealed complete radial tear of the posterior horn of the medial meniscus, broad-based full-thickness chondral loss involving the majority of the central weightbearing portion of the medial femoral condyle medial tibial plateau, subchondral fracture with associated marrow edema within the outer aspect of the medial femoral condyle, mild to moderate medial patellofemoral arthrosis.  Patient is scheduled for left knee total joint replacement with Dr De La Vega on 11/24/21.  Medical and neurosurg clearance pending

## 2025-06-05 NOTE — ED PROVIDER NOTES
"ED Provider Note  [facility]      History     Chief Complaint   Patient presents with    Post-op Problem     Pt reports having surgery on may 22nd and recently had bleeding that started 06/03/2025 that have gotten worse today.      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  Patient underwent suction D&C and laparoscopic tubal ligation on 4/22 for an unplanned pregnancy secondary to IUD expulsion.  She had a normal menstrual cycle 2 weeks ago.  Then on Shaina 3, she started having \"heavy bleeding\".  The bleeding has been intermittent since then but has become more intense over the past 12 hours.  She had spoken with her OB team who recommended she come to the ED if bleeding resumes or becomes heavy.  Otherwise she would follow-up with plan for progesterone therapy and pelvic ultrasound.  Patient denies any pelvic or abdominal pain.  She states she felt lightheaded 2 days ago but denies any lightheadedness, dizziness, or shortness of breath now.  No fever/chills or other infectious symptoms.    Past Medical History  Past Medical History:   Diagnosis Date    Automatic implantable cardioverter-defibrillator in situ     Cardiac arrest (H)     History of atrial flutter     History of venous thromboembolism      Past Surgical History:   Procedure Laterality Date    CV CENTRAL VENOUS CATHETER PLACEMENT N/A 8/5/2023    Procedure: Central Venous Catheter Placement;  Surgeon: Sid Liz MD;  Location: [facility] CARDIAC CATH LAB    CV CORONARY ANGIOGRAM N/A 8/5/2023    Procedure: Coronary Angiogram;  Surgeon: Sid Liz MD;  Location: [facility] CARDIAC CATH LAB    DILATION AND CURETTAGE SUCTION N/A 2/16/2024    Procedure: DILATION AND CURETTAGE, UTERUS, USING SUCTION,;  Surgeon: Shreya Mcdonnell MD;  [facility]    DILATION AND " "CURETTAGE SUCTION Bilateral 4/22/2025    Procedure: DILATION AND CURETTAGE, UTERUS, USING SUCTION,;  Surgeon: Caprice Giron MD;  [facility] OR    EP SICD INSERT N/A 8/15/2023    Procedure: Subcutaneous Implantable Cardioverter Defibrillator Implant;  Surgeon: Eula Johnson MD;  [facility]    INSERT INTRAUTERINE DEVICE N/A 2/16/2024    Procedure: placement of Mirena intrauterine device;  Surgeon: Shreya Mcdonnell MD;  [facility] OR    SALPINGECTOMY, LAPAROSCOPIC Bilateral 4/22/2025    Procedure: LAPAROSCOPY WITH BILATERAL SALPINGECTOMY;  Surgeon: Caprice Giron MD;  [facility] OR     acetaminophen (TYLENOL) 325 MG tablet  chlorhexidine (HIBICLENS) 4 % solution  enoxaparin ANTICOAGULANT (LOVENOX) 40 MG/0.4ML syringe  ibuprofen (ADVIL/MOTRIN) 800 MG tablet  ondansetron (ZOFRAN ODT) 4 MG ODT tab  oxyCODONE (ROXICODONE) 5 MG tablet  Polysaccharide-Iron Complex 150 MG CAPS  rivaroxaban ANTICOAGULANT (XARELTO) 20 MG TABS tablet  senna-docusate (SENOKOT-S/PERICOLACE) 8.6-50 MG tablet  sotalol (BETAPACE) 80 MG tablet      No Known Allergies  Family History  No family history on file.  Social History   Social History     Tobacco Use    Smoking status: Never     Passive exposure: Never    Smokeless tobacco: Never   Vaping Use    Vaping status: Never Used   Substance Use Topics    Alcohol use: Never    Drug use: Never      A medically appropriate review of systems was performed with pertinent positives and negatives noted in the HPI, and all other systems negative.    Physical Exam   BP: 95/66  Pulse: 78  Temp: 98.3  F (36.8  C)  Resp: 18  Height: 157.5 cm (5' 2\")  Weight: 57 kg (125 lb 9.6 oz)  SpO2: 98 %  Physical Exam  Vitals and nursing note reviewed. Exam conducted with a chaperone present.   Constitutional:       General: She is in acute distress.      Appearance: Normal appearance.   HENT:      Head: Normocephalic.      Nose: Nose normal.   Eyes:      Pupils: Pupils are equal, " round, and reactive to light.   Cardiovascular:      Rate and Rhythm: Normal rate and regular rhythm.   Pulmonary:      Effort: Pulmonary effort is normal.   Abdominal:      General: There is no distension.   Genitourinary:     Comments: Golf ball size blood clot noted at the vaginal introitus.  Continued venous oozing from the cervix.  No foreign bodies or tissue.  Musculoskeletal:         General: No deformity. Normal range of motion.      Cervical back: Normal range of motion.   Skin:     General: Skin is warm.   Neurological:      Mental Status: She is alert and oriented to person, place, and time.   Psychiatric:         Mood and Affect: Mood normal.           ED Course, Procedures, & Data           Results for orders placed or performed during the hospital encounter of 06/05/25   US Pelvic Complete w Transvaginal     Status: None    Narrative    EXAM: US PELVIC TRANSABDOMINAL AND TRANSVAGINAL  LOCATION: [facility]  DATE: 6/5/2025    INDICATION: Vaginal bleeding.  Recent d c and tubal ligation  COMPARISON: CT abdomen pelvis on 10/3/2023 and pelvic ultrasound on 10/24/2024.  TECHNIQUE: Transabdominal scans were performed. Endovaginal ultrasound was performed to better visualize the adnexa.    FINDINGS:    UTERUS: Measures 7.4 x 5.6 x 6.0 cm. Normal in size and position with no masses.    ENDOMETRIUM: Mildly thickened heterogenous endometrial stripe measuring 1.6 cm in thickness with evidence of internal flow.    RIGHT OVARY: Measures 2.9 x 1.6 x 1.8 cm. Normal.    LEFT OVARY: Measures 1.7 x 1.3 x 1.3 cm. Normal.    No significant free fluid.      Impression    IMPRESSION:  1.  Mildly thickened heterogenous endometrial stripe measuring 1.6 cm in thickness with evidence of internal flow, findings can be seen with retained products of conception.             Hardy Draw     Status: None    Narrative    The following orders were created for panel order Hardy  Draw.  Procedure                               Abnormality         Status                     ---------                               -----------         ------                     Extra Blue Top Tube[[id number]]                             Final result               Extra Red Top Tube[[id number]]                              Final result               Extra Green Top (Lithiu...[[id number]]                      Final result               Extra Purple Top Tube[[id number]]                           Final result                 Please view results for these tests on the individual orders.   Extra Blue Top Tube     Status: None   Result Value Ref Range    Hold Specimen JIC    Extra Red Top Tube     Status: None   Result Value Ref Range    Hold Specimen JIC    Extra Green Top (Lithium Heparin) Tube     Status: None   Result Value Ref Range    Hold Specimen JIC    Extra Purple Top Tube     Status: None   Result Value Ref Range    Hold Specimen JIC    UA with Microscopic reflex to Culture     Status: Abnormal    Specimen: Urine, Midstream   Result Value Ref Range    Color Urine Light Brown (A) Colorless, Straw, Light Yellow, Yellow    Appearance Urine Clear Clear    Glucose Urine Negative Negative mg/dL    Bilirubin Urine Negative Negative    Ketones Urine Negative Negative mg/dL    Specific Gravity Urine 1.008 1.003 - 1.035    Blood Urine Large (A) Negative    pH Urine 6.5 5.0 - 7.0    Protein Albumin Urine Negative Negative mg/dL    Urobilinogen Urine Normal Normal mg/dL    Nitrite Urine Negative Negative    Leukocyte Esterase Urine Small (A) Negative    Mucus Urine Present (A) None Seen /LPF    RBC Urine >182 (H) <=2 /HPF    WBC Urine 3 <=5 /HPF    Squamous Epithelials Urine 1 <=1 /HPF    Narrative    Urine Culture not indicated   Basic metabolic panel     Status: Abnormal   Result Value Ref Range    Sodium 136 135 - 145 mmol/L    Potassium 3.6 3.4 - 5.3 mmol/L    Chloride 101 98 - 107 mmol/L    Carbon Dioxide (CO2) 27  22 - 29 mmol/L    Anion Gap 8 7 - 15 mmol/L    Urea Nitrogen 13.7 6.0 - 20.0 mg/dL    Creatinine 0.89 0.51 - 0.95 mg/dL    GFR Estimate 87 >60 mL/min/1.73m2    Calcium 9.1 8.8 - 10.4 mg/dL    Glucose 100 (H) 70 - 99 mg/dL   INR     Status: Abnormal   Result Value Ref Range    INR 1.47 (H) 0.85 - 1.15    PT 18.3 (H) 11.8 - 14.8 Seconds   Partial thromboplastin time     Status: Abnormal   Result Value Ref Range    aPTT 44 (H) 22 - 38 Seconds   CBC with platelets and differential     Status: None   Result Value Ref Range    WBC Count 9.9 4.0 - 11.0 10e3/uL    RBC Count 4.57 3.80 - 5.20 10e6/uL    Hemoglobin 12.6 11.7 - 15.7 g/dL    Hematocrit 38.0 35.0 - 47.0 %    MCV 83 78 - 100 fL    MCH 27.6 26.5 - 33.0 pg    MCHC 33.2 31.5 - 36.5 g/dL    RDW 12.6 10.0 - 15.0 %    Platelet Count 250 150 - 450 10e3/uL    % Neutrophils 72 %    % Lymphocytes 20 %    % Monocytes 5 %    % Eosinophils 2 %    % Basophils 0 %    % Immature Granulocytes 0 %    NRBCs per 100 WBC 0 <1 /100    Absolute Neutrophils 7.2 1.6 - 8.3 10e3/uL    Absolute Lymphocytes 2.0 0.8 - 5.3 10e3/uL    Absolute Monocytes 0.5 0.0 - 1.3 10e3/uL    Absolute Eosinophils 0.2 0.0 - 0.7 10e3/uL    Absolute Basophils 0.0 0.0 - 0.2 10e3/uL    Absolute Immature Granulocytes 0.0 <=0.4 10e3/uL    Absolute NRBCs 0.0 10e3/uL   HCG quantitative pregnancy     Status: Abnormal   Result Value Ref Range    hCG Quantitative 24 (H) <5 mIU/mL   Adult Type and Screen     Status: None (Preliminary result)   Result Value Ref Range    SPECIMEN EXPIRATION DATE 6/8/2025 11:59:00 PM CDT    CBC with platelets differential     Status: None    Narrative    The following orders were created for panel order CBC with platelets differential.  Procedure                               Abnormality         Status                     ---------                               -----------         ------                     CBC with platelets and ...[[id number]]                      Final result                  Please view results for these tests on the individual orders.   ABO/Rh type and screen *Canceled*     Status: None ()    Narrative    The following orders were created for panel order ABO/Rh type and screen.  Procedure                               Abnormality         Status                     ---------                               -----------         ------                       Please view results for these tests on the individual orders.   ABO/Rh type and screen *Canceled*     Status: None ()    Narrative    The following orders were created for panel order ABO/Rh type and screen.  Procedure                               Abnormality         Status                     ---------                               -----------         ------                       Please view results for these tests on the individual orders.   ABO/Rh type and screen     Status: None (In process)    Narrative    The following orders were created for panel order ABO/Rh type and screen.  Procedure                               Abnormality         Status                     ---------                               -----------         ------                     Adult Type and Screen[[id number]]                           Preliminary result           Please view results for these tests on the individual orders.     Medications - No data to display  Labs Ordered and Resulted from Time of ED Arrival to Time of ED Departure   ROUTINE UA WITH MICROSCOPIC REFLEX TO CULTURE - Abnormal       Result Value    Color Urine Light Brown (*)     Appearance Urine Clear      Glucose Urine Negative      Bilirubin Urine Negative      Ketones Urine Negative      Specific Gravity Urine 1.008      Blood Urine Large (*)     pH Urine 6.5      Protein Albumin Urine Negative      Urobilinogen Urine Normal      Nitrite Urine Negative      Leukocyte Esterase Urine Small (*)     Mucus Urine Present (*)     RBC Urine >182 (*)     WBC Urine 3      Squamous Epithelials  Urine 1     BASIC METABOLIC PANEL - Abnormal    Sodium 136      Potassium 3.6      Chloride 101      Carbon Dioxide (CO2) 27      Anion Gap 8      Urea Nitrogen 13.7      Creatinine 0.89      GFR Estimate 87      Calcium 9.1      Glucose 100 (*)    INR - Abnormal    INR 1.47 (*)     PT 18.3 (*)    PARTIAL THROMBOPLASTIN TIME - Abnormal    aPTT 44 (*)    HCG QUANTITATIVE PREGNANCY - Abnormal    hCG Quantitative 24 (*)    CBC WITH PLATELETS AND DIFFERENTIAL    WBC Count 9.9      RBC Count 4.57      Hemoglobin 12.6      Hematocrit 38.0      MCV 83      MCH 27.6      MCHC 33.2      RDW 12.6      Platelet Count 250      % Neutrophils 72      % Lymphocytes 20      % Monocytes 5      % Eosinophils 2      % Basophils 0      % Immature Granulocytes 0      NRBCs per 100 WBC 0      Absolute Neutrophils 7.2      Absolute Lymphocytes 2.0      Absolute Monocytes 0.5      Absolute Eosinophils 0.2      Absolute Basophils 0.0      Absolute Immature Granulocytes 0.0      Absolute NRBCs 0.0     TYPE AND SCREEN, ADULT    SPECIMEN EXPIRATION DATE 6/8/2025 11:59:00 PM CDT     ABO/RH TYPE AND SCREEN     US Pelvic Complete w Transvaginal   Final Result   IMPRESSION:   1.  Mildly thickened heterogenous endometrial stripe measuring 1.6 cm in thickness with evidence of internal flow, findings can be seen with retained products of conception.                            Critical care was not performed.     Medical Decision Making  The patient's presentation was of high complexity (an acute health issue posing potential threat to life or bodily function).    The patient's evaluation involved:  ordering and/or review of 3+ test(s) in this encounter (see separate area of note for details) Case discussed with OB/GYN.    The patient's management necessitated high risk (a decision regarding hospitalization).    Assessment & Plan    On arrival, patient appears distressed due to continued bleeding.  Large clot was removed during pelvic exam and noted  continued bleeding from the cervix.  She is hemodynamically stable.  Hemoglobin is 12.  Pelvic ultrasound shows evidence of retained products of conception.  Quantitative hCG is elevated.  OB/GYN is consulted.  Plan to take patient to the OR for repeat D&C.    I have reviewed the nursing notes. I have reviewed the findings, diagnosis, plan and need for follow up with the patient.    New Prescriptions    No medications on file       Final diagnoses:   Retained products of conception with hemorrhage       Hola Mendes DO  [facility] EMERGENCY DEPARTMENT  6/5/2025     Hola Mendes DO  06/05/25 [id number]

## 2025-06-06 NOTE — ANESTHESIA POSTPROCEDURE EVALUATION
Patient: Estela Fry    Procedure: Procedure(s):  DILATION AND CURETTAGE, UTERUS, USING SUCTION  Insert intrauterine device       Anesthesia Type:  General    Note:  Disposition: Outpatient   Postop Pain Control: Uneventful            Sign Out: Well controlled pain   PONV: No   Neuro/Psych: Uneventful            Sign Out: Acceptable/Baseline neuro status   Airway/Respiratory: Uneventful            Sign Out: Acceptable/Baseline resp. status   CV/Hemodynamics: Uneventful            Sign Out: Acceptable CV status; No obvious hypovolemia; No obvious fluid overload   Other NRE: NONE   DID A NON-ROUTINE EVENT OCCUR? No           Last vitals:  Vitals Value Taken Time   BP 98/67 06/05/25 16:30   Temp 36.5  C (97.7  F) 06/05/25 16:29   Pulse     Resp 16 06/05/25 16:29   SpO2 99 % 06/05/25 16:30   Vitals shown include unfiled device data.    Electronically Signed By: La Lora MD  June 5, 2025  7:57 PM

## 2025-06-16 ENCOUNTER — RESULTS FOLLOW-UP (OUTPATIENT)
Dept: OBGYN | Facility: CLINIC | Age: 35
End: 2025-06-16

## 2025-06-19 ENCOUNTER — MYC MEDICAL ADVICE (OUTPATIENT)
Dept: OBGYN | Facility: CLINIC | Age: 35
End: 2025-06-19
Payer: COMMERCIAL

## 2025-06-24 NOTE — TELEPHONE ENCOUNTER
Patient inquired on bulge.     She is a  who had TOP on 2025 due to high-risk co-morbidities. Patient ultimately required a second D&C for retained products of conception.     PAST MEDICAL HISTORY:   Past Medical History:   Diagnosis Date    Automatic implantable cardioverter-defibrillator in situ     Cardiac arrest (H)     History of atrial flutter     History of venous thromboembolism      PAST SURGICAL HISTORY:   Past Surgical History:   Procedure Laterality Date    CV CENTRAL VENOUS CATHETER PLACEMENT N/A 2023    Procedure: Central Venous Catheter Placement;  Surgeon: Sid Liz MD;  Location: [facility] CARDIAC CATH LAB    CV CORONARY ANGIOGRAM N/A 2023    Procedure: Coronary Angiogram;  Surgeon: Sid Liz MD;  Location: [facility] CARDIAC CATH LAB    DILATION AND CURETTAGE SUCTION N/A 2024    Procedure: DILATION AND CURETTAGE, UTERUS, USING SUCTION,;  Surgeon: Shreya Mcdonnell MD;  [facility]    DILATION AND CURETTAGE SUCTION Bilateral 2025    Procedure: DILATION AND CURETTAGE, UTERUS, USING SUCTION,;  Surgeon: Caprice Giron MD;  [facility]    DILATION AND CURETTAGE SUCTION N/A 2025    Procedure: DILATION AND CURETTAGE, UTERUS, USING SUCTION;  Surgeon: Pattie Cabezas MD;  [facility]    EP SICD INSERT N/A 8/15/2023    Procedure: Subcutaneous Implantable Cardioverter Defibrillator Implant;  Surgeon: Eula Johnson MD;  Location: [facility] CARDIAC CATH LAB    INSERT INTRAUTERINE DEVICE N/A 2024    Procedure: placement of Mirena intrauterine device;  Surgeon: Shreya Mcdonnell MD;  [facility]    INSERT INTRAUTERINE DEVICE N/A 2025    Procedure: Insert intrauterine device;  Surgeon: Pattie Cabezas MD;  [facility]    SALPINGECTOMY, LAPAROSCOPIC Bilateral 2025    Procedure: LAPAROSCOPY WITH BILATERAL SALPINGECTOMY;  Surgeon: Caprice Giron MD;  [facility]     Patient had a pelvic exam noted  "7/13/2024 in ED. She was seen in clinic with Dr. Tavera on 9/17/2024 for IUD check. US impression stated IUD was in correct position and normal pelvic US. She was referred to PFPT by Dr. Tavera for stress incontinence and pelvic pain with intercourse. Patient was seen again in clinic on 10/29/2024 with Paulina, SHAMIKA who deferred pelvic exam due to recent TVUS.     Patient was seen initially with PT on 11/25/24 who noted: \"Pt reports \"I'm worried I have prolapse,\" and was told that she has bladder prolapse a while ago by one of her providers. She reports a heaviness in her vaginal area that feels worse with PMS, and previously had this feeling back in 2021 as well. The leaking and heaviness associated with the UTI/BV in October has largely resolved after completing metroNIDAZOLE. Reports that the night before she had her UTI, intercourse was 10/10 pain. Hx of tearing with her first and second born, x9 deliveries with children between 4 and 13yo. Denies leaking, rushing to the toilet or use of pads in the past week.\" Patient was not seen again as she requested to wait until things settle down with her other medical appointments.     Patient is scheduled 7/17 with Dr. Giron for follow-up. [employer] message sent to patient encouraging to schedule again with PFPT. Routed to [facility], Dr. Elder, for insight.   "

## 2025-06-30 ENCOUNTER — TELEPHONE (OUTPATIENT)
Dept: OBGYN | Facility: CLINIC | Age: 35
End: 2025-06-30
Payer: COMMERCIAL

## 2025-06-30 NOTE — TELEPHONE ENCOUNTER
Received consent for sterilization form.    Placed form in Marlyn's mailbox for Dr. Giron to review.     Per message, form is missing provider info. That portion needs to be completed then faxed back.

## 2025-07-07 ENCOUNTER — MYC MEDICAL ADVICE (OUTPATIENT)
Dept: HEMATOLOGY | Facility: CLINIC | Age: 35
End: 2025-07-07
Payer: COMMERCIAL

## 2025-07-07 DIAGNOSIS — I82.C22: ICD-10-CM

## 2025-07-07 DIAGNOSIS — Z79.01 CHRONIC ANTICOAGULATION: ICD-10-CM

## 2025-07-07 DIAGNOSIS — I82.220 IVC THROMBOSIS (H): ICD-10-CM

## 2025-07-08 RX ORDER — IRON POLYSACCHARIDE COMPLEX 150 MG
1 CAPSULE ORAL DAILY
Qty: 90 CAPSULE | Refills: 1 | Status: SHIPPED | OUTPATIENT
Start: 2025-07-08

## 2025-07-08 NOTE — TELEPHONE ENCOUNTER
Estela Fry is followed at [facility] for their history of multiple VTEs.     They were last evaluated by our team on 02/05/2025 with the plan to remain on long term anticoagulation and return to clinic in 1 year.    Prescription updated and refills given.     Felix MAYER RN  [facility]  Office: [phone]  Clinic: [phone]  Fax: [phone]

## 2025-07-17 ENCOUNTER — ANCILLARY PROCEDURE (OUTPATIENT)
Dept: ULTRASOUND IMAGING | Facility: CLINIC | Age: 35
End: 2025-07-17
Attending: OBSTETRICS & GYNECOLOGY
Payer: COMMERCIAL

## 2025-07-17 ENCOUNTER — MYC MEDICAL ADVICE (OUTPATIENT)
Dept: OBGYN | Facility: CLINIC | Age: 35
End: 2025-07-17

## 2025-07-17 ENCOUNTER — OFFICE VISIT (OUTPATIENT)
Dept: OBGYN | Facility: CLINIC | Age: 35
End: 2025-07-17
Attending: OBSTETRICS & GYNECOLOGY
Payer: COMMERCIAL

## 2025-07-17 VITALS
SYSTOLIC BLOOD PRESSURE: 107 MMHG | HEART RATE: 65 BPM | BODY MASS INDEX: 24.13 KG/M2 | DIASTOLIC BLOOD PRESSURE: 74 MMHG | WEIGHT: 131.1 LBS | HEIGHT: 62 IN

## 2025-07-17 DIAGNOSIS — N81.10 FEMALE BLADDER PROLAPSE: Primary | ICD-10-CM

## 2025-07-17 DIAGNOSIS — N93.9 ABNORMAL UTERINE BLEEDING (AUB): ICD-10-CM

## 2025-07-17 PROCEDURE — 76377 3D RENDER W/INTRP POSTPROCES: CPT

## 2025-07-17 PROCEDURE — G0463 HOSPITAL OUTPT CLINIC VISIT: HCPCS | Performed by: STUDENT IN AN ORGANIZED HEALTH CARE EDUCATION/TRAINING PROGRAM

## 2025-07-17 PROCEDURE — 76830 TRANSVAGINAL US NON-OB: CPT | Mod: 26 | Performed by: STUDENT IN AN ORGANIZED HEALTH CARE EDUCATION/TRAINING PROGRAM

## 2025-07-17 ASSESSMENT — PATIENT HEALTH QUESTIONNAIRE - PHQ9: SUM OF ALL RESPONSES TO PHQ QUESTIONS 1-9: 3

## 2025-07-17 NOTE — PROGRESS NOTES
[facility] Return Gynecology Patient      HPI: Estela Fry is a 35 year old year old  who presents for follow up of recent hospital admission for D&C and feeling of bulging in vagina. Today, she feels a bulging in the anterior part of the vagina, which started a week or two after the D&C. She had felt it previously for a while in  but it resolved on its own. At that time was told she had a bladder prolapse. Vaginal discharge is regular. No changes in odors. Experiencing urinary frequency, sometimes feels like incomplete voiding. No pain. Denies fevers, chills, vaginal bleeding outside of menstrual period.     Mood is up and down in the setting of still recovering from the surgery. Feeling fatigued since the surgery. Does have some thoughts of self harm in the setting of feeling uncomfortable about her body and stress about these ongoing symptoms. Recent bulging feeling in the vagina is contributing to these symptoms. Plans to visit a mental health professional about these feelings.     ROS: 10-Point ROS negative except as noted in HPI    History:    - LMP: Patient's last menstrual period was 10/14/2024 (exact date).  - Menses: regular  - Pap History:    - Vaccination status: Completed   - Last Pap: 24   - HPV negative  - Sexual Activity/Concerns: as above    Past Medical History:   Diagnosis Date    Automatic implantable cardioverter-defibrillator in situ     Cardiac arrest (H)     History of atrial flutter     History of venous thromboembolism        Past Surgical History:   Procedure Laterality Date    CV CENTRAL VENOUS CATHETER PLACEMENT N/A 2023    Procedure: Central Venous Catheter Placement;  Surgeon: Sdi Liz MD;  Location: [facility] CARDIAC CATH LAB    CV CORONARY ANGIOGRAM N/A 2023    Procedure: Coronary Angiogram;  Surgeon: Sid Liz MD;  Location: [facility] CARDIAC CATH LAB    DILATION AND CURETTAGE SUCTION N/A 2024    Procedure: DILATION  "AND CURETTAGE, UTERUS, USING SUCTION,;  Surgeon: Shreya Mcdonnell MD;  [facility]: [facility]    DILATION AND CURETTAGE SUCTION Bilateral 4/22/2025    Procedure: DILATION AND CURETTAGE, UTERUS, USING SUCTION,;  Surgeon: Caprice Giron MD;  [facility]    DILATION AND CURETTAGE SUCTION N/A 6/5/2025    Procedure: DILATION AND CURETTAGE, UTERUS, USING SUCTION;  Surgeon: Pattie Cabezas MD;  [facility]    EP SICD INSERT N/A 8/15/2023    Procedure: Subcutaneous Implantable Cardioverter Defibrillator Implant;  Surgeon: Eula Johnson MD;  [facility]    INSERT INTRAUTERINE DEVICE N/A 2/16/2024    Procedure: placement of Mirena intrauterine device;  Surgeon: Shreya Mcdonnell MD;  [facility]    INSERT INTRAUTERINE DEVICE N/A 6/5/2025    Procedure: Insert intrauterine device;  Surgeon: Pattie Cabezas MD;  [facility]    SALPINGECTOMY, LAPAROSCOPIC Bilateral 4/22/2025    Procedure: LAPAROSCOPY WITH BILATERAL SALPINGECTOMY;  Surgeon: Caprice Giron MD;  [facility]       Social History:  Social History     Tobacco Use    Smoking status: Never     Passive exposure: Never    Smokeless tobacco: Never   Substance Use Topics    Alcohol use: Never       Physical Exam:     /74   Pulse 65   Ht 1.575 m (5' 2\")   Wt 59.5 kg (131 lb 1.6 oz)   LMP 10/14/2024 (Exact Date)   BMI 23.98 kg/m    Body mass index is 23.98 kg/m .    General: Alert and oriented, no acute distress  Psych: Appropriate affect  CV: Well perfused, normal heart rate  Lungs: No increased work of breathing    Pelvic:  Normal appearing external female genitalia. Normal hair distribution. Vagina is without lesions. White discharge. Cervix multiparous, no lesions, no cervical motion tenderness. Stage 1-2 anterior vaginal wall prolapse visible with valsalva (~1-2cm from vaginal introitus). Minimal posterior vaginal wall prolapse. Minimal uterine prolapse.    Assessment and Plan:  Estela Fry" is a 35 year old female presenting for follow up of recent D&C (6/5/25). Anterior wall prolapse visible on exam with valsalva.     Stage 1-2 Anterior Vaginal Wall Prolapse  - Referral to pelvic floor therapy placed  - Patient counseled on benefits of pelvic floor therapy and potential referral to urogynecology if this fails  - Return to clinic as needed    Depression   Thoughts of self harm  - Patient intends to follow up with Dr. Escobar for mental health concerns  - Mostly related to feelings about her body, but it is improving.    Monique Silva, MS3  [employer] [facility] [employer]    Ob/Gyn Attending Attestation  I, Caprice Giron, was present with the medical student who participated in the service and in the documentation of the note.  I have verified the history and personally performed the physical exam and medical decision making.  I agree with the assessment and plan of care as documented in the note.  I have reviewed the note and made changes as necessary.    Caprice Giron MD  [facility], Ob/Gyn  07/17/2025 1:38 PM

## 2025-07-17 NOTE — PATIENT INSTRUCTIONS
Thank you for trusting us with your care!   Please be aware, if you are on Mychart, you may see your results prior to your providers' review. If labs are abnormal, we will call or message you on [employer] with a follow up plan.    If you need to contact us for questions about:  Symptoms, Scheduling & Medical Questions: Non-urgent (2-3 day response), send [employer] message. For urgent (needing response today), call [phone].   Prescriptions: Please call your Pharmacy   Billing: [place] [phone] or  Physicians: [phone]

## 2025-07-17 NOTE — LETTER
2025       RE: Estela Fry  [address] [place] [address]     Dear Colleague,    Thank you for referring your patient, Estela Fry, to the [facility]. Please see a copy of my visit note below.    [facility] Return Gynecology Patient      HPI: Estela Fry is a 35 year old year old  who presents for follow up of recent hospital admission for D&C and feeling of bulging in vagina. Today, she feels a bulging in the anterior part of the vagina, which started a week or two after the D&C. She had felt it previously for a while in  but it resolved on its own. At that time was told she had a bladder prolapse. Vaginal discharge is regular. No changes in odors. Experiencing urinary frequency, sometimes feels like incomplete voiding. No pain. Denies fevers, chills, vaginal bleeding outside of menstrual period.     Mood is up and down in the setting of still recovering from the surgery. Feeling fatigued since the surgery. Does have some thoughts of self harm in the setting of feeling uncomfortable about her body and stress about these ongoing symptoms. Recent bulging feeling in the vagina is contributing to these symptoms. Plans to visit a mental health professional about these feelings.     ROS: 10-Point ROS negative except as noted in HPI    History:    - LMP: Patient's last menstrual period was 10/14/2024 (exact date).  - Menses: regular  - Pap History:    - Vaccination status: Completed   - Last Pap: 24   - HPV negative  - Sexual Activity/Concerns: as above    Past Medical History:   Diagnosis Date     Automatic implantable cardioverter-defibrillator in situ      Cardiac arrest (H)      History of atrial flutter      History of venous thromboembolism        Past Surgical History:   Procedure Laterality Date     CV CENTRAL VENOUS CATHETER PLACEMENT N/A 2023    Procedure: Central Venous  "Catheter Placement;  Surgeon: Sid Liz MD;  Location: [facility] CARDIAC CATH LAB     CV CORONARY ANGIOGRAM N/A 8/5/2023    Procedure: Coronary Angiogram;  Surgeon: Sid Liz MD;  Location: [facility] CARDIAC CATH LAB     DILATION AND CURETTAGE SUCTION N/A 2/16/2024    Procedure: DILATION AND CURETTAGE, UTERUS, USING SUCTION,;  Surgeon: Shreya Mcdonnell MD;  [facility]     DILATION AND CURETTAGE SUCTION Bilateral 4/22/2025    Procedure: DILATION AND CURETTAGE, UTERUS, USING SUCTION,;  Surgeon: Caprice Giron MD;  [facility]     DILATION AND CURETTAGE SUCTION N/A 6/5/2025    Procedure: DILATION AND CURETTAGE, UTERUS, USING SUCTION;  Surgeon: Pattie Cabezas MD;  [facility]     EP SICD INSERT N/A 8/15/2023    Procedure: Subcutaneous Implantable Cardioverter Defibrillator Implant;  Surgeon: Eula Johnson MD;  Location: [facility] CARDIAC CATH LAB     INSERT INTRAUTERINE DEVICE N/A 2/16/2024    Procedure: placement of Mirena intrauterine device;  Surgeon: Shreya Mcdonnell MD;  [facility]     INSERT INTRAUTERINE DEVICE N/A 6/5/2025    Procedure: Insert intrauterine device;  Surgeon: Pattie Cabezas MD;  [facility]     SALPINGECTOMY, LAPAROSCOPIC Bilateral 4/22/2025    Procedure: LAPAROSCOPY WITH BILATERAL SALPINGECTOMY;  Surgeon: Caprice Giron MD;  [facility]       Social History:  Social History     Tobacco Use     Smoking status: Never     Passive exposure: Never     Smokeless tobacco: Never   Substance Use Topics     Alcohol use: Never       Physical Exam:     /74   Pulse 65   Ht 1.575 m (5' 2\")   Wt 59.5 kg (131 lb 1.6 oz)   LMP 10/14/2024 (Exact Date)   BMI 23.98 kg/m    Body mass index is 23.98 kg/m .    General: Alert and oriented, no acute distress  Psych: Appropriate affect  CV: Well perfused, normal heart rate  Lungs: No increased work of breathing    Pelvic:  Normal appearing external female genitalia. Normal hair " distribution. Vagina is without lesions. White discharge. Cervix multiparous, no lesions, no cervical motion tenderness. Stage 1-2 anterior vaginal wall prolapse visible with valsalva (~1-2cm from vaginal introitus). Minimal posterior vaginal wall prolapse. Minimal uterine prolapse.    Assessment and Plan:  Estela Fry is a 35 year old female presenting for follow up of recent D&C (6/5/25). Anterior wall prolapse visible on exam with valsalva.     Stage 1-2 Anterior Vaginal Wall Prolapse  - Referral to pelvic floor therapy placed  - Patient counseled on benefits of pelvic floor therapy and potential referral to urogynecology if this fails  - Return to clinic as needed    Depression   Thoughts of self harm  - Patient intends to follow up with Dr. Escobar for mental health concerns  - Mostly related to feelings about her body, but it is improving.    Monique Silva, MS3  [employer]    Ob/Gyn Attending Attestation  I, Caprice Giron, was present with the medical student who participated in the service and in the documentation of the note.  I have verified the history and personally performed the physical exam and medical decision making.  I agree with the assessment and plan of care as documented in the note.  I have reviewed the note and made changes as necessary.    Caprice Giron MD  [facility], Ob/Gyn  07/17/2025 1:38 PM    Again, thank you for allowing me to participate in the care of your patient.      Sincerely,    Caprice Giron MD

## 2025-07-17 NOTE — NURSING NOTE
Chief Complaint   Patient presents with    Follow Up     Pelvic exam, vaginal bulging feeling      Depression Response    Patient completed the PHQ-9 assessment for depression and scored >9? No  Question 9 on the PHQ-9 was positive for suicidality? Yes  Does patient have current mental health provider? Unknown    Is this a virtual visit? No    I personally notified the following: visit provider

## 2025-07-19 ENCOUNTER — HEALTH MAINTENANCE LETTER (OUTPATIENT)
Age: 35
End: 2025-07-19

## 2025-07-21 ENCOUNTER — THERAPY VISIT (OUTPATIENT)
Dept: PHYSICAL THERAPY | Facility: REHABILITATION | Age: 35
End: 2025-07-21
Attending: STUDENT IN AN ORGANIZED HEALTH CARE EDUCATION/TRAINING PROGRAM
Payer: COMMERCIAL

## 2025-07-21 DIAGNOSIS — N81.89 PELVIC FLOOR WEAKNESS IN FEMALE: ICD-10-CM

## 2025-07-21 DIAGNOSIS — R29.898 HIP WEAKNESS: ICD-10-CM

## 2025-07-21 DIAGNOSIS — N81.10 FEMALE BLADDER PROLAPSE: ICD-10-CM

## 2025-07-21 DIAGNOSIS — M62.89 PELVIC FLOOR TENSION: Primary | ICD-10-CM

## 2025-07-21 PROCEDURE — 97161 PT EVAL LOW COMPLEX 20 MIN: CPT | Mod: GP

## 2025-07-21 PROCEDURE — 97110 THERAPEUTIC EXERCISES: CPT | Mod: GP

## 2025-07-21 NOTE — PROGRESS NOTES
PHYSICAL THERAPY EVALUATION  Type of Visit: Evaluation       Subjective   Pt is a 35 year old female here today regarding the feeling of bulging/bladder prolapse (grade 1/2 anterior) with the goal of improving this sensation and body mechanics. PMH several D&C's, grade 1/2 cystocele, cardiac arrest, venous thromboembolism, recent tubal ligation in April and IUD insertion in June. Pt does have some discomfort and pain which she feels is lingering from her tubal ligation. She notes discomfort with intercourse and does present with increased tension in pelvic muscles today. Pt reports small amount of leakage/drops on underwear but sometimes is uncertain if this is just discharge. Pt has 7 children through vaginal delivery. Sometimes feeling of urinary urgency and incomplete bladder emptying.     INSERT INTRAUTERINE DEVICE N/A 6/5/2025   Procedure: Insert intrauterine device;  Surgeon: Pattie Cabezas MD;  Location: [facility]   SALPINGECTOMY, LAPAROSCOPIC Bilateral 4/22/2025   Procedure: LAPAROSCOPY WITH BILATERAL SALPINGECTOMY;  Surgeon: Caprice Giron MD;  Location: [facility]      DILATION AND CURETTAGE SUCTION N/A 2/16/2024    Procedure: DILATION AND CURETTAGE, UTERUS, USING SUCTION,;  Surgeon: Shreya Mcdonnell MD;  Location: [facility]    DILATION AND CURETTAGE SUCTION Bilateral 4/22/2025    Procedure: DILATION AND CURETTAGE, UTERUS, USING SUCTION,;  Surgeon: Caprice Giron MD;  Location: [facility]    DILATION AND CURETTAGE SUCTION N/A 6/5/2025    Procedure: DILATION AND CURETTAGE, UTERUS, USING SUCTION;  Surgeon: Pattie Cabezas MD;  Location: [facility]     Pelvic Floor Distress Inventory    Please answer the following questions. If you are unsure about how to answer a question, give the best answer you can.     Do you usually experience pressure in the lower abdomen?    Do you usually experience heaviness or dullness in the pelvic area?    Do you usually have a bulge or something falling out that you  can see or feel in the vaginal area?    Do you usually have to push on the vagina or around the rectum to have or complete a bowel movement?    Do you usually experience a feeling of incomplete bladder emptying?    Do you ever have to push up on a bulge in the vaginal area with your fingers to start or complete urination?    Do you feel you need to strain too hard to have a bowel movement?    Do you feel you have not completely emptied your bowels at the end of a bowel movement?    Do you usually lose stool beyond your control if your stool is well formed?    Do you usually lose stool beyond your control if your stool is loose?    Do you usually lose gas from the rectum beyond your control?    Do you usually have pain when you pass your stool?    Do you experience a strong sense of urgency and have to rush to the bathroom to have a bowel movemet?    Does a part of your bowel ever pass through the rectum and bulge outside during or after a bowel movement?    Do you usually experience frequent urination?    Do you usually experience urine leakage associated with a feeling of urgency that is a strong sensation of needing to go to the bathroom?    Do you usually experience urine leakage related to coughing, sneezing or laughing?    Do you usually experience small amounts of leakage (that is, drops)?    Do you usually experience difficulty emptying your bladder?    Do you usually experience pain or discomfort in the lower abdomen or genital region?    Questions about activity:   Do you usually experience urine leakage during exercise such as walking, running, aerobics or tennis?    Do you usually have urine leakage when lifting or bending over?        PFDI score: PFDI-20 Total Score: 56.25       07/21/25 Main findings:   Biofeedback :   Perianals: Baseline muscle activity: 7.4 microvolts  Max muscle activity: 17mV          Presenting condition or subjective complaint:    Date of onset: 07/11/25 (Dr. Leyla Tavera)     Relevant medical history:     Dates & types of surgery:      Prior diagnostic imaging/testing results:       Prior therapy history for the same diagnosis, illness or injury:        Living Environment  Social support:    & 7 children  Type of home:     Stairs to enter the home:         Ramp:     Stairs inside the home:         Help at home:    Equipment owned:       Employment:      Hobbies/Interests:      Patient goals for therapy:      Pain assessment:      Objective      ADDITIONAL HISTORY:  Sex assigned at birth: (Patient-Rptd) Female  Gender identity: (Patient-Rptd) Female    Pronouns: (Patient-Rptd) She/Her Hers       Bladder History:  Feels bladder filling: (Patient-Rptd) Yes  Triggers for feeling of inability to wait to go to the bathroom: (Patient-Rptd) No    How long can you wait to urinate: (Patient-Rptd) sometimes 3-4 hours  Gets up at night to urinate: (Patient-Rptd) Yes (Patient-Rptd) 1  Can stop the flow of urine when urinating: (Patient-Rptd) Yes  Volume of urine usually released: (Patient-Rptd) Medium   Other issues:    Number of bladder infections in last 12 months:    Fluid intake per day: (Patient-Rptd) 64 oz      Medications taken for bladder: (Patient-Rptd) No     Activities causing urine leak:      Amount of urine typically leaked:    Pads used to help with leaking: (Patient-Rptd) Yes I use this many pads per day: (Patient-Rptd) 2   I use this type/brand: (Patient-Rptd) caress        Bowel History:  Frequency of bowel movement: (Patient-Rptd) 1  Consistency of stool: (Patient-Rptd) Soft-formed    Ignores the urge to defecate: (Patient-Rptd) No  Other bowel issues:  denies straining   Length of time spent trying to have a bowel movement:       Sexual Function History:  Sexual orientation: (Patient-Rptd) Straight    Sexually active: (Patient-Rptd) No  Lubrication used: (Patient-Rptd) No (Patient-Rptd) No   Pelvic pain:    deeper penetration, sometimes with stomach or on the side   Pain  or difficulty with orgasms/erection/ejaculation: (Patient-Rptd) Yes (Patient-Rptd) some times during intercourse  State of menopause:    Hormone medications: (Patient-Rptd) No       Are you currently pregnant: (Patient-Rptd) No  Number of previous pregnancies: (Patient-Rptd) 13  Number of deliveries: (Patient-Rptd) 9  If you have delivered before, did you have any of these issues during delivery: (Patient-Rptd) Tearing; Vaginal delivery  Have you been diagnosed with pelvic prolapse or abdominal separation: (Patient-Rptd) No  Do you get regular exercise: (Patient-Rptd) No  Have you tried pelvic floor strengthening exercises for 4 weeks: (Patient-Rptd) Yes  Do you have any history of trauma that is relevant to your care that you d like to share: (Patient-Rptd) No     Discussed reason for referral regarding pelvic health needs and external/internal pelvic floor muscle examination with patient/guardian.  Opportunity provided to ask questions and verbal consent for assessment and intervention was given.    ** did not have time today to complete full subj hx forms, this is from previous episode of care, will need to review      OBJECTIVE  OB History    Para Term  AB Living   13 8 3 5 4 7   SAB IAB Ectopic Multiple Live Births   0 4 0 0 8      # Outcome Date GA Lbr Gulshan/2nd Weight Sex Type Anes PTL Lv   13             12 IAB 24 6w6d    IAB      11 IAB 21           10 IAB 20           9  20 32w6d 01:45 / 00:02 2.05 kg (4 lb 8.3 oz) F Vag-Spont None  MELISSA      Name: LUKAS,GIRL MOMYOUA      Apgar1: 8  Apgar5: 8   8  19 32w0d   M Vag-Spont   MELISSA   7 Term 18 38w0d  3.175 kg (7 lb) F Vag-Spont   MELISSA   6  17 36w0d  2.722 kg (6 lb) F Vag-Spont None  MELISSA   5 IAB 09/15/14 12w0d          4 Term 13 39w0d  2.438 kg (5 lb 6 oz) F Vag-Spont   MELISSA   3  13 22w0d   M Vag-Spont   DEC      Birth Comments: System Generated. Please review and  update pregnancy details.   2  12 36w0d  3.6 kg (7 lb 15 oz) F Vag-Spont  Y MELISSA      Name: noemí Alejandro Term 01/29/10 40w0d  3.317 kg (7 lb 5 oz) M Vag-Spont None N MELISSA      Name: Salomón       GENERAL SCREENING    GAIT:  WFL    POSTURE:   Ant pelvic tilt, forward shoulders     SPINE/HIP SCREEN (STRENGTH/ROM):   Globally anti-gravity with functional movement  General weakness in stabilizing muscles including hip abductors, adductors and extensors     ROM -- mild limitation of hip ER R>L   Trunk rotation: WFL     PALPATION:  Tension noted B QLs and lumbar paraspinals, B adductors    Functional Strength Testing: Double Leg Squat: Anterior knee translation, Knee valgus, Hip internal rotation, and Improper use of glutes/hips      BREATHING SYMMETRY: Decreased rib cage mobility    PELVIC EXAM  The patient has provided verbal consent to proceed with the pelvic floor assessment and understands the procedure, including any necessary internal examination, before and during the assessment.    External Visual Inspection:  Active Pelvic Floor Muscles:  Voluntary contraction:  present  Voluntary Relaxation: present with cues  Cough( Involuntary Contraction):  present  Integumentary:   Tissue Concerns: normal appearing vagina with normal color and discharge, no lesions  Introitus: Unremarkable  Scar:   Location/Type:  perineal  Mobility: hypomobile - mild, denies tenderness     Internal Digital Palpation:    Layer 1  Urogenital Triangle(ischiocavernosus, Bulbospongiosus, Superficial Transverse Perineal Muscles) Tension pt denies tenderness    Strength of Pelvic Floor Musculature:   Laycock's Modified Oxford Scale Score: Pelvic Floor Strength: pelvic MMT: 2   Layer 2  Levator Ani (pubococcygeus, iliococcygeus, puborectalis) Tension, mild tenderness L side    Strength of Pelvic Floor Musculature:   Laycock's Modified Oxford Scale Score: Pelvic Floor Strength: pelvic MMT: 2   Layer 3  Obturator Internus  Tension, pt denies  tenderness though notes usually the discomfort is midline and a bit deeper toward cervix    Strength of Pelvic Floor Musculature:   Laycock's Modified Oxford Scale Score: Pelvic Floor Strength: pelvic MMT: 2     Response to Bearing Down : Response to Bearing Down:  Anterior Wall Sag prolapse grade: Contained    Compensations: Abdominals, Breath holding  Endurance: Can maintain contraction for 10 sec   Patient is able to perform 4 quick contractions in 10 seconds     Coordination: fair    With diaphragm: poor  With TA: fair  With cough: fair  With big belly/hard belly: fair, difficulty    ABDOMINAL ASSESSMENT  Abdominal Activation/Strength: engages though some breath holding    BIOFEEDBACK:  Perianals: Baseline muscle activity: 7.4 microvolts  Max muscle activity: 17mV    Assessment & Plan   CLINICAL IMPRESSIONS  Medical Diagnosis: Female bladder prolapse (N81.10)    Treatment Diagnosis: prolapse (1/2 anterior-bladder);   Impression/Assessment: Patient is a 35 year old - year old female with bladder prolapse, pelvic floor tension and weakness, and improper mechanics .  The following significant findings have been identified: Pain, Decreased ROM/flexibility, Decreased joint mobility, Decreased strength, Impaired balance, Impaired muscle performance, Decreased activity tolerance, and Impaired posture. These impairments interfere with their ability to perform self care tasks, recreational activities, household chores, and community mobility as compared to previous level of function. Pt is appropriate for skilled PT intervention.     Clinical Decision Making (Complexity):  Clinical Presentation: Stable/Uncomplicated  Clinical Presentation Rationale: based on medical and personal factors listed in PT evaluation  Clinical Decision Making (Complexity): Low complexity    PLAN OF CARE  Treatment Interventions:  Gait Training, Manual Therapy, Neuromuscular Re-education, Therapeutic Activity, Therapeutic Exercise,  Self-Care/Home Management  Biofeedback, Cryotherapy, Dry Needling, E-stim, Mechanical Traction, Ultrasound    Long Term Goals     PT Goal 1  Goal Identifier: PF discomfort  Goal Description: Pt will implement relaxation strategies and stretching in order to reduce tension in the pelvic floor muscles and improve pain/discomfort symptoms when engaging in intercourse or pelvic exams for return to functional activity within a more comfortable range.  Rationale: to maximize safety and independence within the home  Goal Progress: progressing  Target Date: 10/18/25  PT Goal 2  Goal Identifier: Bulging/heaviness sensation  Goal Description: Pt will report having the feeling of heaviness and bulging sensation (correlated with pelvic muscle tension and grade 1/2 prolapse) <1x/week in order to demonstrate more functional pelvic floor muscles and improved engagement of the pelvic floor at appropriate times.  Rationale: to maximize safety and independence with performance of ADLs and functional tasks  Goal Progress: progressing  Target Date: 10/18/25  PT Goal 3  Goal Identifier: PF engagement in standing exercises  Goal Description: Pt will understand the biomechanics of the pelvic floor and demonstrate appropriate PF and abdominal engagmement with functional activity such as repeated sit-to-stands, squats and lunges, in order to reduce stress on the pelvic floor and decrease heaviness/prolapse in standing positions.  Rationale: to maximize safety and independence with performance of ADLs and functional tasks  Goal Progress: progressing  Target Date: 10/18/25      Frequency of Treatment: 1x/week  Duration of Treatment: 90 days    Recommended Referrals to Other Professionals:   Education Assessment:   Learner/Method: Patient  Education Comments: Education regarding mechanisms and rationale for physical therapy interventions selected to address their goals. Discussed self management strategies for ways patient can actively support  progress towards goals. Education regarding activity precautions/restrictions. Addressed patients questions and concerns to their satisfaction prior to completion of visit.    Risks and benefits of evaluation/treatment have been explained.   Patient/Family/caregiver agrees with Plan of Care.     Evaluation Time:     PT Eval, Low Complexity Minutes (50687): 25     Signing Clinician: Marcella Villalta DPT        [facility]                                                                                   OUTPATIENT PHYSICAL THERAPY      PLAN OF TREATMENT FOR OUTPATIENT REHABILITATION   Patient's Last Name, First Name, Estela Duffy    YOB: 1990   Provider's Name   [facility]   Medical Record No.  [id number]     Onset Date: 07/11/25 (Dr. Leyla Tavera)  Start of Care Date: 07/21/25     Medical Diagnosis:  Female bladder prolapse (N81.10)      PT Treatment Diagnosis:  prolapse (1/2 anterior-bladder); Plan of Treatment  Frequency/Duration: 1x/week/ 90 days    Certification date from 07/21/25 to 10/18/25         See note for plan of treatment details and functional goals     Marcella Villalta, PT                         I CERTIFY THE NEED FOR THESE SERVICES FURNISHED UNDER        THIS PLAN OF TREATMENT AND WHILE UNDER MY CARE     (Physician attestation of this document indicates review and certification of the therapy plan).              Referring Provider:  Caprice Giron    Initial Assessment  See Epic Evaluation- Start of Care Date: 07/21/25        I agree with the above management.    Caprice Giron MD

## 2025-07-28 ENCOUNTER — TELEPHONE (OUTPATIENT)
Dept: OBGYN | Facility: CLINIC | Age: 35
End: 2025-07-28
Payer: COMMERCIAL

## 2025-07-28 NOTE — TELEPHONE ENCOUNTER
Received Physical Therapy Plan of Care    Placed form in Kristi's mailbox for Dr. Tavera to review.

## 2025-07-31 ENCOUNTER — THERAPY VISIT (OUTPATIENT)
Dept: PHYSICAL THERAPY | Facility: REHABILITATION | Age: 35
End: 2025-07-31
Payer: COMMERCIAL

## 2025-07-31 DIAGNOSIS — N39.3 STRESS INCONTINENCE OF URINE: ICD-10-CM

## 2025-07-31 DIAGNOSIS — N81.10 FEMALE BLADDER PROLAPSE: Primary | ICD-10-CM

## 2025-07-31 DIAGNOSIS — N81.89 PELVIC FLOOR WEAKNESS IN FEMALE: ICD-10-CM

## 2025-07-31 DIAGNOSIS — M62.89 PELVIC FLOOR TENSION: ICD-10-CM

## 2025-07-31 DIAGNOSIS — R29.898 HIP WEAKNESS: ICD-10-CM

## 2025-08-04 ENCOUNTER — MYC MEDICAL ADVICE (OUTPATIENT)
Dept: HEMATOLOGY | Facility: CLINIC | Age: 35
End: 2025-08-04
Payer: COMMERCIAL

## 2025-08-06 ENCOUNTER — OFFICE VISIT (OUTPATIENT)
Dept: CARDIOLOGY | Facility: CLINIC | Age: 35
End: 2025-08-06
Attending: NURSE PRACTITIONER
Payer: COMMERCIAL

## 2025-08-06 VITALS
WEIGHT: 156.3 LBS | OXYGEN SATURATION: 99 % | DIASTOLIC BLOOD PRESSURE: 75 MMHG | SYSTOLIC BLOOD PRESSURE: 113 MMHG | BODY MASS INDEX: 28.59 KG/M2 | HEART RATE: 58 BPM

## 2025-08-06 DIAGNOSIS — I46.9 CARDIAC ARREST (H): ICD-10-CM

## 2025-08-06 DIAGNOSIS — I48.92 ATRIAL FLUTTER, UNSPECIFIED TYPE (H): Primary | ICD-10-CM

## 2025-08-06 DIAGNOSIS — Z95.810 ICD (IMPLANTABLE CARDIOVERTER-DEFIBRILLATOR) IN PLACE: ICD-10-CM

## 2025-08-06 DIAGNOSIS — I82.C22: ICD-10-CM

## 2025-08-06 LAB
ATRIAL RATE - MUSE: 53 BPM
DIASTOLIC BLOOD PRESSURE - MUSE: NORMAL MMHG
INTERPRETATION ECG - MUSE: NORMAL
P AXIS - MUSE: 78 DEGREES
PR INTERVAL - MUSE: 152 MS
QRS DURATION - MUSE: 72 MS
QT - MUSE: 436 MS
QTC - MUSE: 409 MS
R AXIS - MUSE: 27 DEGREES
SYSTOLIC BLOOD PRESSURE - MUSE: NORMAL MMHG
T AXIS - MUSE: 34 DEGREES
VENTRICULAR RATE- MUSE: 53 BPM

## 2025-08-06 PROCEDURE — G0463 HOSPITAL OUTPT CLINIC VISIT: HCPCS

## 2025-08-06 PROCEDURE — 93005 ELECTROCARDIOGRAM TRACING: CPT

## 2025-08-06 RX ORDER — SOTALOL HYDROCHLORIDE 80 MG/1
80 TABLET ORAL 2 TIMES DAILY
Qty: 180 TABLET | Refills: 0 | Status: SHIPPED | OUTPATIENT
Start: 2025-08-06

## 2025-08-06 ASSESSMENT — PAIN SCALES - GENERAL: PAINLEVEL_OUTOF10: NO PAIN (0)

## 2025-08-07 ENCOUNTER — THERAPY VISIT (OUTPATIENT)
Dept: PHYSICAL THERAPY | Facility: REHABILITATION | Age: 35
End: 2025-08-07
Payer: COMMERCIAL

## 2025-08-07 DIAGNOSIS — N81.10 FEMALE BLADDER PROLAPSE: Primary | ICD-10-CM

## 2025-08-07 DIAGNOSIS — N81.89 PELVIC FLOOR WEAKNESS IN FEMALE: ICD-10-CM

## 2025-08-07 DIAGNOSIS — R29.898 HIP WEAKNESS: ICD-10-CM

## 2025-08-07 DIAGNOSIS — M62.89 PELVIC FLOOR TENSION: ICD-10-CM

## 2025-08-12 ENCOUNTER — MYC REFILL (OUTPATIENT)
Dept: HEMATOLOGY | Facility: CLINIC | Age: 35
End: 2025-08-12
Payer: COMMERCIAL

## 2025-08-12 DIAGNOSIS — I82.220 IVC THROMBOSIS (H): ICD-10-CM

## 2025-08-12 DIAGNOSIS — Z79.01 CHRONIC ANTICOAGULATION: ICD-10-CM

## 2025-08-13 ENCOUNTER — LAB (OUTPATIENT)
Dept: LAB | Facility: CLINIC | Age: 35
End: 2025-08-13
Payer: COMMERCIAL

## 2025-08-13 DIAGNOSIS — Z79.01 CHRONIC ANTICOAGULATION: ICD-10-CM

## 2025-08-13 DIAGNOSIS — I82.220 IVC THROMBOSIS (H): ICD-10-CM

## 2025-08-13 LAB
ALBUMIN SERPL BCG-MCNC: 4.3 G/DL (ref 3.5–5.2)
ALP SERPL-CCNC: 54 U/L (ref 40–150)
ALT SERPL W P-5'-P-CCNC: 12 U/L (ref 0–50)
ANION GAP SERPL CALCULATED.3IONS-SCNC: 11 MMOL/L (ref 7–15)
AST SERPL W P-5'-P-CCNC: 20 U/L (ref 0–45)
BASOPHILS # BLD AUTO: 0.03 10E3/UL (ref 0–0.2)
BASOPHILS NFR BLD AUTO: 0.4 %
BILIRUB SERPL-MCNC: 1 MG/DL
BUN SERPL-MCNC: 14.5 MG/DL (ref 6–20)
CALCIUM SERPL-MCNC: 9.3 MG/DL (ref 8.8–10.4)
CHLORIDE SERPL-SCNC: 102 MMOL/L (ref 98–107)
CREAT SERPL-MCNC: 0.97 MG/DL (ref 0.51–0.95)
CRP SERPL-MCNC: <3 MG/L
EGFRCR SERPLBLD CKD-EPI 2021: 78 ML/MIN/1.73M2
EOSINOPHIL # BLD AUTO: 0.13 10E3/UL (ref 0–0.7)
EOSINOPHIL NFR BLD AUTO: 1.8 %
ERYTHROCYTE [DISTWIDTH] IN BLOOD BY AUTOMATED COUNT: 12.7 % (ref 10–15)
ERYTHROCYTE [SEDIMENTATION RATE] IN BLOOD BY WESTERGREN METHOD: 11 MM/HR (ref 0–20)
FERRITIN SERPL-MCNC: 25 NG/ML (ref 6–175)
GLUCOSE SERPL-MCNC: 106 MG/DL (ref 70–99)
HCO3 SERPL-SCNC: 26 MMOL/L (ref 22–29)
HCT VFR BLD AUTO: 41.3 % (ref 35–47)
HGB BLD-MCNC: 13.4 G/DL (ref 11.7–15.7)
IMM GRANULOCYTES # BLD: <0.03 10E3/UL
IMM GRANULOCYTES NFR BLD: 0.1 %
IRON BINDING CAPACITY (ROCHE): 324 UG/DL (ref 240–430)
IRON SATN MFR SERPL: 36 % (ref 15–46)
IRON SERPL-MCNC: 118 UG/DL (ref 37–145)
LYMPHOCYTES # BLD AUTO: 1.21 10E3/UL (ref 0.8–5.3)
LYMPHOCYTES NFR BLD AUTO: 16.7 %
MCH RBC QN AUTO: 27.3 PG (ref 26.5–33)
MCHC RBC AUTO-ENTMCNC: 32.4 G/DL (ref 31.5–36.5)
MCV RBC AUTO: 84.3 FL (ref 78–100)
MONOCYTES # BLD AUTO: 0.3 10E3/UL (ref 0–1.3)
MONOCYTES NFR BLD AUTO: 4.1 %
NEUTROPHILS # BLD AUTO: 5.55 10E3/UL (ref 1.6–8.3)
NEUTROPHILS NFR BLD AUTO: 76.9 %
NRBC # BLD AUTO: <0.03 10E3/UL
NRBC BLD AUTO-RTO: 0 /100
PLATELET # BLD AUTO: 222 10E3/UL (ref 150–450)
POTASSIUM SERPL-SCNC: 3.7 MMOL/L (ref 3.4–5.3)
PROT SERPL-MCNC: 7.2 G/DL (ref 6.4–8.3)
RBC # BLD AUTO: 4.9 10E6/UL (ref 3.8–5.2)
RETICS # AUTO: 0.05 10E6/UL (ref 0.03–0.1)
RETICS/RBC NFR AUTO: 1.01 % (ref 0.5–2)
SODIUM SERPL-SCNC: 139 MMOL/L (ref 135–145)
WBC # BLD AUTO: 7.23 10E3/UL (ref 4–11)

## 2025-08-13 PROCEDURE — 80053 COMPREHEN METABOLIC PANEL: CPT | Performed by: PATHOLOGY

## 2025-08-13 PROCEDURE — 85045 AUTOMATED RETICULOCYTE COUNT: CPT | Performed by: PATHOLOGY

## 2025-08-13 PROCEDURE — 36415 COLL VENOUS BLD VENIPUNCTURE: CPT | Performed by: PATHOLOGY

## 2025-08-13 PROCEDURE — 85025 COMPLETE CBC W/AUTO DIFF WBC: CPT | Performed by: PATHOLOGY

## 2025-08-13 PROCEDURE — 82728 ASSAY OF FERRITIN: CPT | Performed by: PATHOLOGY

## 2025-08-13 PROCEDURE — 86140 C-REACTIVE PROTEIN: CPT | Performed by: PATHOLOGY

## 2025-08-13 PROCEDURE — 85652 RBC SED RATE AUTOMATED: CPT | Performed by: PATHOLOGY

## 2025-08-13 PROCEDURE — 83550 IRON BINDING TEST: CPT | Performed by: PATHOLOGY

## 2025-08-13 PROCEDURE — 83540 ASSAY OF IRON: CPT | Performed by: PATHOLOGY

## 2025-08-13 RX ORDER — IRON POLYSACCHARIDE COMPLEX 150 MG
1 CAPSULE ORAL DAILY
Qty: 90 CAPSULE | Refills: 1 | Status: SHIPPED | OUTPATIENT
Start: 2025-08-13

## 2025-08-14 ENCOUNTER — THERAPY VISIT (OUTPATIENT)
Dept: PHYSICAL THERAPY | Facility: REHABILITATION | Age: 35
End: 2025-08-14
Payer: COMMERCIAL

## 2025-08-14 DIAGNOSIS — R29.898 HIP WEAKNESS: ICD-10-CM

## 2025-08-14 DIAGNOSIS — N81.89 PELVIC FLOOR WEAKNESS IN FEMALE: ICD-10-CM

## 2025-08-14 DIAGNOSIS — N81.10 FEMALE BLADDER PROLAPSE: Primary | ICD-10-CM

## 2025-08-14 DIAGNOSIS — M62.89 PELVIC FLOOR TENSION: ICD-10-CM

## (undated) DEVICE — ESU GROUND PAD UNIVERSAL W/O CORD

## (undated) DEVICE — LINEN DRAPE 54X72" 5467

## (undated) DEVICE — WIRE GUIDE 0.035"X145CM AMPLATZ XSTIFF J THSCF-35-145-3-AES

## (undated) DEVICE — PAD PERI INDIV WRAP 11" NON241286

## (undated) DEVICE — GLOVE BIOGEL PI MICRO INDICATOR UNDERGLOVE SZ 7.5 48975

## (undated) DEVICE — TUBING PRESSURE 30"

## (undated) DEVICE — SOLUTION WATER 1000ML BOTTLE R5000-01

## (undated) DEVICE — SUCTION CANNULA UTERINE 08MM CVD 022108-10

## (undated) DEVICE — LINEN GOWN X4 5410

## (undated) DEVICE — DECANTER TRANSFER DEVICE 2008S

## (undated) DEVICE — GLOVE BIOGEL PI ULTRATOUCH SZ 6.0 41160

## (undated) DEVICE — ENDO TROCAR FIRST ENTRY KII FIOS Z-THRD 05X100MM CTF03

## (undated) DEVICE — GOWN XLG DISP 9545

## (undated) DEVICE — INTRO SHEATH 6FRX25CM PINNACLE RSS606

## (undated) DEVICE — PAD CHUX UNDERPAD 30X36" P3036C

## (undated) DEVICE — PREP DYNA-HEX 4% CHG SCRUB 4OZ BOTTLE MDS098710

## (undated) DEVICE — LINEN TOWEL PACK X5 5464

## (undated) DEVICE — SUCTION CANNULA UTERINE 07MM CVD 022107-10

## (undated) DEVICE — WIRE GUIDE 0.035"X150CM EMERALD J TIP 502521

## (undated) DEVICE — GLOVE BIOGEL PI ULTRATOUCH G SZ 6.5 42165

## (undated) DEVICE — SUCTION VACUUM CANISTER STANDARD W/LID&CAPS 003987-901

## (undated) DEVICE — MANIFOLD KIT ANGIO AUTOMATED 014613

## (undated) DEVICE — KIT HAND CONTROL ACIST 016795

## (undated) DEVICE — SPECIMEN TRAP VACUUM SUCTION SAFETOUCH 003853-902

## (undated) DEVICE — PACK HEART LEFT CUSTOM

## (undated) DEVICE — CATH ANGIO INFINITI 3DRC 6FRX100CM 534676T

## (undated) DEVICE — Device

## (undated) DEVICE — SUCTION MANIFOLD NEPTUNE 2 SYS 4 PORT 0702-020-000

## (undated) DEVICE — ESU LIGASURE LAPAROSCOPIC BLUNT TIP SEALER 5MMX37CM LF1837

## (undated) DEVICE — CATH TRAY FOLEY SURESTEP 16FR WDRAIN BAG STLK LATEX A300316A

## (undated) DEVICE — SOLUTION IRRIGATION 0.9% NACL 1000ML BOTTLE R5200-01

## (undated) DEVICE — GLOVE BIOGEL PI MICRO SZ 7.0 48570

## (undated) DEVICE — WIPES FOLEY CARE SURESTEP PROVON DFC100

## (undated) DEVICE — ENDO TROCAR SLEEVE KII Z-THREADED 05X100MM CTS02

## (undated) DEVICE — SYR 10ML FINGER CONTROL W/O NDL 309695

## (undated) DEVICE — TUBING SUCTION VACUUM COLLECTION 6FTX3/8" 022310

## (undated) DEVICE — JELLY LUBRICATING SURGILUBE 2OZ TUBE 0281-0205-02

## (undated) DEVICE — CLEANER INST PRE-KLENZ SOAK SHIELD TUBE 6 ML MEDIUM 2D66J4

## (undated) DEVICE — SOLUTION IV 0.9% NACL 1000ML E8000

## (undated) DEVICE — CATH ANGIO SUPERTORQUE PLUS JL4 6FRX100CM 533620

## (undated) DEVICE — SOLIDIFIER (USE FOR UP TO 1500CC) MSOLID1500

## (undated) DEVICE — SUCTION CANNULA UTERINE 10MM CVD 022110-10

## (undated) DEVICE — SU MONOCRYL 4-0 PS-2 18" UND Y496G

## (undated) DEVICE — SU SILK 3-0 TIE 12X30" A304H

## (undated) DEVICE — STRAP KNEE/BODY 31143004

## (undated) DEVICE — SU DERMABOND ADVANCED .7ML DNX12

## (undated) DEVICE — SOL NACL 0.9% IRRIG 1000ML BOTTLE 2F7124

## (undated) DEVICE — GLOVE BIOGEL PI MICRO INDICATOR UNDERGLOVE SZ 6.0 48960

## (undated) DEVICE — ELECTRODE DEFIB CADENCE 22550R

## (undated) DEVICE — ESU HOLDER LAP INST DISP PURPLE LONG 330MM H-PRO-330

## (undated) DEVICE — NDL INSUFFLATION 13GA 120MM C2201

## (undated) DEVICE — PANTIES MESH LG/XLG 2PK 706M2

## (undated) DEVICE — TUBING SMOKE EVAC PNEUMOCLEAR HIGH FLOW 0620050250

## (undated) DEVICE — ENDO SCOPE WARMER LF TM500

## (undated) DEVICE — UNDERPAD 36X30 PREMIERPRO MAX ABS NS LF 676111

## (undated) DEVICE — GLOVE BIOGEL PI MICRO INDICATOR UNDERGLOVE SZ 7.0 48970

## (undated) RX ORDER — FENTANYL CITRATE 50 UG/ML
INJECTION, SOLUTION INTRAMUSCULAR; INTRAVENOUS
Status: DISPENSED
Start: 2023-08-15

## (undated) RX ORDER — VASOPRESSIN 20 U/ML
INJECTION PARENTERAL
Status: DISPENSED
Start: 2024-02-16

## (undated) RX ORDER — ONDANSETRON 2 MG/ML
INJECTION INTRAMUSCULAR; INTRAVENOUS
Status: DISPENSED
Start: 2025-06-05

## (undated) RX ORDER — ONDANSETRON 4 MG/1
TABLET, ORALLY DISINTEGRATING ORAL
Status: DISPENSED
Start: 2025-04-22

## (undated) RX ORDER — PROPOFOL 10 MG/ML
INJECTION, EMULSION INTRAVENOUS
Status: DISPENSED
Start: 2025-06-05

## (undated) RX ORDER — ACETAMINOPHEN 325 MG/1
TABLET ORAL
Status: DISPENSED
Start: 2024-02-16

## (undated) RX ORDER — LIDOCAINE HYDROCHLORIDE 10 MG/ML
INJECTION, SOLUTION EPIDURAL; INFILTRATION; INTRACAUDAL; PERINEURAL
Status: DISPENSED
Start: 2025-06-05

## (undated) RX ORDER — PROPOFOL 10 MG/ML
INJECTION, EMULSION INTRAVENOUS
Status: DISPENSED
Start: 2023-08-15

## (undated) RX ORDER — PROPOFOL 10 MG/ML
INJECTION, EMULSION INTRAVENOUS
Status: DISPENSED
Start: 2025-04-22

## (undated) RX ORDER — ONDANSETRON 2 MG/ML
INJECTION INTRAMUSCULAR; INTRAVENOUS
Status: DISPENSED
Start: 2025-04-22

## (undated) RX ORDER — OXYCODONE HYDROCHLORIDE 5 MG/1
TABLET ORAL
Status: DISPENSED
Start: 2025-04-22

## (undated) RX ORDER — LIDOCAINE HYDROCHLORIDE 10 MG/ML
INJECTION, SOLUTION EPIDURAL; INFILTRATION; INTRACAUDAL; PERINEURAL
Status: DISPENSED
Start: 2024-02-16

## (undated) RX ORDER — EPHEDRINE SULFATE 50 MG/ML
INJECTION, SOLUTION INTRAMUSCULAR; INTRAVENOUS; SUBCUTANEOUS
Status: DISPENSED
Start: 2023-08-15

## (undated) RX ORDER — FENTANYL CITRATE-0.9 % NACL/PF 10 MCG/ML
PLASTIC BAG, INJECTION (ML) INTRAVENOUS
Status: DISPENSED
Start: 2025-04-22

## (undated) RX ORDER — LIDOCAINE HYDROCHLORIDE 10 MG/ML
INJECTION, SOLUTION EPIDURAL; INFILTRATION; INTRACAUDAL; PERINEURAL
Status: DISPENSED
Start: 2025-04-22

## (undated) RX ORDER — FENTANYL CITRATE-0.9 % NACL/PF 10 MCG/ML
PLASTIC BAG, INJECTION (ML) INTRAVENOUS
Status: DISPENSED
Start: 2024-02-16

## (undated) RX ORDER — HYDROMORPHONE HYDROCHLORIDE 1 MG/ML
INJECTION, SOLUTION INTRAMUSCULAR; INTRAVENOUS; SUBCUTANEOUS
Status: DISPENSED
Start: 2025-04-22

## (undated) RX ORDER — FENTANYL CITRATE 50 UG/ML
INJECTION, SOLUTION INTRAMUSCULAR; INTRAVENOUS
Status: DISPENSED
Start: 2024-02-16

## (undated) RX ORDER — DEXAMETHASONE SODIUM PHOSPHATE 4 MG/ML
INJECTION, SOLUTION INTRA-ARTICULAR; INTRALESIONAL; INTRAMUSCULAR; INTRAVENOUS; SOFT TISSUE
Status: DISPENSED
Start: 2025-06-05

## (undated) RX ORDER — ACETAMINOPHEN 325 MG/1
TABLET ORAL
Status: DISPENSED
Start: 2025-04-22

## (undated) RX ORDER — BUPIVACAINE HYDROCHLORIDE 2.5 MG/ML
INJECTION, SOLUTION EPIDURAL; INFILTRATION; INTRACAUDAL; PERINEURAL
Status: DISPENSED
Start: 2025-04-22

## (undated) RX ORDER — ACETAMINOPHEN 325 MG/1
TABLET ORAL
Status: DISPENSED
Start: 2025-06-05

## (undated) RX ORDER — NOREPINEPHRINE BITARTRATE 0.06 MG/ML
INJECTION, SOLUTION INTRAVENOUS
Status: DISPENSED
Start: 2019-12-19

## (undated) RX ORDER — LIDOCAINE HYDROCHLORIDE 20 MG/ML
INJECTION, SOLUTION INFILTRATION; PERINEURAL
Status: DISPENSED
Start: 2023-08-15

## (undated) RX ORDER — FENTANYL CITRATE 50 UG/ML
INJECTION, SOLUTION INTRAMUSCULAR; INTRAVENOUS
Status: DISPENSED
Start: 2025-04-22

## (undated) RX ORDER — FENTANYL CITRATE 50 UG/ML
INJECTION, SOLUTION INTRAMUSCULAR; INTRAVENOUS
Status: DISPENSED
Start: 2023-08-05

## (undated) RX ORDER — DEXAMETHASONE SODIUM PHOSPHATE 4 MG/ML
INJECTION, SOLUTION INTRA-ARTICULAR; INTRALESIONAL; INTRAMUSCULAR; INTRAVENOUS; SOFT TISSUE
Status: DISPENSED
Start: 2025-04-22

## (undated) RX ORDER — CEFAZOLIN SODIUM 1 G/3ML
INJECTION, POWDER, FOR SOLUTION INTRAMUSCULAR; INTRAVENOUS
Status: DISPENSED
Start: 2023-08-15

## (undated) RX ORDER — IBUPROFEN 400 MG/1
TABLET, FILM COATED ORAL
Status: DISPENSED
Start: 2025-04-22

## (undated) RX ORDER — ONDANSETRON 2 MG/ML
INJECTION INTRAMUSCULAR; INTRAVENOUS
Status: DISPENSED
Start: 2024-02-16

## (undated) RX ORDER — FENTANYL CITRATE 50 UG/ML
INJECTION, SOLUTION INTRAMUSCULAR; INTRAVENOUS
Status: DISPENSED
Start: 2025-06-05

## (undated) RX ORDER — MISOPROSTOL 200 UG/1
TABLET ORAL
Status: DISPENSED
Start: 2024-02-16